# Patient Record
Sex: FEMALE | Race: WHITE | NOT HISPANIC OR LATINO | Employment: FULL TIME | URBAN - METROPOLITAN AREA
[De-identification: names, ages, dates, MRNs, and addresses within clinical notes are randomized per-mention and may not be internally consistent; named-entity substitution may affect disease eponyms.]

---

## 2019-09-03 RX ORDER — LANSOPRAZOLE 30 MG/1
30 CAPSULE, DELAYED RELEASE ORAL DAILY
Qty: 90 CAPSULE | Refills: 0 | Status: SHIPPED | OUTPATIENT
Start: 2019-09-03 | End: 2020-03-02

## 2019-11-25 RX ORDER — ONDANSETRON 4 MG/1
4 TABLET, FILM COATED ORAL EVERY 8 HOURS PRN
Qty: 30 TABLET | Refills: 1 | Status: SHIPPED | OUTPATIENT
Start: 2019-11-25 | End: 2019-12-05

## 2020-01-13 ENCOUNTER — OFFICE VISIT (OUTPATIENT)
Dept: FAMILY MEDICINE CLINIC | Facility: CLINIC | Age: 50
End: 2020-01-13

## 2020-01-13 VITALS
HEIGHT: 65 IN | DIASTOLIC BLOOD PRESSURE: 82 MMHG | OXYGEN SATURATION: 98 % | HEART RATE: 106 BPM | BODY MASS INDEX: 39.09 KG/M2 | TEMPERATURE: 98.9 F | WEIGHT: 234.6 LBS | SYSTOLIC BLOOD PRESSURE: 158 MMHG

## 2020-01-13 DIAGNOSIS — E06.3 HYPOTHYROIDISM DUE TO HASHIMOTO'S THYROIDITIS: ICD-10-CM

## 2020-01-13 DIAGNOSIS — G43.109 MIGRAINE WITH AURA AND WITHOUT STATUS MIGRAINOSUS, NOT INTRACTABLE: ICD-10-CM

## 2020-01-13 DIAGNOSIS — F43.22 ADJUSTMENT DISORDER WITH ANXIOUS MOOD: ICD-10-CM

## 2020-01-13 DIAGNOSIS — K21.9 GASTROESOPHAGEAL REFLUX DISEASE, ESOPHAGITIS PRESENCE NOT SPECIFIED: ICD-10-CM

## 2020-01-13 DIAGNOSIS — I10 ESSENTIAL HYPERTENSION: ICD-10-CM

## 2020-01-13 DIAGNOSIS — E03.8 HYPOTHYROIDISM DUE TO HASHIMOTO'S THYROIDITIS: ICD-10-CM

## 2020-01-13 DIAGNOSIS — Z79.899 ENCOUNTER FOR LONG-TERM (CURRENT) USE OF MEDICATIONS: ICD-10-CM

## 2020-01-13 DIAGNOSIS — I10 ESSENTIAL HYPERTENSION: Primary | ICD-10-CM

## 2020-01-13 PROBLEM — G43.909 HEADACHE, MIGRAINE: Status: ACTIVE | Noted: 2020-01-13

## 2020-01-13 PROBLEM — E03.9 HYPOTHYROIDISM: Status: ACTIVE | Noted: 2020-01-13

## 2020-01-13 LAB
BILIRUB BLD-MCNC: NEGATIVE MG/DL
GLUCOSE UR STRIP-MCNC: NEGATIVE MG/DL
KETONES UR QL: NEGATIVE
LEUKOCYTE EST, POC: NEGATIVE
NITRITE UR-MCNC: NEGATIVE MG/ML
PH UR: 6 [PH] (ref 5–8)
PROT UR STRIP-MCNC: ABNORMAL MG/DL
RBC # UR STRIP: NEGATIVE /UL
SP GR UR: 1.03 (ref 1–1.03)
UROBILINOGEN UR QL: NORMAL

## 2020-01-13 PROCEDURE — 99214 OFFICE O/P EST MOD 30 MIN: CPT | Performed by: FAMILY MEDICINE

## 2020-01-13 PROCEDURE — 93000 ELECTROCARDIOGRAM COMPLETE: CPT | Performed by: FAMILY MEDICINE

## 2020-01-13 PROCEDURE — 81003 URINALYSIS AUTO W/O SCOPE: CPT | Performed by: FAMILY MEDICINE

## 2020-01-13 RX ORDER — AMITRIPTYLINE HYDROCHLORIDE 50 MG/1
TABLET, FILM COATED ORAL
Refills: 1 | COMMUNITY
Start: 2019-11-15 | End: 2020-01-13 | Stop reason: SDUPTHER

## 2020-01-13 RX ORDER — HYDROXYZINE HYDROCHLORIDE 25 MG/1
25 TABLET, FILM COATED ORAL EVERY 8 HOURS PRN
Qty: 90 TABLET | Refills: 3 | Status: SHIPPED | OUTPATIENT
Start: 2020-01-13 | End: 2020-12-16 | Stop reason: SDUPTHER

## 2020-01-13 RX ORDER — AMITRIPTYLINE HYDROCHLORIDE 50 MG/1
50 TABLET, FILM COATED ORAL NIGHTLY
Qty: 90 TABLET | Refills: 3 | Status: SHIPPED | OUTPATIENT
Start: 2020-01-13 | End: 2021-03-18 | Stop reason: SDUPTHER

## 2020-01-13 RX ORDER — ESCITALOPRAM OXALATE 10 MG/1
10 TABLET ORAL DAILY
Qty: 90 TABLET | Refills: 3 | Status: SHIPPED | OUTPATIENT
Start: 2020-01-13 | End: 2020-11-30

## 2020-01-13 RX ORDER — THIAMINE HCL 100 MG
TABLET ORAL 2 TIMES DAILY
COMMUNITY
End: 2020-11-03

## 2020-01-13 RX ORDER — HYDROCHLOROTHIAZIDE 25 MG/1
25 TABLET ORAL DAILY
Qty: 30 TABLET | Refills: 1 | Status: SHIPPED | OUTPATIENT
Start: 2020-01-13 | End: 2020-03-13

## 2020-01-13 RX ORDER — HYDROCHLOROTHIAZIDE 25 MG/1
25 TABLET ORAL DAILY
Qty: 90 TABLET | OUTPATIENT
Start: 2020-01-13

## 2020-01-13 RX ORDER — ONDANSETRON 4 MG/1
TABLET, FILM COATED ORAL
COMMUNITY
Start: 2020-01-03 | End: 2020-03-06

## 2020-01-13 NOTE — PATIENT INSTRUCTIONS
"Surveillance labs were obtained today and any medication changes will be made based on lab results and will be called to the patient later this week.    I have started the patient on hydrochlorothiazide for the essential hypertension today.  Surveillance labs were obtained and follow-up will be in 1 month.    DASH Eating Plan  DASH stands for \"Dietary Approaches to Stop Hypertension.\" The DASH eating plan is a healthy eating plan that has been shown to reduce high blood pressure (hypertension). It may also reduce your risk for type 2 diabetes, heart disease, and stroke. The DASH eating plan may also help with weight loss.  What are tips for following this plan?    General guidelines  · Avoid eating more than 2,300 mg (milligrams) of salt (sodium) a day. If you have hypertension, you may need to reduce your sodium intake to 1,500 mg a day.  · Limit alcohol intake to no more than 1 drink a day for nonpregnant women and 2 drinks a day for men. One drink equals 12 oz of beer, 5 oz of wine, or 1½ oz of hard liquor.  · Work with your health care provider to maintain a healthy body weight or to lose weight. Ask what an ideal weight is for you.  · Get at least 30 minutes of exercise that causes your heart to beat faster (aerobic exercise) most days of the week. Activities may include walking, swimming, or biking.  · Work with your health care provider or diet and nutrition specialist (dietitian) to adjust your eating plan to your individual calorie needs.  Reading food labels    · Check food labels for the amount of sodium per serving. Choose foods with less than 5 percent of the Daily Value of sodium. Generally, foods with less than 300 mg of sodium per serving fit into this eating plan.  · To find whole grains, look for the word \"whole\" as the first word in the ingredient list.  Shopping  · Buy products labeled as \"low-sodium\" or \"no salt added.\"  · Buy fresh foods. Avoid canned foods and premade or frozen " meals.  Cooking  · Avoid adding salt when cooking. Use salt-free seasonings or herbs instead of table salt or sea salt. Check with your health care provider or pharmacist before using salt substitutes.  · Do not dolan foods. Cook foods using healthy methods such as baking, boiling, grilling, and broiling instead.  · Cook with heart-healthy oils, such as olive, canola, soybean, or sunflower oil.  Meal planning  · Eat a balanced diet that includes:  ? 5 or more servings of fruits and vegetables each day. At each meal, try to fill half of your plate with fruits and vegetables.  ? Up to 6-8 servings of whole grains each day.  ? Less than 6 oz of lean meat, poultry, or fish each day. A 3-oz serving of meat is about the same size as a deck of cards. One egg equals 1 oz.  ? 2 servings of low-fat dairy each day.  ? A serving of nuts, seeds, or beans 5 times each week.  ? Heart-healthy fats. Healthy fats called Omega-3 fatty acids are found in foods such as flaxseeds and coldwater fish, like sardines, salmon, and mackerel.  · Limit how much you eat of the following:  ? Canned or prepackaged foods.  ? Food that is high in trans fat, such as fried foods.  ? Food that is high in saturated fat, such as fatty meat.  ? Sweets, desserts, sugary drinks, and other foods with added sugar.  ? Full-fat dairy products.  · Do not salt foods before eating.  · Try to eat at least 2 vegetarian meals each week.  · Eat more home-cooked food and less restaurant, buffet, and fast food.  · When eating at a restaurant, ask that your food be prepared with less salt or no salt, if possible.  What foods are recommended?  The items listed may not be a complete list. Talk with your dietitian about what dietary choices are best for you.  Grains  Whole-grain or whole-wheat bread. Whole-grain or whole-wheat pasta. Brown rice. Oatmeal. Quinoa. Bulgur. Whole-grain and low-sodium cereals. Belkys bread. Low-fat, low-sodium crackers. Whole-wheat flour  tortillas.  Vegetables  Fresh or frozen vegetables (raw, steamed, roasted, or grilled). Low-sodium or reduced-sodium tomato and vegetable juice. Low-sodium or reduced-sodium tomato sauce and tomato paste. Low-sodium or reduced-sodium canned vegetables.  Fruits  All fresh, dried, or frozen fruit. Canned fruit in natural juice (without added sugar).  Meat and other protein foods  Skinless chicken or turkey. Ground chicken or turkey. Pork with fat trimmed off. Fish and seafood. Egg whites. Dried beans, peas, or lentils. Unsalted nuts, nut butters, and seeds. Unsalted canned beans. Lean cuts of beef with fat trimmed off. Low-sodium, lean deli meat.  Dairy  Low-fat (1%) or fat-free (skim) milk. Fat-free, low-fat, or reduced-fat cheeses. Nonfat, low-sodium ricotta or cottage cheese. Low-fat or nonfat yogurt. Low-fat, low-sodium cheese.  Fats and oils  Soft margarine without trans fats. Vegetable oil. Low-fat, reduced-fat, or light mayonnaise and salad dressings (reduced-sodium). Canola, safflower, olive, soybean, and sunflower oils. Avocado.  Seasoning and other foods  Herbs. Spices. Seasoning mixes without salt. Unsalted popcorn and pretzels. Fat-free sweets.  What foods are not recommended?  The items listed may not be a complete list. Talk with your dietitian about what dietary choices are best for you.  Grains  Baked goods made with fat, such as croissants, muffins, or some breads. Dry pasta or rice meal packs.  Vegetables  Creamed or fried vegetables. Vegetables in a cheese sauce. Regular canned vegetables (not low-sodium or reduced-sodium). Regular canned tomato sauce and paste (not low-sodium or reduced-sodium). Regular tomato and vegetable juice (not low-sodium or reduced-sodium). Pickles. Olives.  Fruits  Canned fruit in a light or heavy syrup. Fried fruit. Fruit in cream or butter sauce.  Meat and other protein foods  Fatty cuts of meat. Ribs. Fried meat. Ferrari. Sausage. Bologna and other processed lunch meats.  English Salami. Fatback. Hotdogs. Bratwurst. Salted nuts and seeds. Canned beans with added salt. Canned or smoked fish. Whole eggs or egg yolks. Chicken or turkey with skin.  Dairy  Whole or 2% milk, cream, and half-and-half. Whole or full-fat cream cheese. Whole-fat or sweetened yogurt. Full-fat cheese. Nondairy creamers. Whipped toppings. Processed cheese and cheese spreads.  Fats and oils  Butter. Stick margarine. Lard. Shortening. Ghee. Ferrari fat. Tropical oils, such as coconut, palm kernel, or palm oil.  Seasoning and other foods  Salted popcorn and pretzels. Onion salt, garlic salt, seasoned salt, table salt, and sea salt. Worcestershire sauce. Tartar sauce. Barbecue sauce. Teriyaki sauce. Soy sauce, including reduced-sodium. Steak sauce. Canned and packaged gravies. Fish sauce. Oyster sauce. Cocktail sauce. Horseradish that you find on the shelf. Ketchup. Mustard. Meat flavorings and tenderizers. Bouillon cubes. Hot sauce and Tabasco sauce. Premade or packaged marinades. Premade or packaged taco seasonings. Relishes. Regular salad dressings.  Where to find more information:  · National Heart, Lung, and Blood [place]: www.nhlbi.nih.gov  · American Heart Association: www.heart.org  Summary  · The DASH eating plan is a healthy eating plan that has been shown to reduce high blood pressure (hypertension). It may also reduce your risk for type 2 diabetes, heart disease, and stroke.  · With the DASH eating plan, you should limit salt (sodium) intake to 2,300 mg a day. If you have hypertension, you may need to reduce your sodium intake to 1,500 mg a day.  · When on the DASH eating plan, aim to eat more fresh fruits and vegetables, whole grains, lean proteins, low-fat dairy, and heart-healthy fats.  · Work with your health care provider or diet and nutrition specialist (dietitian) to adjust your eating plan to your individual calorie needs.  This information is not intended to replace advice given to you by your health  care provider. Make sure you discuss any questions you have with your health care provider.  Document Released: 12/06/2012 Document Revised: 12/11/2017 Document Reviewed: 12/11/2017  [employer] Interactive Patient Education © 2019 [employer]

## 2020-01-13 NOTE — PROGRESS NOTES
Subjective   Rosa Melgoza is a 49 y.o. female.     Chief Complaint   Patient presents with   • Hypothyroidism       Patient is here to follow-up on her chronic health conditions:    Migraine with aura.  Patient states that overall the Aimovig has reduced the number of migraines a month but she still is suffering with pretty severe migraine headaches.  Currently she has a headache today that is been there for 5 days.  She also takes amitriptyline 50 mg at bedtime.  And Fioricet as needed.  She has also started B2 and D3 vitamins to help.    GERD.  The patient takes lansoprazole 30 mg daily.  She denies any symptoms of reflux currently.  She denies any side effects of medication.    Hypothyroidism.  The patient currently takes levothyroxine 75 mcg daily.  She denies any hyper or hypothyroid symptoms at this time.    Elevated blood pressure reading.  The patient has had several elevated blood pressure readings over the past 6 to 9 months.  I believe that some of her headaches may be worsened by her blood pressure.  I think it is time to start treatment for high blood pressure.  Patient is in agreement.       Review of Systems   Constitutional: Negative for activity change, chills, fatigue and fever.   HENT: Negative for hearing loss, swollen glands, tinnitus and trouble swallowing.    Eyes: Negative for pain and visual disturbance.   Respiratory: Negative for cough and shortness of breath.    Cardiovascular: Negative for chest pain, palpitations and leg swelling.   Gastrointestinal: Negative for diarrhea and nausea.   Endocrine: Negative for polydipsia and polyuria.   Genitourinary: Negative for difficulty urinating and urinary incontinence.   Musculoskeletal: Negative for arthralgias, gait problem and joint swelling.   Skin: Negative for rash.   Allergic/Immunologic: Negative for immunocompromised state.   Neurological: Positive for headache. Negative for dizziness and light-headedness.   Hematological: Negative for  adenopathy. Does not bruise/bleed easily.   Psychiatric/Behavioral: Negative for dysphoric mood and sleep disturbance.       The following portions of the patient's history were reviewed and updated as appropriate: allergies, current medications, past family history, past medical history, past social history, past surgical history and problem list.    Past Medical History:   Diagnosis Date   • Disease of thyroid gland    • Elevated blood pressure reading without diagnosis of hypertension    • GERD (gastroesophageal reflux disease)    • Headache, migraine    • History of sore throat    • Hypothyroidism    • Migraine    • Need for DTaP and Hib vaccine     History of    • Vaginal yeast infection        Past Surgical History:   Procedure Laterality Date   • BREAST BIOPSY     •  SECTION     • CHOLECYSTECTOMY         Family History   Problem Relation Age of Onset   • Breast cancer Mother    • Hypertension Father    • Heart disease Father    • Breast cancer Other    • Alcohol abuse Other    • Depression Other    • Anxiety disorder Other    • Hypertension Other    • Other Other         Pure hypercholesterolemia and esophageal reflux   • Lung cancer Other        Social History     Socioeconomic History   • Marital status:      Spouse name: Not on file   • Number of children: Not on file   • Years of education: Not on file   • Highest education level: Not on file   Tobacco Use   • Smoking status: Former Smoker     Types: Cigarettes     Last attempt to quit: 3/1/2011     Years since quittin.8   • Smokeless tobacco: Never Used   Substance and Sexual Activity   • Alcohol use: Yes     Comment: Occasional   • Drug use: Never   • Sexual activity: Defer         Current Outpatient Medications:   •  amitriptyline (ELAVIL) 50 MG tablet, Take 1 tablet by mouth Every Night., Disp: 90 tablet, Rfl: 3  •  butalbital-acetaminophen-caffeine (FIORICET, ESGIC) -40 MG per tablet, TK 1 TO 2 TS PO Q 4 H PRN, Disp: , Rfl:  "3  •  Cholecalciferol (VITAMIN D3) 125 MCG (5000 UT) tablet tablet, Take 5,000 Units by mouth Daily., Disp: , Rfl:   •  Erenumab-aooe (AIMOVIG) 140 MG/ML solution auto-injector, Inject 1 mL under the skin into the appropriate area as directed Every 30 (Thirty) Days., Disp: 1.12 mL, Rfl: 7  •  escitalopram (LEXAPRO) 10 MG tablet, Take 1 tablet by mouth Daily., Disp: 90 tablet, Rfl: 3  •  hydrOXYzine (ATARAX) 25 MG tablet, Take 1 tablet by mouth Every 8 (Eight) Hours As Needed for Anxiety., Disp: 90 tablet, Rfl: 3  •  lansoprazole (PREVACID) 30 MG capsule, Take 1 capsule by mouth Daily., Disp: 90 capsule, Rfl: 0  •  levocetirizine (XYZAL) 5 MG tablet, TK 1 T PO QPM, Disp: , Rfl: 2  •  levothyroxine (SYNTHROID, LEVOTHROID) 75 MCG tablet, Take 75 mcg by mouth Daily., Disp: , Rfl: 1  •  ondansetron (ZOFRAN) 4 MG tablet, TK 1 T PO Q 8 H PRF NAUSEA OR VOM, Disp: , Rfl:   •  Riboflavin (B2) 100 MG tablet, Take  by mouth 2 (Two) Times a Day., Disp: , Rfl:   •  hydroCHLOROthiazide (HYDRODIURIL) 25 MG tablet, Take 1 tablet by mouth Daily., Disp: 30 tablet, Rfl: 1    Objective     Vitals:    01/13/20 0844   BP: 158/82   Pulse: 106   Temp: 98.9 °F (37.2 °C)   SpO2: 98%   Weight: 106 kg (234 lb 9.6 oz)   Height: 165.1 cm (65\")       Body mass index is 39.04 kg/m².    No components found for: 2D    Physical Exam   Constitutional: She is oriented to person, place, and time. She appears well-developed and well-nourished. She is obese.  HENT:   Head: Normocephalic and atraumatic.   Eyes: Conjunctivae are normal.   Neck: Normal range of motion. Neck supple.   Cardiovascular: Normal rate, regular rhythm, normal heart sounds and intact distal pulses.   Pulmonary/Chest: Effort normal and breath sounds normal.   Abdominal: Soft. Bowel sounds are normal.   Musculoskeletal: Normal range of motion. She exhibits no edema.   Neurological: She is alert and oriented to person, place, and time.   Skin: Skin is warm and dry. Capillary refill takes " less than 2 seconds. No rash noted.   Psychiatric: She has a normal mood and affect. Her behavior is normal. Judgment and thought content normal.   Nursing note and vitals reviewed.        ECG 12 Lead  Date/Time: 1/13/2020 9:04 AM  Performed by: Kathie Romeo DO  Authorized by: Kathie Romeo DO   Previous ECG: no previous ECG available  Rhythm: sinus rhythm  Rate: normal  Conduction: conduction normal  ST Segments: ST segments normal  T Waves: T waves normal  QRS axis: normal  Other: no other findings    Clinical impression: normal ECG            Assessment/Plan   Rosa was seen today for hypothyroidism.    Diagnoses and all orders for this visit:    Essential hypertension  -     hydroCHLOROthiazide (HYDRODIURIL) 25 MG tablet; Take 1 tablet by mouth Daily.  -     Comprehensive Metabolic Panel  -     Lipid Panel  -     ECG 12 Lead  -     POC Urinalysis Dipstick, Automated    Hypothyroidism due to Hashimoto's thyroiditis  -     TSH    Gastroesophageal reflux disease, esophagitis presence not specified    Migraine with aura and without status migrainosus, not intractable  -     amitriptyline (ELAVIL) 50 MG tablet; Take 1 tablet by mouth Every Night.  -     Erenumab-aooe (AIMOVIG) 140 MG/ML solution auto-injector; Inject 1 mL under the skin into the appropriate area as directed Every 30 (Thirty) Days.    Adjustment disorder with anxious mood  -     escitalopram (LEXAPRO) 10 MG tablet; Take 1 tablet by mouth Daily.  -     hydrOXYzine (ATARAX) 25 MG tablet; Take 1 tablet by mouth Every 8 (Eight) Hours As Needed for Anxiety.    Encounter for long-term (current) use of medications  -     Comprehensive Metabolic Panel  -     CBC & Differential  -     TSH  -     Lipid Panel        Patient Instructions   Surveillance labs were obtained today and any medication changes will be made based on lab results and will be called to the patient later this week.    I have started the patient on hydrochlorothiazide for the  "essential hypertension today.  Surveillance labs were obtained and follow-up will be in 1 month.    DASH Eating Plan  DASH stands for \"Dietary Approaches to Stop Hypertension.\" The DASH eating plan is a healthy eating plan that has been shown to reduce high blood pressure (hypertension). It may also reduce your risk for type 2 diabetes, heart disease, and stroke. The DASH eating plan may also help with weight loss.  What are tips for following this plan?    General guidelines  · Avoid eating more than 2,300 mg (milligrams) of salt (sodium) a day. If you have hypertension, you may need to reduce your sodium intake to 1,500 mg a day.  · Limit alcohol intake to no more than 1 drink a day for nonpregnant women and 2 drinks a day for men. One drink equals 12 oz of beer, 5 oz of wine, or 1½ oz of hard liquor.  · Work with your health care provider to maintain a healthy body weight or to lose weight. Ask what an ideal weight is for you.  · Get at least 30 minutes of exercise that causes your heart to beat faster (aerobic exercise) most days of the week. Activities may include walking, swimming, or biking.  · Work with your health care provider or diet and nutrition specialist (dietitian) to adjust your eating plan to your individual calorie needs.  Reading food labels    · Check food labels for the amount of sodium per serving. Choose foods with less than 5 percent of the Daily Value of sodium. Generally, foods with less than 300 mg of sodium per serving fit into this eating plan.  · To find whole grains, look for the word \"whole\" as the first word in the ingredient list.  Shopping  · Buy products labeled as \"low-sodium\" or \"no salt added.\"  · Buy fresh foods. Avoid canned foods and premade or frozen meals.  Cooking  · Avoid adding salt when cooking. Use salt-free seasonings or herbs instead of table salt or sea salt. Check with your health care provider or pharmacist before using salt substitutes.  · Do not dolan foods. " Cook foods using healthy methods such as baking, boiling, grilling, and broiling instead.  · Cook with heart-healthy oils, such as olive, canola, soybean, or sunflower oil.  Meal planning  · Eat a balanced diet that includes:  ? 5 or more servings of fruits and vegetables each day. At each meal, try to fill half of your plate with fruits and vegetables.  ? Up to 6-8 servings of whole grains each day.  ? Less than 6 oz of lean meat, poultry, or fish each day. A 3-oz serving of meat is about the same size as a deck of cards. One egg equals 1 oz.  ? 2 servings of low-fat dairy each day.  ? A serving of nuts, seeds, or beans 5 times each week.  ? Heart-healthy fats. Healthy fats called Omega-3 fatty acids are found in foods such as flaxseeds and coldwater fish, like sardines, salmon, and mackerel.  · Limit how much you eat of the following:  ? Canned or prepackaged foods.  ? Food that is high in trans fat, such as fried foods.  ? Food that is high in saturated fat, such as fatty meat.  ? Sweets, desserts, sugary drinks, and other foods with added sugar.  ? Full-fat dairy products.  · Do not salt foods before eating.  · Try to eat at least 2 vegetarian meals each week.  · Eat more home-cooked food and less restaurant, buffet, and fast food.  · When eating at a restaurant, ask that your food be prepared with less salt or no salt, if possible.  What foods are recommended?  The items listed may not be a complete list. Talk with your dietitian about what dietary choices are best for you.  Grains  Whole-grain or whole-wheat bread. Whole-grain or whole-wheat pasta. Brown rice. Oatmeal. Quinoa. Bulgur. Whole-grain and low-sodium cereals. Belkys bread. Low-fat, low-sodium crackers. Whole-wheat flour tortillas.  Vegetables  Fresh or frozen vegetables (raw, steamed, roasted, or grilled). Low-sodium or reduced-sodium tomato and vegetable juice. Low-sodium or reduced-sodium tomato sauce and tomato paste. Low-sodium or reduced-sodium  canned vegetables.  Fruits  All fresh, dried, or frozen fruit. Canned fruit in natural juice (without added sugar).  Meat and other protein foods  Skinless chicken or turkey. Ground chicken or turkey. Pork with fat trimmed off. Fish and seafood. Egg whites. Dried beans, peas, or lentils. Unsalted nuts, nut butters, and seeds. Unsalted canned beans. Lean cuts of beef with fat trimmed off. Low-sodium, lean deli meat.  Dairy  Low-fat (1%) or fat-free (skim) milk. Fat-free, low-fat, or reduced-fat cheeses. Nonfat, low-sodium ricotta or cottage cheese. Low-fat or nonfat yogurt. Low-fat, low-sodium cheese.  Fats and oils  Soft margarine without trans fats. Vegetable oil. Low-fat, reduced-fat, or light mayonnaise and salad dressings (reduced-sodium). Canola, safflower, olive, soybean, and sunflower oils. Avocado.  Seasoning and other foods  Herbs. Spices. Seasoning mixes without salt. Unsalted popcorn and pretzels. Fat-free sweets.  What foods are not recommended?  The items listed may not be a complete list. Talk with your dietitian about what dietary choices are best for you.  Grains  Baked goods made with fat, such as croissants, muffins, or some breads. Dry pasta or rice meal packs.  Vegetables  Creamed or fried vegetables. Vegetables in a cheese sauce. Regular canned vegetables (not low-sodium or reduced-sodium). Regular canned tomato sauce and paste (not low-sodium or reduced-sodium). Regular tomato and vegetable juice (not low-sodium or reduced-sodium). Pickles. Olives.  Fruits  Canned fruit in a light or heavy syrup. Fried fruit. Fruit in cream or butter sauce.  Meat and other protein foods  Fatty cuts of meat. Ribs. Fried meat. Ferrari. Sausage. Bologna and other processed lunch meats. Salami. Fatback. Hotdogs. Bratwurst. Salted nuts and seeds. Canned beans with added salt. Canned or smoked fish. Whole eggs or egg yolks. Chicken or turkey with skin.  Dairy  Whole or 2% milk, cream, and half-and-half. Whole or  full-fat cream cheese. Whole-fat or sweetened yogurt. Full-fat cheese. Nondairy creamers. Whipped toppings. Processed cheese and cheese spreads.  Fats and oils  Butter. Stick margarine. Lard. Shortening. Ghee. Ferrari fat. Tropical oils, such as coconut, palm kernel, or palm oil.  Seasoning and other foods  Salted popcorn and pretzels. Onion salt, garlic salt, seasoned salt, table salt, and sea salt. Worcestershire sauce. Tartar sauce. Barbecue sauce. Teriyaki sauce. Soy sauce, including reduced-sodium. Steak sauce. Canned and packaged gravies. Fish sauce. Oyster sauce. Cocktail sauce. Horseradish that you find on the shelf. Ketchup. Mustard. Meat flavorings and tenderizers. Bouillon cubes. Hot sauce and Tabasco sauce. Premade or packaged marinades. Premade or packaged taco seasonings. Relishes. Regular salad dressings.  Where to find more information:  · National Heart, Lung, and Blood [place]: www.nhlbi.nih.gov  · American Heart Association: www.heart.org  Summary  · The DASH eating plan is a healthy eating plan that has been shown to reduce high blood pressure (hypertension). It may also reduce your risk for type 2 diabetes, heart disease, and stroke.  · With the DASH eating plan, you should limit salt (sodium) intake to 2,300 mg a day. If you have hypertension, you may need to reduce your sodium intake to 1,500 mg a day.  · When on the DASH eating plan, aim to eat more fresh fruits and vegetables, whole grains, lean proteins, low-fat dairy, and heart-healthy fats.  · Work with your health care provider or diet and nutrition specialist (dietitian) to adjust your eating plan to your individual calorie needs.  This information is not intended to replace advice given to you by your health care provider. Make sure you discuss any questions you have with your health care provider.  Document Released: 12/06/2012 Document Revised: 12/11/2017 Document Reviewed: 12/11/2017  [employer] Interactive Patient Education © 2019  [employer]

## 2020-01-14 LAB
ALBUMIN SERPL-MCNC: 4 G/DL (ref 3.5–5.2)
ALBUMIN/GLOB SERPL: 1.6 G/DL
ALP SERPL-CCNC: 81 U/L (ref 39–117)
ALT SERPL-CCNC: 15 U/L (ref 1–33)
AST SERPL-CCNC: 9 U/L (ref 1–32)
BASOPHILS # BLD AUTO: 0.03 10*3/MM3 (ref 0–0.2)
BASOPHILS NFR BLD AUTO: 0.4 % (ref 0–1.5)
BILIRUB SERPL-MCNC: 0.2 MG/DL (ref 0.2–1.2)
BUN SERPL-MCNC: 11 MG/DL (ref 6–20)
BUN/CREAT SERPL: 15.1 (ref 7–25)
CALCIUM SERPL-MCNC: 8.5 MG/DL (ref 8.6–10.5)
CHLORIDE SERPL-SCNC: 100 MMOL/L (ref 98–107)
CHOLEST SERPL-MCNC: 214 MG/DL (ref 0–200)
CO2 SERPL-SCNC: 30.2 MMOL/L (ref 22–29)
CREAT SERPL-MCNC: 0.73 MG/DL (ref 0.57–1)
EOSINOPHIL # BLD AUTO: 0.13 10*3/MM3 (ref 0–0.4)
EOSINOPHIL NFR BLD AUTO: 1.9 % (ref 0.3–6.2)
ERYTHROCYTE [DISTWIDTH] IN BLOOD BY AUTOMATED COUNT: 12.8 % (ref 12.3–15.4)
GLOBULIN SER CALC-MCNC: 2.5 GM/DL
GLUCOSE SERPL-MCNC: 86 MG/DL (ref 65–99)
HCT VFR BLD AUTO: 35.2 % (ref 34–46.6)
HDLC SERPL-MCNC: 41 MG/DL (ref 40–60)
HGB BLD-MCNC: 12.2 G/DL (ref 12–15.9)
IMM GRANULOCYTES # BLD AUTO: 0.02 10*3/MM3 (ref 0–0.05)
IMM GRANULOCYTES NFR BLD AUTO: 0.3 % (ref 0–0.5)
LDLC SERPL CALC-MCNC: 128 MG/DL (ref 0–100)
LYMPHOCYTES # BLD AUTO: 1.75 10*3/MM3 (ref 0.7–3.1)
LYMPHOCYTES NFR BLD AUTO: 25.4 % (ref 19.6–45.3)
MCH RBC QN AUTO: 29.3 PG (ref 26.6–33)
MCHC RBC AUTO-ENTMCNC: 34.7 G/DL (ref 31.5–35.7)
MCV RBC AUTO: 84.4 FL (ref 79–97)
MONOCYTES # BLD AUTO: 0.43 10*3/MM3 (ref 0.1–0.9)
MONOCYTES NFR BLD AUTO: 6.2 % (ref 5–12)
NEUTROPHILS # BLD AUTO: 4.53 10*3/MM3 (ref 1.7–7)
NEUTROPHILS NFR BLD AUTO: 65.8 % (ref 42.7–76)
NRBC BLD AUTO-RTO: 0 /100 WBC (ref 0–0.2)
PLATELET # BLD AUTO: 199 10*3/MM3 (ref 140–450)
POTASSIUM SERPL-SCNC: 3.8 MMOL/L (ref 3.5–5.2)
PROT SERPL-MCNC: 6.5 G/DL (ref 6–8.5)
RBC # BLD AUTO: 4.17 10*6/MM3 (ref 3.77–5.28)
SODIUM SERPL-SCNC: 139 MMOL/L (ref 136–145)
TRIGL SERPL-MCNC: 223 MG/DL (ref 0–150)
TSH SERPL DL<=0.005 MIU/L-ACNC: 2.77 UIU/ML (ref 0.27–4.2)
VLDLC SERPL CALC-MCNC: 44.6 MG/DL (ref 5–40)
WBC # BLD AUTO: 6.89 10*3/MM3 (ref 3.4–10.8)

## 2020-01-17 RX ORDER — LEVOTHYROXINE SODIUM 0.07 MG/1
75 TABLET ORAL DAILY
Qty: 90 TABLET | Refills: 0 | Status: SHIPPED | OUTPATIENT
Start: 2020-01-17 | End: 2020-05-07

## 2020-01-28 ENCOUNTER — TELEPHONE (OUTPATIENT)
Dept: FAMILY MEDICINE CLINIC | Facility: CLINIC | Age: 50
End: 2020-01-28

## 2020-01-28 RX ORDER — PROMETHAZINE HYDROCHLORIDE 25 MG/1
25 TABLET ORAL EVERY 6 HOURS PRN
Qty: 60 TABLET | Refills: 1 | Status: SHIPPED | OUTPATIENT
Start: 2020-01-28 | End: 2020-08-13

## 2020-01-28 NOTE — TELEPHONE ENCOUNTER
Pt called this AM she has been having migranes that cause nausea, she went to take a zofran while she was brushing her teeth and knocked the whole bottle in the sink.  Her insurance wont cover a refill because its too soon. She was wondering if you would call her in a Script for phenagran to get her thru until its time for a refill of the zofran.  She also stated that you had her on magnesium in the past and is willing to try it again but was wondering about dosage.  Please advise

## 2020-01-28 NOTE — TELEPHONE ENCOUNTER
I have sent in the prescription for Phenergan as requested.  I am sorry I do not recall having the patient on magnesium in the past.  Maybe we can discuss this at a future appointment.

## 2020-02-13 ENCOUNTER — TELEPHONE (OUTPATIENT)
Dept: FAMILY MEDICINE CLINIC | Facility: CLINIC | Age: 50
End: 2020-02-13

## 2020-02-13 DIAGNOSIS — G43.109 MIGRAINE WITH AURA AND WITHOUT STATUS MIGRAINOSUS, NOT INTRACTABLE: Primary | ICD-10-CM

## 2020-02-14 RX ORDER — BUTALBITAL, ACETAMINOPHEN AND CAFFEINE 50; 325; 40 MG/1; MG/1; MG/1
2 TABLET ORAL EVERY 6 HOURS PRN
Qty: 180 TABLET | Refills: 1 | Status: SHIPPED | OUTPATIENT
Start: 2020-02-14 | End: 2020-08-13

## 2020-03-02 RX ORDER — LANSOPRAZOLE 30 MG/1
30 CAPSULE, DELAYED RELEASE ORAL DAILY
Qty: 90 CAPSULE | Refills: 0 | Status: SHIPPED | OUTPATIENT
Start: 2020-03-02 | End: 2020-07-06

## 2020-03-07 RX ORDER — ONDANSETRON 4 MG/1
TABLET, FILM COATED ORAL
Qty: 30 TABLET | Refills: 0 | Status: SHIPPED | OUTPATIENT
Start: 2020-03-07 | End: 2020-05-07

## 2020-03-13 DIAGNOSIS — I10 ESSENTIAL HYPERTENSION: ICD-10-CM

## 2020-03-13 RX ORDER — HYDROCHLOROTHIAZIDE 25 MG/1
25 TABLET ORAL DAILY
Qty: 90 TABLET | Refills: 1 | Status: SHIPPED | OUTPATIENT
Start: 2020-03-13 | End: 2020-06-16 | Stop reason: DRUGHIGH

## 2020-03-13 RX ORDER — HYDROCHLOROTHIAZIDE 25 MG/1
25 TABLET ORAL DAILY
Qty: 30 TABLET | Refills: 1 | Status: SHIPPED | OUTPATIENT
Start: 2020-03-13 | End: 2020-03-13

## 2020-05-06 ENCOUNTER — TELEMEDICINE (OUTPATIENT)
Dept: FAMILY MEDICINE CLINIC | Facility: CLINIC | Age: 50
End: 2020-05-06

## 2020-05-06 ENCOUNTER — E-VISIT (OUTPATIENT)
Dept: FAMILY MEDICINE CLINIC | Facility: CLINIC | Age: 50
End: 2020-05-06

## 2020-05-06 DIAGNOSIS — G43.109 MIGRAINE WITH AURA AND WITHOUT STATUS MIGRAINOSUS, NOT INTRACTABLE: Primary | ICD-10-CM

## 2020-05-06 DIAGNOSIS — J30.1 SEASONAL ALLERGIC RHINITIS DUE TO POLLEN: ICD-10-CM

## 2020-05-06 PROCEDURE — 99213 OFFICE O/P EST LOW 20 MIN: CPT | Performed by: FAMILY MEDICINE

## 2020-05-06 RX ORDER — FLUTICASONE PROPIONATE 50 MCG
2 SPRAY, SUSPENSION (ML) NASAL DAILY
COMMUNITY
End: 2020-05-06 | Stop reason: SDUPTHER

## 2020-05-06 RX ORDER — FLUTICASONE PROPIONATE 50 MCG
2 SPRAY, SUSPENSION (ML) NASAL DAILY
Qty: 1 BOTTLE | Refills: 6 | Status: SHIPPED | OUTPATIENT
Start: 2020-05-06 | End: 2020-12-21 | Stop reason: SDUPTHER

## 2020-05-06 NOTE — PROGRESS NOTES
Subjective   Rosa Melgoza is a 49 y.o. female.     Chief Complaint   Patient presents with   • Migraine       Patient has requested a video visit today during the coronavirus pandemic to discuss a new medication for migraines.  The patient states that currently the medication she takes for migraines, Fioricet and Aimovig, are not working as well because of the amount of stress she has been under.  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  I have reviewed the side effect profile as well as the contraindications and cautions and drug interactions of Nurtec.  I do feel that the patient would be a candidate and and willing to try it on her.  Patient denies any new symptoms just more frequent headaches.       Review of Systems   Constitutional: Negative for activity change, chills, fatigue and fever.   HENT: Negative for hearing loss, swollen glands, tinnitus and trouble swallowing.    Eyes: Negative for pain and visual disturbance.   Respiratory: Negative for cough and shortness of breath.    Cardiovascular: Negative for chest pain, palpitations and leg swelling.   Gastrointestinal: Negative for diarrhea and nausea.   Endocrine: Negative for polydipsia and polyuria.   Genitourinary: Negative for difficulty urinating and urinary incontinence.   Musculoskeletal: Negative for arthralgias, gait problem and joint swelling.   Skin: Negative for rash.   Allergic/Immunologic: Negative for immunocompromised state.   Neurological: Negative for dizziness, light-headedness and headache.   Hematological: Negative for adenopathy. Does not bruise/bleed easily.   Psychiatric/Behavioral: Negative for dysphoric mood and sleep disturbance.       The following portions of the patient's history were reviewed and updated as appropriate: allergies, current medications, past family history, past medical history, past social history, past surgical history and problem  list.    Past Medical History:   Diagnosis Date   • Disease of thyroid gland    • Elevated blood pressure reading without diagnosis of hypertension    • GERD (gastroesophageal reflux disease)    • Headache, migraine    • History of sore throat    • Hypothyroidism    • Migraine    • Need for DTaP and Hib vaccine     History of    • Vaginal yeast infection        Past Surgical History:   Procedure Laterality Date   • BREAST BIOPSY     •  SECTION     • CHOLECYSTECTOMY         Family History   Problem Relation Age of Onset   • Breast cancer Mother    • Hypertension Father    • Heart disease Father    • Breast cancer Other    • Alcohol abuse Other    • Depression Other    • Anxiety disorder Other    • Hypertension Other    • Other Other         Pure hypercholesterolemia and esophageal reflux   • Lung cancer Other        Social History     Socioeconomic History   • Marital status:      Spouse name: Not on file   • Number of children: Not on file   • Years of education: Not on file   • Highest education level: Not on file   Tobacco Use   • Smoking status: Former Smoker     Types: Cigarettes     Last attempt to quit: 3/1/2011     Years since quittin.1   • Smokeless tobacco: Never Used   Substance and Sexual Activity   • Alcohol use: Yes     Comment: Occasional   • Drug use: Never   • Sexual activity: Defer         Current Outpatient Medications:   •  amitriptyline (ELAVIL) 50 MG tablet, Take 1 tablet by mouth Every Night., Disp: 90 tablet, Rfl: 3  •  butalbital-acetaminophen-caffeine (FIORICET, ESGIC) -40 MG per tablet, Take 2 tablets by mouth Every 6 (Six) Hours As Needed for Migraine., Disp: 180 tablet, Rfl: 1  •  Cholecalciferol (VITAMIN D3) 125 MCG (5000 UT) tablet tablet, Take 5,000 Units by mouth Daily., Disp: , Rfl:   •  Erenumab-aooe (AIMOVIG) 140 MG/ML solution auto-injector, Inject 1 mL under the skin into the appropriate area as directed Every 30 (Thirty) Days., Disp: 1.12 mL, Rfl: 7  •   escitalopram (LEXAPRO) 10 MG tablet, Take 1 tablet by mouth Daily., Disp: 90 tablet, Rfl: 3  •  fluticasone (FLONASE) 50 MCG/ACT nasal spray, 2 sprays into the nostril(s) as directed by provider Daily., Disp: 1 bottle, Rfl: 6  •  hydroCHLOROthiazide (HYDRODIURIL) 25 MG tablet, TAKE 1 TABLET BY MOUTH DAILY, Disp: 90 tablet, Rfl: 1  •  hydrOXYzine (ATARAX) 25 MG tablet, Take 1 tablet by mouth Every 8 (Eight) Hours As Needed for Anxiety., Disp: 90 tablet, Rfl: 3  •  lansoprazole (PREVACID) 30 MG capsule, TAKE 1 CAPSULE BY MOUTH DAILY, Disp: 90 capsule, Rfl: 0  •  levocetirizine (XYZAL) 5 MG tablet, TK 1 T PO QPM, Disp: , Rfl: 2  •  levothyroxine (SYNTHROID, LEVOTHROID) 75 MCG tablet, Take 1 tablet by mouth Daily., Disp: 90 tablet, Rfl: 0  •  ondansetron (ZOFRAN) 4 MG tablet, TAKE 1 TABLET BY MOUTH EVERY 8 HOURS AS NEEDED FOR NAUSEA OR VOMITING, Disp: 30 tablet, Rfl: 0  •  promethazine (PHENERGAN) 25 MG tablet, Take 1 tablet by mouth Every 6 (Six) Hours As Needed for Nausea or Vomiting., Disp: 60 tablet, Rfl: 1  •  Riboflavin (B2) 100 MG tablet, Take  by mouth 2 (Two) Times a Day., Disp: , Rfl:   •  Rimegepant Sulfate (rimegepant) 75 MG tablet dispersible, Take 1 tablet by mouth Daily As Needed (headache)., Disp: 30 tablet, Rfl: 0    Objective     There were no vitals filed for this visit.    There is no height or weight on file to calculate BMI.    No components found for: 2D    Physical Exam   Constitutional: She is oriented to person, place, and time. She appears well-developed and well-nourished.   HENT:   Head: Normocephalic and atraumatic.   Eyes: Conjunctivae are normal. No scleral icterus.   Neck: Normal range of motion.   Pulmonary/Chest: Effort normal.   Neurological: She is alert and oriented to person, place, and time.   Psychiatric: She has a normal mood and affect. Her behavior is normal. Judgment and thought content normal.       Procedures    Assessment/Plan   Rosa was seen today for  migraine.    Diagnoses and all orders for this visit:    Migraine with aura and without status migrainosus, not intractable  -     Rimegepant Sulfate (rimegepant) 75 MG tablet dispersible; Take 1 tablet by mouth Daily As Needed (headache).    Seasonal allergic rhinitis due to pollen  -     fluticasone (FLONASE) 50 MCG/ACT nasal spray; 2 sprays into the nostril(s) as directed by provider Daily.    Per the patient's request I have reviewed interactions and side effect profile of the new medication Nurtec and will give the patient a prescription to try it.  She was advised not to take it with the Fioricet because Fioricet will likely make the Nurtec less effective.  She was also advised to contact the office with any problems and to follow-up in 1 month.    This was an audio and video enabled telemedicine encounter lasting 16 minutes all of which was spent in direct audio and video contact with the patient  There are no Patient Instructions on file for this visit.

## 2020-05-07 RX ORDER — LEVOTHYROXINE SODIUM 0.07 MG/1
75 TABLET ORAL DAILY
Qty: 90 TABLET | Refills: 0 | Status: SHIPPED | OUTPATIENT
Start: 2020-05-07 | End: 2020-06-16 | Stop reason: SDUPTHER

## 2020-05-07 RX ORDER — ONDANSETRON 4 MG/1
TABLET, FILM COATED ORAL
Qty: 30 TABLET | Refills: 0 | Status: SHIPPED | OUTPATIENT
Start: 2020-05-07 | End: 2020-06-23 | Stop reason: SDUPTHER

## 2020-06-04 ENCOUNTER — TELEPHONE (OUTPATIENT)
Dept: FAMILY MEDICINE CLINIC | Facility: CLINIC | Age: 50
End: 2020-06-04

## 2020-06-04 NOTE — TELEPHONE ENCOUNTER
We did received PA however in one message patient stated she wasn't going to do the Nurtec again, I have sent the patient a message asking if she was going to stay on it so we knew whether or not to do the PA.

## 2020-06-04 NOTE — TELEPHONE ENCOUNTER
ALAN FROM [employer] OFFICE (ON BEHALF OF [facility] ON [facility]) CALLED TO VERIFY THAT DR. ALFRED'S OFFICE RECEIVED THE PRIOR AUTHORIZATION FORM FOR THE PATIENT'S PRESCRIPTION FOR THE UBRELVY 100 MG THAT WAS FAXED YESTERDAY (6/3/20).    PLEASE CALL ALAN FROM [employer] OFFICE [phone], USING REFERENCE #[id number] AND STORE #[id number], WHEN THIS MESSAGE HAS BEEN RECEIVED AND ADVISE.

## 2020-06-04 NOTE — TELEPHONE ENCOUNTER
PT STATED THAT SHE IS NOT TAKING THE NURTEC. I HAVE CALLED THE PHARMACY AND LEFT A MESSAGE TO HAVE THEM CANCEL THE PA OUT.

## 2020-06-15 ENCOUNTER — OFFICE VISIT (OUTPATIENT)
Dept: FAMILY MEDICINE CLINIC | Facility: CLINIC | Age: 50
End: 2020-06-15

## 2020-06-15 VITALS
TEMPERATURE: 96.9 F | WEIGHT: 237.8 LBS | SYSTOLIC BLOOD PRESSURE: 148 MMHG | HEIGHT: 65 IN | DIASTOLIC BLOOD PRESSURE: 84 MMHG | HEART RATE: 106 BPM | BODY MASS INDEX: 39.62 KG/M2 | OXYGEN SATURATION: 98 %

## 2020-06-15 DIAGNOSIS — E06.3 HYPOTHYROIDISM DUE TO HASHIMOTO'S THYROIDITIS: Primary | ICD-10-CM

## 2020-06-15 DIAGNOSIS — E03.8 HYPOTHYROIDISM DUE TO HASHIMOTO'S THYROIDITIS: Primary | ICD-10-CM

## 2020-06-15 DIAGNOSIS — I10 ESSENTIAL HYPERTENSION: ICD-10-CM

## 2020-06-15 DIAGNOSIS — Z79.899 ENCOUNTER FOR LONG-TERM (CURRENT) USE OF MEDICATIONS: ICD-10-CM

## 2020-06-15 PROCEDURE — 99214 OFFICE O/P EST MOD 30 MIN: CPT | Performed by: FAMILY MEDICINE

## 2020-06-15 NOTE — PROGRESS NOTES
Subjective   Rosa Melgoza is a 49 y.o. female.     Chief Complaint   Patient presents with   • Hypothyroidism   • Hypertension   • Migraine       Patient is here to follow-up on her chronic health conditions:    Migraine with aura.  Patient states that overall the Aimovig has reduced the number of migraines a month but she still is suffering with pretty severe migraine headaches.   She also takes amitriptyline 50 mg at bedtime.  And Fioricet as needed.  She has also started B2 and D3 vitamins to help.  We have tried a few different newer medications recently and she has had varying degrees of benefit.  She is still considering a neurology appointment in the future.    GERD.  The patient takes lansoprazole 30 mg daily.  She denies any symptoms of reflux currently.  She denies any side effects of medication.    Hypothyroidism.  The patient currently takes levothyroxine 75 mcg daily. Patient has been having some fatigue, hair loss, lack of concentration, constipation, irregular menstrual cycles, weight gain and achiness.  She has a friend who is seen an endocrinologist and told her that she should have her T3 checked because her symptoms sounded a lot like hers and after taking T3 she has felt much better.    Hypertension.  We started the patient on hydrochlorothiazide about 4 months ago.  The patient has been having some fatigue, hair loss, constipation since then.  We have not checked labs since starting it due to the coronavirus pandemic.  The patient has not checked her blood pressures at home.  She has not taken the hydrochlorothiazide yet today.        Review of Systems   Constitutional: Negative for activity change, chills, fatigue and fever.   HENT: Negative for hearing loss, swollen glands, tinnitus and trouble swallowing.    Eyes: Negative for pain and visual disturbance.   Respiratory: Negative for cough and shortness of breath.    Cardiovascular: Negative for chest pain, palpitations and leg swelling.    Gastrointestinal: Positive for constipation. Negative for diarrhea and nausea.   Endocrine: Negative for polydipsia and polyuria.   Genitourinary: Negative for difficulty urinating and urinary incontinence.   Musculoskeletal: Positive for arthralgias. Negative for gait problem and joint swelling.   Skin: Negative for rash.   Allergic/Immunologic: Negative for immunocompromised state.   Neurological: Positive for headache. Negative for dizziness and light-headedness.   Hematological: Negative for adenopathy. Does not bruise/bleed easily.   Psychiatric/Behavioral: Positive for decreased concentration and depressed mood. Negative for dysphoric mood and sleep disturbance.       The following portions of the patient's history were reviewed and updated as appropriate: allergies, current medications, past family history, past medical history, past social history, past surgical history and problem list.    Past Medical History:   Diagnosis Date   • Disease of thyroid gland    • Elevated blood pressure reading without diagnosis of hypertension    • GERD (gastroesophageal reflux disease)    • Headache, migraine    • History of sore throat    • Hypothyroidism    • Migraine    • Need for DTaP and Hib vaccine     History of    • Vaginal yeast infection        Past Surgical History:   Procedure Laterality Date   • BREAST BIOPSY     •  SECTION     • CHOLECYSTECTOMY         Family History   Problem Relation Age of Onset   • Breast cancer Mother    • Hypertension Father    • Heart disease Father    • Breast cancer Other    • Alcohol abuse Other    • Depression Other    • Anxiety disorder Other    • Hypertension Other    • Other Other         Pure hypercholesterolemia and esophageal reflux   • Lung cancer Other        Social History     Socioeconomic History   • Marital status:      Spouse name: Not on file   • Number of children: Not on file   • Years of education: Not on file   • Highest education level: Not on  file   Tobacco Use   • Smoking status: Former Smoker     Types: Cigarettes     Last attempt to quit: 3/1/2011     Years since quittin.2   • Smokeless tobacco: Never Used   Substance and Sexual Activity   • Alcohol use: Yes     Comment: Occasional   • Drug use: Never   • Sexual activity: Defer         Current Outpatient Medications:   •  amitriptyline (ELAVIL) 50 MG tablet, Take 1 tablet by mouth Every Night., Disp: 90 tablet, Rfl: 3  •  butalbital-acetaminophen-caffeine (FIORICET, ESGIC) -40 MG per tablet, Take 2 tablets by mouth Every 6 (Six) Hours As Needed for Migraine., Disp: 180 tablet, Rfl: 1  •  Cholecalciferol (VITAMIN D3) 125 MCG (5000 UT) tablet tablet, Take 5,000 Units by mouth Daily., Disp: , Rfl:   •  Erenumab-aooe (AIMOVIG) 140 MG/ML solution auto-injector, Inject 1 mL under the skin into the appropriate area as directed Every 30 (Thirty) Days., Disp: 1.12 mL, Rfl: 7  •  escitalopram (LEXAPRO) 10 MG tablet, Take 1 tablet by mouth Daily., Disp: 90 tablet, Rfl: 3  •  fluticasone (FLONASE) 50 MCG/ACT nasal spray, 2 sprays into the nostril(s) as directed by provider Daily., Disp: 1 bottle, Rfl: 6  •  hydroCHLOROthiazide (HYDRODIURIL) 25 MG tablet, TAKE 1 TABLET BY MOUTH DAILY, Disp: 90 tablet, Rfl: 1  •  hydrOXYzine (ATARAX) 25 MG tablet, Take 1 tablet by mouth Every 8 (Eight) Hours As Needed for Anxiety., Disp: 90 tablet, Rfl: 3  •  lansoprazole (PREVACID) 30 MG capsule, TAKE 1 CAPSULE BY MOUTH DAILY, Disp: 90 capsule, Rfl: 0  •  levocetirizine (XYZAL) 5 MG tablet, TK 1 T PO QPM, Disp: , Rfl: 2  •  levothyroxine (SYNTHROID, LEVOTHROID) 75 MCG tablet, TAKE 1 TABLET BY MOUTH DAILY, Disp: 90 tablet, Rfl: 0  •  ondansetron (ZOFRAN) 4 MG tablet, TAKE 1 TABLET BY MOUTH EVERY 8 HOURS AS NEEDED FOR NAUSEA OR VOMITING, Disp: 30 tablet, Rfl: 0  •  promethazine (PHENERGAN) 25 MG tablet, Take 1 tablet by mouth Every 6 (Six) Hours As Needed for Nausea or Vomiting., Disp: 60 tablet, Rfl: 1  •  Riboflavin (B2)  "100 MG tablet, Take  by mouth 2 (Two) Times a Day., Disp: , Rfl:   •  Ubrogepant (ubrogepant) 100 MG tablet, Take 1 tablet by mouth Daily As Needed (headache) for up to 1 dose., Disp: 9 tablet, Rfl: 1    Objective     Vitals:    06/15/20 0825   BP: 148/84   Pulse: 106   Temp: 96.9 °F (36.1 °C)   SpO2: 98%   Weight: 108 kg (237 lb 12.8 oz)   Height: 165.1 cm (65\")       Body mass index is 39.57 kg/m².    No components found for: 2D    Physical Exam   Constitutional: She is oriented to person, place, and time. She appears well-developed and well-nourished.   HENT:   Head: Normocephalic and atraumatic.   Eyes: Conjunctivae are normal.   Neck: Normal range of motion. Neck supple. No thyromegaly present.   Cardiovascular: Normal rate, regular rhythm, normal heart sounds and intact distal pulses.   Pulmonary/Chest: Effort normal and breath sounds normal.   Abdominal: Soft. Bowel sounds are normal.   Musculoskeletal: Normal range of motion. She exhibits no edema.   Neurological: She is alert and oriented to person, place, and time.   Skin: Skin is warm and dry. Capillary refill takes less than 2 seconds. No rash noted.   Psychiatric: She has a normal mood and affect. Her behavior is normal. Judgment and thought content normal.   Nursing note and vitals reviewed.      Procedures    Assessment/Plan   Rosa was seen today for hypothyroidism, hypertension and migraine.    Diagnoses and all orders for this visit:    Hypothyroidism due to Hashimoto's thyroiditis  -     TSH  -     T3, free  -     T4, free    Essential hypertension  -     Comprehensive Metabolic Panel    Encounter for long-term (current) use of medications  -     Comprehensive Metabolic Panel    If the patient's T3 is low I will refer her to endocrinology to monitor it in the future.  I will check labs today to follow-up on the start of hydrochlorothiazide for blood pressures.  I have advised the patient to check her blood pressures as an outpatient and let me " know if they remain elevated.  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  Surveillance labs were obtained today and any medication changes will be made based on lab results and will be called to the patient later this week.    There are no Patient Instructions on file for this visit.         Answers for HPI/ROS submitted by the patient on 6/11/2020   What is the primary reason for your visit?: Other  Please describe your symptoms.: Fatigue, weight gain, depression, concentration and memory problems, lots and lots of hair loss, joint aches, dry skin, constipation (I generally have loose stools and diarrhea since no gallbladder), very erratic periods for last 6 or so months.  Have you had these symptoms before?: Yes  How long have you been having these symptoms?: Greater than 2 weeks  Please list any medications you are currently taking for this condition.: levothyroxine.   That's why I want to have my t3 checked, to see if that is the problem.  Please describe any probable cause for these symptoms. : T3

## 2020-06-16 DIAGNOSIS — I10 ESSENTIAL HYPERTENSION: ICD-10-CM

## 2020-06-16 DIAGNOSIS — E03.8 HYPOTHYROIDISM DUE TO HASHIMOTO'S THYROIDITIS: Primary | ICD-10-CM

## 2020-06-16 DIAGNOSIS — E06.3 HYPOTHYROIDISM DUE TO HASHIMOTO'S THYROIDITIS: Primary | ICD-10-CM

## 2020-06-16 LAB
ALBUMIN SERPL-MCNC: 4.1 G/DL (ref 3.8–4.8)
ALBUMIN/GLOB SERPL: 1.3 {RATIO} (ref 1.2–2.2)
ALP SERPL-CCNC: 84 IU/L (ref 39–117)
ALT SERPL-CCNC: 20 IU/L (ref 0–32)
AST SERPL-CCNC: 11 IU/L (ref 0–40)
BILIRUB SERPL-MCNC: 0.3 MG/DL (ref 0–1.2)
BUN SERPL-MCNC: 7 MG/DL (ref 6–24)
BUN/CREAT SERPL: 10 (ref 9–23)
CALCIUM SERPL-MCNC: 9.2 MG/DL (ref 8.7–10.2)
CHLORIDE SERPL-SCNC: 100 MMOL/L (ref 96–106)
CO2 SERPL-SCNC: 28 MMOL/L (ref 20–29)
CREAT SERPL-MCNC: 0.67 MG/DL (ref 0.57–1)
GLOBULIN SER CALC-MCNC: 3.1 G/DL (ref 1.5–4.5)
GLUCOSE SERPL-MCNC: 98 MG/DL (ref 65–99)
POTASSIUM SERPL-SCNC: 3.1 MMOL/L (ref 3.5–5.2)
PROT SERPL-MCNC: 7.2 G/DL (ref 6–8.5)
SODIUM SERPL-SCNC: 143 MMOL/L (ref 134–144)
T3FREE SERPL-MCNC: 2.7 PG/ML (ref 2–4.4)
T4 FREE SERPL-MCNC: 1.11 NG/DL (ref 0.82–1.77)
TSH SERPL DL<=0.005 MIU/L-ACNC: 4.55 UIU/ML (ref 0.45–4.5)

## 2020-06-16 RX ORDER — LISINOPRIL AND HYDROCHLOROTHIAZIDE 12.5; 1 MG/1; MG/1
1 TABLET ORAL DAILY
Qty: 30 TABLET | Refills: 2 | Status: SHIPPED | OUTPATIENT
Start: 2020-06-16 | End: 2020-09-23

## 2020-06-16 RX ORDER — LEVOTHYROXINE SODIUM 88 UG/1
88 TABLET ORAL DAILY
Qty: 30 TABLET | Refills: 2 | Status: SHIPPED | OUTPATIENT
Start: 2020-06-16 | End: 2020-08-03 | Stop reason: SDUPTHER

## 2020-06-19 ENCOUNTER — HOSPITAL ENCOUNTER (EMERGENCY)
Facility: HOSPITAL | Age: 50
Discharge: HOME | End: 2020-06-19
Attending: EMERGENCY MEDICINE | Admitting: EMERGENCY MEDICINE

## 2020-06-19 ENCOUNTER — APPOINTMENT (OUTPATIENT)
Dept: GENERAL RADIOLOGY | Facility: HOSPITAL | Age: 50
End: 2020-06-19

## 2020-06-19 ENCOUNTER — APPOINTMENT (OUTPATIENT)
Dept: CT IMAGING | Facility: HOSPITAL | Age: 50
End: 2020-06-19

## 2020-06-19 VITALS
HEART RATE: 86 BPM | DIASTOLIC BLOOD PRESSURE: 66 MMHG | HEIGHT: 65 IN | BODY MASS INDEX: 39.49 KG/M2 | RESPIRATION RATE: 18 BRPM | OXYGEN SATURATION: 93 % | SYSTOLIC BLOOD PRESSURE: 113 MMHG | TEMPERATURE: 96.6 F | WEIGHT: 237 LBS

## 2020-06-19 DIAGNOSIS — E87.6 HYPOKALEMIA: ICD-10-CM

## 2020-06-19 DIAGNOSIS — R91.1 LUNG NODULE: ICD-10-CM

## 2020-06-19 DIAGNOSIS — K52.9 GASTROENTERITIS: Primary | ICD-10-CM

## 2020-06-19 LAB
ALBUMIN SERPL-MCNC: 4.2 G/DL (ref 3.5–5.2)
ALBUMIN/GLOB SERPL: 1.3 G/DL
ALP SERPL-CCNC: 70 U/L (ref 39–117)
ALT SERPL W P-5'-P-CCNC: 16 U/L (ref 1–33)
ANION GAP SERPL CALCULATED.3IONS-SCNC: 13.2 MMOL/L (ref 5–15)
AST SERPL-CCNC: 8 U/L (ref 1–32)
BACTERIA UR QL AUTO: ABNORMAL /HPF
BASOPHILS # BLD AUTO: 0.04 10*3/MM3 (ref 0–0.2)
BASOPHILS NFR BLD AUTO: 0.2 % (ref 0–1.5)
BILIRUB SERPL-MCNC: 0.2 MG/DL (ref 0.2–1.2)
BILIRUB UR QL STRIP: NEGATIVE
BUN BLD-MCNC: 10 MG/DL (ref 6–20)
BUN/CREAT SERPL: 12 (ref 7–25)
CALCIUM SPEC-SCNC: 9.2 MG/DL (ref 8.6–10.5)
CHLORIDE SERPL-SCNC: 97 MMOL/L (ref 98–107)
CLARITY UR: CLEAR
CO2 SERPL-SCNC: 27.8 MMOL/L (ref 22–29)
COLOR UR: YELLOW
CREAT BLD-MCNC: 0.83 MG/DL (ref 0.57–1)
DEPRECATED RDW RBC AUTO: 43.6 FL (ref 37–54)
EOSINOPHIL # BLD AUTO: 0.07 10*3/MM3 (ref 0–0.4)
EOSINOPHIL NFR BLD AUTO: 0.3 % (ref 0.3–6.2)
ERYTHROCYTE [DISTWIDTH] IN BLOOD BY AUTOMATED COUNT: 13.4 % (ref 12.3–15.4)
GFR SERPL CREATININE-BSD FRML MDRD: 73 ML/MIN/1.73
GLOBULIN UR ELPH-MCNC: 3.2 GM/DL
GLUCOSE BLD-MCNC: 127 MG/DL (ref 65–99)
GLUCOSE UR STRIP-MCNC: NEGATIVE MG/DL
HCG SERPL QL: NEGATIVE
HCT VFR BLD AUTO: 43.9 % (ref 34–46.6)
HGB BLD-MCNC: 14.8 G/DL (ref 12–15.9)
HGB UR QL STRIP.AUTO: NEGATIVE
HOLD SPECIMEN: NORMAL
HYALINE CASTS UR QL AUTO: ABNORMAL /LPF
IMM GRANULOCYTES # BLD AUTO: 0.1 10*3/MM3 (ref 0–0.05)
IMM GRANULOCYTES NFR BLD AUTO: 0.4 % (ref 0–0.5)
KETONES UR QL STRIP: NEGATIVE
LEUKOCYTE ESTERASE UR QL STRIP.AUTO: ABNORMAL
LIPASE SERPL-CCNC: 20 U/L (ref 13–60)
LYMPHOCYTES # BLD AUTO: 2.36 10*3/MM3 (ref 0.7–3.1)
LYMPHOCYTES NFR BLD AUTO: 10.5 % (ref 19.6–45.3)
MCH RBC QN AUTO: 29.7 PG (ref 26.6–33)
MCHC RBC AUTO-ENTMCNC: 33.7 G/DL (ref 31.5–35.7)
MCV RBC AUTO: 88.2 FL (ref 79–97)
MONOCYTES # BLD AUTO: 0.71 10*3/MM3 (ref 0.1–0.9)
MONOCYTES NFR BLD AUTO: 3.2 % (ref 5–12)
NEUTROPHILS # BLD AUTO: 19.12 10*3/MM3 (ref 1.7–7)
NEUTROPHILS NFR BLD AUTO: 85.4 % (ref 42.7–76)
NITRITE UR QL STRIP: NEGATIVE
NRBC BLD AUTO-RTO: 0 /100 WBC (ref 0–0.2)
PH UR STRIP.AUTO: 6 [PH] (ref 5–8)
PLATELET # BLD AUTO: 266 10*3/MM3 (ref 140–450)
PMV BLD AUTO: 9.5 FL (ref 6–12)
POTASSIUM BLD-SCNC: 3.1 MMOL/L (ref 3.5–5.2)
PROT SERPL-MCNC: 7.4 G/DL (ref 6–8.5)
PROT UR QL STRIP: NEGATIVE
RBC # BLD AUTO: 4.98 10*6/MM3 (ref 3.77–5.28)
RBC # UR: ABNORMAL /HPF
REF LAB TEST METHOD: ABNORMAL
SODIUM BLD-SCNC: 138 MMOL/L (ref 136–145)
SP GR UR STRIP: >=1.03 (ref 1–1.03)
SQUAMOUS #/AREA URNS HPF: ABNORMAL /HPF
TROPONIN T SERPL-MCNC: <0.01 NG/ML (ref 0–0.03)
UROBILINOGEN UR QL STRIP: ABNORMAL
WBC NRBC COR # BLD: 22.4 10*3/MM3 (ref 3.4–10.8)
WBC UR QL AUTO: ABNORMAL /HPF
WHOLE BLOOD HOLD SPECIMEN: NORMAL
WHOLE BLOOD HOLD SPECIMEN: NORMAL

## 2020-06-19 PROCEDURE — 74177 CT ABD & PELVIS W/CONTRAST: CPT

## 2020-06-19 PROCEDURE — 93005 ELECTROCARDIOGRAM TRACING: CPT | Performed by: EMERGENCY MEDICINE

## 2020-06-19 PROCEDURE — 96374 THER/PROPH/DIAG INJ IV PUSH: CPT

## 2020-06-19 PROCEDURE — 99284 EMERGENCY DEPT VISIT MOD MDM: CPT

## 2020-06-19 PROCEDURE — 84703 CHORIONIC GONADOTROPIN ASSAY: CPT | Performed by: EMERGENCY MEDICINE

## 2020-06-19 PROCEDURE — 80053 COMPREHEN METABOLIC PANEL: CPT | Performed by: EMERGENCY MEDICINE

## 2020-06-19 PROCEDURE — 93010 ELECTROCARDIOGRAM REPORT: CPT | Performed by: INTERNAL MEDICINE

## 2020-06-19 PROCEDURE — 84484 ASSAY OF TROPONIN QUANT: CPT | Performed by: EMERGENCY MEDICINE

## 2020-06-19 PROCEDURE — 25010000002 MORPHINE PER 10 MG: Performed by: EMERGENCY MEDICINE

## 2020-06-19 PROCEDURE — 96375 TX/PRO/DX INJ NEW DRUG ADDON: CPT

## 2020-06-19 PROCEDURE — 81001 URINALYSIS AUTO W/SCOPE: CPT | Performed by: EMERGENCY MEDICINE

## 2020-06-19 PROCEDURE — 85025 COMPLETE CBC W/AUTO DIFF WBC: CPT | Performed by: EMERGENCY MEDICINE

## 2020-06-19 PROCEDURE — 25010000002 ONDANSETRON PER 1 MG: Performed by: EMERGENCY MEDICINE

## 2020-06-19 PROCEDURE — 83690 ASSAY OF LIPASE: CPT | Performed by: EMERGENCY MEDICINE

## 2020-06-19 PROCEDURE — 25010000002 IOPAMIDOL 61 % SOLUTION: Performed by: EMERGENCY MEDICINE

## 2020-06-19 RX ORDER — ONDANSETRON 2 MG/ML
4 INJECTION INTRAMUSCULAR; INTRAVENOUS ONCE
Status: COMPLETED | OUTPATIENT
Start: 2020-06-19 | End: 2020-06-19

## 2020-06-19 RX ORDER — MORPHINE SULFATE 2 MG/ML
4 INJECTION, SOLUTION INTRAMUSCULAR; INTRAVENOUS ONCE
Status: COMPLETED | OUTPATIENT
Start: 2020-06-19 | End: 2020-06-19

## 2020-06-19 RX ORDER — ONDANSETRON 4 MG/1
4 TABLET, FILM COATED ORAL EVERY 8 HOURS PRN
Qty: 15 TABLET | Refills: 0 | Status: SHIPPED | OUTPATIENT
Start: 2020-06-19 | End: 2020-06-23 | Stop reason: SDUPTHER

## 2020-06-19 RX ORDER — ALUMINA, MAGNESIA, AND SIMETHICONE 2400; 2400; 240 MG/30ML; MG/30ML; MG/30ML
15 SUSPENSION ORAL ONCE
Status: COMPLETED | OUTPATIENT
Start: 2020-06-19 | End: 2020-06-19

## 2020-06-19 RX ORDER — LIDOCAINE HYDROCHLORIDE 20 MG/ML
15 SOLUTION OROPHARYNGEAL ONCE
Status: COMPLETED | OUTPATIENT
Start: 2020-06-19 | End: 2020-06-19

## 2020-06-19 RX ORDER — SUCRALFATE 1 G/1
1 TABLET ORAL 4 TIMES DAILY
Qty: 40 TABLET | Refills: 0 | Status: SHIPPED | OUTPATIENT
Start: 2020-06-19 | End: 2020-11-03

## 2020-06-19 RX ORDER — SODIUM CHLORIDE 0.9 % (FLUSH) 0.9 %
10 SYRINGE (ML) INJECTION AS NEEDED
Status: DISCONTINUED | OUTPATIENT
Start: 2020-06-19 | End: 2020-06-19 | Stop reason: HOSPADM

## 2020-06-19 RX ORDER — POTASSIUM CHLORIDE 750 MG/1
40 CAPSULE, EXTENDED RELEASE ORAL ONCE
Status: COMPLETED | OUTPATIENT
Start: 2020-06-19 | End: 2020-06-19

## 2020-06-19 RX ADMIN — ONDANSETRON 4 MG: 2 INJECTION INTRAMUSCULAR; INTRAVENOUS at 17:37

## 2020-06-19 RX ADMIN — IOPAMIDOL 85 ML: 612 INJECTION, SOLUTION INTRAVENOUS at 18:21

## 2020-06-19 RX ADMIN — SODIUM CHLORIDE 1000 ML: 9 INJECTION, SOLUTION INTRAVENOUS at 17:35

## 2020-06-19 RX ADMIN — LIDOCAINE HYDROCHLORIDE 15 ML: 20 SOLUTION ORAL; TOPICAL at 17:40

## 2020-06-19 RX ADMIN — ALUMINUM HYDROXIDE, MAGNESIUM HYDROXIDE, AND DIMETHICONE 15 ML: 400; 400; 40 SUSPENSION ORAL at 17:40

## 2020-06-19 RX ADMIN — POTASSIUM CHLORIDE 40 MEQ: 10 CAPSULE, COATED, EXTENDED RELEASE ORAL at 19:06

## 2020-06-19 RX ADMIN — MORPHINE SULFATE 4 MG: 2 INJECTION, SOLUTION INTRAMUSCULAR; INTRAVENOUS at 17:38

## 2020-06-19 NOTE — ED NOTES
Spoke with family regarding patient status and was provided patient access code.  Informed family of enhanced visitor policy and they verbalized understanding.    Spoke with  and he was asked to wait at least another hour to allow for disposition and CT results.     Nelli Arenas, RN  06/19/20 1274

## 2020-06-19 NOTE — ED TRIAGE NOTES
Burning and stabbing abd. Pain started 1 hour ago with nausea. Mask placed on patient in first look triage. Triage staff wearing masks.

## 2020-06-19 NOTE — ED PROVIDER NOTES
The KAUR and I have discussed this patient's history, physical exam, and treatment plan. I have reviewed the documentation and personally had a face to face interaction with the patient  I affirm the documentation and agree with the treatment and plan.  The following describes my personal findings.    The patient presents complaining of upper mid abdominal pain onset earlier today with mild nausea without vomiting.  Patient reports that the pain not affected by movement eating or drinking.  Patient reports she had normal urination and bowel movements, denies chest pain, shortness of air, fever, recent cough.  Patient does report she has a history of ulcers diagnosed by Dr. Montes De Oca on EGD approximately 10 years ago and reports she takes Prevacid daily.  Patient reports she is had a  and laparoscopic cholecystectomy years ago    Limited physical exam:  Patient is nontoxic appearing in NAD  Lungs/cardiovascular: Good air movement bilaterally, no respiratory distress, non-tachycardic  Abdomen: Mild epigastric discomfort without guarding or rebound  Back/extremities: Posterior tibial pulses intact no edema    EKG          EKG time: 1750  Rhythm/Rate: Sinus rhythm, rate in the 90s  P waves and SC: Unremarkable P waves, unremarkable FRANCESCO  QRS, axis: Q's normal  ST and T waves: Nonspecific ST/T wave findings anterior lateral leads    Interpreted Contemporaneously by me, independently viewed  Minimally changed compared to prior 2020    Patient voices understanding of leukocytosis and need to follow closely with primary care provider and Dr. Montes De Oca for recheck and further testing/treatment as needed    Patient was wearing facemask when I entered the room and throughout our encounter. Full protective equipment was worn throughout this patient encounter including a face mask, eye protection and gloves. Hand hygiene was performed before donning protective equipment and after removal when leaving the room.      \        Shona Pavon MD  06/19/20 1955

## 2020-06-19 NOTE — ED PROVIDER NOTES
EMERGENCY DEPARTMENT ENCOUNTER    Room Number:  44/44  Date of encounter:  2020  PCP: Kathie Romeo DO  Historian: Patient      HPI:  Chief Complaint: Abdominal pain   A complete HPI/ROS/PMH/PSH/SH/FH are unobtainable due to: Nothing    Context: Rosa Melgoza is a 49 y.o. female who presents to the ED c/o several hour, sudden onset, constant abdominal pain.  Patient states the pain started as a burning sensation in epigastric area, and then migrated down to her periumbilical area.  She does report nausea, however denies any vomiting, fever, dysuria.  Patient has had chronic diarrhea for years since her cholecystectomy.  She denies any previous similar symptoms.  She denies any precipitating or alleviating factors.    Review of Medical Records  I reviewed patient's last internal medicine visit from 6/15/2020.  Patient seen for hypothyroidism, hypertension.  I can find no old abdominal CT scans in our records.    PAST MEDICAL HISTORY  Active Ambulatory Problems     Diagnosis Date Noted   • Hypothyroidism 2020   • GERD (gastroesophageal reflux disease) 2020   • Headache, migraine 2020   • Adjustment disorder with anxious mood 2020   • Encounter for long-term (current) use of medications 2020   • Essential hypertension 2020     Resolved Ambulatory Problems     Diagnosis Date Noted   • No Resolved Ambulatory Problems     Past Medical History:   Diagnosis Date   • Disease of thyroid gland    • Elevated blood pressure reading without diagnosis of hypertension    • History of sore throat    • Migraine    • Need for DTaP and Hib vaccine    • Vaginal yeast infection          PAST SURGICAL HISTORY  Past Surgical History:   Procedure Laterality Date   • BREAST BIOPSY     •  SECTION     • CHOLECYSTECTOMY           FAMILY HISTORY  Family History   Problem Relation Age of Onset   • Breast cancer Mother    • Hypertension Father    • Heart disease Father    • Breast cancer Other     • Alcohol abuse Other    • Depression Other    • Anxiety disorder Other    • Hypertension Other    • Other Other         Pure hypercholesterolemia and esophageal reflux   • Lung cancer Other          SOCIAL HISTORY  Social History     Socioeconomic History   • Marital status:      Spouse name: Not on file   • Number of children: Not on file   • Years of education: Not on file   • Highest education level: Not on file   Tobacco Use   • Smoking status: Former Smoker     Types: Cigarettes     Last attempt to quit: 3/1/2011     Years since quittin.3   • Smokeless tobacco: Never Used   Substance and Sexual Activity   • Alcohol use: Yes     Comment: Occasional   • Drug use: Never   • Sexual activity: Defer         ALLERGIES  Patient has no known allergies.        REVIEW OF SYSTEMS  All systems reviewed and negative except for those discussed in HPI.       PHYSICAL EXAM    I have reviewed the triage vital signs and nursing notes.    ED Triage Vitals [20 1637]   Temp Heart Rate Resp BP SpO2   96.6 °F (35.9 °C) (!) 125 24 -- 97 %      Temp src Heart Rate Source Patient Position BP Location FiO2 (%)   Tympanic Monitor -- -- --       Physical Exam  GENERAL: Alert, oriented, mild distress, pale  HENT: nares patent, head atraumatic  EYES: no scleral icterus, EOMI  CV: regular rhythm, tachycardic rate, no murmur  RESPIRATORY: normal effort, CTA  ABDOMEN: soft, mild generalized tenderness without guarding or rebound.  Normal bowel sounds  MUSCULOSKELETAL: no deformity, FROM  NEURO: alert, moves all extremities, follows commands  SKIN: warm, diaphoretic        LAB RESULTS  Recent Results (from the past 24 hour(s))   Comprehensive Metabolic Panel    Collection Time: 20  5:28 PM   Result Value Ref Range    Glucose 127 (H) 65 - 99 mg/dL    BUN 10 6 - 20 mg/dL    Creatinine 0.83 0.57 - 1.00 mg/dL    Sodium 138 136 - 145 mmol/L    Potassium 3.1 (L) 3.5 - 5.2 mmol/L    Chloride 97 (L) 98 - 107 mmol/L    CO2 27.8  22.0 - 29.0 mmol/L    Calcium 9.2 8.6 - 10.5 mg/dL    Total Protein 7.4 6.0 - 8.5 g/dL    Albumin 4.20 3.50 - 5.20 g/dL    ALT (SGPT) 16 1 - 33 U/L    AST (SGOT) 8 1 - 32 U/L    Alkaline Phosphatase 70 39 - 117 U/L    Total Bilirubin 0.2 0.2 - 1.2 mg/dL    eGFR Non African Amer 73 >60 mL/min/1.73    Globulin 3.2 gm/dL    A/G Ratio 1.3 g/dL    BUN/Creatinine Ratio 12.0 7.0 - 25.0    Anion Gap 13.2 5.0 - 15.0 mmol/L   Lipase    Collection Time: 06/19/20  5:28 PM   Result Value Ref Range    Lipase 20 13 - 60 U/L   Troponin    Collection Time: 06/19/20  5:28 PM   Result Value Ref Range    Troponin T <0.010 0.000 - 0.030 ng/mL   hCG, Serum, Qualitative    Collection Time: 06/19/20  5:28 PM   Result Value Ref Range    HCG Qualitative Negative Negative   Light Blue Top    Collection Time: 06/19/20  5:28 PM   Result Value Ref Range    Extra Tube hold for add-on    Green Top (Gel)    Collection Time: 06/19/20  5:28 PM   Result Value Ref Range    Extra Tube Hold for add-ons.    Lavender Top    Collection Time: 06/19/20  5:28 PM   Result Value Ref Range    Extra Tube hold for add-on    CBC Auto Differential    Collection Time: 06/19/20  5:28 PM   Result Value Ref Range    WBC 22.40 (H) 3.40 - 10.80 10*3/mm3    RBC 4.98 3.77 - 5.28 10*6/mm3    Hemoglobin 14.8 12.0 - 15.9 g/dL    Hematocrit 43.9 34.0 - 46.6 %    MCV 88.2 79.0 - 97.0 fL    MCH 29.7 26.6 - 33.0 pg    MCHC 33.7 31.5 - 35.7 g/dL    RDW 13.4 12.3 - 15.4 %    RDW-SD 43.6 37.0 - 54.0 fl    MPV 9.5 6.0 - 12.0 fL    Platelets 266 140 - 450 10*3/mm3    Neutrophil % 85.4 (H) 42.7 - 76.0 %    Lymphocyte % 10.5 (L) 19.6 - 45.3 %    Monocyte % 3.2 (L) 5.0 - 12.0 %    Eosinophil % 0.3 0.3 - 6.2 %    Basophil % 0.2 0.0 - 1.5 %    Immature Grans % 0.4 0.0 - 0.5 %    Neutrophils, Absolute 19.12 (H) 1.70 - 7.00 10*3/mm3    Lymphocytes, Absolute 2.36 0.70 - 3.10 10*3/mm3    Monocytes, Absolute 0.71 0.10 - 0.90 10*3/mm3    Eosinophils, Absolute 0.07 0.00 - 0.40 10*3/mm3     Basophils, Absolute 0.04 0.00 - 0.20 10*3/mm3    Immature Grans, Absolute 0.10 (H) 0.00 - 0.05 10*3/mm3    nRBC 0.0 0.0 - 0.2 /100 WBC       Ordered the above labs and independently reviewed the results.        RADIOLOGY  Ct Abdomen Pelvis With Contrast    Result Date: 6/19/2020  CT ABDOMEN PELVIS W CONTRAST-  INDICATIONS: Abdominal pain  TECHNIQUE: Radiation dose reduction techniques were utilized, including automated exposure control and exposure modulation based on body size. Enhanced ABDOMEN AND PELVIS CT  COMPARISON:  FINDINGS:  Small perihepatic ascites.  Gallbladder is surgically absent.  The spleen is mildly enlarged, 13.2 cm.  Otherwise unremarkable appearance of the liver, spleen, adrenal glands, pancreas, kidneys, bladder, uterus.  No bowel obstruction. Abnormal wall thickening in the mid to distal small bowel, surrounding inflammatory fat stranding, compatible with nonspecific small bowel enteritis. Mild hiatal hernia. Colonic diverticula are seen that do not appear focally inflamed.  No free intraperitoneal gas. Small pelvic free fluid. Small umbilical hernia of fat.  Scattered small mesenteric and para-aortic lymph nodes are seen that are not significant by size criteria.  Abdominal aorta is not aneurysmal.  The lung bases show a 1.5 cm nodule in the left lower lobe on axial image 11, not present on the prior exam, may represent neoplasm, initial further evaluation with positron emission tomography advised. More superiorly in the left lower lobe on image 7, a 5 mm nodule is apparent. Degenerative changes are seen in the spine. No acute fracture is identified.          1. Nonspecific small bowel enteritis involving the mid to distal small bowel. 2. Left lower lobe pulmonary nodules, further evaluation recommended, possibility of neoplasm not excluded.  Discussed by telephone with Dr. Pavon at 1835, 06/19/2020.  This report was finalized on 6/19/2020 6:37 PM by Dr. Nemesio Estevez M.D.        I ordered  the above noted radiological studies. Reviewed by me and discussed with radiologist.  See dictation for official radiology interpretation.      MEDICATIONS GIVEN IN ER    Medications   sodium chloride 0.9 % flush 10 mL (has no administration in time range)   morphine injection 4 mg (4 mg Intravenous Given 6/19/20 1738)   ondansetron (ZOFRAN) injection 4 mg (4 mg Intravenous Given 6/19/20 1737)   aluminum-magnesium hydroxide-simethicone (MAALOX MAX) 400-400-40 MG/5ML suspension 15 mL (15 mL Oral Given 6/19/20 1740)   Lidocaine Viscous HCl (XYLOCAINE) 2 % mouth solution 15 mL (15 mL Mouth/Throat Given 6/19/20 1740)   sodium chloride 0.9 % bolus 1,000 mL (1,000 mL Intravenous New Bag 6/19/20 1735)   iopamidol (ISOVUE-300) 61 % injection 100 mL (85 mL Intravenous Given by Other 6/19/20 1821)   potassium chloride (MICRO-K) CR capsule 40 mEq (40 mEq Oral Given 6/19/20 1906)         PROGRESS, DATA ANALYSIS, CONSULTS, AND MEDICAL DECISION MAKING    All labs have been independently reviewed by me.  All radiology studies have been reviewed by me and discussed with radiologist dictating the report.   EKG's independently viewed and interpreted by me.  Discussion below represents my analysis of pertinent findings related to patient's condition, differential diagnosis, treatment plan and final disposition.    I have discussed case with Dr. Pavon, emergency room physician.  He has performed his own bedside examination and agrees with treatment plan.    ED Course as of Jun 19 1920 Fri Jun 19, 2020   1727 Patient presents with 2-hour history of epigastric and mid abdominal pain with nausea.  Patient has had her gallbladder removed.  Differential diagnoses include but not limited to gastritis, colitis, pancreatitis.    [EE]   1844 CT findings with Dr. Estevez.  Patient does have a 1.5 cm left pulmonary nodule and needs follow-up.  In addition patient does have extensive small bowel enteritis.  No evidence of obstruction.    [EE]    1845 WBC(!): 22.40 [EE]   1845 Troponin T: <0.010 [EE]   1845 Lipase: 20 [EE]   1845 Glucose(!): 127 [EE]   1846 EKG interpreted by myself.  Time 1715.  Sinus rhythm, 95 bpm.  Normal P/FRANCESCO.  QRS normal.  Nonspecific T wave changes laterally.  No significant change in comparison to 1/13/2020.    [EE]   1901 I updated patient on CT findings.  At this time patient states she feels much better.  Her heart rate is in the 90s.  She understands to follow-up with her family doctor for CT scan of her chest for pulmonary nodule.  We will discharge her with Zofran and close follow-up.    [EE]      ED Course User Index  [EE] Piter Malik PA       AS OF 19:20 VITALS:    BP - 121/70  HR - 90  TEMP - 96.6 °F (35.9 °C) (Tympanic)  O2 SATS - 100%        DIAGNOSIS  Final diagnoses:   Gastroenteritis   Hypokalemia   Lung nodule         DISPOSITION  Discharged             Piter Malik PA  06/19/20 1923

## 2020-06-19 NOTE — ED TRIAGE NOTES
Pt to ED with c/o epigastric pains moving into lower abd, nausea began last night, took phenergan PTA, states helping a little. Feels lightheaded     Pt placed in mask at triage, all staff wearing appropriate ppe

## 2020-06-22 ENCOUNTER — EPISODE CHANGES (OUTPATIENT)
Dept: CASE MANAGEMENT | Facility: OTHER | Age: 50
End: 2020-06-22

## 2020-06-22 ENCOUNTER — OFFICE VISIT (OUTPATIENT)
Dept: FAMILY MEDICINE CLINIC | Facility: CLINIC | Age: 50
End: 2020-06-22

## 2020-06-22 VITALS
WEIGHT: 226.4 LBS | DIASTOLIC BLOOD PRESSURE: 86 MMHG | HEART RATE: 110 BPM | BODY MASS INDEX: 37.72 KG/M2 | SYSTOLIC BLOOD PRESSURE: 134 MMHG | HEIGHT: 65 IN | TEMPERATURE: 96.8 F | OXYGEN SATURATION: 98 %

## 2020-06-22 DIAGNOSIS — R11.2 NON-INTRACTABLE VOMITING WITH NAUSEA, UNSPECIFIED VOMITING TYPE: ICD-10-CM

## 2020-06-22 DIAGNOSIS — R10.84 GENERALIZED ABDOMINAL PAIN: ICD-10-CM

## 2020-06-22 DIAGNOSIS — R19.7 DIARRHEA, UNSPECIFIED TYPE: Primary | ICD-10-CM

## 2020-06-22 PROCEDURE — 99214 OFFICE O/P EST MOD 30 MIN: CPT | Performed by: FAMILY MEDICINE

## 2020-06-22 NOTE — PROGRESS NOTES
Subjective   Rosa Melgoza is a 49 y.o. female.     Chief Complaint   Patient presents with   • Follow-up     [facility] 06/19 with gastro issues   • Diarrhea   • Nausea   • Vomiting       Patient started 5 days ago with decreased appetite.  Then the next day the patient started having some nausea and a burning type abdominal pain in the upper abdomen.  The abdominal pain worsened throughout the day and seemed to be more generalized around the umbilicus.  It seemed bad enough that she went to the emergency room.  They did lab work and a CT and found an elevation in her white blood cells, slightly low potassium, normal liver function tests, and a CT of the abdomen and pelvis that supported an acute gastroenteritis (fluid-filled loops of small bowel).  The patient improved with morphine and IV fluids.  However the following day she started having nausea, vomiting, and diarrhea.  The patient does not have a thermometer at home but has felt feverish over the past 3 days.  She denies any cough or congestion.  However she is having a lot of fatigue and body aches.  She was not tested in the ER for COVID-19.  The patient is a home health nurse.  The patient has not been to work since her symptoms started.       Review of Systems   Constitutional: Positive for fatigue and fever. Negative for activity change and chills.   HENT: Negative for hearing loss, swollen glands, tinnitus and trouble swallowing.    Eyes: Negative for pain and visual disturbance.   Respiratory: Negative for cough and shortness of breath.    Cardiovascular: Negative for chest pain, palpitations and leg swelling.   Gastrointestinal: Positive for abdominal pain, diarrhea, nausea and vomiting.   Endocrine: Negative for polydipsia and polyuria.   Genitourinary: Negative for difficulty urinating and urinary incontinence.   Musculoskeletal: Negative for arthralgias, gait problem and joint swelling.   Skin: Negative for rash.   Allergic/Immunologic:  Negative for immunocompromised state.   Neurological: Negative for dizziness, light-headedness and headache.   Hematological: Negative for adenopathy. Does not bruise/bleed easily.   Psychiatric/Behavioral: Negative for dysphoric mood and sleep disturbance.       The following portions of the patient's history were reviewed and updated as appropriate: allergies, current medications, past family history, past medical history, past social history, past surgical history and problem list.    Past Medical History:   Diagnosis Date   • Disease of thyroid gland    • Elevated blood pressure reading without diagnosis of hypertension    • GERD (gastroesophageal reflux disease)    • Headache, migraine    • History of sore throat    • Hypothyroidism    • Migraine    • Need for DTaP and Hib vaccine     History of    • Vaginal yeast infection        Past Surgical History:   Procedure Laterality Date   • BREAST BIOPSY     •  SECTION     • CHOLECYSTECTOMY         Family History   Problem Relation Age of Onset   • Breast cancer Mother    • Hypertension Father    • Heart disease Father    • Breast cancer Other    • Alcohol abuse Other    • Depression Other    • Anxiety disorder Other    • Hypertension Other    • Other Other         Pure hypercholesterolemia and esophageal reflux   • Lung cancer Other        Social History     Socioeconomic History   • Marital status:      Spouse name: Not on file   • Number of children: Not on file   • Years of education: Not on file   • Highest education level: Not on file   Tobacco Use   • Smoking status: Former Smoker     Types: Cigarettes     Last attempt to quit: 3/1/2011     Years since quittin.3   • Smokeless tobacco: Never Used   Substance and Sexual Activity   • Alcohol use: Yes     Comment: Occasional   • Drug use: Never   • Sexual activity: Defer         Current Outpatient Medications:   •  amitriptyline (ELAVIL) 50 MG tablet, Take 1 tablet by mouth Every Night., Disp:  90 tablet, Rfl: 3  •  butalbital-acetaminophen-caffeine (FIORICET, ESGIC) -40 MG per tablet, Take 2 tablets by mouth Every 6 (Six) Hours As Needed for Migraine., Disp: 180 tablet, Rfl: 1  •  Cholecalciferol (VITAMIN D3) 125 MCG (5000 UT) tablet tablet, Take 5,000 Units by mouth Daily., Disp: , Rfl:   •  Erenumab-aooe (AIMOVIG) 140 MG/ML solution auto-injector, Inject 1 mL under the skin into the appropriate area as directed Every 30 (Thirty) Days., Disp: 1.12 mL, Rfl: 7  •  escitalopram (LEXAPRO) 10 MG tablet, Take 1 tablet by mouth Daily., Disp: 90 tablet, Rfl: 3  •  fluticasone (FLONASE) 50 MCG/ACT nasal spray, 2 sprays into the nostril(s) as directed by provider Daily., Disp: 1 bottle, Rfl: 6  •  hydrOXYzine (ATARAX) 25 MG tablet, Take 1 tablet by mouth Every 8 (Eight) Hours As Needed for Anxiety., Disp: 90 tablet, Rfl: 3  •  lansoprazole (PREVACID) 30 MG capsule, TAKE 1 CAPSULE BY MOUTH DAILY, Disp: 90 capsule, Rfl: 0  •  levocetirizine (XYZAL) 5 MG tablet, TK 1 T PO QPM, Disp: , Rfl: 2  •  levothyroxine (SYNTHROID, LEVOTHROID) 88 MCG tablet, Take 1 tablet by mouth Daily., Disp: 30 tablet, Rfl: 2  •  lisinopril-hydrochlorothiazide (Zestoretic) 10-12.5 MG per tablet, Take 1 tablet by mouth Daily., Disp: 30 tablet, Rfl: 2  •  ondansetron (ZOFRAN) 4 MG tablet, TAKE 1 TABLET BY MOUTH EVERY 8 HOURS AS NEEDED FOR NAUSEA OR VOMITING, Disp: 30 tablet, Rfl: 0  •  ondansetron (Zofran) 4 MG tablet, Take 1 tablet by mouth Every 8 (Eight) Hours As Needed for Nausea or Vomiting., Disp: 15 tablet, Rfl: 0  •  promethazine (PHENERGAN) 25 MG tablet, Take 1 tablet by mouth Every 6 (Six) Hours As Needed for Nausea or Vomiting., Disp: 60 tablet, Rfl: 1  •  Riboflavin (B2) 100 MG tablet, Take  by mouth 2 (Two) Times a Day., Disp: , Rfl:   •  sucralfate (CARAFATE) 1 g tablet, Take 1 tablet by mouth 4 (Four) Times a Day., Disp: 40 tablet, Rfl: 0  •  Ubrogepant (ubrogepant) 100 MG tablet, Take 1 tablet by mouth Daily As Needed  "(headache) for up to 1 dose., Disp: 9 tablet, Rfl: 1    Objective     Vitals:    06/22/20 0958   BP: 134/86   Pulse: 110   Temp: 96.8 °F (36 °C)   SpO2: 98%   Weight: 103 kg (226 lb 6.4 oz)   Height: 165.1 cm (65\")       Body mass index is 37.67 kg/m².    No components found for: 2D    Physical Exam   Constitutional: She is oriented to person, place, and time. She appears well-developed and well-nourished.   HENT:   Head: Normocephalic and atraumatic.   Eyes: Conjunctivae are normal.   Neck: Normal range of motion. Neck supple.   Cardiovascular: Normal rate, regular rhythm, normal heart sounds and intact distal pulses.   Pulmonary/Chest: Effort normal and breath sounds normal.   Abdominal: Soft. She exhibits no distension and no mass. Bowel sounds are increased. There is tenderness (Mild generalized tenderness). There is no rebound and no guarding. No hernia.   Musculoskeletal: Normal range of motion. She exhibits no edema.   Neurological: She is alert and oriented to person, place, and time.   Skin: Skin is warm and dry. Capillary refill takes less than 2 seconds. No rash noted.   Psychiatric: She has a normal mood and affect. Her behavior is normal. Judgment and thought content normal.   Nursing note and vitals reviewed.    I reviewed all the labs from [facility] emergency room visit as well as the CT scan.    Procedures    Assessment/Plan   Rosa was seen today for follow-up, diarrhea, nausea and vomiting.    Diagnoses and all orders for this visit:    Diarrhea, unspecified type  -     Stool Culture (Reference Lab) - Stool, Per Rectum; Future  -     Ova & Parasite Examination - Stool, Per Rectum; Future  -     Clostridium Difficile Toxin, PCR - Stool, Per Rectum; Future  -     CBC & Differential  -     Comprehensive Metabolic Panel  -     Hepatitis panel, acute  -     COVID-19,[facility] ROUTINE, NP/OP SWAB IN TRANSPORT MEDIA OR ESWAB 72 HR TAT - Swab, Oropharynx; Future  -     Clostridium Difficile Toxin, PCR - " Stool, Per Rectum  -     Ova & Parasite Examination - Stool, Per Rectum  -     Stool Culture (Reference Lab) - Stool, Per Rectum    Generalized abdominal pain  -     Stool Culture (Reference Lab) - Stool, Per Rectum; Future  -     Ova & Parasite Examination - Stool, Per Rectum; Future  -     Clostridium Difficile Toxin, PCR - Stool, Per Rectum; Future  -     CBC & Differential  -     Comprehensive Metabolic Panel  -     Hepatitis panel, acute  -     COVID-19,[facility] ROUTINE, NP/OP SWAB IN TRANSPORT MEDIA OR ESWAB 72 HR TAT - Swab, Oropharynx; Future  -     Clostridium Difficile Toxin, PCR - Stool, Per Rectum  -     Ova & Parasite Examination - Stool, Per Rectum  -     Stool Culture (Reference Lab) - Stool, Per Rectum    Non-intractable vomiting with nausea, unspecified vomiting type  -     Stool Culture (Reference Lab) - Stool, Per Rectum; Future  -     Ova & Parasite Examination - Stool, Per Rectum; Future  -     Clostridium Difficile Toxin, PCR - Stool, Per Rectum; Future  -     CBC & Differential  -     Comprehensive Metabolic Panel  -     Hepatitis panel, acute  -     COVID-19,[facility] ROUTINE, NP/OP SWAB IN TRANSPORT MEDIA OR ESWAB 72 HR TAT - Swab, Oropharynx; Future  -     Clostridium Difficile Toxin, PCR - Stool, Per Rectum  -     Ova & Parasite Examination - Stool, Per Rectum  -     Stool Culture (Reference Lab) - Stool, Per Rectum    Patient symptoms are likely due to a gastroenteritis and I will proceed with stool studies.  It may be viral and it also may be related to COVID-19 so I will also have her tested for that.  I have advised the patient that until her COVID-19 test results she should self quarantine.  I will provide her with a note.  Depending on the results of the testing I will make further recommendations.  I advised her to go to the ER if her pain worsens or if she is becoming dehydrated.  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    Patient Instructions   How to Quarantine at  Home  Information for Patients and Families    These instructions are for people with confirmed or suspected COVID-19 who do not need to be hospitalized and those with confirmed COVID-19 who were hospitalized and discharged to care for themselves at home.    If you were tested through the Health Department  The Health Department will monitor your wellbeing.  If it is determined that you do not need to be hospitalized and can be isolated at home, you will be monitored by staff from your local or state health department.     If you were tested through a Commercial Lab  You will need to monitor yourself and report changes in your symptoms to your doctor.  See the section below called Monitor Your Symptoms.    Follow these steps until a healthcare provider or local or state health department says you can return to your normal activities.    Stay home except to get medical care  • Restrict activities outside your home, except for getting medical care.   • Do not go to work, school, or public areas.   • Avoid using public transportation, ride-sharing, or taxis.    Separate yourself from other people and animals in your home  People  As much as possible, you should stay in a specific room and away from other people in your home. Also, you should use a separate bathroom, if available.    Animals  You should restrict contact with pets and other animals while you are sick with COVID-19, just like you would around other people. When possible, have another member of your household care for your animals while you are sick. If you are sick with COVID-19, avoid contact with your pet, including petting, snuggling, being kissed or licked, and sharing food. If you must care for your pet or be around animals while you are sick, wash your hands before and after you interact with pets and wear a facemask. See COVID-19 and Animals for more information.    Call ahead before visiting your doctor  If you have a medical appointment, call the  healthcare provider and tell them that you have or may have COVID-19. This information will help the healthcare provider’s office take steps to keep other people from getting infected or exposed.    Wear a facemask  You should wear a facemask when you are around other people (e.g., sharing a room or vehicle) or pets and before you enter a healthcare provider’s office.     If you are not able to wear a facemask (for example, because it causes trouble breathing), then people who live with you should not stay in the same room with you, or they should wear a facemask if they enter your room.    Cover your coughs and sneezes  • Cover your mouth and nose with a tissue when you cough or sneeze.   • Throw used tissues in a lined trash can.   • Immediately wash your hands with soap and water for at least 20 seconds or, if soap and water are not available, clean your hands with an alcohol-based hand  that contains at least 60% alcohol.    Clean your hands often  • Wash your hands often with soap and water for at least 20 seconds, especially after blowing your nose, coughing, or sneezing; going to the bathroom; and before eating or preparing food.     • If soap and water are not readily available, use an alcohol-based hand  with at least 60% alcohol, covering all surfaces of your hands and rubbing them together until they feel dry.    • Soap and water are the best option if hands are visibly dirty. Avoid touching your eyes, nose, and mouth with unwashed hands.    Avoid sharing personal household items  • You should not share dishes, drinking glasses, cups, eating utensils, towels, or bedding with other people or pets in your home.   • After using these items, they should be washed thoroughly with soap and water.    Clean all “high-touch” surfaces everyday  • High touch surfaces include counters, tabletops, doorknobs, bathroom fixtures, toilets, phones, keyboards, tablets, and bedside tables.   • Also, clean  any surfaces that may have blood, stool, or body fluids on them.   • Use a household cleaning spray or wipe, according to the label instructions. Labels contain instructions for safe and effective use of the cleaning product, including precautions you should take when applying the product, such as wearing gloves and making sure you have good ventilation during use of the product.    Monitor your symptoms  • Seek prompt medical attention if your illness is worsening (e.g., difficulty breathing).   • Before seeking care, call your healthcare provider and tell them that you have, or are being evaluated for, COVID-19.   • Put on a facemask before you enter the facility.     • These steps will help the healthcare provider’s office to keep other people in the office or waiting room from getting infected or exposed.   • Persons who are placed under active monitoring or facilitated self-monitoring should follow instructions provided by their local health department or occupational health professionals, as appropriate.  • If you have a medical emergency and need to call 911, notify the dispatch personnel that you have, or are being evaluated for COVID-19. If possible, put on a facemask before emergency medical services arrive.    Discontinuing home isolation  Patients with confirmed COVID-19 should remain under home isolation precautions until the risk of secondary transmission to others is thought to be low. The decision to discontinue home isolation precautions should be made on a case-by-case basis, in consultation with healthcare providers and state and local health departments.    The below content are for household members, intimate partners, and caregivers of a patient with symptomatic laboratory-confirmed COVID-19 or a patient under investigation:    Household members, intimate partners, and caregivers may have close contact with a person with symptomatic, laboratory-confirmed COVID-19 or a person under  investigation.     Close contacts should monitor their health; they should call their healthcare provider right away if they develop symptoms suggestive of COVID-19 (e.g., fever, cough, shortness of breath)     Close contacts should also follow these recommendations:  • Make sure that you understand and can help the patient follow their healthcare provider’s instructions for medication(s) and care. You should help the patient with basic needs in the home and provide support for getting groceries, prescriptions, and other personal needs.  • Monitor the patient’s symptoms. If the patient is getting sicker, call his or her healthcare provider and tell them that the patient has laboratory-confirmed COVID-19. This will help the healthcare provider’s office take steps to keep other people in the office or waiting room from getting infected. Ask the healthcare provider to call the local or [facility] health department for additional guidance. If the patient has a medical emergency and you need to call 911, notify the dispatch personnel that the patient has, or is being evaluated for COVID-19.  • Household members should stay in another room or be  from the patient as much as possible. Household members should use a separate bedroom and bathroom, if available.  • Prohibit visitors who do not have an essential need to be in the home.  • Household members should care for any pets in the home. Do not handle pets or other animals while sick.  For more information, see COVID-19 and Animals.  • Make sure that shared spaces in the home have good air flow, such as by an air conditioner or an opened window, weather permitting.  • Perform hand hygiene frequently. Wash your hands often with soap and water for at least 20 seconds or use an alcohol-based hand  that contains 60 to 95% alcohol, covering all surfaces of your hands and rubbing them together until they feel dry. Soap and water should be used preferentially if  hands are visibly dirty.  • Avoid touching your eyes, nose, and mouth with unwashed hands.  • The patient should wear a facemask when you are around other people. If the patient is not able to wear a facemask (for example, because it causes trouble breathing), you, as the caregiver, should wear a mask when you are in the same room as the patient.  • Wear a disposable facemask and gloves when you touch or have contact with the patient’s blood, stool, or body fluids, such as saliva, sputum, nasal mucus, vomit, or urine.   o Throw out disposable facemasks and gloves after using them. Do not reuse.  o When removing personal protective equipment, first remove and dispose of gloves. Then, immediately clean your hands with soap and water or alcohol-based hand . Next, remove and dispose of facemask, and immediately clean your hands again with soap and water or alcohol-based hand .  • Avoid sharing household items with the patient. You should not share dishes, drinking glasses, cups, eating utensils, towels, bedding, or other items. After the patient uses these items, you should wash them thoroughly (see below “Wash laundry thoroughly”).  • Clean all “high-touch” surfaces, such as counters, tabletops, doorknobs, bathroom fixtures, toilets, phones, keyboards, tablets, and bedside tables, every day. Also, clean any surfaces that may have blood, stool, or body fluids on them.   o Use a household cleaning spray or wipe, according to the label instructions. Labels contain instructions for safe and effective use of the cleaning product including precautions you should take when applying the product, such as wearing gloves and making sure you have good ventilation during use of the product.  • Wash laundry thoroughly.   o Immediately remove and wash clothes or bedding that have blood, stool, or body fluids on them.  o Wear disposable gloves while handling soiled items and keep soiled items away from your body.  Clean your hands (with soap and water or an alcohol-based hand ) immediately after removing your gloves.  o Read and follow directions on labels of laundry or clothing items and detergent. In general, using a normal laundry detergent according to washing machine instructions and dry thoroughly using the warmest temperatures recommended on the clothing label.  • Place all used disposable gloves, facemasks, and other contaminated items in a lined container before disposing of them with other household waste. Clean your hands (with soap and water or an alcohol-based hand ) immediately after handling these items. Soap and water should be used preferentially if hands are visibly dirty.  • Discuss any additional questions with your state or local health department or healthcare provider.    Adapted from information provided by the Centers for Disease Control and Prevention.  For more information, visit https://www.cdc.gov/coronavirus/2019-ncov/hcp/guidance-prevent-spread.html

## 2020-06-22 NOTE — PATIENT INSTRUCTIONS

## 2020-06-23 DIAGNOSIS — R11.2 NON-INTRACTABLE VOMITING WITH NAUSEA, UNSPECIFIED VOMITING TYPE: Primary | ICD-10-CM

## 2020-06-23 DIAGNOSIS — R19.7 DIARRHEA, UNSPECIFIED TYPE: ICD-10-CM

## 2020-06-23 DIAGNOSIS — R10.84 GENERALIZED ABDOMINAL PAIN: ICD-10-CM

## 2020-06-23 LAB
ALBUMIN SERPL-MCNC: 4.3 G/DL (ref 3.5–5.2)
ALBUMIN/GLOB SERPL: 1.3 G/DL
ALP SERPL-CCNC: 80 U/L (ref 39–117)
ALT SERPL-CCNC: 27 U/L (ref 1–33)
AST SERPL-CCNC: 20 U/L (ref 1–32)
BASOPHILS # BLD AUTO: 0.04 10*3/MM3 (ref 0–0.2)
BASOPHILS NFR BLD AUTO: 0.2 % (ref 0–1.5)
BILIRUB SERPL-MCNC: 0.5 MG/DL (ref 0.2–1.2)
BUN SERPL-MCNC: 11 MG/DL (ref 6–20)
BUN/CREAT SERPL: 10.7 (ref 7–25)
CALCIUM SERPL-MCNC: 8.7 MG/DL (ref 8.6–10.5)
CHLORIDE SERPL-SCNC: 96 MMOL/L (ref 98–107)
CO2 SERPL-SCNC: 25.1 MMOL/L (ref 22–29)
CREAT SERPL-MCNC: 1.03 MG/DL (ref 0.57–1)
EOSINOPHIL # BLD AUTO: 0.14 10*3/MM3 (ref 0–0.4)
EOSINOPHIL NFR BLD AUTO: 0.7 % (ref 0.3–6.2)
ERYTHROCYTE [DISTWIDTH] IN BLOOD BY AUTOMATED COUNT: 12.7 % (ref 12.3–15.4)
GLOBULIN SER CALC-MCNC: 3.2 GM/DL
GLUCOSE SERPL-MCNC: 124 MG/DL (ref 65–99)
HAV IGM SERPL QL IA: NEGATIVE
HBV CORE IGM SERPL QL IA: POSITIVE
HBV SURFACE AG SERPL QL IA: NEGATIVE
HCT VFR BLD AUTO: 41.9 % (ref 34–46.6)
HCV AB S/CO SERPL IA: 0.2 S/CO RATIO (ref 0–0.9)
HGB BLD-MCNC: 14.4 G/DL (ref 12–15.9)
IMM GRANULOCYTES # BLD AUTO: 0.11 10*3/MM3 (ref 0–0.05)
IMM GRANULOCYTES NFR BLD AUTO: 0.6 % (ref 0–0.5)
LYMPHOCYTES # BLD AUTO: 2.33 10*3/MM3 (ref 0.7–3.1)
LYMPHOCYTES NFR BLD AUTO: 12.5 % (ref 19.6–45.3)
MCH RBC QN AUTO: 29.8 PG (ref 26.6–33)
MCHC RBC AUTO-ENTMCNC: 34.4 G/DL (ref 31.5–35.7)
MCV RBC AUTO: 86.6 FL (ref 79–97)
MONOCYTES # BLD AUTO: 0.81 10*3/MM3 (ref 0.1–0.9)
MONOCYTES NFR BLD AUTO: 4.3 % (ref 5–12)
NEUTROPHILS # BLD AUTO: 15.24 10*3/MM3 (ref 1.7–7)
NEUTROPHILS NFR BLD AUTO: 81.7 % (ref 42.7–76)
NRBC BLD AUTO-RTO: 0 /100 WBC (ref 0–0.2)
PLATELET # BLD AUTO: 383 10*3/MM3 (ref 140–450)
POTASSIUM SERPL-SCNC: 3.2 MMOL/L (ref 3.5–5.2)
PROT SERPL-MCNC: 7.5 G/DL (ref 6–8.5)
RBC # BLD AUTO: 4.84 10*6/MM3 (ref 3.77–5.28)
SODIUM SERPL-SCNC: 135 MMOL/L (ref 136–145)
WBC # BLD AUTO: 18.67 10*3/MM3 (ref 3.4–10.8)

## 2020-06-23 RX ORDER — ONDANSETRON 4 MG/1
4 TABLET, FILM COATED ORAL EVERY 8 HOURS PRN
Qty: 60 TABLET | Refills: 0 | Status: SHIPPED | OUTPATIENT
Start: 2020-06-23 | End: 2020-08-13

## 2020-06-23 RX ORDER — ONDANSETRON 4 MG/1
TABLET, FILM COATED ORAL
Qty: 30 TABLET | Refills: 0 | OUTPATIENT
Start: 2020-06-23

## 2020-06-24 ENCOUNTER — EPISODE CHANGES (OUTPATIENT)
Dept: CASE MANAGEMENT | Facility: OTHER | Age: 50
End: 2020-06-24

## 2020-06-26 LAB
BACTERIA SPEC CULT: NORMAL
BACTERIA SPEC CULT: NORMAL
C DIFF TOX GENS STL QL NAA+PROBE: NEGATIVE
CAMPYLOBACTER STL CULT: NORMAL
E COLI SXT STL QL IA: NEGATIVE
O+P SPEC MICRO: NORMAL
O+P STL CONC: NORMAL
SALM + SHIG STL CULT: NORMAL

## 2020-06-27 LAB
HBV CORE AB SERPL QL IA: POSITIVE
HBV SURFACE AB SER QL: REACTIVE
HBV SURFACE AG SERPL QL IA: NEGATIVE

## 2020-06-28 ENCOUNTER — RESULTS ENCOUNTER (OUTPATIENT)
Dept: FAMILY MEDICINE CLINIC | Facility: CLINIC | Age: 50
End: 2020-06-28

## 2020-06-28 DIAGNOSIS — R11.2 NON-INTRACTABLE VOMITING WITH NAUSEA, UNSPECIFIED VOMITING TYPE: ICD-10-CM

## 2020-06-28 DIAGNOSIS — R19.7 DIARRHEA, UNSPECIFIED TYPE: ICD-10-CM

## 2020-06-28 DIAGNOSIS — R10.84 GENERALIZED ABDOMINAL PAIN: ICD-10-CM

## 2020-06-29 ENCOUNTER — EPISODE CHANGES (OUTPATIENT)
Dept: CASE MANAGEMENT | Facility: OTHER | Age: 50
End: 2020-06-29

## 2020-07-06 RX ORDER — LANSOPRAZOLE 30 MG/1
30 CAPSULE, DELAYED RELEASE ORAL DAILY
Qty: 90 CAPSULE | Refills: 0 | Status: SHIPPED | OUTPATIENT
Start: 2020-07-06 | End: 2020-07-27

## 2020-07-06 RX ORDER — LEVOCETIRIZINE DIHYDROCHLORIDE 5 MG/1
5 TABLET, FILM COATED ORAL EVERY EVENING
Qty: 90 TABLET | Refills: 0 | Status: SHIPPED | OUTPATIENT
Start: 2020-07-06 | End: 2020-10-07

## 2020-07-07 ENCOUNTER — EPISODE CHANGES (OUTPATIENT)
Dept: CASE MANAGEMENT | Facility: OTHER | Age: 50
End: 2020-07-07

## 2020-07-08 ENCOUNTER — EPISODE CHANGES (OUTPATIENT)
Dept: CASE MANAGEMENT | Facility: OTHER | Age: 50
End: 2020-07-08

## 2020-07-10 ENCOUNTER — EPISODE CHANGES (OUTPATIENT)
Dept: CASE MANAGEMENT | Facility: OTHER | Age: 50
End: 2020-07-10

## 2020-07-27 ENCOUNTER — OFFICE VISIT (OUTPATIENT)
Dept: FAMILY MEDICINE CLINIC | Facility: CLINIC | Age: 50
End: 2020-07-27

## 2020-07-27 VITALS
WEIGHT: 223 LBS | HEIGHT: 65 IN | HEART RATE: 94 BPM | BODY MASS INDEX: 37.15 KG/M2 | TEMPERATURE: 97.3 F | DIASTOLIC BLOOD PRESSURE: 90 MMHG | OXYGEN SATURATION: 97 % | SYSTOLIC BLOOD PRESSURE: 132 MMHG

## 2020-07-27 DIAGNOSIS — K21.9 GASTROESOPHAGEAL REFLUX DISEASE, ESOPHAGITIS PRESENCE NOT SPECIFIED: ICD-10-CM

## 2020-07-27 DIAGNOSIS — R11.2 NON-INTRACTABLE VOMITING WITH NAUSEA, UNSPECIFIED VOMITING TYPE: ICD-10-CM

## 2020-07-27 DIAGNOSIS — K21.9 GASTROESOPHAGEAL REFLUX DISEASE, ESOPHAGITIS PRESENCE NOT SPECIFIED: Primary | ICD-10-CM

## 2020-07-27 DIAGNOSIS — R19.7 DIARRHEA, UNSPECIFIED TYPE: ICD-10-CM

## 2020-07-27 DIAGNOSIS — R10.84 GENERALIZED ABDOMINAL PAIN: ICD-10-CM

## 2020-07-27 PROCEDURE — 99213 OFFICE O/P EST LOW 20 MIN: CPT | Performed by: FAMILY MEDICINE

## 2020-07-27 RX ORDER — PANTOPRAZOLE SODIUM 40 MG/1
40 TABLET, DELAYED RELEASE ORAL DAILY
Qty: 30 TABLET | Refills: 0 | Status: SHIPPED | OUTPATIENT
Start: 2020-07-27 | End: 2020-07-28

## 2020-07-27 RX ORDER — METRONIDAZOLE 500 MG/1
500 TABLET ORAL 3 TIMES DAILY
Qty: 30 TABLET | Refills: 0 | Status: SHIPPED | OUTPATIENT
Start: 2020-07-27 | End: 2020-09-01 | Stop reason: SDUPTHER

## 2020-07-27 NOTE — PROGRESS NOTES
Subjective   Rosa Melgoza is a 49 y.o. female.     Chief Complaint   Patient presents with   • Abdominal Pain     STATES THE PAIN IMPROVED, AND THEN RETURNED. SHE HAS TRIED OTC ACID REFLUX MEDICATION   • Vomiting       Patient is here today for worsening problem.  She initially started with abdominal pain about a month ago.  It was very sharp epigastric pain associated with nausea and vomiting and diarrhea.  She was seen at [facility] ER and a CT and labs were drawn which showed an area of thickening and stranding in the distal small intestine, normal labs.  She was given Carafate and Zofran.  She followed up with me and I gave her Flagyl for 10 days and the patient symptoms improved.  Until July 24 when she started once again with the same sharp epigastric pain nausea, vomiting and diarrhea.  It is a little bit better today.  She has tried Carafate, lansoprazole, Pepto-Bismol, Tums, Mylanta, Maalox without much benefit.  The patient has had her gallbladder removed in the past.  She denies any fevers, chills, melena or bright red blood per rectum or in emesis       Review of Systems   Constitutional: Negative for activity change, chills, fatigue and fever.   HENT: Negative for hearing loss, swollen glands, tinnitus and trouble swallowing.    Eyes: Negative for pain and visual disturbance.   Respiratory: Negative for cough and shortness of breath.    Cardiovascular: Negative for chest pain, palpitations and leg swelling.   Gastrointestinal: Positive for abdominal pain, diarrhea, nausea and vomiting.   Endocrine: Negative for polydipsia and polyuria.   Genitourinary: Negative for difficulty urinating and urinary incontinence.   Musculoskeletal: Negative for arthralgias, gait problem and joint swelling.   Skin: Negative for rash.   Allergic/Immunologic: Negative for immunocompromised state.   Neurological: Negative for dizziness, light-headedness and headache.   Hematological: Negative for adenopathy. Does not  bruise/bleed easily.   Psychiatric/Behavioral: Negative for dysphoric mood and sleep disturbance.       The following portions of the patient's history were reviewed and updated as appropriate: allergies, current medications, past family history, past medical history, past social history, past surgical history and problem list.    Past Medical History:   Diagnosis Date   • Disease of thyroid gland    • Elevated blood pressure reading without diagnosis of hypertension    • GERD (gastroesophageal reflux disease)    • Headache, migraine    • History of sore throat    • Hypothyroidism    • Migraine    • Need for DTaP and Hib vaccine     History of    • Vaginal yeast infection        Past Surgical History:   Procedure Laterality Date   • BREAST BIOPSY     •  SECTION     • CHOLECYSTECTOMY         Family History   Problem Relation Age of Onset   • Breast cancer Mother    • Hypertension Father    • Heart disease Father    • Breast cancer Other    • Alcohol abuse Other    • Depression Other    • Anxiety disorder Other    • Hypertension Other    • Other Other         Pure hypercholesterolemia and esophageal reflux   • Lung cancer Other        Social History     Socioeconomic History   • Marital status:      Spouse name: Not on file   • Number of children: Not on file   • Years of education: Not on file   • Highest education level: Not on file   Tobacco Use   • Smoking status: Former Smoker     Types: Cigarettes     Last attempt to quit: 3/1/2011     Years since quittin.4   • Smokeless tobacco: Never Used   Substance and Sexual Activity   • Alcohol use: Yes     Comment: Occasional   • Drug use: Never   • Sexual activity: Defer         Current Outpatient Medications:   •  amitriptyline (ELAVIL) 50 MG tablet, Take 1 tablet by mouth Every Night., Disp: 90 tablet, Rfl: 3  •  butalbital-acetaminophen-caffeine (FIORICET, ESGIC) -40 MG per tablet, Take 2 tablets by mouth Every 6 (Six) Hours As Needed for  Migraine., Disp: 180 tablet, Rfl: 1  •  Cholecalciferol (VITAMIN D3) 125 MCG (5000 UT) tablet tablet, Take 5,000 Units by mouth Daily., Disp: , Rfl:   •  Erenumab-aooe (AIMOVIG) 140 MG/ML solution auto-injector, Inject 1 mL under the skin into the appropriate area as directed Every 30 (Thirty) Days., Disp: 1.12 mL, Rfl: 7  •  escitalopram (LEXAPRO) 10 MG tablet, Take 1 tablet by mouth Daily., Disp: 90 tablet, Rfl: 3  •  fluticasone (FLONASE) 50 MCG/ACT nasal spray, 2 sprays into the nostril(s) as directed by provider Daily., Disp: 1 bottle, Rfl: 6  •  hydrOXYzine (ATARAX) 25 MG tablet, Take 1 tablet by mouth Every 8 (Eight) Hours As Needed for Anxiety., Disp: 90 tablet, Rfl: 3  •  levocetirizine (XYZAL) 5 MG tablet, Take 1 tablet by mouth Every Evening., Disp: 90 tablet, Rfl: 0  •  levothyroxine (SYNTHROID, LEVOTHROID) 88 MCG tablet, Take 1 tablet by mouth Daily., Disp: 30 tablet, Rfl: 2  •  lisinopril-hydrochlorothiazide (Zestoretic) 10-12.5 MG per tablet, Take 1 tablet by mouth Daily., Disp: 30 tablet, Rfl: 2  •  ondansetron (Zofran) 4 MG tablet, Take 1 tablet by mouth Every 8 (Eight) Hours As Needed for Nausea or Vomiting., Disp: 60 tablet, Rfl: 0  •  promethazine (PHENERGAN) 25 MG tablet, Take 1 tablet by mouth Every 6 (Six) Hours As Needed for Nausea or Vomiting., Disp: 60 tablet, Rfl: 1  •  Riboflavin (B2) 100 MG tablet, Take  by mouth 2 (Two) Times a Day., Disp: , Rfl:   •  Ubrogepant (ubrogepant) 100 MG tablet, Take 1 tablet by mouth Daily As Needed (headache) for up to 1 dose., Disp: 9 tablet, Rfl: 1  •  metroNIDAZOLE (Flagyl) 500 MG tablet, Take 1 tablet by mouth 3 (Three) Times a Day., Disp: 30 tablet, Rfl: 0  •  pantoprazole (PROTONIX) 40 MG EC tablet, Take 1 tablet by mouth Daily., Disp: 30 tablet, Rfl: 0  •  sucralfate (CARAFATE) 1 g tablet, Take 1 tablet by mouth 4 (Four) Times a Day., Disp: 40 tablet, Rfl: 0    Objective     Vitals:    07/27/20 1417   BP: 132/90   Pulse: 94   Temp: 97.3 °F (36.3 °C)  "  SpO2: 97%   Weight: 101 kg (223 lb)   Height: 165.1 cm (65\")       Body mass index is 37.11 kg/m².    No components found for: 2D    Physical Exam   Constitutional: She is oriented to person, place, and time. She appears well-developed and well-nourished.   HENT:   Head: Normocephalic and atraumatic.   Eyes: Conjunctivae are normal.   Neck: Normal range of motion. Neck supple.   Cardiovascular: Normal rate, regular rhythm, normal heart sounds and intact distal pulses.   Pulmonary/Chest: Effort normal and breath sounds normal.   Abdominal: Soft. Bowel sounds are normal. She exhibits no distension and no mass. There is tenderness (Generalized tenderness throughout). There is no rebound and no guarding. No hernia.   Musculoskeletal: Normal range of motion. She exhibits no edema.   Neurological: She is alert and oriented to person, place, and time.   Skin: Skin is warm and dry. Capillary refill takes less than 2 seconds. No rash noted.   Psychiatric: She has a normal mood and affect. Her behavior is normal. Judgment and thought content normal.   Nursing note and vitals reviewed.      Procedures    Assessment/Plan   Rosa was seen today for abdominal pain and vomiting.    Diagnoses and all orders for this visit:    Gastroesophageal reflux disease, esophagitis presence not specified  -     pantoprazole (PROTONIX) 40 MG EC tablet; Take 1 tablet by mouth Daily.  -     CBC & Differential  -     Comprehensive Metabolic Panel  -     Lipase  -     Amylase    Diarrhea, unspecified type  -     metroNIDAZOLE (Flagyl) 500 MG tablet; Take 1 tablet by mouth 3 (Three) Times a Day.  -     Ambulatory Referral to Gastroenterology  -     CBC & Differential  -     Comprehensive Metabolic Panel  -     Lipase  -     Amylase    Generalized abdominal pain  -     metroNIDAZOLE (Flagyl) 500 MG tablet; Take 1 tablet by mouth 3 (Three) Times a Day.  -     Ambulatory Referral to Gastroenterology  -     CBC & Differential  -     Comprehensive " Metabolic Panel  -     Lipase  -     Amylase    Non-intractable vomiting with nausea, unspecified vomiting type  -     Ambulatory Referral to Gastroenterology  -     CBC & Differential  -     Comprehensive Metabolic Panel  -     Lipase  -     Amylase      We will try another round of Flagyl and changing the patient's PPI from lansoprazole to pantoprazole.  I will refer the patient to GI for at least upper endoscopy and further work-up of CT findings in distal small bowel.    There are no Patient Instructions on file for this visit.

## 2020-07-28 LAB
ALBUMIN SERPL-MCNC: 4.4 G/DL (ref 3.8–4.8)
ALBUMIN/GLOB SERPL: 1.7 {RATIO} (ref 1.2–2.2)
ALP SERPL-CCNC: 60 IU/L (ref 39–117)
ALT SERPL-CCNC: 25 IU/L (ref 0–32)
AMYLASE SERPL-CCNC: 32 U/L (ref 31–110)
AST SERPL-CCNC: 22 IU/L (ref 0–40)
BASOPHILS # BLD AUTO: 0 X10E3/UL (ref 0–0.2)
BASOPHILS NFR BLD AUTO: 0 %
BILIRUB SERPL-MCNC: 0.2 MG/DL (ref 0–1.2)
BUN SERPL-MCNC: 8 MG/DL (ref 6–24)
BUN/CREAT SERPL: 10 (ref 9–23)
CALCIUM SERPL-MCNC: 9.3 MG/DL (ref 8.7–10.2)
CHLORIDE SERPL-SCNC: 99 MMOL/L (ref 96–106)
CO2 SERPL-SCNC: 28 MMOL/L (ref 20–29)
CREAT SERPL-MCNC: 0.81 MG/DL (ref 0.57–1)
EOSINOPHIL # BLD AUTO: 0.1 X10E3/UL (ref 0–0.4)
EOSINOPHIL NFR BLD AUTO: 1 %
ERYTHROCYTE [DISTWIDTH] IN BLOOD BY AUTOMATED COUNT: 13.1 % (ref 11.7–15.4)
GLOBULIN SER CALC-MCNC: 2.6 G/DL (ref 1.5–4.5)
GLUCOSE SERPL-MCNC: 93 MG/DL (ref 65–99)
HCT VFR BLD AUTO: 35.9 % (ref 34–46.6)
HGB BLD-MCNC: 12.1 G/DL (ref 11.1–15.9)
IMM GRANULOCYTES # BLD AUTO: 0 X10E3/UL (ref 0–0.1)
IMM GRANULOCYTES NFR BLD AUTO: 0 %
LIPASE SERPL-CCNC: 34 U/L (ref 14–72)
LYMPHOCYTES # BLD AUTO: 1.9 X10E3/UL (ref 0.7–3.1)
LYMPHOCYTES NFR BLD AUTO: 20 %
MCH RBC QN AUTO: 29.7 PG (ref 26.6–33)
MCHC RBC AUTO-ENTMCNC: 33.7 G/DL (ref 31.5–35.7)
MCV RBC AUTO: 88 FL (ref 79–97)
MONOCYTES # BLD AUTO: 0.5 X10E3/UL (ref 0.1–0.9)
MONOCYTES NFR BLD AUTO: 5 %
NEUTROPHILS # BLD AUTO: 7.3 X10E3/UL (ref 1.4–7)
NEUTROPHILS NFR BLD AUTO: 74 %
PLATELET # BLD AUTO: 332 X10E3/UL (ref 150–450)
POTASSIUM SERPL-SCNC: 3.4 MMOL/L (ref 3.5–5.2)
PROT SERPL-MCNC: 7 G/DL (ref 6–8.5)
RBC # BLD AUTO: 4.08 X10E6/UL (ref 3.77–5.28)
SODIUM SERPL-SCNC: 141 MMOL/L (ref 134–144)
WBC # BLD AUTO: 9.8 X10E3/UL (ref 3.4–10.8)

## 2020-07-28 RX ORDER — PANTOPRAZOLE SODIUM 40 MG/1
40 TABLET, DELAYED RELEASE ORAL DAILY
Qty: 90 TABLET | Refills: 0 | Status: SHIPPED | OUTPATIENT
Start: 2020-07-28 | End: 2020-12-02

## 2020-08-03 ENCOUNTER — PATIENT MESSAGE (OUTPATIENT)
Dept: FAMILY MEDICINE CLINIC | Facility: CLINIC | Age: 50
End: 2020-08-03

## 2020-08-03 DIAGNOSIS — E06.3 HYPOTHYROIDISM DUE TO HASHIMOTO'S THYROIDITIS: ICD-10-CM

## 2020-08-03 DIAGNOSIS — E03.8 HYPOTHYROIDISM DUE TO HASHIMOTO'S THYROIDITIS: ICD-10-CM

## 2020-08-03 RX ORDER — LEVOTHYROXINE SODIUM 88 UG/1
88 TABLET ORAL DAILY
Qty: 30 TABLET | Refills: 2 | OUTPATIENT
Start: 2020-08-03

## 2020-08-03 RX ORDER — LEVOTHYROXINE SODIUM 0.07 MG/1
75 TABLET ORAL DAILY
Qty: 90 TABLET | Refills: 0 | OUTPATIENT
Start: 2020-08-03

## 2020-08-03 RX ORDER — LEVOTHYROXINE SODIUM 88 UG/1
88 TABLET ORAL DAILY
Qty: 30 TABLET | Refills: 2 | Status: SHIPPED | OUTPATIENT
Start: 2020-08-03 | End: 2020-12-21 | Stop reason: SDUPTHER

## 2020-08-03 NOTE — TELEPHONE ENCOUNTER
From: Erin Jon MA  To: Rosa Melgoza  Sent: 8/3/2020 10:34 AM EDT  Subject: levothyroxine    What dose of levothyroxine are you currently taking?

## 2020-08-12 DIAGNOSIS — G43.109 MIGRAINE WITH AURA AND WITHOUT STATUS MIGRAINOSUS, NOT INTRACTABLE: ICD-10-CM

## 2020-08-12 DIAGNOSIS — R11.2 NON-INTRACTABLE VOMITING WITH NAUSEA, UNSPECIFIED VOMITING TYPE: ICD-10-CM

## 2020-08-13 RX ORDER — ONDANSETRON 4 MG/1
TABLET, FILM COATED ORAL
Qty: 60 TABLET | Refills: 0 | Status: SHIPPED | OUTPATIENT
Start: 2020-08-13 | End: 2020-11-20 | Stop reason: SDUPTHER

## 2020-08-13 RX ORDER — BUTALBITAL, ACETAMINOPHEN AND CAFFEINE 50; 325; 40 MG/1; MG/1; MG/1
TABLET ORAL
Qty: 180 TABLET | Refills: 0 | Status: SHIPPED | OUTPATIENT
Start: 2020-08-13 | End: 2020-11-20

## 2020-08-13 RX ORDER — PROMETHAZINE HYDROCHLORIDE 25 MG/1
TABLET ORAL
Qty: 60 TABLET | Refills: 0 | Status: SHIPPED | OUTPATIENT
Start: 2020-08-13 | End: 2020-11-20 | Stop reason: SDUPTHER

## 2020-09-01 DIAGNOSIS — R19.7 DIARRHEA, UNSPECIFIED TYPE: ICD-10-CM

## 2020-09-01 DIAGNOSIS — R10.84 GENERALIZED ABDOMINAL PAIN: ICD-10-CM

## 2020-09-01 RX ORDER — METRONIDAZOLE 500 MG/1
500 TABLET ORAL 3 TIMES DAILY
Qty: 30 TABLET | Refills: 0 | Status: SHIPPED | OUTPATIENT
Start: 2020-09-01 | End: 2020-09-09

## 2020-09-01 NOTE — TELEPHONE ENCOUNTER
----- Message from Rosa Melgoza sent at 9/1/2020 11:37 AM EDT -----  Regarding: RE: Non-Urgent Medical Question  Contact: [phone]  My GI appt is on the 9th, so if she doesnt mind to call me in a refill to get me thru til my appt, that would be great.      ----- Message -----  From: MICHAEL CISNEROS  Sent: 9/1/20 11:20 AM  To: Rosa Melgoza  Subject: RE: Non-Urgent Medical Question    From Dr. Romeo:    I am okay to try another trial of Flagyl but if that does not fix this long-term then she needs to follow-up with me next time.  If she wants to be seen today I would be glad to see her as well, please find out what she would rather do.     ----- Message -----     From: Rosa Melgoza     Sent: 9/1/2020  7:54 AM EDT       To: Kathie Romeo, DO  Subject: Non-Urgent Medical Question    The stomach pain, vomiting, and diarrhea are back.  Do I need to do flagyl again? If so, can you call it in for me.     Thank you,   Rosa

## 2020-09-08 DIAGNOSIS — G43.109 MIGRAINE WITH AURA AND WITHOUT STATUS MIGRAINOSUS, NOT INTRACTABLE: ICD-10-CM

## 2020-09-08 RX ORDER — ERENUMAB-AOOE 140 MG/ML
INJECTION, SOLUTION SUBCUTANEOUS
Qty: 1 ML | Refills: 2 | Status: SHIPPED | OUTPATIENT
Start: 2020-09-08 | End: 2020-12-21 | Stop reason: SDUPTHER

## 2020-09-09 ENCOUNTER — OFFICE VISIT (OUTPATIENT)
Dept: GASTROENTEROLOGY | Facility: CLINIC | Age: 50
End: 2020-09-09

## 2020-09-09 VITALS — TEMPERATURE: 97.8 F | HEIGHT: 65 IN | WEIGHT: 232.9 LBS | BODY MASS INDEX: 38.8 KG/M2

## 2020-09-09 DIAGNOSIS — R10.13 EPIGASTRIC PAIN: Primary | ICD-10-CM

## 2020-09-09 PROCEDURE — 99204 OFFICE O/P NEW MOD 45 MIN: CPT | Performed by: INTERNAL MEDICINE

## 2020-09-09 RX ORDER — SODIUM CHLORIDE, SODIUM LACTATE, POTASSIUM CHLORIDE, CALCIUM CHLORIDE 600; 310; 30; 20 MG/100ML; MG/100ML; MG/100ML; MG/100ML
30 INJECTION, SOLUTION INTRAVENOUS CONTINUOUS
Status: CANCELLED | OUTPATIENT
Start: 2020-09-24

## 2020-09-09 NOTE — H&P (VIEW-ONLY)
Chief Complaint   Patient presents with   • Abdominal Pain   • Nausea   • Vomiting     Subjective      HPI     Rosa Melgoza is a 50 y.o. female who presents today for new patient evaluation.     She c/o abdominal pain. Located in epigastrium.  Associated with blaoting and diarrhea.  Initial episode in June, seen in ER with CT scan noted below.      IMPRESSION:  1. Nonspecific small bowel enteritis involving the mid to distal small  bowel.  2. Left lower lobe pulmonary nodules, further evaluation recommended,  possibility of neoplasm not excluded.    Was felt to have nonspecific GE, symptoms gradually improved.  Had some recurrence after 2 weeks, treated with course of Flagyl by PCP.   Had another similar episode about 2 weeks ago.    She reports prior colonoscopy about 10 years ago.      No unintentional weight loss      Past Medical History:   Diagnosis Date   • Disease of thyroid gland    • Elevated blood pressure reading without diagnosis of hypertension    • GERD (gastroesophageal reflux disease)    • Headache, migraine    • History of sore throat    • Hypothyroidism    • Migraine    • Need for DTaP and Hib vaccine     History of    • Vaginal yeast infection        Current Outpatient Medications:   •  AIMOVIG 140 MG/ML prefilled syringe, INJECT 1 ML UNDER THE SKIN IN APPROPRIATE AREA AS DIRECTED EVERY 30 DAYS, Disp: 1 mL, Rfl: 2  •  amitriptyline (ELAVIL) 50 MG tablet, Take 1 tablet by mouth Every Night., Disp: 90 tablet, Rfl: 3  •  butalbital-acetaminophen-caffeine (FIORICET, ESGIC) -40 MG per tablet, TAKE 2 TABLETS BY MOUTH EVERY 6 HOURS AS NEEDED FOR MIGRAINE, Disp: 180 tablet, Rfl: 0  •  Cholecalciferol (VITAMIN D3) 125 MCG (5000 UT) tablet tablet, Take 5,000 Units by mouth Daily., Disp: , Rfl:   •  escitalopram (LEXAPRO) 10 MG tablet, Take 1 tablet by mouth Daily., Disp: 90 tablet, Rfl: 3  •  fluticasone (FLONASE) 50 MCG/ACT nasal spray, 2 sprays into the nostril(s) as directed by provider Daily.,  Disp: 1 bottle, Rfl: 6  •  hydrOXYzine (ATARAX) 25 MG tablet, Take 1 tablet by mouth Every 8 (Eight) Hours As Needed for Anxiety., Disp: 90 tablet, Rfl: 3  •  levocetirizine (XYZAL) 5 MG tablet, Take 1 tablet by mouth Every Evening., Disp: 90 tablet, Rfl: 0  •  levothyroxine (SYNTHROID, LEVOTHROID) 88 MCG tablet, Take 1 tablet by mouth Daily., Disp: 30 tablet, Rfl: 2  •  lisinopril-hydrochlorothiazide (Zestoretic) 10-12.5 MG per tablet, Take 1 tablet by mouth Daily., Disp: 30 tablet, Rfl: 2  •  ondansetron (ZOFRAN) 4 MG tablet, TAKE 1 TABLET BY MOUTH EVERY 8 HOURS AS NEEDED FOR NAUSEA OR VOMITING, Disp: 60 tablet, Rfl: 0  •  pantoprazole (PROTONIX) 40 MG EC tablet, TAKE 1 TABLET BY MOUTH DAILY, Disp: 90 tablet, Rfl: 0  •  promethazine (PHENERGAN) 25 MG tablet, TAKE 1 TABLET BY MOUTH EVERY 6 HOURS AS NEEDED FOR NAUSEA OR VOMITING, Disp: 60 tablet, Rfl: 0  •  Riboflavin (B2) 100 MG tablet, Take  by mouth 2 (Two) Times a Day., Disp: , Rfl:   •  sucralfate (CARAFATE) 1 g tablet, Take 1 tablet by mouth 4 (Four) Times a Day., Disp: 40 tablet, Rfl: 0  No Known Allergies  Social History     Socioeconomic History   • Marital status:      Spouse name: Not on file   • Number of children: Not on file   • Years of education: Not on file   • Highest education level: Not on file   Tobacco Use   • Smoking status: Former Smoker     Types: Cigarettes     Last attempt to quit: 3/1/2011     Years since quittin.5   • Smokeless tobacco: Never Used   Substance and Sexual Activity   • Alcohol use: Yes     Comment: Occasional   • Drug use: Never   • Sexual activity: Defer     Family History   Problem Relation Age of Onset   • Breast cancer Mother    • Hypertension Father    • Heart disease Father    • Breast cancer Other    • Alcohol abuse Other    • Depression Other    • Anxiety disorder Other    • Hypertension Other    • Other Other         Pure hypercholesterolemia and esophageal reflux   • Lung cancer Other      Review of  Systems   Constitutional: Positive for fatigue.   Gastrointestinal: Positive for abdominal pain, diarrhea and nausea.   All other systems reviewed and are negative.    Objective   Vitals:    09/09/20 0920   Temp: 97.8 °F (36.6 °C)     Physical Exam   Constitutional: She is oriented to person, place, and time. She appears well-developed and well-nourished.   HENT:   Head: Normocephalic and atraumatic.   Mouth/Throat: Oropharynx is clear and moist.   Eyes: EOM are normal. No scleral icterus.   Neck: Normal range of motion. Neck supple. No thyromegaly present.   Cardiovascular: Normal rate, regular rhythm and normal heart sounds. Exam reveals no gallop and no friction rub.   No murmur heard.  Pulmonary/Chest: Effort normal and breath sounds normal. She has no wheezes. She has no rales. She exhibits no tenderness.   Abdominal: Soft. Bowel sounds are normal. She exhibits no distension. There is no tenderness. There is no rebound and no guarding. No hernia.   Musculoskeletal: Normal range of motion. She exhibits no edema.   Lymphadenopathy:     She has no cervical adenopathy.   Neurological: She is alert and oriented to person, place, and time.   Skin: Skin is warm and dry.   Psychiatric: She has a normal mood and affect. Judgment and thought content normal.   Vitals reviewed.    Assessment/Plan   Assessment:     1. Epigastric pain    2.      Nausea  3.      Diarrhea  4.      Abnormal CT scan small bowel    Plan:   The patient will be scheduled for bidirectional endoscopy to evaluate the above-noted symptoms.  Her initial episode sounds like a nonspecific viral GE but she has had recurrent episodes since that time and this needs further work-up.  I will also arrange for her to have a small bowel enterography to evaluate the area of inflammation in the mid small bowel which will not be able to reach with standard gastroscope or colonoscope.            Maicol Garza M.D.  [facility]  [address]  [place] [address]  Office: [phone]

## 2020-09-09 NOTE — PROGRESS NOTES
Chief Complaint   Patient presents with   • Abdominal Pain   • Nausea   • Vomiting     Subjective      HPI     Rosa Melgoza is a 50 y.o. female who presents today for new patient evaluation.     She c/o abdominal pain. Located in epigastrium.  Associated with blaoting and diarrhea.  Initial episode in June, seen in ER with CT scan noted below.      IMPRESSION:  1. Nonspecific small bowel enteritis involving the mid to distal small  bowel.  2. Left lower lobe pulmonary nodules, further evaluation recommended,  possibility of neoplasm not excluded.    Was felt to have nonspecific GE, symptoms gradually improved.  Had some recurrence after 2 weeks, treated with course of Flagyl by PCP.   Had another similar episode about 2 weeks ago.    She reports prior colonoscopy about 10 years ago.      No unintentional weight loss      Past Medical History:   Diagnosis Date   • Disease of thyroid gland    • Elevated blood pressure reading without diagnosis of hypertension    • GERD (gastroesophageal reflux disease)    • Headache, migraine    • History of sore throat    • Hypothyroidism    • Migraine    • Need for DTaP and Hib vaccine     History of    • Vaginal yeast infection        Current Outpatient Medications:   •  AIMOVIG 140 MG/ML prefilled syringe, INJECT 1 ML UNDER THE SKIN IN APPROPRIATE AREA AS DIRECTED EVERY 30 DAYS, Disp: 1 mL, Rfl: 2  •  amitriptyline (ELAVIL) 50 MG tablet, Take 1 tablet by mouth Every Night., Disp: 90 tablet, Rfl: 3  •  butalbital-acetaminophen-caffeine (FIORICET, ESGIC) -40 MG per tablet, TAKE 2 TABLETS BY MOUTH EVERY 6 HOURS AS NEEDED FOR MIGRAINE, Disp: 180 tablet, Rfl: 0  •  Cholecalciferol (VITAMIN D3) 125 MCG (5000 UT) tablet tablet, Take 5,000 Units by mouth Daily., Disp: , Rfl:   •  escitalopram (LEXAPRO) 10 MG tablet, Take 1 tablet by mouth Daily., Disp: 90 tablet, Rfl: 3  •  fluticasone (FLONASE) 50 MCG/ACT nasal spray, 2 sprays into the nostril(s) as directed by provider Daily.,  Disp: 1 bottle, Rfl: 6  •  hydrOXYzine (ATARAX) 25 MG tablet, Take 1 tablet by mouth Every 8 (Eight) Hours As Needed for Anxiety., Disp: 90 tablet, Rfl: 3  •  levocetirizine (XYZAL) 5 MG tablet, Take 1 tablet by mouth Every Evening., Disp: 90 tablet, Rfl: 0  •  levothyroxine (SYNTHROID, LEVOTHROID) 88 MCG tablet, Take 1 tablet by mouth Daily., Disp: 30 tablet, Rfl: 2  •  lisinopril-hydrochlorothiazide (Zestoretic) 10-12.5 MG per tablet, Take 1 tablet by mouth Daily., Disp: 30 tablet, Rfl: 2  •  ondansetron (ZOFRAN) 4 MG tablet, TAKE 1 TABLET BY MOUTH EVERY 8 HOURS AS NEEDED FOR NAUSEA OR VOMITING, Disp: 60 tablet, Rfl: 0  •  pantoprazole (PROTONIX) 40 MG EC tablet, TAKE 1 TABLET BY MOUTH DAILY, Disp: 90 tablet, Rfl: 0  •  promethazine (PHENERGAN) 25 MG tablet, TAKE 1 TABLET BY MOUTH EVERY 6 HOURS AS NEEDED FOR NAUSEA OR VOMITING, Disp: 60 tablet, Rfl: 0  •  Riboflavin (B2) 100 MG tablet, Take  by mouth 2 (Two) Times a Day., Disp: , Rfl:   •  sucralfate (CARAFATE) 1 g tablet, Take 1 tablet by mouth 4 (Four) Times a Day., Disp: 40 tablet, Rfl: 0  No Known Allergies  Social History     Socioeconomic History   • Marital status:      Spouse name: Not on file   • Number of children: Not on file   • Years of education: Not on file   • Highest education level: Not on file   Tobacco Use   • Smoking status: Former Smoker     Types: Cigarettes     Last attempt to quit: 3/1/2011     Years since quittin.5   • Smokeless tobacco: Never Used   Substance and Sexual Activity   • Alcohol use: Yes     Comment: Occasional   • Drug use: Never   • Sexual activity: Defer     Family History   Problem Relation Age of Onset   • Breast cancer Mother    • Hypertension Father    • Heart disease Father    • Breast cancer Other    • Alcohol abuse Other    • Depression Other    • Anxiety disorder Other    • Hypertension Other    • Other Other         Pure hypercholesterolemia and esophageal reflux   • Lung cancer Other      Review of  Systems   Constitutional: Positive for fatigue.   Gastrointestinal: Positive for abdominal pain, diarrhea and nausea.   All other systems reviewed and are negative.    Objective   Vitals:    09/09/20 0920   Temp: 97.8 °F (36.6 °C)     Physical Exam   Constitutional: She is oriented to person, place, and time. She appears well-developed and well-nourished.   HENT:   Head: Normocephalic and atraumatic.   Mouth/Throat: Oropharynx is clear and moist.   Eyes: EOM are normal. No scleral icterus.   Neck: Normal range of motion. Neck supple. No thyromegaly present.   Cardiovascular: Normal rate, regular rhythm and normal heart sounds. Exam reveals no gallop and no friction rub.   No murmur heard.  Pulmonary/Chest: Effort normal and breath sounds normal. She has no wheezes. She has no rales. She exhibits no tenderness.   Abdominal: Soft. Bowel sounds are normal. She exhibits no distension. There is no tenderness. There is no rebound and no guarding. No hernia.   Musculoskeletal: Normal range of motion. She exhibits no edema.   Lymphadenopathy:     She has no cervical adenopathy.   Neurological: She is alert and oriented to person, place, and time.   Skin: Skin is warm and dry.   Psychiatric: She has a normal mood and affect. Judgment and thought content normal.   Vitals reviewed.    Assessment/Plan   Assessment:     1. Epigastric pain    2.      Nausea  3.      Diarrhea  4.      Abnormal CT scan small bowel    Plan:   The patient will be scheduled for bidirectional endoscopy to evaluate the above-noted symptoms.  Her initial episode sounds like a nonspecific viral GE but she has had recurrent episodes since that time and this needs further work-up.  I will also arrange for her to have a small bowel enterography to evaluate the area of inflammation in the mid small bowel which will not be able to reach with standard gastroscope or colonoscope.            Maicol Garza M.D.  [facility]  [address]  [place] [address]  Office: [phone]

## 2020-09-10 DIAGNOSIS — I10 ESSENTIAL HYPERTENSION: ICD-10-CM

## 2020-09-10 RX ORDER — HYDROCHLOROTHIAZIDE 25 MG/1
25 TABLET ORAL DAILY
Qty: 90 TABLET | Refills: 1 | OUTPATIENT
Start: 2020-09-10

## 2020-09-17 ENCOUNTER — TRANSCRIBE ORDERS (OUTPATIENT)
Dept: SLEEP MEDICINE | Facility: HOSPITAL | Age: 50
End: 2020-09-17

## 2020-09-17 DIAGNOSIS — Z01.818 OTHER SPECIFIED PRE-OPERATIVE EXAMINATION: Primary | ICD-10-CM

## 2020-09-21 ENCOUNTER — HOSPITAL ENCOUNTER (OUTPATIENT)
Dept: CT IMAGING | Facility: HOSPITAL | Age: 50
Discharge: HOME | End: 2020-09-21
Admitting: INTERNAL MEDICINE

## 2020-09-21 LAB — CREAT BLDA-MCNC: 0.8 MG/DL (ref 0.6–1.3)

## 2020-09-21 PROCEDURE — 25010000002 IOPAMIDOL 61 % SOLUTION: Performed by: INTERNAL MEDICINE

## 2020-09-21 PROCEDURE — 82565 ASSAY OF CREATININE: CPT

## 2020-09-21 PROCEDURE — 74177 CT ABD & PELVIS W/CONTRAST: CPT

## 2020-09-21 RX ADMIN — IOPAMIDOL 100 ML: 612 INJECTION, SOLUTION INTRAVENOUS at 12:32

## 2020-09-22 ENCOUNTER — LAB (OUTPATIENT)
Dept: LAB | Facility: HOSPITAL | Age: 50
End: 2020-09-22

## 2020-09-22 DIAGNOSIS — Z01.818 OTHER SPECIFIED PRE-OPERATIVE EXAMINATION: ICD-10-CM

## 2020-09-22 DIAGNOSIS — I10 ESSENTIAL HYPERTENSION: ICD-10-CM

## 2020-09-22 PROCEDURE — C9803 HOPD COVID-19 SPEC COLLECT: HCPCS

## 2020-09-22 PROCEDURE — U0004 COV-19 TEST NON-CDC HGH THRU: HCPCS

## 2020-09-23 LAB — SARS-COV-2 RNA RESP QL NAA+PROBE: NOT DETECTED

## 2020-09-23 RX ORDER — LISINOPRIL AND HYDROCHLOROTHIAZIDE 12.5; 1 MG/1; MG/1
1 TABLET ORAL DAILY
Qty: 90 TABLET | Refills: 1 | Status: SHIPPED | OUTPATIENT
Start: 2020-09-23 | End: 2021-03-18 | Stop reason: SDUPTHER

## 2020-09-24 ENCOUNTER — TELEPHONE (OUTPATIENT)
Dept: GASTROENTEROLOGY | Facility: CLINIC | Age: 50
End: 2020-09-24

## 2020-09-24 ENCOUNTER — ANESTHESIA EVENT (OUTPATIENT)
Dept: GASTROENTEROLOGY | Facility: HOSPITAL | Age: 50
End: 2020-09-24

## 2020-09-24 ENCOUNTER — ANESTHESIA (OUTPATIENT)
Dept: GASTROENTEROLOGY | Facility: HOSPITAL | Age: 50
End: 2020-09-24

## 2020-09-24 ENCOUNTER — HOSPITAL ENCOUNTER (OUTPATIENT)
Facility: HOSPITAL | Age: 50
Setting detail: HOSPITAL OUTPATIENT SURGERY
Discharge: HOME | End: 2020-09-24
Attending: INTERNAL MEDICINE | Admitting: INTERNAL MEDICINE

## 2020-09-24 VITALS
BODY MASS INDEX: 37.03 KG/M2 | DIASTOLIC BLOOD PRESSURE: 70 MMHG | HEIGHT: 65 IN | SYSTOLIC BLOOD PRESSURE: 121 MMHG | HEART RATE: 71 BPM | OXYGEN SATURATION: 100 % | TEMPERATURE: 98.8 F | RESPIRATION RATE: 16 BRPM | WEIGHT: 222.25 LBS

## 2020-09-24 DIAGNOSIS — R91.1 NODULE OF LOWER LOBE OF LEFT LUNG: Primary | ICD-10-CM

## 2020-09-24 DIAGNOSIS — R10.13 EPIGASTRIC PAIN: ICD-10-CM

## 2020-09-24 LAB
B-HCG UR QL: NEGATIVE
INTERNAL NEGATIVE CONTROL: NEGATIVE
INTERNAL POSITIVE CONTROL: POSITIVE
Lab: NORMAL

## 2020-09-24 PROCEDURE — 25010000002 PROPOFOL 10 MG/ML EMULSION: Performed by: ANESTHESIOLOGY

## 2020-09-24 PROCEDURE — 45380 COLONOSCOPY AND BIOPSY: CPT | Performed by: INTERNAL MEDICINE

## 2020-09-24 PROCEDURE — 81025 URINE PREGNANCY TEST: CPT | Performed by: INTERNAL MEDICINE

## 2020-09-24 PROCEDURE — 43239 EGD BIOPSY SINGLE/MULTIPLE: CPT | Performed by: INTERNAL MEDICINE

## 2020-09-24 PROCEDURE — 88305 TISSUE EXAM BY PATHOLOGIST: CPT | Performed by: INTERNAL MEDICINE

## 2020-09-24 RX ORDER — PROPOFOL 10 MG/ML
VIAL (ML) INTRAVENOUS CONTINUOUS PRN
Status: DISCONTINUED | OUTPATIENT
Start: 2020-09-24 | End: 2020-09-24 | Stop reason: SURG

## 2020-09-24 RX ORDER — PROPOFOL 10 MG/ML
VIAL (ML) INTRAVENOUS AS NEEDED
Status: DISCONTINUED | OUTPATIENT
Start: 2020-09-24 | End: 2020-09-24 | Stop reason: SURG

## 2020-09-24 RX ORDER — PROMETHAZINE HYDROCHLORIDE 25 MG/1
25 TABLET ORAL ONCE AS NEEDED
Status: DISCONTINUED | OUTPATIENT
Start: 2020-09-24 | End: 2020-09-24 | Stop reason: HOSPADM

## 2020-09-24 RX ORDER — SODIUM CHLORIDE, SODIUM LACTATE, POTASSIUM CHLORIDE, CALCIUM CHLORIDE 600; 310; 30; 20 MG/100ML; MG/100ML; MG/100ML; MG/100ML
30 INJECTION, SOLUTION INTRAVENOUS CONTINUOUS
Status: DISCONTINUED | OUTPATIENT
Start: 2020-09-24 | End: 2020-09-24 | Stop reason: HOSPADM

## 2020-09-24 RX ORDER — PROMETHAZINE HYDROCHLORIDE 25 MG/1
25 SUPPOSITORY RECTAL ONCE AS NEEDED
Status: DISCONTINUED | OUTPATIENT
Start: 2020-09-24 | End: 2020-09-24 | Stop reason: HOSPADM

## 2020-09-24 RX ORDER — LIDOCAINE HYDROCHLORIDE 20 MG/ML
INJECTION, SOLUTION INFILTRATION; PERINEURAL AS NEEDED
Status: DISCONTINUED | OUTPATIENT
Start: 2020-09-24 | End: 2020-09-24 | Stop reason: SURG

## 2020-09-24 RX ADMIN — SODIUM CHLORIDE, POTASSIUM CHLORIDE, SODIUM LACTATE AND CALCIUM CHLORIDE 30 ML/HR: 600; 310; 30; 20 INJECTION, SOLUTION INTRAVENOUS at 09:21

## 2020-09-24 RX ADMIN — PROPOFOL 200 MCG/KG/MIN: 10 INJECTION, EMULSION INTRAVENOUS at 09:52

## 2020-09-24 RX ADMIN — LIDOCAINE HYDROCHLORIDE 40 MG: 20 INJECTION, SOLUTION INFILTRATION; PERINEURAL at 09:52

## 2020-09-24 RX ADMIN — PROPOFOL 100 MG: 10 INJECTION, EMULSION INTRAVENOUS at 09:56

## 2020-09-24 RX ADMIN — PROPOFOL 100 MG: 10 INJECTION, EMULSION INTRAVENOUS at 09:52

## 2020-09-24 NOTE — ANESTHESIA POSTPROCEDURE EVALUATION
Patient: Rosa Melgoza    Procedure Summary     Date: 09/24/20 Room / Location: [facility] ENDOSCOPY 10 / [facility] ENDOSCOPY    Anesthesia Start: 0949 Anesthesia Stop: 1026    Procedures:       ESOPHAGOGASTRODUODENOSCOPY WITH BIOPSY (N/A Esophagus)      COLONOSCOPY  into cecum and TI with biopsies (N/A ) Diagnosis:       Epigastric pain      (Epigastric pain [R10.13])    Surgeon: Maicol Garza MD Provider: John Ware MD    Anesthesia Type: MAC ASA Status: 2          Anesthesia Type: MAC    Vitals  Vitals Value Taken Time   /74 09/24/20 1027   Temp     Pulse 72 09/24/20 1027   Resp 16 09/24/20 1027   SpO2 100 % 09/24/20 1027           Post Anesthesia Care and Evaluation    Patient location during evaluation: bedside  Patient participation: complete - patient participated  Level of consciousness: awake  Pain management: adequate  Airway patency: patent  Anesthetic complications: No anesthetic complications  PONV Status: none  Cardiovascular status: acceptable  Respiratory status: acceptable  Hydration status: acceptable  Post Neuraxial Block status: Motor and sensory function returned to baseline

## 2020-09-24 NOTE — PROGRESS NOTES
The inflammation of small bowel has resolved.  There is ? Of some mild thickening of the cecum, this is non-specific but I think we should have her come in for colonoscopy to evaluate.    There was an incidental finding of some nodules in her L lower lung.  Radiologist recommends a dedicated CT scan of Chest with contrast.  Can we schedule and then refer her to [facility] for evaluation, Dr Loaiza et al.

## 2020-09-24 NOTE — TELEPHONE ENCOUNTER
----- Message from Maicol Garza MD sent at 9/24/2020  8:02 AM EDT -----  The inflammation of small bowel has resolved.  There is ? Of some mild thickening of the cecum, this is non-specific but I think we should have her come in for colonoscopy to evaluate.    There was an incidental finding of some nodules in her L lower lung.  Radiologist recommends a dedicated CT scan of Chest with contrast.  Can we schedule and then refer her to [facility] for evaluation, Dr Loaiza et al.

## 2020-09-24 NOTE — ANESTHESIA PREPROCEDURE EVALUATION
Anesthesia Evaluation     Patient summary reviewed and Nursing notes reviewed   NPO Solid Status: > 8 hours  NPO Liquid Status: > 2 hours           Airway   Mallampati: II  TM distance: >3 FB  Neck ROM: full  No difficulty expected  Dental      Pulmonary - negative pulmonary ROS    breath sounds clear to auscultation  Cardiovascular   Exercise tolerance: good (4-7 METS)    Rhythm: regular  Rate: normal    (+) hypertension, hyperlipidemia,       Neuro/Psych  (+) headaches, psychiatric history,     GI/Hepatic/Renal/Endo    (+) obesity,  GERD,  thyroid problem hypothyroidism    Musculoskeletal (-) negative ROS    Abdominal   (+) obese,    Substance History - negative use     OB/GYN negative ob/gyn ROS         Other                        Anesthesia Plan    ASA 2     MAC       Anesthetic plan, all risks, benefits, and alternatives have been provided, discussed and informed consent has been obtained with: patient.

## 2020-09-24 NOTE — DISCHARGE INSTRUCTIONS
For the next 24 hours patient needs to be with a responsible adult.    For 24 hours DO NOT drive, operate machinery, appliances, drink alcohol, make important decisions or sign legal documents.    Start with a light or bland diet if you are feeling sick to your stomach otherwise advance to regular diet as tolerated.    Follow recommendations on procedure report if provided by your doctor.    Call Dr Garza for problems [phone]    Problems may include but not limited to: large amounts of bleeding, trouble breathing, repeated vomiting, severe unrelieved pain, fever or chills.

## 2020-09-25 LAB
CYTO UR: NORMAL
LAB AP CASE REPORT: NORMAL
PATH REPORT.FINAL DX SPEC: NORMAL
PATH REPORT.GROSS SPEC: NORMAL

## 2020-10-07 RX ORDER — LEVOCETIRIZINE DIHYDROCHLORIDE 5 MG/1
5 TABLET, FILM COATED ORAL EVERY EVENING
Qty: 90 TABLET | Refills: 0 | Status: SHIPPED | OUTPATIENT
Start: 2020-10-07 | End: 2020-11-30

## 2020-11-03 ENCOUNTER — TELEMEDICINE (OUTPATIENT)
Dept: FAMILY MEDICINE CLINIC | Facility: TELEHEALTH | Age: 50
End: 2020-11-03

## 2020-11-03 DIAGNOSIS — J01.40 ACUTE PANSINUSITIS, RECURRENCE NOT SPECIFIED: Primary | ICD-10-CM

## 2020-11-03 PROCEDURE — 99212-NC PR NO CHARGE CBC OFFICE OUTPATIENT VISIT 10 MINUTES: Performed by: NURSE PRACTITIONER

## 2020-11-03 PROCEDURE — BHEMPVIDEOVISIT: Performed by: NURSE PRACTITIONER

## 2020-11-03 RX ORDER — METHYLPREDNISOLONE 4 MG/1
TABLET ORAL
Qty: 21 TABLET | Refills: 0 | Status: SHIPPED | OUTPATIENT
Start: 2020-11-03 | End: 2021-02-04 | Stop reason: SDDI

## 2020-11-03 RX ORDER — POLYETHYLENE GLYCOL 3350, SODIUM SULFATE, SODIUM CHLORIDE, POTASSIUM CHLORIDE, ASCORBIC ACID, SODIUM ASCORBATE 140-9-5.2G
KIT ORAL
COMMUNITY
Start: 2020-09-23 | End: 2021-02-04 | Stop reason: SDDI

## 2020-11-03 RX ORDER — AMOXICILLIN AND CLAVULANATE POTASSIUM 875; 125 MG/1; MG/1
1 TABLET, FILM COATED ORAL 2 TIMES DAILY
Qty: 20 TABLET | Refills: 0 | Status: SHIPPED | OUTPATIENT
Start: 2020-11-03 | End: 2020-11-13

## 2020-11-03 RX ORDER — FLUCONAZOLE 150 MG/1
150 TABLET ORAL ONCE
Qty: 1 TABLET | Refills: 0 | Status: SHIPPED | OUTPATIENT
Start: 2020-11-03 | End: 2020-11-03

## 2020-11-03 NOTE — PATIENT INSTRUCTIONS
Restart nasal rinses   Ibuprofen or acetaminophen for pain   Start antibiotic if symptoms last longer then 5 more days or worsen   If symptoms persist after starting antibiotic follow up at urgent care or with primary care provider for in person evaluation and treatmen    Follow up if you develop new symptoms.       Sinusitis, Adult  Sinusitis is soreness and swelling (inflammation) of your sinuses. Sinuses are hollow spaces in the bones around your face. They are located:  · Around your eyes.  · In the middle of your forehead.  · Behind your nose.  · In your cheekbones.  Your sinuses and nasal passages are lined with a fluid called mucus. Mucus drains out of your sinuses. Swelling can trap mucus in your sinuses. This lets germs (bacteria, virus, or fungus) grow, which leads to infection. Most of the time, this condition is caused by a virus.  What are the causes?  This condition is caused by:  · Allergies.  · Asthma.  · Germs.  · Things that block your nose or sinuses.  · Growths in the nose (nasal polyps).  · Chemicals or irritants in the air.  · Fungus (rare).  What increases the risk?  You are more likely to develop this condition if:  · You have a weak body defense system (immune system).  · You do a lot of swimming or diving.  · You use nasal sprays too much.  · You smoke.  What are the signs or symptoms?  The main symptoms of this condition are pain and a feeling of pressure around the sinuses. Other symptoms include:  · Stuffy nose (congestion).  · Runny nose (drainage).  · Swelling and warmth in the sinuses.  · Headache.  · Toothache.  · A cough that may get worse at night.  · Mucus that collects in the throat or the back of the nose (postnasal drip).  · Being unable to smell and taste.  · Being very tired (fatigue).  · A fever.  · Sore throat.  · Bad breath.  How is this diagnosed?  This condition is diagnosed based on:  · Your symptoms.  · Your medical history.  · A physical exam.  · Tests to find out  if your condition is short-term (acute) or long-term (chronic). Your doctor may:  ? Check your nose for growths (polyps).  ? Check your sinuses using a tool that has a light (endoscope).  ? Check for allergies or germs.  ? Do imaging tests, such as an MRI or CT scan.  How is this treated?  Treatment for this condition depends on the cause and whether it is short-term or long-term.  · If caused by a virus, your symptoms should go away on their own within 10 days. You may be given medicines to relieve symptoms. They include:  ? Medicines that shrink swollen tissue in the nose.  ? Medicines that treat allergies (antihistamines).  ? A spray that treats swelling of the nostrils.   ? Rinses that help get rid of thick mucus in your nose (nasal saline washes).  · If caused by bacteria, your doctor may wait to see if you will get better without treatment. You may be given antibiotic medicine if you have:  ? A very bad infection.  ? A weak body defense system.  · If caused by growths in the nose, you may need to have surgery.  Follow these instructions at home:  Medicines  · Take, use, or apply over-the-counter and prescription medicines only as told by your doctor. These may include nasal sprays.  · If you were prescribed an antibiotic medicine, take it as told by your doctor. Do not stop taking the antibiotic even if you start to feel better.  Hydrate and humidify    · Drink enough water to keep your pee (urine) pale yellow.  · Use a cool mist humidifier to keep the humidity level in your home above 50%.  · Breathe in steam for 10-15 minutes, 3-4 times a day, or as told by your doctor. You can do this in the bathroom while a hot shower is running.  · Try not to spend time in cool or dry air.  Rest  · Rest as much as you can.  · Sleep with your head raised (elevated).  · Make sure you get enough sleep each night.  General instructions    · Put a warm, moist washcloth on your face 3-4 times a day, or as often as told by your  doctor. This will help with discomfort.  · Wash your hands often with soap and water. If there is no soap and water, use hand .  · Do not smoke. Avoid being around people who are smoking (secondhand smoke).  · Keep all follow-up visits as told by your doctor. This is important.  Contact a doctor if:  · You have a fever.  · Your symptoms get worse.  · Your symptoms do not get better within 10 days.  Get help right away if:  · You have a very bad headache.  · You cannot stop throwing up (vomiting).  · You have very bad pain or swelling around your face or eyes.  · You have trouble seeing.  · You feel confused.  · Your neck is stiff.  · You have trouble breathing.  Summary  · Sinusitis is swelling of your sinuses. Sinuses are hollow spaces in the bones around your face.  · This condition is caused by tissues in your nose that become inflamed or swollen. This traps germs. These can lead to infection.  · If you were prescribed an antibiotic medicine, take it as told by your doctor. Do not stop taking it even if you start to feel better.  · Keep all follow-up visits as told by your doctor. This is important.  This information is not intended to replace advice given to you by your health care provider. Make sure you discuss any questions you have with your health care provider.  Document Released: 06/05/2009 Document Revised: 05/20/2019 Document Reviewed: 05/20/2019  Elsevier Patient Education © 2020 [employer]

## 2020-11-03 NOTE — PROGRESS NOTES
CHIEF COMPLAINT  Chief Complaint   Patient presents with   • Sinusitis         HPI  Rosa Melgoza is a 50 y.o. female  presents with complaint of sinusitis for about 3 days. She has sinus pressure across her forehead and across her nose. She has seasonal allergies. She has had issues with allergies this fall for several weeks. She has had no fever, no coughing, no loss of taste or smell. 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     Review of Systems   Constitutional: Negative for chills, diaphoresis, fatigue and fever.   HENT: Positive for congestion, postnasal drip, rhinorrhea, sinus pressure and sinus pain. Negative for sneezing and sore throat (irritation ).         No reports of loss of taste or smell.    Respiratory: Negative for cough, shortness of breath and wheezing.    Cardiovascular: Negative for chest pain.   Gastrointestinal: Negative for diarrhea, nausea and vomiting.   Skin: Negative for rash.   Neurological: Positive for headaches.       Past Medical History:   Diagnosis Date   • Disease of thyroid gland    • Elevated blood pressure reading without diagnosis of hypertension    • GERD (gastroesophageal reflux disease)    • Headache, migraine    • History of sore throat    • Hypothyroidism    • Migraine    • Need for DTaP and Hib vaccine     History of    • Vaginal yeast infection        Family History   Problem Relation Age of Onset   • Breast cancer Mother    • Hypertension Father    • Heart disease Father    • Breast cancer Other    • Alcohol abuse Other    • Depression Other    • Anxiety disorder Other    • Hypertension Other    • Other Other         Pure hypercholesterolemia and esophageal reflux   • Lung cancer Other        Social History     Socioeconomic History   • Marital status:      Spouse name: Not on file   • Number of children: Not on file   • Years of education: Not on file   • Highest education level: Not on file   Tobacco Use   • Smoking status:  Former Smoker     Types: Cigarettes     Quit date: 3/1/2011     Years since quittin.6   • Smokeless tobacco: Never Used   Substance and Sexual Activity   • Alcohol use: Yes     Comment: Occasional   • Drug use: Never   • Sexual activity: Defer         LMP 10/24/2020 (Exact Date)   Breastfeeding No     PHYSICAL EXAM  Physical Exam   Constitutional: She is oriented to person, place, and time. She appears well-developed and well-nourished. She does not have a sickly appearance. She does not appear ill. No distress.   HENT:   Head: Normocephalic and atraumatic.   Nose: Right sinus exhibits maxillary sinus tenderness (patient directed exam. reports pain ) and frontal sinus tenderness (patient directed exam. reports pain ). Left sinus exhibits maxillary sinus tenderness (patient directed exam. reports pain ) and frontal sinus tenderness (patient directed exam. reports pain ).   Eyes: EOM are normal.   Neck:   Patient directed exam. Reports tenderness   Pulmonary/Chest: Effort normal.  No respiratory distress.  Lymphadenopathy:     She has no cervical adenopathy.   Neurological: She is alert and oriented to person, place, and time.   Skin: Skin is warm and dry.   Psychiatric: She has a normal mood and affect.         Diagnoses and all orders for this visit:    1. Acute pansinusitis, recurrence not specified (Primary)    Other orders  -     methylPREDNISolone (MEDROL) 4 MG dose pack; Take as directed on package instructions.  Dispense: 21 tablet; Refill: 0  -     amoxicillin-clavulanate (Augmentin) 875-125 MG per tablet; Take 1 tablet by mouth 2 (Two) Times a Day for 10 days.  Dispense: 20 tablet; Refill: 0        Start antibiotic if symptoms last longer then 5 more days or worsen   If symptoms persist after starting antibiotic follow up at urgent care or with primary care provider for in person evaluation and treatment  Follow up if you develop new symptoms.       Patient verbalizes understanding of medication dosage,  comfort measures, instructions for treatment and follow-up.    Shell Campbell, NADEGE  11/03/2020  13:54 EST    This visit was performed via Telehealth.  This patient has been instructed to follow-up with their primary care provider if their symptoms worsen or the treatment provided does not resolve their illness.

## 2020-11-20 DIAGNOSIS — G43.109 MIGRAINE WITH AURA AND WITHOUT STATUS MIGRAINOSUS, NOT INTRACTABLE: ICD-10-CM

## 2020-11-20 DIAGNOSIS — R11.2 NON-INTRACTABLE VOMITING WITH NAUSEA, UNSPECIFIED VOMITING TYPE: ICD-10-CM

## 2020-11-20 RX ORDER — PROMETHAZINE HYDROCHLORIDE 25 MG/1
TABLET ORAL
Qty: 60 TABLET | Refills: 0 | Status: SHIPPED | OUTPATIENT
Start: 2020-11-20 | End: 2021-03-18 | Stop reason: SDUPTHER

## 2020-11-20 RX ORDER — ONDANSETRON 4 MG/1
TABLET, FILM COATED ORAL
Qty: 60 TABLET | Refills: 0 | Status: SHIPPED | OUTPATIENT
Start: 2020-11-20 | End: 2021-03-18 | Stop reason: SDUPTHER

## 2020-11-20 RX ORDER — BUTALBITAL, ACETAMINOPHEN AND CAFFEINE 50; 325; 40 MG/1; MG/1; MG/1
TABLET ORAL
Qty: 180 TABLET | Refills: 2 | Status: SHIPPED | OUTPATIENT
Start: 2020-11-20 | End: 2021-03-18 | Stop reason: SDUPTHER

## 2020-11-29 DIAGNOSIS — F43.22 ADJUSTMENT DISORDER WITH ANXIOUS MOOD: ICD-10-CM

## 2020-11-30 RX ORDER — LEVOCETIRIZINE DIHYDROCHLORIDE 5 MG/1
5 TABLET, FILM COATED ORAL EVERY EVENING
Qty: 90 TABLET | Refills: 0 | Status: SHIPPED | OUTPATIENT
Start: 2020-11-30 | End: 2021-03-18 | Stop reason: SDUPTHER

## 2020-11-30 RX ORDER — ESCITALOPRAM OXALATE 10 MG/1
10 TABLET ORAL DAILY
Qty: 90 TABLET | Refills: 0 | Status: SHIPPED | OUTPATIENT
Start: 2020-11-30 | End: 2021-03-18 | Stop reason: SDUPTHER

## 2020-12-02 DIAGNOSIS — K21.9 GASTROESOPHAGEAL REFLUX DISEASE: ICD-10-CM

## 2020-12-02 RX ORDER — PANTOPRAZOLE SODIUM 40 MG/1
40 TABLET, DELAYED RELEASE ORAL DAILY
Qty: 90 TABLET | Refills: 3 | Status: SHIPPED | OUTPATIENT
Start: 2020-12-02 | End: 2021-03-18 | Stop reason: SDUPTHER

## 2020-12-16 DIAGNOSIS — F43.22 ADJUSTMENT DISORDER WITH ANXIOUS MOOD: ICD-10-CM

## 2020-12-16 RX ORDER — HYDROXYZINE HYDROCHLORIDE 25 MG/1
25 TABLET, FILM COATED ORAL EVERY 8 HOURS PRN
Qty: 90 TABLET | Refills: 3 | Status: SHIPPED | OUTPATIENT
Start: 2020-12-16 | End: 2022-03-24 | Stop reason: SDUPTHER

## 2020-12-21 DIAGNOSIS — E03.8 HYPOTHYROIDISM DUE TO HASHIMOTO'S THYROIDITIS: ICD-10-CM

## 2020-12-21 DIAGNOSIS — J30.1 SEASONAL ALLERGIC RHINITIS DUE TO POLLEN: ICD-10-CM

## 2020-12-21 DIAGNOSIS — G43.109 MIGRAINE WITH AURA AND WITHOUT STATUS MIGRAINOSUS, NOT INTRACTABLE: ICD-10-CM

## 2020-12-21 DIAGNOSIS — E06.3 HYPOTHYROIDISM DUE TO HASHIMOTO'S THYROIDITIS: ICD-10-CM

## 2020-12-21 RX ORDER — ERENUMAB-AOOE 140 MG/ML
INJECTION, SOLUTION SUBCUTANEOUS
Qty: 3 ML | Refills: 0 | Status: SHIPPED | OUTPATIENT
Start: 2020-12-21 | End: 2021-03-18 | Stop reason: SDUPTHER

## 2020-12-21 RX ORDER — FLUTICASONE PROPIONATE 50 MCG
2 SPRAY, SUSPENSION (ML) NASAL DAILY
Qty: 3 BOTTLE | Refills: 0 | Status: SHIPPED | OUTPATIENT
Start: 2020-12-21 | End: 2021-03-18 | Stop reason: SDUPTHER

## 2020-12-21 RX ORDER — LEVOTHYROXINE SODIUM 88 UG/1
88 TABLET ORAL DAILY
Qty: 90 TABLET | Refills: 0 | Status: SHIPPED | OUTPATIENT
Start: 2020-12-21 | End: 2021-02-05 | Stop reason: SDUPTHER

## 2020-12-29 ENCOUNTER — TELEPHONE (OUTPATIENT)
Dept: GASTROENTEROLOGY | Facility: CLINIC | Age: 50
End: 2020-12-29

## 2020-12-29 NOTE — TELEPHONE ENCOUNTER
----- Message from Maicol Garza MD sent at 10/5/2020  6:51 AM EDT -----  Gastritis no H pylori, continue PPI and carafate  Colon biopsies were normal recall 10 years  RTC 6-8 weeks with BG

## 2020-12-31 ENCOUNTER — IMMUNIZATION (OUTPATIENT)
Dept: VACCINE CLINIC | Facility: HOSPITAL | Age: 50
End: 2020-12-31

## 2020-12-31 PROCEDURE — 0001A: CPT | Performed by: INTERNAL MEDICINE

## 2020-12-31 PROCEDURE — 91300 HC SARSCOV02 VAC 30MCG/0.3ML IM: CPT | Performed by: INTERNAL MEDICINE

## 2021-01-21 ENCOUNTER — IMMUNIZATION (OUTPATIENT)
Dept: VACCINE CLINIC | Facility: HOSPITAL | Age: 51
End: 2021-01-21

## 2021-01-21 PROCEDURE — 0002A: CPT | Performed by: INTERNAL MEDICINE

## 2021-01-21 PROCEDURE — 91300 HC SARSCOV02 VAC 30MCG/0.3ML IM: CPT | Performed by: INTERNAL MEDICINE

## 2021-02-04 ENCOUNTER — PATIENT MESSAGE (OUTPATIENT)
Dept: FAMILY MEDICINE CLINIC | Facility: CLINIC | Age: 51
End: 2021-02-04

## 2021-02-04 ENCOUNTER — OFFICE VISIT (OUTPATIENT)
Dept: FAMILY MEDICINE CLINIC | Facility: CLINIC | Age: 51
End: 2021-02-04

## 2021-02-04 VITALS
OXYGEN SATURATION: 99 % | SYSTOLIC BLOOD PRESSURE: 124 MMHG | WEIGHT: 233.6 LBS | RESPIRATION RATE: 18 BRPM | HEART RATE: 99 BPM | BODY MASS INDEX: 38.92 KG/M2 | TEMPERATURE: 97.3 F | DIASTOLIC BLOOD PRESSURE: 72 MMHG | HEIGHT: 65 IN

## 2021-02-04 DIAGNOSIS — G43.109 MIGRAINE WITH AURA AND WITHOUT STATUS MIGRAINOSUS, NOT INTRACTABLE: Primary | ICD-10-CM

## 2021-02-04 DIAGNOSIS — K21.9 GASTROESOPHAGEAL REFLUX DISEASE, UNSPECIFIED WHETHER ESOPHAGITIS PRESENT: ICD-10-CM

## 2021-02-04 DIAGNOSIS — F43.22 ADJUSTMENT DISORDER WITH ANXIOUS MOOD: ICD-10-CM

## 2021-02-04 DIAGNOSIS — Z79.899 ENCOUNTER FOR LONG-TERM (CURRENT) USE OF MEDICATIONS: ICD-10-CM

## 2021-02-04 DIAGNOSIS — E06.3 HYPOTHYROIDISM DUE TO HASHIMOTO'S THYROIDITIS: ICD-10-CM

## 2021-02-04 DIAGNOSIS — I10 ESSENTIAL HYPERTENSION: ICD-10-CM

## 2021-02-04 DIAGNOSIS — E03.8 HYPOTHYROIDISM DUE TO HASHIMOTO'S THYROIDITIS: ICD-10-CM

## 2021-02-04 PROCEDURE — 99214 OFFICE O/P EST MOD 30 MIN: CPT | Performed by: FAMILY MEDICINE

## 2021-02-04 RX ORDER — LASMIDITAN 50 MG/1
1 TABLET ORAL DAILY PRN
Qty: 32 TABLET | Refills: 0 | Status: SHIPPED | OUTPATIENT
Start: 2021-02-04 | End: 2021-10-19 | Stop reason: SDUPTHER

## 2021-02-04 NOTE — PROGRESS NOTES
"Chief Complaint  Migraine    Subjective          Rosa Melgoza presents to [facility] for   Patient is here to follow-up on her chronic health conditions:    Migraine with aura.  Patient states that overall the Aimovig has reduced the number of migraines a month but she still is suffering with pretty severe migraine headaches.   She also takes amitriptyline 50 mg at bedtime.  And Fioricet as needed.  She has also started B2 and D3 vitamins to help.  She would like to try a new medication called Reyvow.  It is a schedule V medication.  She understands that she cannot drive and take this medication.  She also understands only 1 dose per day.  She will discontinue Fioricet if the Reyvow works and agrees not to take them together..  UDS will be obtained today, Hero is consistent.    GERD.  The patient takes pantoprazole 40 mg daily.  She denies any symptoms of reflux currently.  She denies any side effects of medication.    Hypothyroidism.  The patient currently takes levothyroxine 88 mcg daily.  Patient feels her thyroid is in range.  No side effects of medication.    Hypertension.  The patient takes lisinopril hydrochlorothiazide 10/12.5 for blood pressure.  She denies any side effects and states that her blood pressures have been good.      Objective   Vital Signs:   /72 (BP Location: Left arm, Patient Position: Sitting, Cuff Size: Large Adult)   Pulse 99   Temp 97.3 °F (36.3 °C) (Temporal)   Resp 18   Ht 165.1 cm (65\")   Wt 106 kg (233 lb 9.6 oz)   SpO2 99%   BMI 38.87 kg/m²     Physical Exam  Vitals signs and nursing note reviewed.   Constitutional:       Appearance: Normal appearance. She is well-developed. She is obese.   HENT:      Head: Normocephalic and atraumatic.   Eyes:      General: No scleral icterus.     Conjunctiva/sclera: Conjunctivae normal.   Neck:      Musculoskeletal: Normal range of motion and neck supple.   Cardiovascular:      Rate and Rhythm: " Normal rate and regular rhythm.      Heart sounds: Normal heart sounds.   Pulmonary:      Effort: Pulmonary effort is normal.      Breath sounds: Normal breath sounds.   Abdominal:      General: Bowel sounds are normal.      Palpations: Abdomen is soft.   Musculoskeletal: Normal range of motion.      Right lower leg: No edema.      Left lower leg: No edema.   Skin:     General: Skin is warm and dry.      Capillary Refill: Capillary refill takes less than 2 seconds.      Findings: No rash.   Neurological:      Mental Status: She is alert and oriented to person, place, and time.   Psychiatric:         Mood and Affect: Mood normal.         Behavior: Behavior normal.         Thought Content: Thought content normal.         Judgment: Judgment normal.        Result Review :   The following data was reviewed by: Kathie Romeo DO on 02/04/2021:  Common labs    Common Labsle 6/22/20 6/22/20 7/27/20 7/27/20 9/21/20    1045 1045 1523 1523    Glucose  124 (A)  93    BUN  11  8    Creatinine  1.03 (A)  0.81 0.80   eGFR Non African Am  57 (A)  86    eGFR  Am  69  99    Sodium  135 (A)  141    Potassium  3.2 (A)  3.4 (A)    Chloride  96 (A)  99    Calcium  8.7  9.3    Total Protein  7.5  7.0    Albumin  4.30  4.4    Total Bilirubin  0.5  0.2    Alkaline Phosphatase  80  60    AST (SGOT)  20  22    ALT (SGPT)  27  25    WBC 18.67 (A)  9.8     Hemoglobin 14.4  12.1     Hematocrit 41.9  35.9     Platelets 383  332     (A) Abnormal value       Comments are available for some flowsheets but are not being displayed.           TSH    TSH 6/15/20   TSH 4.550 (A)   (A) Abnormal value                      Assessment and Plan    Problem List Items Addressed This Visit        Cardiac and Vasculature    Essential hypertension    Relevant Orders    Comprehensive Metabolic Panel       Endocrine and Metabolic    Hypothyroidism    Relevant Orders    TSH       Gastrointestinal Abdominal     GERD (gastroesophageal reflux disease)        Health Encounters    Encounter for long-term (current) use of medications    Relevant Medications    Lasmiditan Succinate (Reyvow) 50 MG tablet    Other Relevant Orders    CBC & Differential    Comprehensive Metabolic Panel    TSH    Drug Profile [id number] - Urine, Clean Catch       Mental Health    Adjustment disorder with anxious mood       Neuro    Headache, migraine - Primary    Relevant Medications    Lasmiditan Succinate (Reyvow) 50 MG tablet        Patient seen today for chronic stable hypertension, hypothyroidism, and GERD. Surveillance labs were obtained today and any medication changes will be made based on lab results and will be called to the patient later this week. She is also here for worsening migraine headaches.  We will start her on a new medication for acute migraines but she will continue Aimovig and amitriptyline.  A long-term med use agreement was discussed the patient understands that the medication is a controlled substance schedule V.  She agrees to close follow-up.      Follow Up   Return in about 3 months (around 5/4/2021) for Annual physical.  Patient was given instructions and counseling regarding her condition or for health maintenance advice. Please see specific information pulled into the AVS if appropriate.       Answers for HPI/ROS submitted by the patient on 2/1/2021   What is the primary reason for your visit?: Other  Please describe your symptoms.: Migraine headaches.   Chronic and frequent.  Have you had these symptoms before?: Yes  How long have you been having these symptoms?: Greater than 2 weeks  Please list any medications you are currently taking for this condition.: Aimovig, Fioricet, Amitriptyline  Please describe any probable cause for these symptoms. : Migraines

## 2021-02-05 DIAGNOSIS — E06.3 HYPOTHYROIDISM DUE TO HASHIMOTO'S THYROIDITIS: ICD-10-CM

## 2021-02-05 DIAGNOSIS — E03.8 HYPOTHYROIDISM DUE TO HASHIMOTO'S THYROIDITIS: ICD-10-CM

## 2021-02-05 LAB
ALBUMIN SERPL-MCNC: 3.8 G/DL (ref 3.5–5.2)
ALBUMIN/GLOB SERPL: 1.5 G/DL
ALP SERPL-CCNC: 67 U/L (ref 39–117)
ALT SERPL-CCNC: 14 U/L (ref 1–33)
AST SERPL-CCNC: 14 U/L (ref 1–32)
BASOPHILS # BLD AUTO: 0.02 10*3/MM3 (ref 0–0.2)
BASOPHILS NFR BLD AUTO: 0.2 % (ref 0–1.5)
BILIRUB SERPL-MCNC: 0.3 MG/DL (ref 0–1.2)
BUN SERPL-MCNC: 16 MG/DL (ref 6–20)
BUN/CREAT SERPL: 17.6 (ref 7–25)
CALCIUM SERPL-MCNC: 8.6 MG/DL (ref 8.6–10.5)
CHLORIDE SERPL-SCNC: 99 MMOL/L (ref 98–107)
CO2 SERPL-SCNC: 28.8 MMOL/L (ref 22–29)
CREAT SERPL-MCNC: 0.91 MG/DL (ref 0.57–1)
EOSINOPHIL # BLD AUTO: 0.12 10*3/MM3 (ref 0–0.4)
EOSINOPHIL NFR BLD AUTO: 1.2 % (ref 0.3–6.2)
ERYTHROCYTE [DISTWIDTH] IN BLOOD BY AUTOMATED COUNT: 12.7 % (ref 12.3–15.4)
GLOBULIN SER CALC-MCNC: 2.6 GM/DL
GLUCOSE SERPL-MCNC: 96 MG/DL (ref 65–99)
HCT VFR BLD AUTO: 35.6 % (ref 34–46.6)
HGB BLD-MCNC: 12.1 G/DL (ref 12–15.9)
IMM GRANULOCYTES # BLD AUTO: 0.03 10*3/MM3 (ref 0–0.05)
IMM GRANULOCYTES NFR BLD AUTO: 0.3 % (ref 0–0.5)
LYMPHOCYTES # BLD AUTO: 1.88 10*3/MM3 (ref 0.7–3.1)
LYMPHOCYTES NFR BLD AUTO: 19.5 % (ref 19.6–45.3)
MCH RBC QN AUTO: 29.4 PG (ref 26.6–33)
MCHC RBC AUTO-ENTMCNC: 34 G/DL (ref 31.5–35.7)
MCV RBC AUTO: 86.6 FL (ref 79–97)
MONOCYTES # BLD AUTO: 0.48 10*3/MM3 (ref 0.1–0.9)
MONOCYTES NFR BLD AUTO: 5 % (ref 5–12)
NEUTROPHILS # BLD AUTO: 7.12 10*3/MM3 (ref 1.7–7)
NEUTROPHILS NFR BLD AUTO: 73.8 % (ref 42.7–76)
NRBC BLD AUTO-RTO: 0 /100 WBC (ref 0–0.2)
PLATELET # BLD AUTO: 223 10*3/MM3 (ref 140–450)
POTASSIUM SERPL-SCNC: 3.3 MMOL/L (ref 3.5–5.2)
PROT SERPL-MCNC: 6.4 G/DL (ref 6–8.5)
RBC # BLD AUTO: 4.11 10*6/MM3 (ref 3.77–5.28)
SODIUM SERPL-SCNC: 137 MMOL/L (ref 136–145)
TSH SERPL DL<=0.005 MIU/L-ACNC: 1.15 UIU/ML (ref 0.27–4.2)
WBC # BLD AUTO: 9.65 10*3/MM3 (ref 3.4–10.8)

## 2021-02-05 RX ORDER — LEVOTHYROXINE SODIUM 88 UG/1
88 TABLET ORAL DAILY
Qty: 90 TABLET | Refills: 1 | Status: SHIPPED | OUTPATIENT
Start: 2021-02-05 | End: 2021-03-18 | Stop reason: SDUPTHER

## 2021-02-05 NOTE — TELEPHONE ENCOUNTER
From: Rosa Melgoza  To: Kathie Romeo DO  Sent: 2/4/2021 8:46 PM EST  Subject: Visit Follow-Up Question    Just wanted to update you on the reyvow. Ins will let you get 8 tablets per month. With the reyvow savings card, download from their site and you don't need a prior auth, and it's $0 copay for the first month. To refill, they will need the prior auth. I will update again when I take it. [facility] pharmacy ordered it today, and said it may bein tomorrow.

## 2021-02-12 ENCOUNTER — TELEPHONE (OUTPATIENT)
Dept: FAMILY MEDICINE CLINIC | Facility: CLINIC | Age: 51
End: 2021-02-12

## 2021-02-12 NOTE — TELEPHONE ENCOUNTER
Left message per verbal release for pt that she has a UDS order here that needs to be completed for her new migraine medication.  Will postpone 1 week

## 2021-02-16 ENCOUNTER — TELEPHONE (OUTPATIENT)
Dept: FAMILY MEDICINE CLINIC | Facility: CLINIC | Age: 51
End: 2021-02-16

## 2021-02-16 NOTE — TELEPHONE ENCOUNTER
Attempted PA, cover my meds is stating patients info is wrong. Called pt to confirm address and insurance are correct na lm to call back

## 2021-02-16 NOTE — TELEPHONE ENCOUNTER
[employer] MY [employer] CALLED REQUESTING A PRIOR AUTH FOR:  Lasmiditan Succinate (Reyvow) 50 MG tablet    CALL BACK NUMBER: [phone]    REFERENCE CURRY: [id number]

## 2021-02-17 ENCOUNTER — LAB (OUTPATIENT)
Dept: FAMILY MEDICINE CLINIC | Facility: CLINIC | Age: 51
End: 2021-02-17

## 2021-02-17 VITALS — TEMPERATURE: 96.9 F

## 2021-02-17 DIAGNOSIS — Z79.899 LONG-TERM USE OF HIGH-RISK MEDICATION: Primary | ICD-10-CM

## 2021-02-21 LAB
AMPHETAMINES UR QL SCN: NEGATIVE NG/ML
BARBITURATES UR QL: POSITIVE
BENZODIAZ UR QL SCN: NEGATIVE NG/ML
BZE UR QL: NEGATIVE NG/ML
CANNABINOIDS UR QL SCN: NEGATIVE NG/ML
CREAT UR-MCNC: 271.7 MG/DL (ref 20–300)
ETHANOL UR-MCNC: NEGATIVE %
MEPERIDINE UR QL: NEGATIVE NG/ML
METHADONE UR QL: NEGATIVE NG/ML
OPIATES UR QL SCN: NEGATIVE NG/ML
OXYCODONE+OXYMORPHONE UR QL SCN: NEGATIVE NG/ML
PCP UR QL: NEGATIVE NG/ML
PROPOXYPH UR QL: NEGATIVE NG/ML
TRAMADOL UR QL SCN: NEGATIVE NG/ML

## 2021-02-27 ENCOUNTER — PATIENT MESSAGE (OUTPATIENT)
Dept: FAMILY MEDICINE CLINIC | Facility: CLINIC | Age: 51
End: 2021-02-27

## 2021-03-01 NOTE — TELEPHONE ENCOUNTER
From: Rosa Melgoza  To: Kathie Romeo DO  Sent: 2/27/2021 5:54 PM EST  Subject: Prescription Question    Just wanted to update you on the Reyvow. I have taken it twice so far. 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 I took a dose of my fioricet so I could finish my day, and it took the edge off but the migraine was still there. When I got home for the day, I took the reyvow, a phenergan, put a heating pad on my neck, and an ice pack on my forehead and eyes and laid down for a nap. I slept for about an hour and a half, but still felt bad and migraine wasn't totally gone. I went to bed early that night, and when I woke up the next morning it was gone. The second time I tried it was a day I woke up with a migraine but I was off work so I took the reyvow, laid back down, and woke up 2 hrs later and the migraine was gone. I did still have some of what I call my migraine hangover that I get the day after a bad one, where I am very tired, achey all over, kind of in a mental fog. So far, I think it will be a great option for   migraines when I can stay home or go out with someone else driving. I will, of course, still have to use my fioricet and zofran on days I have to work/drive but the combo of phenergan and reyvow seems great so far. Just thought I would pass that along for your other migraine patients. Thanks, Rosa

## 2021-03-08 ENCOUNTER — LAB (OUTPATIENT)
Dept: LAB | Facility: HOSPITAL | Age: 51
End: 2021-03-08

## 2021-03-08 ENCOUNTER — OFFICE VISIT (OUTPATIENT)
Dept: FAMILY MEDICINE CLINIC | Facility: CLINIC | Age: 51
End: 2021-03-08

## 2021-03-08 ENCOUNTER — HOSPITAL ENCOUNTER (OUTPATIENT)
Dept: CT IMAGING | Facility: HOSPITAL | Age: 51
Discharge: HOME | End: 2021-03-08

## 2021-03-08 VITALS
RESPIRATION RATE: 18 BRPM | HEART RATE: 109 BPM | BODY MASS INDEX: 38.29 KG/M2 | OXYGEN SATURATION: 98 % | DIASTOLIC BLOOD PRESSURE: 76 MMHG | HEIGHT: 65 IN | TEMPERATURE: 98.2 F | SYSTOLIC BLOOD PRESSURE: 128 MMHG | WEIGHT: 229.8 LBS

## 2021-03-08 DIAGNOSIS — K52.9 ENTERITIS: ICD-10-CM

## 2021-03-08 DIAGNOSIS — K50.00 TERMINAL ILEITIS WITHOUT COMPLICATION (HCC): ICD-10-CM

## 2021-03-08 DIAGNOSIS — R19.7 DIARRHEA, UNSPECIFIED TYPE: ICD-10-CM

## 2021-03-08 DIAGNOSIS — R19.8 ABDOMINAL GUARDING: Primary | ICD-10-CM

## 2021-03-08 DIAGNOSIS — E87.6 HYPOKALEMIA: ICD-10-CM

## 2021-03-08 DIAGNOSIS — R10.13 EPIGASTRIC PAIN: Primary | ICD-10-CM

## 2021-03-08 DIAGNOSIS — E86.0 DEHYDRATION: ICD-10-CM

## 2021-03-08 DIAGNOSIS — R10.13 EPIGASTRIC PAIN: ICD-10-CM

## 2021-03-08 DIAGNOSIS — R19.8 ABDOMINAL GUARDING: ICD-10-CM

## 2021-03-08 DIAGNOSIS — K57.92 DIVERTICULITIS: ICD-10-CM

## 2021-03-08 LAB
ALBUMIN SERPL-MCNC: 3.7 G/DL (ref 3.5–5.2)
ALBUMIN/GLOB SERPL: 1.1 G/DL
ALP SERPL-CCNC: 67 U/L (ref 39–117)
ALT SERPL W P-5'-P-CCNC: 16 U/L (ref 1–33)
AMYLASE SERPL-CCNC: 24 U/L (ref 28–100)
ANION GAP SERPL CALCULATED.3IONS-SCNC: 7.9 MMOL/L (ref 5–15)
AST SERPL-CCNC: 14 U/L (ref 1–32)
BASOPHILS # BLD AUTO: 0.03 10*3/MM3 (ref 0–0.2)
BASOPHILS NFR BLD AUTO: 0.3 % (ref 0–1.5)
BILIRUB SERPL-MCNC: 0.2 MG/DL (ref 0–1.2)
BUN SERPL-MCNC: 9 MG/DL (ref 6–20)
BUN/CREAT SERPL: 7.4 (ref 7–25)
CALCIUM SPEC-SCNC: 8.6 MG/DL (ref 8.6–10.5)
CHLORIDE SERPL-SCNC: 94 MMOL/L (ref 98–107)
CO2 SERPL-SCNC: 30.1 MMOL/L (ref 22–29)
CREAT SERPL-MCNC: 1.21 MG/DL (ref 0.57–1)
DEPRECATED RDW RBC AUTO: 40.7 FL (ref 37–54)
EOSINOPHIL # BLD AUTO: 0.07 10*3/MM3 (ref 0–0.4)
EOSINOPHIL NFR BLD AUTO: 0.6 % (ref 0.3–6.2)
ERYTHROCYTE [DISTWIDTH] IN BLOOD BY AUTOMATED COUNT: 12.8 % (ref 12.3–15.4)
GFR SERPL CREATININE-BSD FRML MDRD: 47 ML/MIN/1.73
GLOBULIN UR ELPH-MCNC: 3.3 GM/DL
GLUCOSE SERPL-MCNC: 96 MG/DL (ref 65–99)
HCT VFR BLD AUTO: 36.3 % (ref 34–46.6)
HGB BLD-MCNC: 12.3 G/DL (ref 12–15.9)
IMM GRANULOCYTES # BLD AUTO: 0.04 10*3/MM3 (ref 0–0.05)
IMM GRANULOCYTES NFR BLD AUTO: 0.3 % (ref 0–0.5)
LIPASE SERPL-CCNC: 22 U/L (ref 13–60)
LYMPHOCYTES # BLD AUTO: 1.76 10*3/MM3 (ref 0.7–3.1)
LYMPHOCYTES NFR BLD AUTO: 15.4 % (ref 19.6–45.3)
MCH RBC QN AUTO: 29.4 PG (ref 26.6–33)
MCHC RBC AUTO-ENTMCNC: 33.9 G/DL (ref 31.5–35.7)
MCV RBC AUTO: 86.8 FL (ref 79–97)
MONOCYTES # BLD AUTO: 0.55 10*3/MM3 (ref 0.1–0.9)
MONOCYTES NFR BLD AUTO: 4.8 % (ref 5–12)
NEUTROPHILS NFR BLD AUTO: 78.6 % (ref 42.7–76)
NEUTROPHILS NFR BLD AUTO: 8.98 10*3/MM3 (ref 1.7–7)
NRBC BLD AUTO-RTO: 0 /100 WBC (ref 0–0.2)
PLATELET # BLD AUTO: 243 10*3/MM3 (ref 140–450)
PMV BLD AUTO: 9.5 FL (ref 6–12)
POTASSIUM SERPL-SCNC: 2.9 MMOL/L (ref 3.5–5.2)
PROT SERPL-MCNC: 7 G/DL (ref 6–8.5)
RBC # BLD AUTO: 4.18 10*6/MM3 (ref 3.77–5.28)
SODIUM SERPL-SCNC: 132 MMOL/L (ref 136–145)
WBC # BLD AUTO: 11.43 10*3/MM3 (ref 3.4–10.8)

## 2021-03-08 PROCEDURE — 83690 ASSAY OF LIPASE: CPT

## 2021-03-08 PROCEDURE — 0 DIATRIZOATE MEGLUMINE & SODIUM PER 1 ML: Performed by: FAMILY MEDICINE

## 2021-03-08 PROCEDURE — 36415 COLL VENOUS BLD VENIPUNCTURE: CPT

## 2021-03-08 PROCEDURE — 99214 OFFICE O/P EST MOD 30 MIN: CPT | Performed by: FAMILY MEDICINE

## 2021-03-08 PROCEDURE — 82565 ASSAY OF CREATININE: CPT

## 2021-03-08 PROCEDURE — 85025 COMPLETE CBC W/AUTO DIFF WBC: CPT

## 2021-03-08 PROCEDURE — 74177 CT ABD & PELVIS W/CONTRAST: CPT

## 2021-03-08 PROCEDURE — 82150 ASSAY OF AMYLASE: CPT

## 2021-03-08 PROCEDURE — 25010000002 IOPAMIDOL 61 % SOLUTION: Performed by: FAMILY MEDICINE

## 2021-03-08 PROCEDURE — 80053 COMPREHEN METABOLIC PANEL: CPT

## 2021-03-08 RX ORDER — METRONIDAZOLE 500 MG/1
500 TABLET ORAL 3 TIMES DAILY
Qty: 30 TABLET | Refills: 0 | Status: SHIPPED | OUTPATIENT
Start: 2021-03-08 | End: 2021-03-08 | Stop reason: SDUPTHER

## 2021-03-08 RX ORDER — KETOROLAC TROMETHAMINE 30 MG/ML
60 INJECTION, SOLUTION INTRAMUSCULAR; INTRAVENOUS ONCE
Status: SHIPPED | OUTPATIENT
Start: 2021-03-08 | End: 2021-03-13

## 2021-03-08 RX ORDER — METRONIDAZOLE 500 MG/1
500 TABLET ORAL 3 TIMES DAILY
Qty: 30 TABLET | Refills: 0 | Status: SHIPPED | OUTPATIENT
Start: 2021-03-08 | End: 2021-03-18

## 2021-03-08 RX ORDER — POTASSIUM CHLORIDE 750 MG/1
20 TABLET, FILM COATED, EXTENDED RELEASE ORAL 2 TIMES DAILY
Qty: 28 TABLET | Refills: 0 | Status: SHIPPED | OUTPATIENT
Start: 2021-03-08 | End: 2021-03-08 | Stop reason: SDUPTHER

## 2021-03-08 RX ORDER — POTASSIUM CHLORIDE 750 MG/1
20 TABLET, FILM COATED, EXTENDED RELEASE ORAL 2 TIMES DAILY
Qty: 28 TABLET | Refills: 0 | Status: SHIPPED | OUTPATIENT
Start: 2021-03-08 | End: 2021-03-15

## 2021-03-08 RX ADMIN — IOPAMIDOL 90 ML: 612 INJECTION, SOLUTION INTRAVENOUS at 14:58

## 2021-03-08 RX ADMIN — DIATRIZOATE MEGLUMINE AND DIATRIZOATE SODIUM 30 ML: 660; 100 LIQUID ORAL; RECTAL at 13:40

## 2021-03-08 NOTE — PROGRESS NOTES
"Chief Complaint  Abdominal Pain    Subjective          Rosa Melgoza presents to [facility]  Has had the same abdominal pain since Friday.  This is the fouth spell in the past 8 months or so.   Slept all day Friday.  Would get up and have diarrhea and then go back to sleep.  Had to work Saturday.  Has had diarrhea the whole time since Friday.  There has not been any blood in the diarrhea.  Kept running between patients.   Has not eaten anything over the past 3 days.  Pain is all upper abdomen.  Pain is intermittent.  Unable to indentify aggravating and alleviating factors.   Pain gets up to 8-9 out of 10.  It is sharp and stabbing in her epigastrium.  She denies any fever or chills.  She is nauseated but has not vomited.  Has had 2 CTs in the past.  First one showed enteritis and the second one showed cecal inflammation.  Had GI consultation and colonoscopy and EGD were nondiagnostic.  This is the fourth or fifth episode of the same pain that she has had over the past 8 months.        Objective   Vital Signs:   /76 (BP Location: Left arm, Patient Position: Sitting, Cuff Size: Large Adult)   Pulse 109   Temp 98.2 °F (36.8 °C) (Temporal)   Resp 18   Ht 165.1 cm (65\")   Wt 104 kg (229 lb 12.8 oz)   SpO2 98%   BMI 38.24 kg/m²     Physical Exam  Constitutional:       General: She is not in acute distress.     Appearance: Normal appearance. She is well-developed.   HENT:      Head: Normocephalic and atraumatic.      Right Ear: Tympanic membrane, ear canal and external ear normal.      Left Ear: Tympanic membrane, ear canal and external ear normal.      Mouth/Throat:      Mouth: Mucous membranes are dry.      Pharynx: Oropharynx is clear.   Eyes:      Conjunctiva/sclera: Conjunctivae normal.      Pupils: Pupils are equal, round, and reactive to light.   Neck:      Thyroid: No thyromegaly.   Cardiovascular:      Rate and Rhythm: Normal rate and regular rhythm.      Heart sounds: " No murmur.   Pulmonary:      Effort: Pulmonary effort is normal.      Breath sounds: Normal breath sounds. No wheezing.   Abdominal:      General: Bowel sounds are normal. There is no distension.      Palpations: Abdomen is soft. There is no mass.      Tenderness: There is abdominal tenderness. There is guarding. There is no right CVA tenderness, left CVA tenderness or rebound.   Musculoskeletal:         General: Normal range of motion.      Cervical back: Neck supple.   Lymphadenopathy:      Cervical: No cervical adenopathy.   Skin:     General: Skin is warm and dry.   Neurological:      Mental Status: She is alert and oriented to person, place, and time.   Psychiatric:         Mood and Affect: Mood normal.         Behavior: Behavior normal.        Result Review :            CT Abdomen Pelvis With Contrast Enterography (09/21/2020 12:31)  CT Abdomen Pelvis With Contrast (06/19/2020 18:19)  UPPER GI ENDOSCOPY (09/24/2020 07:49)  COLONOSCOPY (09/24/2020 07:48)       Assessment and Plan    Diagnoses and all orders for this visit:    1. Epigastric pain (Primary)  -     CBC & Differential; Future  -     Comprehensive Metabolic Panel; Future  -     Amylase; Future  -     Lipase; Future  -     ketorolac (TORADOL) injection 60 mg  -     CT Abdomen Pelvis With Contrast; Future    2. Diarrhea, unspecified type  -     CBC & Differential; Future  -     Comprehensive Metabolic Panel; Future  -     Amylase; Future  -     Lipase; Future  -     CT Abdomen Pelvis With Contrast; Future    3. Abdominal guarding    4. Dehydration        Follow Up   No follow-ups on file.  Patient was given instructions and counseling regarding her condition or for health maintenance advice. Please see specific information pulled into the AVS if appropriate.

## 2021-03-09 ENCOUNTER — PATIENT MESSAGE (OUTPATIENT)
Dept: FAMILY MEDICINE CLINIC | Facility: CLINIC | Age: 51
End: 2021-03-09

## 2021-03-09 DIAGNOSIS — R91.1 PULMONARY NODULE, LEFT: Primary | ICD-10-CM

## 2021-03-09 DIAGNOSIS — R10.13 EPIGASTRIC PAIN: Primary | ICD-10-CM

## 2021-03-09 LAB — CREAT BLDA-MCNC: 1.2 MG/DL (ref 0.6–1.3)

## 2021-03-09 RX ORDER — HYOSCYAMINE SULFATE 0.12 MG/1
1 TABLET SUBLINGUAL EVERY 6 HOURS PRN
Qty: 60 EACH | Refills: 1 | Status: SHIPPED | OUTPATIENT
Start: 2021-03-09 | End: 2021-06-22 | Stop reason: SDUPTHER

## 2021-03-09 NOTE — TELEPHONE ENCOUNTER
From: Rosa Melgoza  To: Meredith Lea Kehrer, MD  Sent: 3/9/2021 2:44 AM EST  Subject: Prescription Question    I forgot to ask when you called with my ct results, but would hyoscamine or bentyl help with the cramping pains?     Thanks,  Rosa

## 2021-03-10 ENCOUNTER — TELEPHONE (OUTPATIENT)
Dept: GASTROENTEROLOGY | Facility: CLINIC | Age: 51
End: 2021-03-10

## 2021-03-18 ENCOUNTER — TELEPHONE (OUTPATIENT)
Dept: FAMILY MEDICINE CLINIC | Facility: CLINIC | Age: 51
End: 2021-03-18

## 2021-03-18 DIAGNOSIS — R11.2 NON-INTRACTABLE VOMITING WITH NAUSEA, UNSPECIFIED VOMITING TYPE: ICD-10-CM

## 2021-03-18 DIAGNOSIS — E06.3 HYPOTHYROIDISM DUE TO HASHIMOTO'S THYROIDITIS: ICD-10-CM

## 2021-03-18 DIAGNOSIS — F43.22 ADJUSTMENT DISORDER WITH ANXIOUS MOOD: ICD-10-CM

## 2021-03-18 DIAGNOSIS — G43.109 MIGRAINE WITH AURA AND WITHOUT STATUS MIGRAINOSUS, NOT INTRACTABLE: ICD-10-CM

## 2021-03-18 DIAGNOSIS — K21.9 GASTROESOPHAGEAL REFLUX DISEASE: ICD-10-CM

## 2021-03-18 DIAGNOSIS — I10 ESSENTIAL HYPERTENSION: ICD-10-CM

## 2021-03-18 DIAGNOSIS — J30.1 SEASONAL ALLERGIC RHINITIS DUE TO POLLEN: ICD-10-CM

## 2021-03-18 DIAGNOSIS — E03.8 HYPOTHYROIDISM DUE TO HASHIMOTO'S THYROIDITIS: ICD-10-CM

## 2021-03-18 RX ORDER — FLUTICASONE PROPIONATE 50 MCG
2 SPRAY, SUSPENSION (ML) NASAL DAILY
Qty: 16 G | Refills: 5 | Status: SHIPPED | OUTPATIENT
Start: 2021-03-18 | End: 2022-03-24 | Stop reason: SDUPTHER

## 2021-03-18 RX ORDER — PANTOPRAZOLE SODIUM 40 MG/1
40 TABLET, DELAYED RELEASE ORAL DAILY
Qty: 90 TABLET | Refills: 3 | Status: SHIPPED | OUTPATIENT
Start: 2021-03-18 | End: 2022-03-24 | Stop reason: SDUPTHER

## 2021-03-18 RX ORDER — ESCITALOPRAM OXALATE 10 MG/1
10 TABLET ORAL DAILY
Qty: 90 TABLET | Refills: 0 | Status: SHIPPED | OUTPATIENT
Start: 2021-03-18 | End: 2021-06-22 | Stop reason: SDUPTHER

## 2021-03-18 RX ORDER — ONDANSETRON 4 MG/1
4 TABLET, FILM COATED ORAL EVERY 8 HOURS PRN
Qty: 60 TABLET | Refills: 0 | Status: SHIPPED | OUTPATIENT
Start: 2021-03-18 | End: 2021-06-22 | Stop reason: SDUPTHER

## 2021-03-18 RX ORDER — BUTALBITAL, ACETAMINOPHEN AND CAFFEINE 50; 325; 40 MG/1; MG/1; MG/1
1 TABLET ORAL EVERY 6 HOURS PRN
Qty: 180 TABLET | Refills: 2 | Status: SHIPPED | OUTPATIENT
Start: 2021-03-18 | End: 2021-10-19 | Stop reason: SDUPTHER

## 2021-03-18 RX ORDER — PROMETHAZINE HYDROCHLORIDE 25 MG/1
25 TABLET ORAL EVERY 6 HOURS PRN
Qty: 60 TABLET | Refills: 0 | Status: SHIPPED | OUTPATIENT
Start: 2021-03-18 | End: 2021-06-22 | Stop reason: SDUPTHER

## 2021-03-18 RX ORDER — ERENUMAB-AOOE 140 MG/ML
INJECTION, SOLUTION SUBCUTANEOUS
Qty: 3 ML | Refills: 0 | Status: SHIPPED | OUTPATIENT
Start: 2021-03-18 | End: 2021-06-22 | Stop reason: SDUPTHER

## 2021-03-18 RX ORDER — AMITRIPTYLINE HYDROCHLORIDE 50 MG/1
50 TABLET, FILM COATED ORAL NIGHTLY
Qty: 90 TABLET | Refills: 3 | Status: SHIPPED | OUTPATIENT
Start: 2021-03-18 | End: 2021-10-19

## 2021-03-18 RX ORDER — LEVOTHYROXINE SODIUM 88 UG/1
88 TABLET ORAL DAILY
Qty: 90 TABLET | Refills: 1 | Status: SHIPPED | OUTPATIENT
Start: 2021-03-18 | End: 2021-10-19 | Stop reason: SDUPTHER

## 2021-03-18 RX ORDER — LISINOPRIL AND HYDROCHLOROTHIAZIDE 12.5; 1 MG/1; MG/1
1 TABLET ORAL DAILY
Qty: 90 TABLET | Refills: 1 | Status: SHIPPED | OUTPATIENT
Start: 2021-03-18 | End: 2021-10-19 | Stop reason: SDUPTHER

## 2021-03-18 RX ORDER — LEVOCETIRIZINE DIHYDROCHLORIDE 5 MG/1
5 TABLET, FILM COATED ORAL EVERY EVENING
Qty: 90 TABLET | Refills: 0 | Status: SHIPPED | OUTPATIENT
Start: 2021-03-18 | End: 2021-06-22 | Stop reason: SDUPTHER

## 2021-03-18 NOTE — TELEPHONE ENCOUNTER
Caller: lilliam     Relationship to patient: cover my meds     Best call back number:  [phone]  Ref # [id number]    Patient is needing: Lilliam called to let us know Lasmiditan Succinate (Reyvow) 50 MG tablet . The medication is denied through Pharmacy but was accepted through medical insurance . We would need to call the number on the back of the card to bill insurance .   They will allow  8 tablets 30 day supply  From 3/09/2021 to 9/08 /2021  .

## 2021-03-23 ENCOUNTER — HOSPITAL ENCOUNTER (OUTPATIENT)
Dept: CT IMAGING | Facility: HOSPITAL | Age: 51
Discharge: HOME | End: 2021-03-23
Admitting: FAMILY MEDICINE

## 2021-03-23 DIAGNOSIS — R91.1 PULMONARY NODULE, LEFT: ICD-10-CM

## 2021-03-23 DIAGNOSIS — R91.1 PULMONARY NODULE, LEFT: Primary | ICD-10-CM

## 2021-03-23 LAB — CREAT BLDA-MCNC: 0.8 MG/DL (ref 0.6–1.3)

## 2021-03-23 PROCEDURE — 71260 CT THORAX DX C+: CPT

## 2021-03-23 PROCEDURE — 25010000002 IOPAMIDOL 61 % SOLUTION: Performed by: FAMILY MEDICINE

## 2021-03-23 PROCEDURE — 82565 ASSAY OF CREATININE: CPT

## 2021-03-23 RX ADMIN — IOPAMIDOL 80 ML: 612 INJECTION, SOLUTION INTRAVENOUS at 08:41

## 2021-03-31 ENCOUNTER — PATIENT MESSAGE (OUTPATIENT)
Dept: FAMILY MEDICINE CLINIC | Facility: CLINIC | Age: 51
End: 2021-03-31

## 2021-03-31 DIAGNOSIS — R19.7 DIARRHEA, UNSPECIFIED TYPE: Primary | ICD-10-CM

## 2021-03-31 RX ORDER — METRONIDAZOLE 500 MG/1
500 TABLET ORAL 3 TIMES DAILY
Qty: 30 TABLET | Refills: 0 | Status: SHIPPED | OUTPATIENT
Start: 2021-03-31 | End: 2021-04-10

## 2021-03-31 NOTE — TELEPHONE ENCOUNTER
From: Rosa Melgoza  To: Kathie Romeo DO  Sent: 3/31/2021 11:31 AM EDT  Subject: Non-Urgent Medical Question    Hey, just wanted to let you know the pain is back. Started last night. I have been up all night with pain and watery diarrhea again. I haven't thrown up yet but am very nauseous.     Rosa

## 2021-04-14 ENCOUNTER — OFFICE VISIT (OUTPATIENT)
Dept: GASTROENTEROLOGY | Facility: CLINIC | Age: 51
End: 2021-04-14

## 2021-04-14 VITALS
DIASTOLIC BLOOD PRESSURE: 84 MMHG | BODY MASS INDEX: 40.15 KG/M2 | WEIGHT: 241 LBS | TEMPERATURE: 97.1 F | HEIGHT: 65 IN | HEART RATE: 95 BPM | OXYGEN SATURATION: 99 % | RESPIRATION RATE: 16 BRPM | SYSTOLIC BLOOD PRESSURE: 130 MMHG

## 2021-04-14 DIAGNOSIS — R10.33 PERIUMBILICAL ABDOMINAL PAIN: ICD-10-CM

## 2021-04-14 DIAGNOSIS — R19.7 DIARRHEA, UNSPECIFIED TYPE: Primary | ICD-10-CM

## 2021-04-14 DIAGNOSIS — R93.5 ABNORMAL CT OF THE ABDOMEN: ICD-10-CM

## 2021-04-14 DIAGNOSIS — R11.2 NAUSEA AND VOMITING, INTRACTABILITY OF VOMITING NOT SPECIFIED, UNSPECIFIED VOMITING TYPE: ICD-10-CM

## 2021-04-14 LAB
ALBUMIN SERPL-MCNC: 3.8 G/DL (ref 3.5–5.2)
ALBUMIN/GLOB SERPL: 1.5 G/DL
ALP SERPL-CCNC: 70 U/L (ref 39–117)
ALT SERPL-CCNC: 23 U/L (ref 1–33)
AST SERPL-CCNC: 15 U/L (ref 1–32)
BASOPHILS # BLD AUTO: 0.05 10*3/MM3 (ref 0–0.2)
BASOPHILS NFR BLD AUTO: 0.6 % (ref 0–1.5)
BILIRUB SERPL-MCNC: <0.2 MG/DL (ref 0–1.2)
BUN SERPL-MCNC: 10 MG/DL (ref 6–20)
BUN/CREAT SERPL: 12.7 (ref 7–25)
CALCIUM SERPL-MCNC: 8.8 MG/DL (ref 8.6–10.5)
CHLORIDE SERPL-SCNC: 98 MMOL/L (ref 98–107)
CO2 SERPL-SCNC: 31.9 MMOL/L (ref 22–29)
CREAT SERPL-MCNC: 0.79 MG/DL (ref 0.57–1)
CRP SERPL-MCNC: 1.58 MG/DL (ref 0–0.5)
EOSINOPHIL # BLD AUTO: 0.11 10*3/MM3 (ref 0–0.4)
EOSINOPHIL NFR BLD AUTO: 1.3 % (ref 0.3–6.2)
ERYTHROCYTE [DISTWIDTH] IN BLOOD BY AUTOMATED COUNT: 12.9 % (ref 12.3–15.4)
ERYTHROCYTE [SEDIMENTATION RATE] IN BLOOD BY WESTERGREN METHOD: 12 MM/HR (ref 0–20)
GLOBULIN SER CALC-MCNC: 2.5 GM/DL
GLUCOSE SERPL-MCNC: 71 MG/DL (ref 65–99)
HCT VFR BLD AUTO: 33.7 % (ref 34–46.6)
HGB BLD-MCNC: 11.9 G/DL (ref 12–15.9)
IMM GRANULOCYTES # BLD AUTO: 0.03 10*3/MM3 (ref 0–0.05)
IMM GRANULOCYTES NFR BLD AUTO: 0.4 % (ref 0–0.5)
LYMPHOCYTES # BLD AUTO: 1.8 10*3/MM3 (ref 0.7–3.1)
LYMPHOCYTES NFR BLD AUTO: 21.7 % (ref 19.6–45.3)
MCH RBC QN AUTO: 30.6 PG (ref 26.6–33)
MCHC RBC AUTO-ENTMCNC: 35.3 G/DL (ref 31.5–35.7)
MCV RBC AUTO: 86.6 FL (ref 79–97)
MONOCYTES # BLD AUTO: 0.51 10*3/MM3 (ref 0.1–0.9)
MONOCYTES NFR BLD AUTO: 6.1 % (ref 5–12)
NEUTROPHILS # BLD AUTO: 5.8 10*3/MM3 (ref 1.7–7)
NEUTROPHILS NFR BLD AUTO: 69.9 % (ref 42.7–76)
NRBC BLD AUTO-RTO: 0 /100 WBC (ref 0–0.2)
PLATELET # BLD AUTO: 247 10*3/MM3 (ref 140–450)
POTASSIUM SERPL-SCNC: 3.4 MMOL/L (ref 3.5–5.2)
PROT SERPL-MCNC: 6.3 G/DL (ref 6–8.5)
RBC # BLD AUTO: 3.89 10*6/MM3 (ref 3.77–5.28)
SODIUM SERPL-SCNC: 138 MMOL/L (ref 136–145)
TSH SERPL DL<=0.005 MIU/L-ACNC: 2.74 UIU/ML (ref 0.27–4.2)
WBC # BLD AUTO: 8.3 10*3/MM3 (ref 3.4–10.8)

## 2021-04-14 PROCEDURE — 99214 OFFICE O/P EST MOD 30 MIN: CPT | Performed by: NURSE PRACTITIONER

## 2021-04-14 NOTE — PROGRESS NOTES
Chief Complaint   Patient presents with   • Abdominal Pain      N/V/D follow up;still having sxs     HPI    Rosa Melgoza is a  50 y.o. female here for a follow up visit for abdominal pain.  This patient follows with Dr. Garza, new to me.  Work-up to date has included a bidirectional endoscopic evaluation which I reviewed as follows dated 2020:    EGD: Medium-sized hiatal hernia, LA Grade A esophagitis, otherwise normal.   Cscope: Diverticulosis otherwise normal.   Pathology with gastritis but no H. pylori.  Patient was treated with PPI and Carafate.  Colon biopsies were normal, recall 10 years.    Recently she had a CT abdomen pelvis with contrast for complaints of abdominal pain and diarrhea demonstrating diverticulitis with coexisting enteritis involving the terminal ileum suggestive of IBD.  She was treated with antibiotic therapy (Flagyl) and referred back to our services.    On visit today she tells me for approximately the past year she has been having episodes of diarrhea, mid abdominal pain, nausea and vomiting that seem to occur more frequently over the past several months on average once a month.  No identifiable triggers.  No relationship to menses.  Symptoms come on suddenly and last for several days then yahaira.  In between she feels fine.  She is currently on Protonix 40 mg once daily.  No dysphagia or odynophagia.  No rectal bleeding.    Past Medical History:   Diagnosis Date   • Disease of thyroid gland    • Elevated blood pressure reading without diagnosis of hypertension    • GERD (gastroesophageal reflux disease)    • Headache, migraine    • History of sore throat    • Hypothyroidism    • Migraine    • Need for DTaP and Hib vaccine     History of    • Vaginal yeast infection        Past Surgical History:   Procedure Laterality Date   • BREAST BIOPSY     •  SECTION     • CHOLECYSTECTOMY     • COLONOSCOPY  approx     Tavon LIU  benign polyps per patient   • COLONOSCOPY N/A  9/24/2020    Procedure: COLONOSCOPY  into cecum and TI with biopsies;  Surgeon: Maicol Garza MD;  Location: [facility] ENDOSCOPY;  Service: Gastroenterology;  Laterality: N/A;  Pre: abn CT scan  post: diverticulosis   • ENDOSCOPY N/A 9/24/2020    Procedure: ESOPHAGOGASTRODUODENOSCOPY WITH BIOPSY;  Surgeon: Maicol Garza MD;  Location: [facility] ENDOSCOPY;  Service: Gastroenterology;  Laterality: N/A;  Pre: abn CT scan  post: hiatal hernia, esophagitis   • TONSILLECTOMY     • UPPER GASTROINTESTINAL ENDOSCOPY  approx 2010    Montes De Oca M.D.  normal per patient       Scheduled Meds:  Outpatient Encounter Medications as of 4/14/2021   Medication Sig Dispense Refill   • amitriptyline (ELAVIL) 50 MG tablet Take 1 tablet by mouth Every Night. 90 tablet 3   • butalbital-acetaminophen-caffeine (FIORICET, ESGIC) -40 MG per tablet Take 1 tablet by mouth Every 6 (Six) Hours As Needed for Headache. 180 tablet 2   • Cholecalciferol (VITAMIN D3) 125 MCG (5000 UT) tablet tablet Take 5,000 Units by mouth Daily.     • Erenumab-aooe (Aimovig) 140 MG/ML prefilled syringe Inject 1 ML under the skin in appropriate area as directed every 30 days 3 mL 0   • escitalopram (LEXAPRO) 10 MG tablet Take 1 tablet by mouth Daily. 90 tablet 0   • fluticasone (FLONASE) 50 MCG/ACT nasal spray Use 2 sprays into the nostril(s) as directed by provider Daily. 16 g 5   • hydrOXYzine (ATARAX) 25 MG tablet Take 1 tablet by mouth Every 8 (Eight) Hours As Needed for Anxiety. 90 tablet 3   • Hyoscyamine Sulfate SL (Levsin/SL) 0.125 MG sublingual tablet Place 1 tablet under the tongue Every 6 (Six) Hours As Needed (abdominal pain/cramping). 60 each 1   • Lasmiditan Succinate (Reyvow) 50 MG tablet Take 1 tablet by mouth Daily As Needed (headache). 32 tablet 0   • levocetirizine (XYZAL) 5 MG tablet Take 1 tablet by mouth Every Evening. 90 tablet 0   • levothyroxine (SYNTHROID, LEVOTHROID) 88 MCG tablet Take 1 tablet by mouth Daily. 90 tablet 1   •  lisinopril-hydrochlorothiazide (PRINZIDE,ZESTORETIC) 10-12.5 MG per tablet Take 1 tablet by mouth Daily. 90 tablet 1   • ondansetron (ZOFRAN) 4 MG tablet Take 1 tablet by mouth Every 8 (Eight) Hours As Needed for Nausea or Vomiting. 60 tablet 0   • pantoprazole (PROTONIX) 40 MG EC tablet Take 1 tablet by mouth Daily. 90 tablet 3   • promethazine (PHENERGAN) 25 MG tablet Take 1 tablet by mouth Every 6 (Six) Hours As Needed for Nausea or Vomiting. 60 tablet 0   • [DISCONTINUED] ondansetron (ZOFRAN) 4 MG tablet TAKE 1 TABLET BY MOUTH EVERY 8 HOURS AS NEEDED FOR NAUSEA OR VOMITING 60 tablet 0   • [DISCONTINUED] promethazine (PHENERGAN) 25 MG tablet TAKE 1 TABLET BY MOUTH EVERY 6 HOURS AS NEEDED FOR NAUSEA OR VOMITING 60 tablet 0     No facility-administered encounter medications on file as of 4/14/2021.       Continuous Infusions:No current facility-administered medications for this visit.      PRN Meds:.    No Known Allergies    Social History     Socioeconomic History   • Marital status:      Spouse name: Not on file   • Number of children: Not on file   • Years of education: Not on file   • Highest education level: Not on file   Tobacco Use   • Smoking status: Former Smoker     Types: Cigarettes     Quit date: 3/1/2011     Years since quitting: 10.1   • Smokeless tobacco: Never Used   Vaping Use   • Vaping Use: Never used   Substance and Sexual Activity   • Alcohol use: Yes     Comment: Occasional   • Drug use: Never   • Sexual activity: Yes     Partners: Male       Family History   Problem Relation Age of Onset   • Breast cancer Mother    • Hypertension Father    • Heart disease Father    • Breast cancer Other    • Alcohol abuse Other    • Depression Other    • Anxiety disorder Other    • Hypertension Other    • Other Other         Pure hypercholesterolemia and esophageal reflux   • Lung cancer Other        Review of Systems   Constitutional: Negative for activity change, appetite change, fatigue, fever and  unexpected weight change.   HENT: Negative for trouble swallowing and voice change.    Respiratory: Negative for apnea, cough, choking, chest tightness, shortness of breath and wheezing.    Cardiovascular: Negative for chest pain, palpitations and leg swelling.   Gastrointestinal: Positive for abdominal pain, diarrhea, nausea and vomiting. Negative for abdominal distention, anal bleeding, blood in stool, constipation and rectal pain.       Vitals:    04/14/21 0918   BP: 130/84   Pulse: 95   Resp: 16   Temp: 97.1 °F (36.2 °C)   SpO2: 99%       Physical Exam  Constitutional:       Appearance: She is well-developed. She is obese.   Cardiovascular:      Rate and Rhythm: Normal rate and regular rhythm.      Heart sounds: Normal heart sounds.   Pulmonary:      Effort: Pulmonary effort is normal. No respiratory distress.      Breath sounds: Normal breath sounds. No wheezing.   Abdominal:      General: Bowel sounds are normal. There is no distension.      Palpations: Abdomen is soft. There is no mass.      Tenderness: There is no abdominal tenderness. There is no guarding.      Hernia: No hernia is present.          Comments: Episodic abdominal pain about once a month in the area marked above   Skin:     General: Skin is warm and dry.   Neurological:      Mental Status: She is alert and oriented to person, place, and time.   Psychiatric:         Behavior: Behavior normal.     Assessment    Diarrhea  Abdominal pain  Nausea and vomiting  Abnormal CT of the small intestine  History of diverticulitis    Plan    Pleasant 50-year-old female presented today in follow-up with episodic diarrhea, abdominal pain, nausea and vomiting occurring approximately once a month.  She was recently diagnosed and treated for diverticulitis and CT showed possible abnormalities of the terminal ileum concerning for possible IBD.  That being said she had a bidirectional endoscopic evaluation in September of last year which did not show evidence of  Crohn's or ulcerative colitis.    At this point recommend stool testing to rule out fissures pathogens with GI PCR and C. difficile.  Check fecal calprotectin.  Labs today to include CBC, CMP, CRP, sed rate, TSH, and food allergy panel.  Arrange IBD diagnostic panel.  I think her symptoms and recent abnormality seen on CT certainly warrant further investigation with CT enterography.  She is agreeable to plan of care.  All questions were answered.  We will make further recommendations when the aforementioned work-up is in.

## 2021-04-16 NOTE — PROGRESS NOTES
Please call Rosa and let her know that her lab work shows elevation in one of her inflammatory markers which could be coming from her bowel wall.  Normal thyroid function, normal liver function, normal kidney function.     Lets see what CTE reveals and please complete stool testing and IBD serology which I have ordered.

## 2021-04-19 LAB
CLAM IGE QN: <0.1 KU/L
CODFISH IGE QN: <0.1 KU/L
CONV CLASS DESCRIPTION: ABNORMAL
CORN IGE QN: <0.1 KU/L
COW MILK IGE QN: <0.1 KU/L
EGG WHITE IGE QN: 0.16 KU/L
PEANUT IGE QN: <0.1 KU/L
SCALLOP IGE QN: <0.1 KU/L
SESAME SEED IGE QN: <0.1 KU/L
SHRIMP IGE QN: <0.1 KU/L
SOYBEAN IGE QN: <0.1 KU/L
WALNUT IGE QN: <0.1 KU/L
WHEAT IGE QN: <0.1 KU/L

## 2021-04-19 NOTE — PROGRESS NOTES
Her food allergy panel came back with a low sensitivity to egg white.  I would recommend she avoid egg whites for now.

## 2021-05-20 ENCOUNTER — HOSPITAL ENCOUNTER (OUTPATIENT)
Dept: CT IMAGING | Facility: HOSPITAL | Age: 51
Discharge: HOME | End: 2021-05-20

## 2021-05-20 ENCOUNTER — LAB (OUTPATIENT)
Dept: LAB | Facility: HOSPITAL | Age: 51
End: 2021-05-20

## 2021-05-20 LAB — CREAT BLDA-MCNC: 1 MG/DL (ref 0.6–1.3)

## 2021-05-20 PROCEDURE — 82397 CHEMILUMINESCENT ASSAY: CPT | Performed by: NURSE PRACTITIONER

## 2021-05-20 PROCEDURE — 81479 UNLISTED MOLECULAR PATHOLOGY: CPT | Performed by: NURSE PRACTITIONER

## 2021-05-20 PROCEDURE — 83520 IMMUNOASSAY QUANT NOS NONAB: CPT | Performed by: NURSE PRACTITIONER

## 2021-05-20 PROCEDURE — 36415 COLL VENOUS BLD VENIPUNCTURE: CPT | Performed by: NURSE PRACTITIONER

## 2021-05-20 PROCEDURE — 88346 IMFLUOR 1ST 1ANTB STAIN PX: CPT | Performed by: NURSE PRACTITIONER

## 2021-05-20 PROCEDURE — 74177 CT ABD & PELVIS W/CONTRAST: CPT

## 2021-05-20 PROCEDURE — 25010000002 IOPAMIDOL 61 % SOLUTION: Performed by: NURSE PRACTITIONER

## 2021-05-20 PROCEDURE — 86140 C-REACTIVE PROTEIN: CPT | Performed by: NURSE PRACTITIONER

## 2021-05-20 PROCEDURE — 82565 ASSAY OF CREATININE: CPT

## 2021-05-20 PROCEDURE — 88350 IMFLUOR EA ADDL 1ANTB STN PX: CPT | Performed by: NURSE PRACTITIONER

## 2021-05-20 RX ADMIN — IOPAMIDOL 85 ML: 612 INJECTION, SOLUTION INTRAVENOUS at 10:57

## 2021-06-05 LAB — REF LAB TEST METHOD: NORMAL

## 2021-06-08 DIAGNOSIS — R93.5 ABNORMAL CT OF THE ABDOMEN: Primary | ICD-10-CM

## 2021-06-09 ENCOUNTER — LAB (OUTPATIENT)
Dept: LAB | Facility: HOSPITAL | Age: 51
End: 2021-06-09

## 2021-06-09 PROCEDURE — 83520 IMMUNOASSAY QUANT NOS NONAB: CPT | Performed by: NURSE PRACTITIONER

## 2021-06-09 PROCEDURE — 36415 COLL VENOUS BLD VENIPUNCTURE: CPT | Performed by: NURSE PRACTITIONER

## 2021-06-09 PROCEDURE — 86141 C-REACTIVE PROTEIN HS: CPT | Performed by: NURSE PRACTITIONER

## 2021-06-20 LAB — REF LAB TEST METHOD: NORMAL

## 2021-06-22 DIAGNOSIS — R11.2 NON-INTRACTABLE VOMITING WITH NAUSEA, UNSPECIFIED VOMITING TYPE: ICD-10-CM

## 2021-06-22 DIAGNOSIS — G43.109 MIGRAINE WITH AURA AND WITHOUT STATUS MIGRAINOSUS, NOT INTRACTABLE: ICD-10-CM

## 2021-06-22 DIAGNOSIS — R10.13 EPIGASTRIC PAIN: ICD-10-CM

## 2021-06-22 DIAGNOSIS — F43.22 ADJUSTMENT DISORDER WITH ANXIOUS MOOD: ICD-10-CM

## 2021-06-23 ENCOUNTER — TELEPHONE (OUTPATIENT)
Dept: GASTROENTEROLOGY | Facility: CLINIC | Age: 51
End: 2021-06-23

## 2021-06-23 RX ORDER — ONDANSETRON 4 MG/1
4 TABLET, FILM COATED ORAL EVERY 8 HOURS PRN
Qty: 60 TABLET | Refills: 0 | Status: SHIPPED | OUTPATIENT
Start: 2021-06-23 | End: 2021-08-08 | Stop reason: SDUPTHER

## 2021-06-23 RX ORDER — LEVOCETIRIZINE DIHYDROCHLORIDE 5 MG/1
5 TABLET, FILM COATED ORAL EVERY EVENING
Qty: 90 TABLET | Refills: 0 | Status: SHIPPED | OUTPATIENT
Start: 2021-06-23 | End: 2021-10-19 | Stop reason: SDUPTHER

## 2021-06-23 RX ORDER — HYOSCYAMINE SULFATE 0.12 MG/1
1 TABLET SUBLINGUAL EVERY 6 HOURS PRN
Qty: 60 EACH | Refills: 0 | Status: SHIPPED | OUTPATIENT
Start: 2021-06-23 | End: 2021-10-19 | Stop reason: SDUPTHER

## 2021-06-23 RX ORDER — ERENUMAB-AOOE 140 MG/ML
INJECTION, SOLUTION SUBCUTANEOUS
Qty: 3 ML | Refills: 0 | Status: SHIPPED | OUTPATIENT
Start: 2021-06-23 | End: 2022-02-17 | Stop reason: SDUPTHER

## 2021-06-23 RX ORDER — ESCITALOPRAM OXALATE 10 MG/1
10 TABLET ORAL DAILY
Qty: 90 TABLET | Refills: 0 | Status: SHIPPED | OUTPATIENT
Start: 2021-06-23 | End: 2021-10-19 | Stop reason: SDUPTHER

## 2021-06-23 RX ORDER — PROMETHAZINE HYDROCHLORIDE 25 MG/1
25 TABLET ORAL EVERY 6 HOURS PRN
Qty: 60 TABLET | Refills: 0 | Status: SHIPPED | OUTPATIENT
Start: 2021-06-23 | End: 2021-12-01 | Stop reason: SDUPTHER

## 2021-06-27 RX ORDER — MESALAMINE 500 MG/1
1000 CAPSULE, EXTENDED RELEASE ORAL 4 TIMES DAILY
Qty: 224 CAPSULE | Refills: 3 | Status: SHIPPED | OUTPATIENT
Start: 2021-06-27 | End: 2021-12-07 | Stop reason: SDUPTHER

## 2021-08-08 DIAGNOSIS — R11.2 NON-INTRACTABLE VOMITING WITH NAUSEA, UNSPECIFIED VOMITING TYPE: ICD-10-CM

## 2021-08-08 RX ORDER — ONDANSETRON 4 MG/1
4 TABLET, FILM COATED ORAL EVERY 8 HOURS PRN
Qty: 60 TABLET | Refills: 0 | Status: CANCELLED | OUTPATIENT
Start: 2021-08-08

## 2021-08-09 DIAGNOSIS — R11.2 NON-INTRACTABLE VOMITING WITH NAUSEA, UNSPECIFIED VOMITING TYPE: ICD-10-CM

## 2021-08-09 RX ORDER — ONDANSETRON 4 MG/1
4 TABLET, FILM COATED ORAL EVERY 8 HOURS PRN
Qty: 60 TABLET | Refills: 0 | Status: SHIPPED | OUTPATIENT
Start: 2021-08-09 | End: 2021-10-19 | Stop reason: SDUPTHER

## 2021-10-19 ENCOUNTER — PATIENT MESSAGE (OUTPATIENT)
Dept: FAMILY MEDICINE CLINIC | Facility: CLINIC | Age: 51
End: 2021-10-19

## 2021-10-19 DIAGNOSIS — G43.109 MIGRAINE WITH AURA AND WITHOUT STATUS MIGRAINOSUS, NOT INTRACTABLE: ICD-10-CM

## 2021-10-19 DIAGNOSIS — R11.2 NON-INTRACTABLE VOMITING WITH NAUSEA, UNSPECIFIED VOMITING TYPE: ICD-10-CM

## 2021-10-19 DIAGNOSIS — R10.13 EPIGASTRIC PAIN: ICD-10-CM

## 2021-10-19 DIAGNOSIS — Z79.899 ENCOUNTER FOR LONG-TERM (CURRENT) USE OF MEDICATIONS: ICD-10-CM

## 2021-10-19 DIAGNOSIS — F43.22 ADJUSTMENT DISORDER WITH ANXIOUS MOOD: ICD-10-CM

## 2021-10-19 DIAGNOSIS — E03.8 HYPOTHYROIDISM DUE TO HASHIMOTO'S THYROIDITIS: ICD-10-CM

## 2021-10-19 DIAGNOSIS — I10 ESSENTIAL HYPERTENSION: ICD-10-CM

## 2021-10-19 DIAGNOSIS — E06.3 HYPOTHYROIDISM DUE TO HASHIMOTO'S THYROIDITIS: ICD-10-CM

## 2021-10-19 RX ORDER — ONDANSETRON 4 MG/1
4 TABLET, FILM COATED ORAL EVERY 8 HOURS PRN
Qty: 60 TABLET | Refills: 0 | Status: SHIPPED | OUTPATIENT
Start: 2021-10-19 | End: 2022-02-17 | Stop reason: SDUPTHER

## 2021-10-19 RX ORDER — BUTALBITAL, ACETAMINOPHEN AND CAFFEINE 50; 325; 40 MG/1; MG/1; MG/1
1 TABLET ORAL EVERY 6 HOURS PRN
Qty: 180 TABLET | Refills: 0 | Status: SHIPPED | OUTPATIENT
Start: 2021-10-19 | End: 2022-03-24 | Stop reason: SDUPTHER

## 2021-10-19 RX ORDER — LASMIDITAN 50 MG/1
1 TABLET ORAL DAILY PRN
Qty: 32 TABLET | Refills: 0 | Status: SHIPPED | OUTPATIENT
Start: 2021-10-19 | End: 2022-06-28

## 2021-10-19 RX ORDER — LEVOCETIRIZINE DIHYDROCHLORIDE 5 MG/1
5 TABLET, FILM COATED ORAL EVERY EVENING
Qty: 90 TABLET | Refills: 0 | Status: SHIPPED | OUTPATIENT
Start: 2021-10-19 | End: 2022-03-24 | Stop reason: SDUPTHER

## 2021-10-19 RX ORDER — HYOSCYAMINE SULFATE 0.12 MG/1
1 TABLET SUBLINGUAL EVERY 6 HOURS PRN
Qty: 60 EACH | Refills: 0 | Status: SHIPPED | OUTPATIENT
Start: 2021-10-19 | End: 2022-06-03 | Stop reason: SDUPTHER

## 2021-10-19 RX ORDER — LISINOPRIL AND HYDROCHLOROTHIAZIDE 12.5; 1 MG/1; MG/1
1 TABLET ORAL DAILY
Qty: 90 TABLET | Refills: 0 | Status: SHIPPED | OUTPATIENT
Start: 2021-10-19 | End: 2022-03-25 | Stop reason: SDUPTHER

## 2021-10-19 RX ORDER — LEVOTHYROXINE SODIUM 88 UG/1
88 TABLET ORAL DAILY
Qty: 90 TABLET | Refills: 0 | Status: SHIPPED | OUTPATIENT
Start: 2021-10-19 | End: 2022-03-24 | Stop reason: SDUPTHER

## 2021-10-19 RX ORDER — ESCITALOPRAM OXALATE 10 MG/1
10 TABLET ORAL DAILY
Qty: 90 TABLET | Refills: 0 | Status: SHIPPED | OUTPATIENT
Start: 2021-10-19 | End: 2022-03-24 | Stop reason: SDUPTHER

## 2021-10-19 NOTE — TELEPHONE ENCOUNTER
From: Rosa Melgoza  To: Kathie Romeo DO  Sent: 10/19/2021 2:11 PM EDT  Subject: Prescription Question    Hi, I just saw an ad for a new migraine med, and wondered if you had heard anything about it yet. It is called Trudhesa. It is a nasal spray. You know I am always willing to try anything new in hopes of getting rid of these evil things. I have stopped taking the amitriptyline, I didnt feel like it was helping anymore. I also may need to stop the aimovig, I don't think it is helping much either anymore. It is so frustrating that some things work only for a while, some not at all, etc. Let me know what you think about the trudhesa if you get a chance.   Thanks,  Rosa Alexandre,   Rosa

## 2021-12-02 RX ORDER — PROMETHAZINE HYDROCHLORIDE 25 MG/1
25 TABLET ORAL EVERY 6 HOURS PRN
Qty: 60 TABLET | Refills: 0 | Status: SHIPPED | OUTPATIENT
Start: 2021-12-02 | End: 2022-02-17 | Stop reason: SDUPTHER

## 2021-12-03 ENCOUNTER — TELEPHONE (OUTPATIENT)
Dept: GASTROENTEROLOGY | Facility: CLINIC | Age: 51
End: 2021-12-03

## 2021-12-03 NOTE — TELEPHONE ENCOUNTER
----- Message from NADEGE Sandra sent at 12/3/2021  1:07 PM EST -----  Regarding: FW: pentasa  Can we clarify if is she taking Pentasa 2 tablets 4 times a day?    ----- Message -----  From: Judy Ruiz RN  Sent: 2021   1:23 PM EST  To: NADEGE Sandra  Subject: FW: pentasa                                        ----- Message -----  From: Rosa Melgoza  Sent: 2021  11:17 AM EST  [facility]  Subject: pentasa                                          Hi, can you send a new rx in for my pentasa?  The other one must have  bc it isnt showing up in my mychart.   Thanks,  Rosa Melgoza

## 2021-12-07 ENCOUNTER — TELEPHONE (OUTPATIENT)
Dept: GASTROENTEROLOGY | Facility: CLINIC | Age: 51
End: 2021-12-07

## 2021-12-07 RX ORDER — MESALAMINE 500 MG/1
1000 CAPSULE, EXTENDED RELEASE ORAL 4 TIMES DAILY
Qty: 224 CAPSULE | Refills: 3 | Status: SHIPPED | OUTPATIENT
Start: 2021-12-07 | End: 2022-03-30

## 2021-12-07 RX ORDER — MESALAMINE 500 MG/1
1000 CAPSULE, EXTENDED RELEASE ORAL 4 TIMES DAILY
Qty: 224 CAPSULE | Refills: 3 | Status: SHIPPED | OUTPATIENT
Start: 2021-12-07 | End: 2021-12-07 | Stop reason: SDUPTHER

## 2021-12-07 NOTE — TELEPHONE ENCOUNTER
----- Message from Rosa Melgoza sent at 12/7/2021 12:58 PM EST -----  Regarding: pentasa  Did you send it to the [facility] pharmacy?  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

## 2022-02-17 DIAGNOSIS — G43.109 MIGRAINE WITH AURA AND WITHOUT STATUS MIGRAINOSUS, NOT INTRACTABLE: ICD-10-CM

## 2022-02-17 DIAGNOSIS — R11.2 NON-INTRACTABLE VOMITING WITH NAUSEA, UNSPECIFIED VOMITING TYPE: ICD-10-CM

## 2022-02-18 DIAGNOSIS — G43.109 MIGRAINE WITH AURA AND WITHOUT STATUS MIGRAINOSUS, NOT INTRACTABLE: ICD-10-CM

## 2022-02-18 DIAGNOSIS — R11.2 NON-INTRACTABLE VOMITING WITH NAUSEA, UNSPECIFIED VOMITING TYPE: ICD-10-CM

## 2022-02-18 RX ORDER — ONDANSETRON 4 MG/1
4 TABLET, FILM COATED ORAL EVERY 8 HOURS PRN
Qty: 60 TABLET | Refills: 0 | Status: CANCELLED | OUTPATIENT
Start: 2022-02-18

## 2022-02-18 RX ORDER — ERENUMAB-AOOE 140 MG/ML
INJECTION, SOLUTION SUBCUTANEOUS
Qty: 3 ML | Refills: 0 | Status: CANCELLED | OUTPATIENT
Start: 2022-02-18

## 2022-02-18 RX ORDER — PROMETHAZINE HYDROCHLORIDE 25 MG/1
25 TABLET ORAL EVERY 6 HOURS PRN
Qty: 60 TABLET | Refills: 0 | Status: SHIPPED | OUTPATIENT
Start: 2022-02-18 | End: 2022-06-03 | Stop reason: SDUPTHER

## 2022-02-18 RX ORDER — ERENUMAB-AOOE 140 MG/ML
INJECTION, SOLUTION SUBCUTANEOUS
Qty: 3 ML | Refills: 0 | Status: SHIPPED | OUTPATIENT
Start: 2022-02-18 | End: 2022-06-03 | Stop reason: SDUPTHER

## 2022-02-18 RX ORDER — ONDANSETRON 4 MG/1
4 TABLET, FILM COATED ORAL EVERY 8 HOURS PRN
Qty: 60 TABLET | Refills: 0 | Status: SHIPPED | OUTPATIENT
Start: 2022-02-18 | End: 2022-08-01 | Stop reason: SDUPTHER

## 2022-02-18 RX ORDER — PROMETHAZINE HYDROCHLORIDE 25 MG/1
25 TABLET ORAL EVERY 6 HOURS PRN
Qty: 60 TABLET | Refills: 0 | Status: CANCELLED | OUTPATIENT
Start: 2022-02-18

## 2022-02-22 ENCOUNTER — TELEPHONE (OUTPATIENT)
Dept: FAMILY MEDICINE CLINIC | Facility: CLINIC | Age: 52
End: 2022-02-22

## 2022-02-22 NOTE — TELEPHONE ENCOUNTER
Caller: APRIL     Relationship to patient: Other    Best call back number:[phone]    Patient is needing: April FROM [facility] CALLED IN STATING THAT A PRIOR AUTHORIZATION NEEDS TO BE SUBMITTED FOR THE Erenumab-aooe (Aimovig) 140 MG/ML prefilled syringe THROUGH [facility]. PLEASE CALL AND ADVISE THANK YOU.

## 2022-02-23 ENCOUNTER — TELEPHONE (OUTPATIENT)
Dept: FAMILY MEDICINE CLINIC | Facility: CLINIC | Age: 52
End: 2022-02-23

## 2022-02-23 NOTE — TELEPHONE ENCOUNTER
Left message for patient stating that we need her new pharmacy card since we have switch to [facility]

## 2022-02-23 NOTE — TELEPHONE ENCOUNTER
April CALLING FROM [employer] TO INQUIRE ABOUT A PRIOR AUTH FOR Erenumab-aooe (Aimovig) 140 MG/ML prefilled syringe   SHE IS REQUESTING THAT AN ONLINE PRIOR AUTH BE SUBMITTED THROUGH [email or web address]. PLEASE ADVISE THANK YOU!

## 2022-03-21 ENCOUNTER — TELEPHONE (OUTPATIENT)
Dept: GASTROENTEROLOGY | Facility: CLINIC | Age: 52
End: 2022-03-21

## 2022-03-21 DIAGNOSIS — R19.7 DIARRHEA, UNSPECIFIED TYPE: Primary | ICD-10-CM

## 2022-03-21 NOTE — TELEPHONE ENCOUNTER
----- Message from NADEGE Sandra sent at 3/21/2022  1:50 PM EDT -----  Regarding: FW: crohns   I think she needs to complete labs and stool testing which I have ordered.  See if we can work her in this week with Breonna Villagran or Manda. ([facility] patient)     ----- Message -----  From: Judy Ruiz RN  Sent: 3/21/2022   9:13 AM EDT  To: NADEGE Sandra  Subject: FW: crohns                                         ----- Message -----  From: Rosa Melgoza  Sent: 3/20/2022   8:43 PM EDT  [facility]  Subject: crohns                                           Hi Loly, I am having a crohns flare that started Saturday.   I have been in bed or the bathroom since it started.  My diarrhea is brown water at this point, and the pain in awful.  I take my mesalamine, and also hyoscamine, zofran, and phenergan.  Is there any other med you can give me for during a flare?  I hate to go to the ER bc they will give me a dose of IV pain med and send me home, only to be miserable again as soon as it wears off.  I am honestly miserable right now and would try anything.     Thanks,   Rosa Melgoza

## 2022-03-22 ENCOUNTER — HOSPITAL ENCOUNTER (EMERGENCY)
Facility: HOSPITAL | Age: 52
Discharge: HOME | End: 2022-03-22
Attending: EMERGENCY MEDICINE | Admitting: EMERGENCY MEDICINE

## 2022-03-22 ENCOUNTER — APPOINTMENT (OUTPATIENT)
Dept: CT IMAGING | Facility: HOSPITAL | Age: 52
End: 2022-03-22

## 2022-03-22 VITALS
HEIGHT: 64 IN | TEMPERATURE: 97.7 F | BODY MASS INDEX: 37.56 KG/M2 | SYSTOLIC BLOOD PRESSURE: 126 MMHG | DIASTOLIC BLOOD PRESSURE: 84 MMHG | WEIGHT: 220 LBS | HEART RATE: 76 BPM | OXYGEN SATURATION: 84 % | RESPIRATION RATE: 15 BRPM

## 2022-03-22 DIAGNOSIS — R10.9 ACUTE ABDOMINAL PAIN: Primary | ICD-10-CM

## 2022-03-22 DIAGNOSIS — K50.10 CROHN'S DISEASE OF COLON WITHOUT COMPLICATION: ICD-10-CM

## 2022-03-22 DIAGNOSIS — N39.0 ACUTE UTI: ICD-10-CM

## 2022-03-22 DIAGNOSIS — E87.6 HYPOKALEMIA: ICD-10-CM

## 2022-03-22 DIAGNOSIS — E86.9 VOLUME DEPLETION, GASTROINTESTINAL LOSS: ICD-10-CM

## 2022-03-22 LAB
ALBUMIN SERPL-MCNC: 4.3 G/DL (ref 3.5–5.2)
ALBUMIN/GLOB SERPL: 1.4 G/DL
ALP SERPL-CCNC: 93 U/L (ref 39–117)
ALT SERPL W P-5'-P-CCNC: 38 U/L (ref 1–33)
AMORPH URATE CRY URNS QL MICRO: ABNORMAL /HPF
ANION GAP SERPL CALCULATED.3IONS-SCNC: 13 MMOL/L (ref 5–15)
AST SERPL-CCNC: 29 U/L (ref 1–32)
BACTERIA UR QL AUTO: ABNORMAL /HPF
BASOPHILS # BLD AUTO: 0.02 10*3/MM3 (ref 0–0.2)
BASOPHILS NFR BLD AUTO: 0.2 % (ref 0–1.5)
BILIRUB SERPL-MCNC: 0.3 MG/DL (ref 0–1.2)
BILIRUB UR QL STRIP: NEGATIVE
BUN SERPL-MCNC: 5 MG/DL (ref 6–20)
BUN/CREAT SERPL: 5 (ref 7–25)
CALCIUM SPEC-SCNC: 9.1 MG/DL (ref 8.6–10.5)
CHLORIDE SERPL-SCNC: 101 MMOL/L (ref 98–107)
CLARITY UR: ABNORMAL
CO2 SERPL-SCNC: 27 MMOL/L (ref 22–29)
COLOR UR: ABNORMAL
CREAT SERPL-MCNC: 1 MG/DL (ref 0.57–1)
DEPRECATED RDW RBC AUTO: 42.1 FL (ref 37–54)
EGFRCR SERPLBLD CKD-EPI 2021: 68.3 ML/MIN/1.73
EOSINOPHIL # BLD AUTO: 0.11 10*3/MM3 (ref 0–0.4)
EOSINOPHIL NFR BLD AUTO: 1.1 % (ref 0.3–6.2)
ERYTHROCYTE [DISTWIDTH] IN BLOOD BY AUTOMATED COUNT: 13.3 % (ref 12.3–15.4)
GLOBULIN UR ELPH-MCNC: 3 GM/DL
GLUCOSE SERPL-MCNC: 110 MG/DL (ref 65–99)
GLUCOSE UR STRIP-MCNC: NEGATIVE MG/DL
HCG SERPL QL: NEGATIVE
HCT VFR BLD AUTO: 37.3 % (ref 34–46.6)
HGB BLD-MCNC: 12.6 G/DL (ref 12–15.9)
HGB UR QL STRIP.AUTO: NEGATIVE
HYALINE CASTS UR QL AUTO: ABNORMAL /LPF
IMM GRANULOCYTES # BLD AUTO: 0.03 10*3/MM3 (ref 0–0.05)
IMM GRANULOCYTES NFR BLD AUTO: 0.3 % (ref 0–0.5)
KETONES UR QL STRIP: ABNORMAL
LEUKOCYTE ESTERASE UR QL STRIP.AUTO: ABNORMAL
LIPASE SERPL-CCNC: 20 U/L (ref 13–60)
LYMPHOCYTES # BLD AUTO: 1.58 10*3/MM3 (ref 0.7–3.1)
LYMPHOCYTES NFR BLD AUTO: 16.3 % (ref 19.6–45.3)
MAGNESIUM SERPL-MCNC: 2 MG/DL (ref 1.6–2.6)
MCH RBC QN AUTO: 29.4 PG (ref 26.6–33)
MCHC RBC AUTO-ENTMCNC: 33.8 G/DL (ref 31.5–35.7)
MCV RBC AUTO: 86.9 FL (ref 79–97)
MONOCYTES # BLD AUTO: 0.46 10*3/MM3 (ref 0.1–0.9)
MONOCYTES NFR BLD AUTO: 4.8 % (ref 5–12)
NEUTROPHILS NFR BLD AUTO: 7.47 10*3/MM3 (ref 1.7–7)
NEUTROPHILS NFR BLD AUTO: 77.3 % (ref 42.7–76)
NITRITE UR QL STRIP: NEGATIVE
NRBC BLD AUTO-RTO: 0 /100 WBC (ref 0–0.2)
PH UR STRIP.AUTO: 5.5 [PH] (ref 5–8)
PLATELET # BLD AUTO: 224 10*3/MM3 (ref 140–450)
PMV BLD AUTO: 10.3 FL (ref 6–12)
POTASSIUM SERPL-SCNC: 3 MMOL/L (ref 3.5–5.2)
PROT SERPL-MCNC: 7.3 G/DL (ref 6–8.5)
PROT UR QL STRIP: ABNORMAL
RBC # BLD AUTO: 4.29 10*6/MM3 (ref 3.77–5.28)
RBC # UR STRIP: ABNORMAL /HPF
REF LAB TEST METHOD: ABNORMAL
SARS-COV-2 RNA PNL SPEC NAA+PROBE: NOT DETECTED
SODIUM SERPL-SCNC: 141 MMOL/L (ref 136–145)
SP GR UR STRIP: >=1.03 (ref 1–1.03)
SQUAMOUS #/AREA URNS HPF: ABNORMAL /HPF
UROBILINOGEN UR QL STRIP: ABNORMAL
WBC # UR STRIP: ABNORMAL /HPF
WBC NRBC COR # BLD: 9.67 10*3/MM3 (ref 3.4–10.8)

## 2022-03-22 PROCEDURE — 83690 ASSAY OF LIPASE: CPT | Performed by: EMERGENCY MEDICINE

## 2022-03-22 PROCEDURE — 25010000002 IOPAMIDOL 61 % SOLUTION: Performed by: EMERGENCY MEDICINE

## 2022-03-22 PROCEDURE — 25010000002 ONDANSETRON PER 1 MG: Performed by: EMERGENCY MEDICINE

## 2022-03-22 PROCEDURE — 96365 THER/PROPH/DIAG IV INF INIT: CPT

## 2022-03-22 PROCEDURE — 87635 SARS-COV-2 COVID-19 AMP PRB: CPT | Performed by: EMERGENCY MEDICINE

## 2022-03-22 PROCEDURE — 85025 COMPLETE CBC W/AUTO DIFF WBC: CPT | Performed by: EMERGENCY MEDICINE

## 2022-03-22 PROCEDURE — 99284 EMERGENCY DEPT VISIT MOD MDM: CPT

## 2022-03-22 PROCEDURE — 84703 CHORIONIC GONADOTROPIN ASSAY: CPT | Performed by: EMERGENCY MEDICINE

## 2022-03-22 PROCEDURE — 81001 URINALYSIS AUTO W/SCOPE: CPT | Performed by: EMERGENCY MEDICINE

## 2022-03-22 PROCEDURE — 74177 CT ABD & PELVIS W/CONTRAST: CPT

## 2022-03-22 PROCEDURE — 63710000001 PREDNISONE PER 1 MG: Performed by: EMERGENCY MEDICINE

## 2022-03-22 PROCEDURE — 83735 ASSAY OF MAGNESIUM: CPT | Performed by: EMERGENCY MEDICINE

## 2022-03-22 PROCEDURE — 96361 HYDRATE IV INFUSION ADD-ON: CPT

## 2022-03-22 PROCEDURE — 25010000002 CEFTRIAXONE PER 250 MG: Performed by: EMERGENCY MEDICINE

## 2022-03-22 PROCEDURE — 80053 COMPREHEN METABOLIC PANEL: CPT | Performed by: EMERGENCY MEDICINE

## 2022-03-22 PROCEDURE — 25010000002 HYDROMORPHONE 1 MG/ML SOLUTION: Performed by: EMERGENCY MEDICINE

## 2022-03-22 PROCEDURE — 36415 COLL VENOUS BLD VENIPUNCTURE: CPT

## 2022-03-22 PROCEDURE — 96375 TX/PRO/DX INJ NEW DRUG ADDON: CPT

## 2022-03-22 RX ORDER — POTASSIUM CHLORIDE 750 MG/1
40 TABLET, FILM COATED, EXTENDED RELEASE ORAL ONCE
Status: COMPLETED | OUTPATIENT
Start: 2022-03-22 | End: 2022-03-22

## 2022-03-22 RX ORDER — PREDNISONE 10 MG/1
TABLET ORAL
Qty: 50 TABLET | Refills: 0 | Status: SHIPPED | OUTPATIENT
Start: 2022-03-22 | End: 2022-03-30

## 2022-03-22 RX ORDER — FAMOTIDINE 10 MG/ML
20 INJECTION, SOLUTION INTRAVENOUS ONCE
Status: COMPLETED | OUTPATIENT
Start: 2022-03-22 | End: 2022-03-22

## 2022-03-22 RX ORDER — PREDNISONE 20 MG/1
60 TABLET ORAL ONCE
Status: COMPLETED | OUTPATIENT
Start: 2022-03-22 | End: 2022-03-22

## 2022-03-22 RX ORDER — HYDROCODONE BITARTRATE AND ACETAMINOPHEN 5; 325 MG/1; MG/1
2 TABLET ORAL ONCE
Status: COMPLETED | OUTPATIENT
Start: 2022-03-22 | End: 2022-03-22

## 2022-03-22 RX ORDER — CEFUROXIME AXETIL 500 MG/1
500 TABLET ORAL 2 TIMES DAILY
Qty: 14 TABLET | Refills: 0 | Status: SHIPPED | OUTPATIENT
Start: 2022-03-22 | End: 2022-03-30

## 2022-03-22 RX ORDER — HYDROCODONE BITARTRATE AND ACETAMINOPHEN 5; 325 MG/1; MG/1
TABLET ORAL
Qty: 15 TABLET | Refills: 0 | Status: SHIPPED | OUTPATIENT
Start: 2022-03-22 | End: 2022-09-18

## 2022-03-22 RX ORDER — FLUCONAZOLE 150 MG/1
TABLET ORAL
Qty: 2 TABLET | Refills: 0 | Status: SHIPPED | OUTPATIENT
Start: 2022-03-22 | End: 2022-03-30

## 2022-03-22 RX ORDER — ONDANSETRON 2 MG/ML
4 INJECTION INTRAMUSCULAR; INTRAVENOUS ONCE
Status: COMPLETED | OUTPATIENT
Start: 2022-03-22 | End: 2022-03-22

## 2022-03-22 RX ORDER — SODIUM CHLORIDE 9 MG/ML
125 INJECTION, SOLUTION INTRAVENOUS CONTINUOUS
Status: DISCONTINUED | OUTPATIENT
Start: 2022-03-22 | End: 2022-03-22 | Stop reason: HOSPADM

## 2022-03-22 RX ADMIN — ONDANSETRON 4 MG: 2 INJECTION INTRAMUSCULAR; INTRAVENOUS at 16:25

## 2022-03-22 RX ADMIN — FAMOTIDINE 20 MG: 10 INJECTION INTRAVENOUS at 16:26

## 2022-03-22 RX ADMIN — SODIUM CHLORIDE 1000 ML: 9 INJECTION, SOLUTION INTRAVENOUS at 16:27

## 2022-03-22 RX ADMIN — PREDNISONE 60 MG: 20 TABLET ORAL at 19:47

## 2022-03-22 RX ADMIN — POTASSIUM CHLORIDE 40 MEQ: 750 TABLET, EXTENDED RELEASE ORAL at 19:26

## 2022-03-22 RX ADMIN — HYDROCODONE BITARTRATE AND ACETAMINOPHEN 2 TABLET: 5; 325 TABLET ORAL at 19:46

## 2022-03-22 RX ADMIN — SODIUM CHLORIDE 125 ML/HR: 9 INJECTION, SOLUTION INTRAVENOUS at 17:26

## 2022-03-22 RX ADMIN — IOPAMIDOL 95 ML: 612 INJECTION, SOLUTION INTRAVENOUS at 17:52

## 2022-03-22 RX ADMIN — CEFTRIAXONE 1 G: 1 INJECTION, POWDER, FOR SOLUTION INTRAMUSCULAR; INTRAVENOUS at 19:27

## 2022-03-22 RX ADMIN — HYDROMORPHONE HYDROCHLORIDE 1 MG: 1 INJECTION, SOLUTION INTRAMUSCULAR; INTRAVENOUS; SUBCUTANEOUS at 16:27

## 2022-03-22 NOTE — ED NOTES
Pt presents to ED with complains of abd pain with N/D since Saturday. Pt reports hx of Chron's. Pt is A&Ox4, able to ambulate, and in a mask at this time.     Patient was placed in face mask during first look triage.  Patient was wearing a face mask throughout encounter.  I wore personal protective equipment throughout the encounter.  Hand hygiene was performed before and after patient encounter.

## 2022-03-22 NOTE — ED PROVIDER NOTES
EMERGENCY DEPARTMENT ENCOUNTER    Room Number:  14/14  Date of encounter:  3/22/2022  PCP: Kathie Romeo DO  Historian: Pt    Patient was placed in face mask during triage process. Patient was wearing facemask when I entered the room and throughout our encounter. I wore full protective equipment throughout this patient encounter including a face mask, eye protection, and gloves. Hand hygiene was performed before donning protective equipment and again following doffing of PPE after leaving the room.    HPI:  Chief Complaint: Abdominal pain  A complete HPI/ROS/PMH/PSH/SH/FH are unobtainable due to: N/A   Context: Rosa Melgoza is a 51 y.o. female with prior diagnosis of Crohn's who follows with Dr. Ann, GI, who presents to the ED c/o mid abdominal discomfort with associated nausea and copious diarrhea over the last 3 and half days.  No associated fever, chest pain, syncope.  Diminished urine output but no dysuria.  Denies possibility of pregnancy.  No black or bloody stools.  Poor p.o. intake.  Discomfort moderately severe at this time.  Worsened with any attempted eating.      MEDICAL HISTORY REVIEW  EMR reviewed:    PAST MEDICAL HISTORY  Active Ambulatory Problems     Diagnosis Date Noted   • Hypothyroidism 01/13/2020   • GERD (gastroesophageal reflux disease) 01/13/2020   • Headache, migraine 01/13/2020   • Adjustment disorder with anxious mood 01/13/2020   • Encounter for long-term (current) use of medications 01/13/2020   • Essential hypertension 01/13/2020   • Epigastric pain 09/09/2020     Resolved Ambulatory Problems     Diagnosis Date Noted   • No Resolved Ambulatory Problems     Past Medical History:   Diagnosis Date   • Crohn disease (HCC)    • Disease of thyroid gland    • Elevated blood pressure reading without diagnosis of hypertension    • History of sore throat    • Migraine    • Need for DTaP and Hib vaccine    • Vaginal yeast infection          PAST SURGICAL HISTORY  Past Surgical History:    Procedure Laterality Date   • BREAST BIOPSY     •  SECTION     • CHOLECYSTECTOMY     • COLONOSCOPY  approx     Tavon M.D.  benign polyps per patient   • COLONOSCOPY N/A 2020    Procedure: COLONOSCOPY  into cecum and TI with biopsies;  Surgeon: Maicol Garza MD;  Location: [facility] ENDOSCOPY;  Service: Gastroenterology;  Laterality: N/A;  Pre: abn CT scan  post: diverticulosis   • ENDOSCOPY N/A 2020    Procedure: ESOPHAGOGASTRODUODENOSCOPY WITH BIOPSY;  Surgeon: Maicol Garza MD;  Location: [facility] ENDOSCOPY;  Service: Gastroenterology;  Laterality: N/A;  Pre: abn CT scan  post: hiatal hernia, esophagitis   • TONSILLECTOMY     • UPPER GASTROINTESTINAL ENDOSCOPY  approx     Tavon M.D.  normal per patient         FAMILY HISTORY  Family History   Problem Relation Age of Onset   • Breast cancer Mother    • Hypertension Father    • Heart disease Father    • Breast cancer Other    • Alcohol abuse Other    • Depression Other    • Anxiety disorder Other    • Hypertension Other    • Other Other         Pure hypercholesterolemia and esophageal reflux   • Lung cancer Other          SOCIAL HISTORY  Social History     Socioeconomic History   • Marital status:    Tobacco Use   • Smoking status: Former Smoker     Types: Cigarettes     Quit date: 3/1/2011     Years since quittin.0   • Smokeless tobacco: Never Used   Vaping Use   • Vaping Use: Never used   Substance and Sexual Activity   • Alcohol use: Yes     Comment: Occasional   • Drug use: Never   • Sexual activity: Yes     Partners: Male         ALLERGIES  Patient has no known allergies.        REVIEW OF SYSTEMS  Review of Systems     All systems reviewed and negative except for those discussed in HPI.       PHYSICAL EXAM    I have reviewed the triage vital signs and nursing notes.    ED Triage Vitals [22 1505]   Temp Heart Rate Resp BP SpO2   97.7 °F (36.5 °C) 108 15 -- 94 %      Temp src Heart Rate Source Patient  Position BP Location FiO2 (%)   Tympanic Monitor -- -- --       Physical Exam    Physical Exam   Constitutional: No distress.  Uncomfortable appearing but not overtly toxic.  HENT:  Head: Normocephalic and atraumatic.   Oropharynx: Mucous membranes are moist.   Eyes: No scleral icterus. No conjunctival pallor.  Neck: Painless range of motion noted. Neck supple.   Cardiovascular: Normal rate, regular rhythm and intact distal pulses.  Pulmonary/Chest: No respiratory distress.  No tachypnea or increased work of breathing.  Abdominal: Soft. There is moderate diffuse tenderness. There is no rebound and no guarding.  No rigidity  Musculoskeletal: Moves all extremities equally. There is no pedal edema or calf tenderness.   Neurological: Alert.  Baseline strength and sensation noted.   Skin: Skin is pink, warm, and dry. No pallor.   Psychiatric: Mood and affect normal.   Nursing note and vitals reviewed.    LAB RESULTS  Recent Results (from the past 24 hour(s))   Comprehensive Metabolic Panel    Collection Time: 03/22/22  4:20 PM    Specimen: Blood   Result Value Ref Range    Glucose 110 (H) 65 - 99 mg/dL    BUN 5 (L) 6 - 20 mg/dL    Creatinine 1.00 0.57 - 1.00 mg/dL    Sodium 141 136 - 145 mmol/L    Potassium 3.0 (L) 3.5 - 5.2 mmol/L    Chloride 101 98 - 107 mmol/L    CO2 27.0 22.0 - 29.0 mmol/L    Calcium 9.1 8.6 - 10.5 mg/dL    Total Protein 7.3 6.0 - 8.5 g/dL    Albumin 4.30 3.50 - 5.20 g/dL    ALT (SGPT) 38 (H) 1 - 33 U/L    AST (SGOT) 29 1 - 32 U/L    Alkaline Phosphatase 93 39 - 117 U/L    Total Bilirubin 0.3 0.0 - 1.2 mg/dL    Globulin 3.0 gm/dL    A/G Ratio 1.4 g/dL    BUN/Creatinine Ratio 5.0 (L) 7.0 - 25.0    Anion Gap 13.0 5.0 - 15.0 mmol/L    eGFR 68.3 >60.0 mL/min/1.73   Lipase    Collection Time: 03/22/22  4:20 PM    Specimen: Blood   Result Value Ref Range    Lipase 20 13 - 60 U/L   hCG, Serum, Qualitative    Collection Time: 03/22/22  4:20 PM    Specimen: Blood   Result Value Ref Range    HCG Qualitative  Negative Negative   Urinalysis With Microscopic If Indicated (No Culture) - Urine, Clean Catch    Collection Time: 03/22/22  4:20 PM    Specimen: Urine, Clean Catch   Result Value Ref Range    Color, UA Dark Yellow (A) Yellow, Straw    Appearance, UA Turbid (A) Clear    pH, UA 5.5 5.0 - 8.0    Specific Gravity, UA >=1.030 1.005 - 1.030    Glucose, UA Negative Negative    Ketones, UA Trace (A) Negative    Bilirubin, UA Negative Negative    Blood, UA Negative Negative    Protein, UA 30 mg/dL (1+) (A) Negative    Leuk Esterase, UA Small (1+) (A) Negative    Nitrite, UA Negative Negative    Urobilinogen, UA 1.0 E.U./dL 0.2 - 1.0 E.U./dL   CBC Auto Differential    Collection Time: 03/22/22  4:20 PM    Specimen: Blood   Result Value Ref Range    WBC 9.67 3.40 - 10.80 10*3/mm3    RBC 4.29 3.77 - 5.28 10*6/mm3    Hemoglobin 12.6 12.0 - 15.9 g/dL    Hematocrit 37.3 34.0 - 46.6 %    MCV 86.9 79.0 - 97.0 fL    MCH 29.4 26.6 - 33.0 pg    MCHC 33.8 31.5 - 35.7 g/dL    RDW 13.3 12.3 - 15.4 %    RDW-SD 42.1 37.0 - 54.0 fl    MPV 10.3 6.0 - 12.0 fL    Platelets 224 140 - 450 10*3/mm3    Neutrophil % 77.3 (H) 42.7 - 76.0 %    Lymphocyte % 16.3 (L) 19.6 - 45.3 %    Monocyte % 4.8 (L) 5.0 - 12.0 %    Eosinophil % 1.1 0.3 - 6.2 %    Basophil % 0.2 0.0 - 1.5 %    Immature Grans % 0.3 0.0 - 0.5 %    Neutrophils, Absolute 7.47 (H) 1.70 - 7.00 10*3/mm3    Lymphocytes, Absolute 1.58 0.70 - 3.10 10*3/mm3    Monocytes, Absolute 0.46 0.10 - 0.90 10*3/mm3    Eosinophils, Absolute 0.11 0.00 - 0.40 10*3/mm3    Basophils, Absolute 0.02 0.00 - 0.20 10*3/mm3    Immature Grans, Absolute 0.03 0.00 - 0.05 10*3/mm3    nRBC 0.0 0.0 - 0.2 /100 WBC   Urinalysis, Microscopic Only - Urine, Clean Catch    Collection Time: 03/22/22  4:20 PM    Specimen: Urine, Clean Catch   Result Value Ref Range    RBC, UA Unable to determine due to loaded field (A) None Seen, 0-2 /HPF    WBC, UA Unable to determine due to loaded field (A) None Seen, 0-2 /HPF    Bacteria, UA  Unable to determine due to loaded field (A) None Seen /HPF    Squamous Epithelial Cells, UA Unable to determine due to loaded field (A) None Seen, 0-2 /HPF    Hyaline Casts, UA Unable to determine due to loaded field None Seen /LPF    Amorphous Crystals, UA Large/3+ None Seen /HPF    Methodology Manual Light Microscopy    Magnesium    Collection Time: 03/22/22  4:20 PM    Specimen: Blood   Result Value Ref Range    Magnesium 2.0 1.6 - 2.6 mg/dL   COVID-19,[facility] IN-HOUSE CEPHEID/LUDA NP SWAB IN TRANSPORT MEDIA 8-12 HR TAT - Swab, Nasopharynx    Collection Time: 03/22/22  4:21 PM    Specimen: Nasopharynx; Swab   Result Value Ref Range    COVID19 Not Detected Not Detected - Ref. Range       Ordered the above labs and independently reviewed the results.        RADIOLOGY  CT Abdomen Pelvis With Contrast    Result Date: 3/22/2022  CT ABDOMEN PELVIS W CONTRAST-  HISTORY:  Abdominal pain, history of Crohn's disease.  TECHNIQUE:  CT of the abdomen and pelvis was performed following the administration of intravenous contrast.  Radiation dose reduction techniques were utilized, including automated exposure control and exposure modulation based on body size.  COMPARISON:  CT abdomen and pelvis with contrast 05/20/2021  FINDINGS:  Heart size is normal. There is no pericardial effusion. A 1.5 cm solid left lower lobe nodule and a 0.5 cm left lower lobe nodule are not significantly changed dating back to 06/19/2020 but new from 2013. There is mild curvilinear left basilar atelectasis. Pleural spaces are clear. The liver is mildly enlarged measuring 17.7 cm in craniocaudal length, previously 17.5 cm when remeasured. No suspicious focal intrahepatic lesion is identified. The gallbladder is surgically absent. The spleen is normal in size. The pancreas is within normal limits. The adrenal glands are within normal limits. The kidneys are within normal limits. No pathologically enlarged abdominal or pelvic lymph nodes are identified.  The abdominal aorta is normal in caliber. There is a small hiatal hernia. There is no bowel obstruction. The appendix is present and normal in size. There is colonic diverticulosis without CT evidence of acute diverticulitis. There is submucosal fat deposition within the portions of the small bowel, suggestive of prior infection/inflammation. There is mild wall thickening and mucosal hyperenhancement of portions of the distal small bowel with trace adjacent mesenteric free fluid. The bladder is well distended and within normal limits. The uterus is present. There is no adnexal mass. There is a tiny fat-containing umbilical hernia. There are small degenerative endplate osteophytes at a few levels in the spine.        1.  Mild wall thickening and mucosal hyperenhancement of portions of the distal small bowel with mild adjacent fat stranding and trace free fluid, likely reactive. Findings can be seen with active inflammation of the patient's known Crohn's disease. 2.  Left lower lobe pulmonary nodules measuring up to 1.5 cm are not significantly changed dating back to 06/19/2020 but new from 2013. Recommend follow-up CT chest in 3-6 months.  Discussed with Dr. Doyle at 6:22 PM.  This report was finalized on 3/22/2022 6:23 PM by Dr. Barbara Rasmussen M.D.        I ordered the above noted radiological studies. Reviewed by me and discussed with radiologist.  See dictation for official radiology interpretation.      PROCEDURES    Procedures        MEDICATIONS GIVEN IN ER    Medications   sodium chloride 0.9 % infusion (125 mL/hr Intravenous New Bag 3/22/22 1726)   cefTRIAXone (ROCEPHIN) 1 g in sodium chloride 0.9 % 100 mL IVPB-VTB (1 g Intravenous New Bag 3/22/22 1927)   predniSONE (DELTASONE) tablet 60 mg (has no administration in time range)   HYDROcodone-acetaminophen (NORCO) 5-325 MG per tablet 2 tablet (has no administration in time range)   sodium chloride 0.9 % bolus 1,000 mL (0 mL Intravenous Stopped 3/22/22 1726)    famotidine (PEPCID) injection 20 mg (20 mg Intravenous Given 3/22/22 1626)   HYDROmorphone (DILAUDID) injection 1 mg (1 mg Intravenous Given 3/22/22 1627)   ondansetron (ZOFRAN) injection 4 mg (4 mg Intravenous Given 3/22/22 1625)   iopamidol (ISOVUE-300) 61 % injection 100 mL (95 mL Intravenous Given by Other 3/22/22 1752)   potassium chloride (K-DUR,KLOR-CON) ER tablet 40 mEq (40 mEq Oral Given 3/22/22 1926)         PROGRESS, DATA ANALYSIS, CONSULTS, AND MEDICAL DECISION MAKING    My differential diagnosis for abdominal pain includes but is not limited to:  Gastritis, gastroenteritis, peptic ulcer disease, GERD, esophageal perforation, acute appendicitis, mesenteric adenitis, Meckel’s diverticulum, epiploic appendagitis, diverticulitis, colon cancer, ulcerative colitis, Crohn’s disease, intussusception, small bowel obstruction, adhesions, ischemic bowel, perforated viscus, ileus, obstipation, biliary colic, cholecystitis, cholelithiasis, Malcolm-Corey Tyrone, hepatitis, pancreatitis, common bile duct obstruction, cholangitis, bile leak, splenic trauma, splenic rupture, splenic infarction, splenic abscess, abdominal abscess, ascites, spontaneous bacterial peritonitis, hernia, UTI, cystitis, prostatitis, ureterolithiasis, urinary obstruction, AAA, myocardial infarction, pneumonia, cancer, porphyria, DKA, medications, sickle cell, viral syndrome, zoster      All labs have been independently reviewed by me.  All radiology studies have been reviewed by me and discussed with radiologist dictating the report.   EKG's independently viewed and interpreted by me.  Discussion below represents my analysis of pertinent findings related to patient's condition, differential diagnosis, treatment plan and final disposition.      ED Course as of 03/22/22 1946   Tue Mar 22, 2022   1715 COVID19: Not Detected [RS]   1715 Lipase: 20 [RS]   1715 BUN(!): 5 [RS]   1715 Creatinine: 1.00 [RS]   1715 Sodium: 141 [RS]   1716 Potassium(!): 3.0  [RS]   1716 ALT (SGPT)(!): 38 [RS]   1716 AST (SGOT): 29 [RS]   1716 Total Bilirubin: 0.3 [RS]   1716 WBC: 9.67 [RS]   1716 Hemoglobin: 12.6 [RS]   1716 Nitrite, UA: Negative [RS]   1716 RBC, UA(!): Unable to determine due to loaded field [RS]   1716 WBC, UA(!): Unable to determine due to loaded field [RS]   1716 Bacteria, UA(!): Unable to determine due to loaded field [RS]   1716 Squamous Epithelial Cells, UA(!): Unable to determine due to loaded field [RS]   1819 HCG Qualitative: Negative [RS]   1856 SpO2(!): 84 %  Patient is not hypoxemic.  She is awake and talking to me and 95% on room air.  This is considered a recording/transcription error. [RS]   1857 Reviewed CT and lab results with the patient.  Plan antibiotics.  Her pain is much improved at this time.  Will discuss case with gastroenterology for disposition planning. [RS]   1926 CONSULT        Provider: Dr. Gomez-gastroenterology    Discussion: Reviewed patient history, ED presentation and evaluation including labs and CT result.  The patient is comfortable at this time it is reasonable to let her go home.  Taper course of prednisone over the next 2 weeks may help return her to normal.  Outpatient follow-up in the office within 3 weeks is recommended.    Agreeable c treatment and planned disposition.         [RS]   1933 Hero query complete. Treatment plan to include limited course of prescribed controlled substance.  Risks including addiction, tolerance, sedation, benefits and alternatives presented to patient. [RS]      ED Course User Index  [RS] Mumtaz Doyle MD       AS OF 19:46 EDT VITALS:    BP - 126/84  HR - 76  TEMP - 97.7 °F (36.5 °C) (Tympanic)  O2 SATS - (!) 84%        DIAGNOSIS  Final diagnoses:   Acute abdominal pain   Crohn's disease of colon without complication (HCC)   Acute UTI   Volume depletion, gastrointestinal loss   Hypokalemia         DISPOSITION  DISCHARGE    Patient discharged in stable condition.    Reviewed implications of  results, diagnosis, meds, responsibility to follow up, warning signs and symptoms of possible worsening, potential complications and reasons to return to ER.    Patient/Family voiced understanding of above instructions.    Discussed plan for discharge, as there is no emergent indication for admission. Patient referred to primary care provider for regular health maintenance. Pt/family is agreeable and understands need for follow up and possible repeat testing.  Pt is aware that discharge does not mean that nothing is wrong but it indicates no emergency is present that requires admission and they must continue care with follow-up as given below or physician of their choice.     FOLLOW-UP  Kathie Romeo DO  [address]  [address]  [facility] [address] [phone]    Schedule an appointment as soon as possible for a visit   As needed    Maicol Garza MD  [address]  [facility] [address] [phone]    Schedule an appointment as soon as possible for a visit in 3 weeks           Medication List      New Prescriptions    cefuroxime 500 MG tablet  Commonly known as: CEFTIN  Take 1 tablet by mouth 2 (Two) Times a Day.     HYDROcodone-acetaminophen 5-325 MG per tablet  Commonly known as: NORCO  Take 1 tab by mouth every 6 hours as needed for pain     predniSONE 10 MG tablet  Commonly known as: DELTASONE  40 mg daily 5 days, then 30 mg daily 5 days, then 20 mg daily 5 days, then 10 mg daily 5 days        Changed    * fluconazole 150 MG tablet  Commonly known as: DIFLUCAN  Please take 1 tab po, repeat dose in 48 hours  What changed: Another medication with the same name was added. Make sure you understand how and when to take each.     * fluconazole 150 MG tablet  Commonly known as: DIFLUCAN  Take 1 tablet by mouth once; may repeat in 1 week if needed  What changed: You were already taking a medication with the same name, and this prescription was added. Make sure you understand how and  when to take each.         * This list has 2 medication(s) that are the same as other medications prescribed for you. Read the directions carefully, and ask your doctor or other care provider to review them with you.               Where to Get Your Medications      These medications were sent to [id number] - [place], [place] - [address] [address] [facility] [address] AT [facility] [address] - [phone]  - [phone]   [address] [facility] [address]    Phone: [phone]   · cefuroxime 500 MG tablet  · fluconazole 150 MG tablet  · HYDROcodone-acetaminophen 5-325 MG per tablet  · predniSONE 10 MG tablet            Mumtaz Doyle MD  03/22/22 7518

## 2022-03-24 DIAGNOSIS — G43.109 MIGRAINE WITH AURA AND WITHOUT STATUS MIGRAINOSUS, NOT INTRACTABLE: ICD-10-CM

## 2022-03-24 DIAGNOSIS — K21.9 GASTROESOPHAGEAL REFLUX DISEASE: ICD-10-CM

## 2022-03-24 DIAGNOSIS — F43.22 ADJUSTMENT DISORDER WITH ANXIOUS MOOD: ICD-10-CM

## 2022-03-24 DIAGNOSIS — E06.3 HYPOTHYROIDISM DUE TO HASHIMOTO'S THYROIDITIS: ICD-10-CM

## 2022-03-24 DIAGNOSIS — E03.8 HYPOTHYROIDISM DUE TO HASHIMOTO'S THYROIDITIS: ICD-10-CM

## 2022-03-24 DIAGNOSIS — J30.1 SEASONAL ALLERGIC RHINITIS DUE TO POLLEN: ICD-10-CM

## 2022-03-24 RX ORDER — ESCITALOPRAM OXALATE 10 MG/1
10 TABLET ORAL DAILY
Qty: 90 TABLET | Refills: 0 | Status: SHIPPED | OUTPATIENT
Start: 2022-03-24 | End: 2022-06-03 | Stop reason: SDUPTHER

## 2022-03-24 RX ORDER — HYDROXYZINE HYDROCHLORIDE 25 MG/1
25 TABLET, FILM COATED ORAL EVERY 8 HOURS PRN
Qty: 90 TABLET | Refills: 0 | Status: SHIPPED | OUTPATIENT
Start: 2022-03-24 | End: 2022-10-25 | Stop reason: SDUPTHER

## 2022-03-24 RX ORDER — FLUTICASONE PROPIONATE 50 MCG
2 SPRAY, SUSPENSION (ML) NASAL DAILY
Qty: 16 G | Refills: 2 | Status: SHIPPED | OUTPATIENT
Start: 2022-03-24 | End: 2022-10-25 | Stop reason: SDUPTHER

## 2022-03-24 RX ORDER — PANTOPRAZOLE SODIUM 40 MG/1
40 TABLET, DELAYED RELEASE ORAL DAILY
Qty: 90 TABLET | Refills: 0 | Status: SHIPPED | OUTPATIENT
Start: 2022-03-24 | End: 2022-06-03 | Stop reason: SDUPTHER

## 2022-03-24 RX ORDER — LEVOTHYROXINE SODIUM 88 UG/1
88 TABLET ORAL DAILY
Qty: 90 TABLET | Refills: 0 | Status: SHIPPED | OUTPATIENT
Start: 2022-03-24 | End: 2022-03-31 | Stop reason: SDUPTHER

## 2022-03-24 RX ORDER — BUTALBITAL, ACETAMINOPHEN AND CAFFEINE 50; 325; 40 MG/1; MG/1; MG/1
1 TABLET ORAL EVERY 6 HOURS PRN
Qty: 180 TABLET | Refills: 0 | Status: SHIPPED | OUTPATIENT
Start: 2022-03-24 | End: 2022-06-03 | Stop reason: SDUPTHER

## 2022-03-24 RX ORDER — LEVOCETIRIZINE DIHYDROCHLORIDE 5 MG/1
5 TABLET, FILM COATED ORAL EVERY EVENING
Qty: 90 TABLET | Refills: 0 | Status: SHIPPED | OUTPATIENT
Start: 2022-03-24 | End: 2022-06-03 | Stop reason: SDUPTHER

## 2022-03-25 DIAGNOSIS — I10 ESSENTIAL HYPERTENSION: ICD-10-CM

## 2022-03-25 RX ORDER — LISINOPRIL AND HYDROCHLOROTHIAZIDE 12.5; 1 MG/1; MG/1
1 TABLET ORAL DAILY
Qty: 90 TABLET | Refills: 0 | Status: CANCELLED | OUTPATIENT
Start: 2022-03-25

## 2022-03-27 DIAGNOSIS — I10 ESSENTIAL HYPERTENSION: ICD-10-CM

## 2022-03-28 ENCOUNTER — OFFICE VISIT (OUTPATIENT)
Dept: GASTROENTEROLOGY | Facility: CLINIC | Age: 52
End: 2022-03-28

## 2022-03-28 VITALS — BODY MASS INDEX: 39.85 KG/M2 | TEMPERATURE: 96 F | WEIGHT: 239.2 LBS | HEIGHT: 65 IN

## 2022-03-28 DIAGNOSIS — R19.7 DIARRHEA, UNSPECIFIED TYPE: ICD-10-CM

## 2022-03-28 DIAGNOSIS — R10.84 DIFFUSE ABDOMINAL PAIN: ICD-10-CM

## 2022-03-28 DIAGNOSIS — K50.00 CROHN'S ILEITIS, WITHOUT COMPLICATIONS: Primary | ICD-10-CM

## 2022-03-28 DIAGNOSIS — R11.2 NAUSEA AND VOMITING, INTRACTABILITY OF VOMITING NOT SPECIFIED, UNSPECIFIED VOMITING TYPE: ICD-10-CM

## 2022-03-28 PROCEDURE — 99214 OFFICE O/P EST MOD 30 MIN: CPT | Performed by: PHYSICIAN ASSISTANT

## 2022-03-28 RX ORDER — LISINOPRIL AND HYDROCHLOROTHIAZIDE 12.5; 1 MG/1; MG/1
1 TABLET ORAL DAILY
Qty: 90 TABLET | Refills: 0 | Status: SHIPPED | OUTPATIENT
Start: 2022-03-28 | End: 2022-06-03 | Stop reason: SDUPTHER

## 2022-03-30 ENCOUNTER — OFFICE VISIT (OUTPATIENT)
Dept: FAMILY MEDICINE CLINIC | Facility: CLINIC | Age: 52
End: 2022-03-30

## 2022-03-30 VITALS
DIASTOLIC BLOOD PRESSURE: 62 MMHG | SYSTOLIC BLOOD PRESSURE: 104 MMHG | HEART RATE: 85 BPM | OXYGEN SATURATION: 99 % | WEIGHT: 238 LBS | TEMPERATURE: 97.8 F | BODY MASS INDEX: 40.63 KG/M2 | HEIGHT: 64 IN

## 2022-03-30 DIAGNOSIS — F43.22 ADJUSTMENT DISORDER WITH ANXIOUS MOOD: ICD-10-CM

## 2022-03-30 DIAGNOSIS — E06.3 HYPOTHYROIDISM DUE TO HASHIMOTO'S THYROIDITIS: ICD-10-CM

## 2022-03-30 DIAGNOSIS — E03.8 HYPOTHYROIDISM DUE TO HASHIMOTO'S THYROIDITIS: ICD-10-CM

## 2022-03-30 DIAGNOSIS — K21.9 GASTROESOPHAGEAL REFLUX DISEASE WITHOUT ESOPHAGITIS: ICD-10-CM

## 2022-03-30 DIAGNOSIS — G43.109 MIGRAINE WITH AURA AND WITHOUT STATUS MIGRAINOSUS, NOT INTRACTABLE: ICD-10-CM

## 2022-03-30 DIAGNOSIS — J30.1 SEASONAL ALLERGIC RHINITIS DUE TO POLLEN: ICD-10-CM

## 2022-03-30 DIAGNOSIS — Z79.899 ENCOUNTER FOR LONG-TERM (CURRENT) USE OF MEDICATIONS: ICD-10-CM

## 2022-03-30 DIAGNOSIS — I10 ESSENTIAL HYPERTENSION: Primary | ICD-10-CM

## 2022-03-30 PROCEDURE — 99214 OFFICE O/P EST MOD 30 MIN: CPT | Performed by: FAMILY MEDICINE

## 2022-03-30 RX ORDER — PREDNISONE 10 MG/1
TABLET ORAL
Qty: 50 TABLET | Refills: 0
Start: 2022-03-30 | End: 2022-04-08 | Stop reason: SDUPTHER

## 2022-03-30 NOTE — PROGRESS NOTES
"Chief Complaint  Hypothyroidism    Subjective          Rosa Melgoza presents to [facility]  Patient is here to follow-up on her chronic health conditions:    Migraine with aura.  Patient states that overall the Aimovig has reduced the number of migraines a month but she still is suffering with pretty severe migraine headaches.  She also takes Reyvow and it helps but she is not allowed to drive within 12 hours after taking it. She takes Fioricet as needed. Hero is consistent.  Patient did not keep appointment with neurology after being referred last year.    GERD.  The patient takes pantoprazole 40 mg daily.  She denies any symptoms of reflux currently.  She denies any side effects of medication.    Hypothyroidism.  The patient currently takes levothyroxine 88 mcg daily.  Patient feels her thyroid is in range.  No side effects of medication.    Hypertension.  The patient takes lisinopril/ hydrochlorothiazide 10/12.5 for blood pressure.  She denies any side effects and states that her blood pressures have been good.    Anxiety and depression.  The patient takes Lexapro 10 mg daily.  Doing well.      Of note the patient currently is under the care of GI for a recent flare of Crohn's.  She is taking prednisone.      Objective   Vital Signs:   /62   Pulse 85   Temp 97.8 °F (36.6 °C)   Ht 162.6 cm (64\")   Wt 108 kg (238 lb)   SpO2 99%   BMI 40.85 kg/m²     BMI is above normal parameters. Recommendations: exercise counseling/recommendations and nutrition counseling/recommendations       Physical Exam  Vitals and nursing note reviewed.   Constitutional:       Appearance: Normal appearance. She is well-developed. She is obese.   HENT:      Head: Normocephalic and atraumatic.      Right Ear: External ear normal.      Left Ear: External ear normal.      Nose: Nose normal.   Eyes:      General: No scleral icterus.     Conjunctiva/sclera: Conjunctivae normal.   Cardiovascular:      " Rate and Rhythm: Normal rate and regular rhythm.      Heart sounds: Normal heart sounds.   Pulmonary:      Effort: Pulmonary effort is normal.      Breath sounds: Normal breath sounds.   Musculoskeletal:      Cervical back: Normal range of motion and neck supple.      Right lower leg: No edema.      Left lower leg: No edema.   Lymphadenopathy:      Cervical: No cervical adenopathy.   Skin:     General: Skin is warm and dry.      Findings: No rash.   Neurological:      Mental Status: She is alert and oriented to person, place, and time.   Psychiatric:         Mood and Affect: Mood normal.         Behavior: Behavior normal.         Thought Content: Thought content normal.         Judgment: Judgment normal.        Result Review :   The following data was reviewed by: Kathie Romeo DO on 03/30/2022:  Common labs    Common Labsle 4/14/21 4/14/21 5/20/21 3/22/22 3/22/22    1010 1010  1620 1620   Glucose  71   110 (A)   BUN  10   5 (A)   Creatinine  0.79 1.00  1.00   eGFR Non  Am  77      eGFR African Am  93      Sodium  138   141   Potassium  3.4 (A)   3.0 (A)   Chloride  98   101   Calcium  8.8   9.1   Total Protein  6.3      Albumin  3.80   4.30   Total Bilirubin  <0.2   0.3   Alkaline Phosphatase  70   93   AST (SGOT)  15   29   ALT (SGPT)  23   38 (A)   WBC 8.30   9.67    Hemoglobin 11.9 (A)   12.6    Hematocrit 33.7 (A)   37.3    Platelets 247   224    (A) Abnormal value       Comments are available for some flowsheets but are not being displayed.           TSH    TSH 4/14/21   TSH 2.740                     Assessment and Plan    Diagnoses and all orders for this visit:    1. Essential hypertension (Primary)  -     Comprehensive Metabolic Panel    2. Adjustment disorder with anxious mood    3. Gastroesophageal reflux disease without esophagitis    4. Seasonal allergic rhinitis due to pollen    5. Hypothyroidism due to Hashimoto's thyroiditis  -     TSH    6. Migraine with aura and without status  migrainosus, not intractable  -     Ambulatory Referral to Neurology    7. Encounter for long-term (current) use of medications  -     TSH  -     Comprehensive Metabolic Panel    Other orders  -     predniSONE (DELTASONE) 10 MG tablet; 40 mg daily 5 days, then 30 mg daily 5 days, then 20 mg daily 5 days, then 10 mg daily 5 days  Dispense: 50 tablet; Refill: 0    Patient is here today for chronic stable adjustment disorder, GERD, hypothyroidism, and hypertension.  Surveillance labs were obtained today and any medication changes will be made based on lab results and will be called to the patient later this week.    She is also here to follow-up on worsening migraine headaches.  I have again asked the patient to see neurology.  I have tried everything I feel comfortable with in the way of prevention and acute management of migraines.  Patient was encouraged to keep appointment this time.  Continue current management until she can be seen.      Follow Up   Return in about 6 months (around 9/30/2022) for Annual physical, HTN, Thyroid.  Patient was given instructions and counseling regarding her condition or for health maintenance advice. Please see specific information pulled into the AVS if appropriate.       Answers for HPI/ROS submitted by the patient on 3/24/2022  Please describe your symptoms.: Need thyroid labs drawn. Was recently seen in ER for crohns flare, follow up with GI on 3/28.  Have you had these symptoms before?: Yes  How long have you been having these symptoms?: Greater than 2 weeks  Please list any medications you are currently taking for this condition.: levothyroxine for thyroid. , for crohns- pentasa, added prednisone and pain meds for flare.  Also have uti and Im on abx for that as well  Please describe any probable cause for these symptoms. : chronic illness  What is the primary reason for your visit?: Other

## 2022-03-31 DIAGNOSIS — E03.8 HYPOTHYROIDISM DUE TO HASHIMOTO'S THYROIDITIS: ICD-10-CM

## 2022-03-31 DIAGNOSIS — E06.3 HYPOTHYROIDISM DUE TO HASHIMOTO'S THYROIDITIS: ICD-10-CM

## 2022-03-31 LAB
ALBUMIN SERPL-MCNC: 4.1 G/DL (ref 3.8–4.9)
ALBUMIN/GLOB SERPL: 1.5 {RATIO} (ref 1.2–2.2)
ALP SERPL-CCNC: 90 IU/L (ref 44–121)
ALT SERPL-CCNC: 31 IU/L (ref 0–32)
AST SERPL-CCNC: 9 IU/L (ref 0–40)
BILIRUB SERPL-MCNC: <0.2 MG/DL (ref 0–1.2)
BUN SERPL-MCNC: 10 MG/DL (ref 6–24)
BUN/CREAT SERPL: 12 (ref 9–23)
CALCIUM SERPL-MCNC: 9.4 MG/DL (ref 8.7–10.2)
CHLORIDE SERPL-SCNC: 98 MMOL/L (ref 96–106)
CO2 SERPL-SCNC: 29 MMOL/L (ref 20–29)
CREAT SERPL-MCNC: 0.82 MG/DL (ref 0.57–1)
EGFRCR SERPLBLD CKD-EPI 2021: 87 ML/MIN/1.73
GLOBULIN SER CALC-MCNC: 2.7 G/DL (ref 1.5–4.5)
GLUCOSE SERPL-MCNC: 84 MG/DL (ref 65–99)
POTASSIUM SERPL-SCNC: 4 MMOL/L (ref 3.5–5.2)
PROT SERPL-MCNC: 6.8 G/DL (ref 6–8.5)
SODIUM SERPL-SCNC: 142 MMOL/L (ref 134–144)
TSH SERPL DL<=0.005 MIU/L-ACNC: 1.62 UIU/ML (ref 0.45–4.5)

## 2022-03-31 RX ORDER — LEVOTHYROXINE SODIUM 88 UG/1
88 TABLET ORAL DAILY
Qty: 90 TABLET | Refills: 1 | Status: SHIPPED | OUTPATIENT
Start: 2022-03-31 | End: 2023-01-12 | Stop reason: SDUPTHER

## 2022-04-08 RX ORDER — PREDNISONE 10 MG/1
10 TABLET ORAL DAILY
Qty: 30 TABLET | Refills: 0
Start: 2022-04-08 | End: 2022-04-08 | Stop reason: SDUPTHER

## 2022-04-11 ENCOUNTER — TELEPHONE (OUTPATIENT)
Dept: GASTROENTEROLOGY | Facility: CLINIC | Age: 52
End: 2022-04-11

## 2022-04-11 DIAGNOSIS — K50.00 CROHN'S ILEITIS, WITHOUT COMPLICATIONS: Primary | ICD-10-CM

## 2022-04-11 DIAGNOSIS — Z79.899 HIGH RISK MEDICATION USE: ICD-10-CM

## 2022-04-11 RX ORDER — PREDNISONE 10 MG/1
10 TABLET ORAL DAILY
Qty: 30 TABLET | Refills: 0
Start: 2022-04-11 | End: 2022-04-12 | Stop reason: SDUPTHER

## 2022-04-11 NOTE — TELEPHONE ENCOUNTER
----- Message from Maicol Garza MD sent at 4/10/2022  3:28 PM EDT -----  Agree. I would get her prepared for Entyvio, would check Hep B serology and T Spot.        ----- Message -----  From: Manda Butler PA-C  Sent: 4/2/2022   7:19 PM EDT  To: Maicol Garza MD    50 y/o female with dx of Crohn's enteritis dx via CT scans and IBD diagnostic testing in [id number]. Colonoscopy was unremarkable but with multi CT scan that showed enteritis. She was placed on Pentasa but ended up with flare recently that sent her to ED. Currently on Prednisone (first time). Previously flare treated with Flagyl. I think she is going to need biologics which we discussed. Told her I would review with you to see what you thought. I did order stool studies given diarrhea (not done at ED). She is doing better on Prednisone currently.

## 2022-04-11 NOTE — TELEPHONE ENCOUNTER
Will proceed with getting Entyvio approved once patient has had her labs drawn and resulted. We need the results before we can initiate PA

## 2022-04-11 NOTE — TELEPHONE ENCOUNTER
I put in an order for the hepatitis B profile and TB Gold.  I left message on patient's voicemail to call us back for the plan which will be to work on approval for Entyvio infusions.  These will be every 8-week infusions.  She will need to have loading doses initially more frequently.  I did have a long conversation with her in the office regarding Biologics, side effects, risks versus benefits as well as different ones.  If she is okay with the Entyvio, let me know and we will send message to Marissa to get the prior Auth.

## 2022-04-11 NOTE — TELEPHONE ENCOUNTER
I talked to patient and gave her recommendations with Dr. Garza to start Entyvio infusions.  She is agreeable to this.  We will send this message to Marissa Butler MA to get the process started.

## 2022-04-11 NOTE — TELEPHONE ENCOUNTER
----- Message from Judy Ruiz RN sent at 4/11/2022 11:58 AM EDT -----  Regarding: FW: phone call    ----- Message -----  From: Rosa Melgoza  Sent: 4/11/2022  11:16 AM EDT  To: Raheem [facility]  Subject: phone call                                       I ttied calling you back a couple of times, but it just rings and rings, so Im guessing yall are having a super busy Monday, can you try to call me back later when you have a minute. Also, the pharmacy hasnt filled the prednisone so I'm thinking its stuck out in escribe space.     thanks, Rosa

## 2022-04-12 ENCOUNTER — TRANSCRIBE ORDERS (OUTPATIENT)
Dept: ADMINISTRATIVE | Facility: HOSPITAL | Age: 52
End: 2022-04-12

## 2022-04-12 DIAGNOSIS — Z12.39 SCREENING BREAST EXAMINATION: Primary | ICD-10-CM

## 2022-04-12 RX ORDER — PREDNISONE 10 MG/1
10 TABLET ORAL DAILY
Qty: 30 TABLET | Refills: 0 | Status: SHIPPED | OUTPATIENT
Start: 2022-04-12 | End: 2022-04-30 | Stop reason: SDUPTHER

## 2022-04-12 RX ORDER — PREDNISONE 10 MG/1
10 TABLET ORAL DAILY
Qty: 30 TABLET | Refills: 0
Start: 2022-04-12 | End: 2022-05-12

## 2022-04-12 NOTE — TELEPHONE ENCOUNTER
----- Message from Judy Ruiz RN sent at 4/12/2022  4:14 PM EDT -----  Regarding: FW: phone call    ----- Message -----  From: Rosa Melgoza  Sent: 4/12/2022   3:45 PM EDT  To: Raheem [facility]  Subject: phone call                                       yes I will.do labs at hosp on Wed.   They are sending the prednisone script back, not sure why but they said they caht fill as it is.  They are a hot mess.   Thanks, Rosa

## 2022-04-12 NOTE — TELEPHONE ENCOUNTER
I sent the prednisone prescription again.  We might need to call the pharmacy to see what is going on if this keeps getting kicked back.

## 2022-04-13 ENCOUNTER — TELEPHONE (OUTPATIENT)
Dept: GASTROENTEROLOGY | Facility: CLINIC | Age: 52
End: 2022-04-13

## 2022-04-13 ENCOUNTER — PATIENT MESSAGE (OUTPATIENT)
Dept: FAMILY MEDICINE CLINIC | Facility: CLINIC | Age: 52
End: 2022-04-13

## 2022-04-13 ENCOUNTER — LAB (OUTPATIENT)
Dept: LAB | Facility: HOSPITAL | Age: 52
End: 2022-04-13

## 2022-04-13 PROCEDURE — 86705 HEP B CORE ANTIBODY IGM: CPT | Performed by: PHYSICIAN ASSISTANT

## 2022-04-13 PROCEDURE — 86707 HEPATITIS BE ANTIBODY: CPT | Performed by: PHYSICIAN ASSISTANT

## 2022-04-13 PROCEDURE — 86704 HEP B CORE ANTIBODY TOTAL: CPT | Performed by: PHYSICIAN ASSISTANT

## 2022-04-13 PROCEDURE — 86480 TB TEST CELL IMMUN MEASURE: CPT | Performed by: PHYSICIAN ASSISTANT

## 2022-04-13 PROCEDURE — 86706 HEP B SURFACE ANTIBODY: CPT | Performed by: PHYSICIAN ASSISTANT

## 2022-04-13 PROCEDURE — 87340 HEPATITIS B SURFACE AG IA: CPT | Performed by: PHYSICIAN ASSISTANT

## 2022-04-13 PROCEDURE — 87350 HEPATITIS BE AG IA: CPT | Performed by: PHYSICIAN ASSISTANT

## 2022-04-13 NOTE — TELEPHONE ENCOUNTER
Waiting for patient to complete labs before moving forward with [employer] start PA. Patient is also checking with her PCP about Hep B immunization since her last lab result in 2020 showed that she was not immune. Once labs are completed and resulted I will start the PA. Paperwork is all filled out and ready to be submitted for the Entyvio start PA just waiting for the lab results.

## 2022-04-13 NOTE — TELEPHONE ENCOUNTER
From: Rosa Melgoza  To: Kathie Romeo, DO  Sent: 4/13/2022 7:45 AM EDT  Subject: immunizations     0The GI  needs me to get some additional immunizations since I will start entyvio soon, and I wanted to check to see if you all do them there or if I need to go to [facility] or someplace. I need the following:    pneumonia vax  hep A  hep B  shingles 2 dose  kelley godwin, Rosa

## 2022-04-15 ENCOUNTER — CLINICAL SUPPORT (OUTPATIENT)
Dept: FAMILY MEDICINE CLINIC | Facility: CLINIC | Age: 52
End: 2022-04-15

## 2022-04-15 VITALS — TEMPERATURE: 98.6 F

## 2022-04-15 DIAGNOSIS — Z23 NEED FOR HEPATITIS A VACCINATION: ICD-10-CM

## 2022-04-15 DIAGNOSIS — Z23 NEED FOR PNEUMOCOCCAL VACCINE: Primary | ICD-10-CM

## 2022-04-15 DIAGNOSIS — B18.1 HEPATITIS B, CHRONIC: Primary | ICD-10-CM

## 2022-04-15 DIAGNOSIS — Z23 NEED FOR TDAP VACCINATION: ICD-10-CM

## 2022-04-15 DIAGNOSIS — Z23 NEED FOR HEPATITIS B VACCINATION: ICD-10-CM

## 2022-04-15 PROCEDURE — 90715 TDAP VACCINE 7 YRS/> IM: CPT | Performed by: NURSE PRACTITIONER

## 2022-04-15 PROCEDURE — 90746 HEPB VACCINE 3 DOSE ADULT IM: CPT | Performed by: NURSE PRACTITIONER

## 2022-04-15 PROCEDURE — 90472 IMMUNIZATION ADMIN EACH ADD: CPT | Performed by: NURSE PRACTITIONER

## 2022-04-15 PROCEDURE — 90471 IMMUNIZATION ADMIN: CPT | Performed by: NURSE PRACTITIONER

## 2022-04-15 PROCEDURE — 90632 HEPA VACCINE ADULT IM: CPT | Performed by: NURSE PRACTITIONER

## 2022-04-15 NOTE — TELEPHONE ENCOUNTER
Still waiting on TB Gold results. Hep profile finalized. Waiting for TB results and for the provider to review the patient Hep B labs before moving forward with Mena MARTIN.

## 2022-04-18 ENCOUNTER — LAB (OUTPATIENT)
Dept: LAB | Facility: HOSPITAL | Age: 52
End: 2022-04-18

## 2022-04-18 PROCEDURE — 87517 HEPATITIS B DNA QUANT: CPT | Performed by: PHYSICIAN ASSISTANT

## 2022-04-18 PROCEDURE — 86707 HEPATITIS BE ANTIBODY: CPT | Performed by: PHYSICIAN ASSISTANT

## 2022-04-18 NOTE — TELEPHONE ENCOUNTER
TB Gold results are in the chart but I am now waiting for additional Hep labs to be drawn and resulted that Manda MARTIN ordered on the patient on 4/16/2022

## 2022-04-19 NOTE — TELEPHONE ENCOUNTER
Spoke with Manda MARTIN this morning 4/19/22 about this patient and starting Entyvio. Per Manda MARTIN we need to wait until patient lab results come back and she is cleared to start Entyvio. Manda Butler will let me know when we need to submit her information for an Entyvio start PA. I will hold off on starting this prior authorization process until Manda MARTIN authorizes to move forward.

## 2022-04-20 NOTE — TELEPHONE ENCOUNTER
Spoke with patient regarding FMLA needs.  Patient stated when she is out of work up to 1 week when she has a flare-up.  Advised patient FMLA will be filled out for 1 episode monthly, and lasting 1-4 days.  Patient voiced understanding.  Advised patient to keep in contact with our office if she has worsening symptoms, so we have documentation to support increasing FMLA if needed.  Patient voiced understanding.

## 2022-04-20 NOTE — TELEPHONE ENCOUNTER
FMLA faxed to [employer] [phone]    Patient notified FMLA was faxed.   Worker's Compensation Initial Visit     Employer: [employer] ( [employer])  Date of Injury: 12/11/2017  Date of Visit: 12/14/2017    CC:   Chief Complaint   Patient presents with   • Worker's Compensation     WRI PINKY/RING FING LAC/[employer] DOI 12/11/17         HPI: Jeff Hoffman is a 21 year old male, who presents with a complaint of left hand laceration that occurred while he was at work.     He states that while he was at work on 12/11/2017, he was opening a can of pizza sauce when he got cut across the fourth and fifth fingers. Patient states that he was working in the [employer] when he opened up a new can of pizza sauce. Patient states that he went to gently push down on the lid of the saw when it cut his left hand on the fourth and fifth fingers. Patient was seen in the urgent care and for sutures were placed into the left fifth finger just distal to the metacarpophalangeal joint on the palmar surface. 2 sutures were placed on the left fourth finger at the same location. Patient's tetanus was in 2009 study did not update patient's tetanus. Patient states that he has been working without restrictions and notes some increased discomfort to the left hand. Patient denies any fevers or chills. Patient denies any injuries elsewhere. Patient has been keeping the wound clean, dry, and covered. He does note some irritation to the left fifth finger with removal of his gloves. Patient denies any other concerns.    He denies any other precipitating event or activity.  He has no history of previous problems with this part of the body.  He localizes the pain to the left fourth and fifth fingers with no radiation.  The severity of the pain is 2-3 out of 10 and it has been increasing.  The pain is exacerbated by movement and palpation of the left fifth finger.  The pain is relieved by nothing.    Denies back pain or joint pain Denies headache, focal weakness or sensory changes Denies fever or chills   The patient  denies  leg symptoms such as leg pain, paresthesias or weakness.   The patient denies any loss of bowel or bladder control.    ROS: A six point review of system was performed including general constitutional, ENT, pulmonary, GI, musculoskeletal and dermatological symptoms.  Findings that are pertinent to this evaluaton are included in the HPI     MEDS: The patient's medication list is reviewed in the electronic record.  ALLERGY: The patient's allergies are reviewed in the electronic record.    PMH: Past medical history is reviewed and findings pertinent to the injury are included in the HPI.  PSH: Past surgical history is reviewed and findings pertinent to the injury are included in the HPI  Smoking HIstory:  reports that he has been smoking.  He has been smoking about 0.25 packs per day. He has never used smokeless tobacco.    PE: The patient is a well developed male, who appears in no acute distress.    Visit Vitals  /64   Pulse 84   Resp 16   Ht 5' 9\" (1.753 m)   Wt 83.9 kg   BMI 27.32 kg/m²      Extremities:   Patient has full active and passive range of motion noted to the left hand and fingers and all directions. Moderate swelling and erythema is noted to the left fifth finger at the laceration site. Moderate tenderness to palpation is also noted to this area. No exudates are noted with palpation of the wound. Strength and sensation does appear to be intact and equal bilaterally. The laceration to the left fourth finger does appear to be healing well with no significant swelling or erythema. Distal pulses are intact and cap refill is less than 2 seconds.    DIAGNOSTICS:  None currently    ASSESSMENT:  1. Laceration of finger of left hand without foreign body without damage to nail, unspecified finger, initial encounter    2. Wound infection        This injury is determined to be work-related.    Anticipated maximum medical improvement: 2-3 week    PLAN:  Return to work: with restrictions  Restrictions  are to be followed at work and at home.  Restrictions are in effect until next follow-up visit.  Keep the wound clean, dry, and covered while working.  Avoid a tight gripping and grasping activities with the left hand.    TREATMENT PLAN:  Medications for this injury/condition:   Keflex antibiotic 1 tablet 4 times daily for the next 10 days. Tylenol or ibuprofen as needed for increased discomfort  Referral/Consult:  Diagnostic Testing:   None         Instructions:   Watch closely for infection to increase. Should you develop any significant swelling or redness that may extend to the finger or up the arm please go directly to the emergency room or return to our clinic for additional antibiotic treatment. Return to the clinic should your symptoms worsen, change, or should you have any other concerns. Keep the wound clean, dry, and covered while working. Change gloves frequently as hands do sweat.    NEXT RETURN VISIT: Monday December 18th at 10:45 AM    Thank you for the privilege of providing medical care for this injury/condition.  If there are any questions, please call the occupational health clinic at Dept: [phone].    Electronically signed on 12/14/2017 at 1:47 PM by:   Rosamaria Cleveland PA-C   [facility]    The physician below agrees with the plan and restrictions placed on the patient by the provider above.  Andrew Celeste MD          The patient's employer was notified.

## 2022-04-22 ENCOUNTER — HOSPITAL ENCOUNTER (OUTPATIENT)
Dept: MAMMOGRAPHY | Facility: HOSPITAL | Age: 52
Discharge: HOME | End: 2022-04-22
Admitting: FAMILY MEDICINE

## 2022-04-22 DIAGNOSIS — Z12.39 SCREENING BREAST EXAMINATION: ICD-10-CM

## 2022-04-22 PROCEDURE — 77063 BREAST TOMOSYNTHESIS BI: CPT

## 2022-04-22 PROCEDURE — 77067 SCR MAMMO BI INCL CAD: CPT

## 2022-04-26 ENCOUNTER — TELEPHONE (OUTPATIENT)
Dept: GASTROENTEROLOGY | Facility: CLINIC | Age: 52
End: 2022-04-26

## 2022-04-26 ENCOUNTER — PATIENT MESSAGE (OUTPATIENT)
Dept: FAMILY MEDICINE CLINIC | Facility: CLINIC | Age: 52
End: 2022-04-26

## 2022-04-26 DIAGNOSIS — Z79.899 HIGH RISK MEDICATION USE: Primary | ICD-10-CM

## 2022-04-26 DIAGNOSIS — R91.1 PULMONARY NODULE, LEFT: Primary | ICD-10-CM

## 2022-04-26 NOTE — TELEPHONE ENCOUNTER
Thank you for following up on this.  Received message back from Dr. Garza who stated that it was okay to start Entyvio.  I asked the nurses on separate message to call her and let her know.  We will recheck hep B DNA in 8 weeks just to make sure.  It appears she has natural immunity and not chronic hepatitis B.  please start prior Auth for this.

## 2022-04-26 NOTE — TELEPHONE ENCOUNTER
Message sent to Manda MARTIN to see if there has been any updates/approval with moving forward with the Entyvio start PA.

## 2022-04-26 NOTE — TELEPHONE ENCOUNTER
Please let patient know Dr. Garza's okay with proceeding with Entyvio as she appears to have natural immunity to the hepatitis B virus.  He does recommend rechecking hepatitis B DNA lab every 8 weeks after initiation of Entyvio to monitor for any evidence of reactivation.  However he feels is unlikely.  I will put in lab order for this.

## 2022-04-26 NOTE — TELEPHONE ENCOUNTER
From: Rosa Melgoza  To: Kathie Romeo,   Sent: 4/26/2022 4:06 PM EDT  Subject: Chest CT    I missee my chest ct last time you ordered it, maybe covid or something going on at the time. Just wondering if you can reorder it so I can get it over with since I am having everything else checked for entyvio.     thanks, Rosa

## 2022-04-26 NOTE — TELEPHONE ENCOUNTER
----- Message from Maicol Garza MD sent at 4/22/2022  6:51 AM EDT -----  I think its ok to start Entyvio.  I suspect she has natural immunity, not sure why her HBcAB IgM is positive but looks like it was positive 2 years ago as well.  I would check HBV DNA every 8 weeks after initiation of Entyvio just to monitor for any evidence of reactivation but I think this is very unlikely.  Thanks      ----- Message -----  From: Manda Butler PA-C  Sent: 4/20/2022   6:30 PM EDT  To: Maicol Garza MD    This is a Crohn's patient of yours that was not responding to Pentasa and I saw after ED visit on prednisone.  We were going to start her on Entyvio but her hep B labs came back abnormal.  She had positive hep B core IgM and reactive hep B surface antibody.  Her hepatitis B surface antigen and hepatitis B E antigen and antibodies were negative.  I checked a viral load and this said there was no HBV DNA detected.  What do you recommend for the next step?  Can we go ahead and submit the paperwork for Entyvio to get her started?

## 2022-04-29 ENCOUNTER — CLINICAL SUPPORT (OUTPATIENT)
Dept: FAMILY MEDICINE CLINIC | Facility: CLINIC | Age: 52
End: 2022-04-29

## 2022-04-29 DIAGNOSIS — Z23 NEED FOR SHINGLES VACCINE: Primary | ICD-10-CM

## 2022-04-29 PROCEDURE — 90750 HZV VACC RECOMBINANT IM: CPT | Performed by: FAMILY MEDICINE

## 2022-04-29 PROCEDURE — 90471 IMMUNIZATION ADMIN: CPT | Performed by: FAMILY MEDICINE

## 2022-04-30 RX ORDER — PREDNISONE 10 MG/1
10 TABLET ORAL DAILY
Qty: 30 TABLET | Refills: 0 | Status: CANCELLED | OUTPATIENT
Start: 2022-04-30 | End: 2022-05-30

## 2022-05-03 ENCOUNTER — TELEPHONE (OUTPATIENT)
Dept: GASTROENTEROLOGY | Facility: CLINIC | Age: 52
End: 2022-05-03

## 2022-05-03 RX ORDER — PREDNISONE 10 MG/1
10 TABLET ORAL DAILY
Qty: 30 TABLET | Refills: 0 | Status: SHIPPED | OUTPATIENT
Start: 2022-05-03 | End: 2022-05-16 | Stop reason: SDUPTHER

## 2022-05-03 NOTE — TELEPHONE ENCOUNTER
----- Message from Judy Ruiz RN sent at 4/28/2022  2:30 PM EDT -----  Regarding: FW: letter    ----- Message -----  From: Rosa Melgoza  Sent: 4/28/2022   2:25 PM EDT  To: Raheem [facility]  Subject: letter                                           Can you also put on there that mine is active and not remission or that I'm in a flare, or something to that effrct.      [Use of Plain Language] : use of plain language

## 2022-05-05 ENCOUNTER — TELEPHONE (OUTPATIENT)
Dept: GASTROENTEROLOGY | Facility: CLINIC | Age: 52
End: 2022-05-05

## 2022-05-05 NOTE — TELEPHONE ENCOUNTER
----- Message from Rosa Melgoza sent at 5/4/2022 10:55 PM EDT -----  Regarding: entyvio  I just got the letter today from my insurance that my entyvio was approved.  I also signed up with the entyvio copay assistance program on line.  What happens next?  Thanks,   Rosa Melgoza

## 2022-05-05 NOTE — TELEPHONE ENCOUNTER
Please see email response below from Tootie with [facility]. Tootie will call patient again today.     Leigh Ann,  I actually reached out to her earlier this week and left her a vm with my direct # but I will call her again.  Thank you.  Tootie Oconnell  Patient Registration Lead  Office : [phone] Ext : [phone]  Direct : [phone]  Fax : [phone]

## 2022-05-05 NOTE — TELEPHONE ENCOUNTER
Please see other encounter from 04/11/2022 in the patient chart for other documentation.    I emailed Tootie with [facility] to let her know that the patient has reached out to our office to inquire about getting scheduled for her Entyvio infusion and requested that she reaches out to the patient some time today. Tootie calls the patients to schedule them for the infusions and to go over any financial assistance needs. Our office forwards the prior authorization to Tootie and she handles getting the PA approved, scheduling and any questions that the patient may have.     Thank you

## 2022-05-16 RX ORDER — PREDNISONE 10 MG/1
TABLET ORAL
Qty: 35 TABLET | Refills: 1 | Status: SHIPPED | OUTPATIENT
Start: 2022-05-16 | End: 2023-01-11

## 2022-05-18 ENCOUNTER — APPOINTMENT (OUTPATIENT)
Dept: CT IMAGING | Facility: HOSPITAL | Age: 52
End: 2022-05-18

## 2022-05-26 ENCOUNTER — PATIENT MESSAGE (OUTPATIENT)
Dept: FAMILY MEDICINE CLINIC | Facility: CLINIC | Age: 52
End: 2022-05-26

## 2022-05-26 DIAGNOSIS — E06.3 HYPOTHYROIDISM DUE TO HASHIMOTO'S THYROIDITIS: Primary | ICD-10-CM

## 2022-05-26 DIAGNOSIS — E03.8 HYPOTHYROIDISM DUE TO HASHIMOTO'S THYROIDITIS: Primary | ICD-10-CM

## 2022-05-27 ENCOUNTER — TELEPHONE (OUTPATIENT)
Dept: FAMILY MEDICINE CLINIC | Facility: CLINIC | Age: 52
End: 2022-05-27

## 2022-05-31 DIAGNOSIS — R53.81 MALAISE AND FATIGUE: ICD-10-CM

## 2022-05-31 DIAGNOSIS — R05.9 COUGH: Primary | ICD-10-CM

## 2022-05-31 DIAGNOSIS — Z79.899 HIGH RISK MEDICATION USE: ICD-10-CM

## 2022-05-31 DIAGNOSIS — R53.83 MALAISE AND FATIGUE: ICD-10-CM

## 2022-06-01 ENCOUNTER — OFFICE VISIT (OUTPATIENT)
Dept: FAMILY MEDICINE CLINIC | Facility: CLINIC | Age: 52
End: 2022-06-01

## 2022-06-01 ENCOUNTER — HOSPITAL ENCOUNTER (OUTPATIENT)
Dept: GENERAL RADIOLOGY | Facility: HOSPITAL | Age: 52
Discharge: HOME | End: 2022-06-01
Admitting: PHYSICIAN ASSISTANT

## 2022-06-01 VITALS
HEART RATE: 113 BPM | TEMPERATURE: 98.2 F | SYSTOLIC BLOOD PRESSURE: 128 MMHG | WEIGHT: 244.8 LBS | OXYGEN SATURATION: 96 % | HEIGHT: 65 IN | BODY MASS INDEX: 40.79 KG/M2 | DIASTOLIC BLOOD PRESSURE: 76 MMHG

## 2022-06-01 DIAGNOSIS — Z79.899 HIGH RISK MEDICATION USE: ICD-10-CM

## 2022-06-01 DIAGNOSIS — R05.9 COUGH: ICD-10-CM

## 2022-06-01 DIAGNOSIS — J02.9 SORE THROAT: ICD-10-CM

## 2022-06-01 DIAGNOSIS — R53.83 MALAISE AND FATIGUE: ICD-10-CM

## 2022-06-01 DIAGNOSIS — Z00.00 ENCOUNTER FOR WELLNESS EXAMINATION IN ADULT: Primary | ICD-10-CM

## 2022-06-01 DIAGNOSIS — R53.81 MALAISE AND FATIGUE: ICD-10-CM

## 2022-06-01 DIAGNOSIS — B33.8 RSV INFECTION: ICD-10-CM

## 2022-06-01 DIAGNOSIS — J06.9 VIRAL UPPER RESPIRATORY TRACT INFECTION: ICD-10-CM

## 2022-06-01 DIAGNOSIS — J10.1 INFLUENZA A: ICD-10-CM

## 2022-06-01 DIAGNOSIS — Z12.4 SCREENING FOR MALIGNANT NEOPLASM OF CERVIX: ICD-10-CM

## 2022-06-01 LAB
EXPIRATION DATE: ABNORMAL
EXPIRATION DATE: ABNORMAL
EXPIRATION DATE: NORMAL
FLUAV AG NPH QL: POSITIVE
FLUBV AG NPH QL: NEGATIVE
HETEROPH AB SER QL LA: NEGATIVE
INTERNAL CONTROL: ABNORMAL
INTERNAL CONTROL: ABNORMAL
INTERNAL CONTROL: NORMAL
Lab: ABNORMAL
Lab: ABNORMAL
Lab: NORMAL
RSV AG SPEC QL: POSITIVE
S PYO AG THROAT QL: NEGATIVE
SARS-COV-2 AG UPPER RESP QL IA.RAPID: NOT DETECTED

## 2022-06-01 PROCEDURE — 87804 INFLUENZA ASSAY W/OPTIC: CPT | Performed by: FAMILY MEDICINE

## 2022-06-01 PROCEDURE — 87880 STREP A ASSAY W/OPTIC: CPT | Performed by: FAMILY MEDICINE

## 2022-06-01 PROCEDURE — 87426 SARSCOV CORONAVIRUS AG IA: CPT | Performed by: FAMILY MEDICINE

## 2022-06-01 PROCEDURE — 99396 PREV VISIT EST AGE 40-64: CPT | Performed by: FAMILY MEDICINE

## 2022-06-01 PROCEDURE — 87807 RSV ASSAY W/OPTIC: CPT | Performed by: FAMILY MEDICINE

## 2022-06-01 PROCEDURE — 86308 HETEROPHILE ANTIBODY SCREEN: CPT | Performed by: FAMILY MEDICINE

## 2022-06-01 PROCEDURE — 99213 OFFICE O/P EST LOW 20 MIN: CPT | Performed by: FAMILY MEDICINE

## 2022-06-01 PROCEDURE — 71046 X-RAY EXAM CHEST 2 VIEWS: CPT

## 2022-06-01 RX ORDER — VEDOLIZUMAB 300 MG/5ML
INJECTION, POWDER, LYOPHILIZED, FOR SOLUTION INTRAVENOUS
COMMUNITY
Start: 2022-05-14

## 2022-06-01 RX ORDER — DEXTROMETHORPHAN HYDROBROMIDE AND PROMETHAZINE HYDROCHLORIDE 15; 6.25 MG/5ML; MG/5ML
5 SYRUP ORAL NIGHTLY PRN
Qty: 118 ML | Refills: 0 | Status: SHIPPED | OUTPATIENT
Start: 2022-06-01 | End: 2022-06-03

## 2022-06-01 NOTE — PROGRESS NOTES
Subjective   Rosa Melgoza is a 51 y.o. female who presents for annual female wellness exam.  Chief Complaint   Patient presents with   • Gynecologic Exam   • Hypertension     Patient is also here today to discuss some new symptoms she is having.  She started with sore throat and congestion 5 days ago.  She has taken home COVID test which have all been negative.  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     Menstrual History: Perimenopausal.  Last menstrual cycle was 2021  Pregnancy History:   Sexual History: Monogamous male partner for the past 15 years  Contraception: Partner vasectomy  Hormone Replacement Therapy: Never  Diet: needs improvement  Exercise: no routine exercise  Do you feel safe? Yes  Have you ever been abused? No    Mammogram: 2022  Pap Smear: 10 years ago, normal.  Abnormal Pap smear years ago and also had a colposcopy at the age of 25 no intervention necessary.   Bone Density: Never  Colon Cancer Screenin2020 per GI.  Patient has Crohn's and gets regular colonoscopies.    Immunization History   Administered Date(s) Administered   • COVID-19 (PFIZER) PURPLE CAP 2020, 2021, 2021   • Covid-19 (Pfizer) Gray Cap 2022   • Flu Vaccine Split Quad 2021   • FluLaval/Fluarix/Fluzone >6 2020   • Hepatitis A 04/15/2022   • Hepatitis B Vaccine Adult IM 04/15/2022   • Pneumococcal Conjugate 13-Valent (PCV13) 04/15/2022   • Shingrix 2022   • Tdap 04/15/2022       The following portions of the patient's history were reviewed and updated as appropriate: allergies, current medications, past family history, past medical history, past social history, past surgical history and problem list.    Past Medical History:   Diagnosis Date   • Allergic    • Anxiety    • Cholelithiasis    • Crohn disease (HCC)    • Depression    • Disease of thyroid gland    • Elevated blood pressure reading without diagnosis of hypertension     • Fatty liver    • GERD (gastroesophageal reflux disease)    • Headache, migraine    • History of sore throat    • Hyperlipidemia    • Hypertension    • Hypothyroidism    • Migraine    • Need for DTaP and Hib vaccine     History of    • Obesity    • Urinary tract infection    • Vaginal yeast infection        Past Surgical History:   Procedure Laterality Date   • BREAST BIOPSY     •  SECTION     • CHOLECYSTECTOMY     • COLONOSCOPY  approx     Montes De Oca M.D.  benign polyps per patient   • COLONOSCOPY N/A 2020    Procedure: COLONOSCOPY  into cecum and TI with biopsies;  Surgeon: Maicol Garza MD;  Location: [facility] ENDOSCOPY;  Service: Gastroenterology;  Laterality: N/A;  Pre: abn CT scan  post: diverticulosis   • ENDOSCOPY N/A 2020    Procedure: ESOPHAGOGASTRODUODENOSCOPY WITH BIOPSY;  Surgeon: Maicol Garza MD;  Location: [facility] ENDOSCOPY;  Service: Gastroenterology;  Laterality: N/A;  Pre: abn CT scan  post: hiatal hernia, esophagitis   • TONSILLECTOMY     • UPPER GASTROINTESTINAL ENDOSCOPY  approx     Tavon M.D.  normal per patient       Family History   Problem Relation Age of Onset   • Breast cancer Mother    • Hypertension Father    • Heart disease Father    • Alcohol abuse Father    • Breast cancer Maternal Grandmother    • Alcohol abuse Other    • Depression Other    • Anxiety disorder Other    • Hypertension Other    • Other Other         Pure hypercholesterolemia and esophageal reflux   • Lung cancer Other        Social History     Socioeconomic History   • Marital status:    Tobacco Use   • Smoking status: Former Smoker     Packs/day: 1.00     Years: 10.00     Pack years: 10.00     Types: Cigarettes     Quit date: 3/1/2011     Years since quittin.2   • Smokeless tobacco: Never Used   Vaping Use   • Vaping Use: Never used   Substance and Sexual Activity   • Alcohol use: Not Currently     Comment: Occasional   • Drug use: Never   • Sexual activity: Yes      Partners: Male     Birth control/protection: Surgical       Review of Systems   Constitutional: Negative for activity change, appetite change, fatigue and unexpected weight change.   HENT: Negative for congestion, ear pain, nosebleeds, sore throat and tinnitus.    Eyes: Negative for pain, redness and visual disturbance.   Respiratory: Negative for cough, shortness of breath and wheezing.    Cardiovascular: Negative for chest pain, palpitations and leg swelling.   Gastrointestinal: Negative for abdominal pain, blood in stool and nausea.   Endocrine: Negative for polydipsia and polyuria.   Genitourinary: Negative for dysuria, frequency, menstrual problem and vaginal discharge.   Musculoskeletal: Negative for arthralgias, joint swelling and myalgias.   Skin: Negative for rash.   Allergic/Immunologic: Negative for environmental allergies, food allergies and immunocompromised state.   Neurological: Negative for dizziness, speech difficulty, weakness and headaches.   Hematological: Negative for adenopathy. Does not bruise/bleed easily.   Psychiatric/Behavioral: Negative for decreased concentration and dysphoric mood. The patient is not nervous/anxious.        Objective   Vitals:    06/01/22 1510   BP: 128/76   Pulse: 113   Temp: 98.2 °F (36.8 °C)   SpO2: 96%     Body mass index is 40.74 kg/m².  Physical Exam  Vitals and nursing note reviewed.   Constitutional:       Appearance: Normal appearance. She is well-developed. She is obese.   HENT:      Head: Normocephalic and atraumatic.   Eyes:      General: No scleral icterus.     Conjunctiva/sclera: Conjunctivae normal.   Cardiovascular:      Rate and Rhythm: Normal rate and regular rhythm.      Heart sounds: Normal heart sounds.   Pulmonary:      Effort: Pulmonary effort is normal.      Breath sounds: Normal breath sounds.   Abdominal:      General: Bowel sounds are normal.      Palpations: Abdomen is soft.   Genitourinary:     General: Normal vulva.      Vagina: Normal.       Cervix: Normal.   Musculoskeletal:         General: Normal range of motion.      Cervical back: Normal range of motion and neck supple.      Right lower leg: No edema.      Left lower leg: No edema.   Skin:     General: Skin is warm and dry.      Capillary Refill: Capillary refill takes less than 2 seconds.      Findings: No rash.   Neurological:      Mental Status: She is alert and oriented to person, place, and time.   Psychiatric:         Mood and Affect: Mood normal.         Behavior: Behavior normal.         Thought Content: Thought content normal.         Judgment: Judgment normal.     In office strep test negative  In office monotest negative  In office Influenza A test is positive  In office influenza B test is negative  In office COVID test is negative   In office RSV is positive      Assessment & Plan   Diagnoses and all orders for this visit:    1. Encounter for wellness examination in adult (Primary)    2. Viral upper respiratory tract infection    3. Influenza A  -     promethazine-dextromethorphan (PROMETHAZINE-DM) 6.25-15 MG/5ML syrup; Take 5 mL by mouth At Night As Needed for Cough.  Dispense: 118 mL; Refill: 0    4. RSV infection    5. Sore throat  -     POCT rapid strep A  -     POCT Influenza A/B  -     POCT Infectious mononucleosis antibody  -     POCT SARS-CoV-2 Antigen TIFFANIE  -     POCT RSV    6. Screening for malignant neoplasm of cervix  -     IGP, Aptima HPV, CtNg Age Gdln      Patient was also seen for influenza A and RSV positive upper respiratory infection.  Patient was advised to wear a mask and practice good hand hygiene.  She was advised to drink plenty of fluids and follow-up if symptoms are worsening.    Routine health maintenance is up-to-date as above    Discussed the importance of maintaining a healthy weight and getting regular exercise.  Educated patient on the benefits of healthy diet.  Advise follow-up annually for wellness exams.      There are no Patient Instructions on  file for this visit.         Answers for HPI/ROS submitted by the patient on 5/25/2022  Please describe your symptoms.: pap smear, routine  Have you had these symptoms before?: No  How long have you been having these symptoms?: Greater than 2 weeks  Please list any medications you are currently taking for this condition.: none  Please describe any probable cause for these symptoms. : birth  What is the primary reason for your visit?: Other

## 2022-06-02 ENCOUNTER — TELEPHONE (OUTPATIENT)
Dept: GASTROENTEROLOGY | Facility: CLINIC | Age: 52
End: 2022-06-02

## 2022-06-02 ENCOUNTER — PATIENT MESSAGE (OUTPATIENT)
Dept: FAMILY MEDICINE CLINIC | Facility: CLINIC | Age: 52
End: 2022-06-02

## 2022-06-02 DIAGNOSIS — J10.1 INFLUENZA A: Primary | ICD-10-CM

## 2022-06-02 RX ORDER — BENZONATATE 200 MG/1
200 CAPSULE ORAL 3 TIMES DAILY PRN
Qty: 30 CAPSULE | Refills: 0 | Status: SHIPPED | OUTPATIENT
Start: 2022-06-02 | End: 2022-06-28

## 2022-06-02 RX ORDER — PROMETHAZINE HYDROCHLORIDE AND CODEINE PHOSPHATE 6.25; 1 MG/5ML; MG/5ML
5-10 SYRUP ORAL NIGHTLY PRN
Qty: 118 ML | Refills: 0 | Status: SHIPPED | OUTPATIENT
Start: 2022-06-02 | End: 2022-06-03

## 2022-06-02 NOTE — TELEPHONE ENCOUNTER
----- Message from Manda Butler PA-C sent at 6/1/2022  4:44 PM EDT -----  Please let patient know chest x-ray does not show any evidence of pneumonia or acute lung infection.  I I saw her results from her doctor's appointment today and it appears that she has flu and RSV.  Since her next infusion is not for an additional 25 days, she should be better from these infections.  If she is still symptomatic she should not proceed with Entyvio infusion and let me know.  Please impress upon her that she is immunosuppressed due to the steroids and the Entyvio infusion.  She should rest and hydrate well.  If she does not feel like she is getting better or her symptoms get worse she should go right to the hospital.    Incidentally the chest x-ray shows a lung nodule that was seen previously.  Looks like she is already scheduled for chest CT on 6/23/2022 by Dr. Romeo.  Proceed with that as recommended.

## 2022-06-02 NOTE — TELEPHONE ENCOUNTER
From: Rosa Melgoza  To: Kathie Romeo DO  Sent: 6/2/2022 4:38 PM EDT  Subject: cough    Can you call me in something stronger for my cough? I am coughing so much I cant sleep. My head is pounding. my entire body is sore from it. I tried the promethazine and otc tussin and I am still absolutely miserable with this stupid cough.   thank you, Rosa

## 2022-06-03 DIAGNOSIS — G43.109 MIGRAINE WITH AURA AND WITHOUT STATUS MIGRAINOSUS, NOT INTRACTABLE: ICD-10-CM

## 2022-06-03 DIAGNOSIS — R10.13 EPIGASTRIC PAIN: ICD-10-CM

## 2022-06-03 DIAGNOSIS — K21.9 GASTROESOPHAGEAL REFLUX DISEASE: ICD-10-CM

## 2022-06-03 DIAGNOSIS — F43.22 ADJUSTMENT DISORDER WITH ANXIOUS MOOD: ICD-10-CM

## 2022-06-03 DIAGNOSIS — I10 ESSENTIAL HYPERTENSION: ICD-10-CM

## 2022-06-03 LAB
AGE GDLN ACOG TESTING: NORMAL
CYTOLOGIST CVX/VAG CYTO: NORMAL
CYTOLOGY CVX/VAG DOC CYTO: NORMAL
CYTOLOGY CVX/VAG DOC THIN PREP: NORMAL
DX ICD CODE: NORMAL
HIV 1 & 2 AB SER-IMP: NORMAL
HPV I/H RISK 4 DNA CVX QL PROBE+SIG AMP: NEGATIVE
OTHER STN SPEC: NORMAL
STAT OF ADQ CVX/VAG CYTO-IMP: NORMAL

## 2022-06-03 RX ORDER — ALBUTEROL SULFATE 90 UG/1
2 AEROSOL, METERED RESPIRATORY (INHALATION) EVERY 4 HOURS PRN
Qty: 6.7 G | Refills: 0 | Status: SHIPPED | OUTPATIENT
Start: 2022-06-03

## 2022-06-06 RX ORDER — BUTALBITAL, ACETAMINOPHEN AND CAFFEINE 50; 325; 40 MG/1; MG/1; MG/1
1 TABLET ORAL EVERY 6 HOURS PRN
Qty: 180 TABLET | Refills: 0 | Status: SHIPPED | OUTPATIENT
Start: 2022-06-06 | End: 2022-09-06 | Stop reason: SDUPTHER

## 2022-06-06 RX ORDER — ERENUMAB-AOOE 140 MG/ML
1 INJECTION, SOLUTION SUBCUTANEOUS
Qty: 3 ML | Refills: 1 | Status: SHIPPED | OUTPATIENT
Start: 2022-06-06 | End: 2022-09-15 | Stop reason: SDUPTHER

## 2022-06-06 RX ORDER — ESCITALOPRAM OXALATE 10 MG/1
10 TABLET ORAL DAILY
Qty: 90 TABLET | Refills: 3 | Status: SHIPPED | OUTPATIENT
Start: 2022-06-06

## 2022-06-06 RX ORDER — HYOSCYAMINE SULFATE 0.12 MG/1
1 TABLET SUBLINGUAL EVERY 6 HOURS PRN
Qty: 60 EACH | Refills: 0 | Status: SHIPPED | OUTPATIENT
Start: 2022-06-06 | End: 2022-10-25 | Stop reason: SDUPTHER

## 2022-06-06 RX ORDER — PROMETHAZINE HYDROCHLORIDE 25 MG/1
25 TABLET ORAL EVERY 6 HOURS PRN
Qty: 60 TABLET | Refills: 1 | Status: SHIPPED | OUTPATIENT
Start: 2022-06-06 | End: 2023-01-12 | Stop reason: SDUPTHER

## 2022-06-06 RX ORDER — LEVOCETIRIZINE DIHYDROCHLORIDE 5 MG/1
5 TABLET, FILM COATED ORAL EVERY EVENING
Qty: 90 TABLET | Refills: 3 | Status: SHIPPED | OUTPATIENT
Start: 2022-06-06

## 2022-06-06 RX ORDER — PANTOPRAZOLE SODIUM 40 MG/1
40 TABLET, DELAYED RELEASE ORAL DAILY
Qty: 90 TABLET | Refills: 3 | Status: SHIPPED | OUTPATIENT
Start: 2022-06-06 | End: 2022-06-29

## 2022-06-06 RX ORDER — LISINOPRIL AND HYDROCHLOROTHIAZIDE 12.5; 1 MG/1; MG/1
1 TABLET ORAL DAILY
Qty: 90 TABLET | Refills: 1 | Status: SHIPPED | OUTPATIENT
Start: 2022-06-06 | End: 2023-02-08 | Stop reason: SDUPTHER

## 2022-06-08 ENCOUNTER — TELEPHONE (OUTPATIENT)
Dept: FAMILY MEDICINE CLINIC | Facility: CLINIC | Age: 52
End: 2022-06-08

## 2022-06-08 NOTE — TELEPHONE ENCOUNTER
Spoke to patient she is stating that she is only taking the fioricet as needed and the aimovig monthly.  Informed pt that we are working on getting her meds approved. Pt voiced understanding           Left message for [facility]

## 2022-06-08 NOTE — TELEPHONE ENCOUNTER
Attempted to call [facility] pharmacy  aimovig was approved on 3/25,  not scanned in chart need to know how long approval was for

## 2022-06-08 NOTE — TELEPHONE ENCOUNTER
Caller: JAMAL WITH [facility]     Relationship: [unfilled]     Best call back number: [phone]    What is your medical concern?  PA IS NEEDED FOR THE Erenumab-aooe (Aimovig) 140 MG/ML prefilled syringe TO BE FILLED AT [facility]

## 2022-06-12 ENCOUNTER — PATIENT MESSAGE (OUTPATIENT)
Dept: FAMILY MEDICINE CLINIC | Facility: CLINIC | Age: 52
End: 2022-06-12

## 2022-06-12 DIAGNOSIS — J10.1 INFLUENZA A: ICD-10-CM

## 2022-06-13 NOTE — TELEPHONE ENCOUNTER
From: Rosa Melgoza  To: Kathie Romeo, DO  Sent: 6/12/2022 4:26 PM EDT  Subject: cough syrup    Dr Romeo, Can you call in more of the tussionex for me? My insurance would only cover a 7 day supply last time, and it was written for 5 ml at bedtime so they could only fill 35 ml without a PA, even though your original script for for more than that. If you can write it for 10 ml, twice a day or something like that so a 7 day script is really longer, then hopefully it will last until this goes away. I still have the cough and sore throat, my GI said it will take longer for me to get over stuff like this bc of the entyvio making me immunocompromised so she wasn't shocked that I still felt bad. I also have another infusion coming up in 2 weeks so I am hoping to get over this before that infusion too.   Rosa hernandez

## 2022-06-17 ENCOUNTER — TELEPHONE (OUTPATIENT)
Dept: FAMILY MEDICINE CLINIC | Facility: CLINIC | Age: 52
End: 2022-06-17

## 2022-06-17 ENCOUNTER — LAB (OUTPATIENT)
Dept: LAB | Facility: HOSPITAL | Age: 52
End: 2022-06-17

## 2022-06-17 DIAGNOSIS — Z79.899 HIGH RISK MEDICATION USE: ICD-10-CM

## 2022-06-17 PROCEDURE — 86800 THYROGLOBULIN ANTIBODY: CPT | Performed by: FAMILY MEDICINE

## 2022-06-17 PROCEDURE — 86376 MICROSOMAL ANTIBODY EACH: CPT | Performed by: FAMILY MEDICINE

## 2022-06-17 NOTE — TELEPHONE ENCOUNTER
POPEYE FROM [facility] IS CALLING STATING THAT THEY DO NOT NEED TO DO A CT WITH CONTRAST FOR THE HISTORY OF PULMONARY NODULES.  TO GET A DX PLEASE ADVISE IF YOU WANT TO CHANGE THE ORDER THEY CAN DO A VERBAL AND REVISE THE ORDER

## 2022-06-23 ENCOUNTER — HOSPITAL ENCOUNTER (OUTPATIENT)
Dept: CT IMAGING | Facility: HOSPITAL | Age: 52
Discharge: HOME | End: 2022-06-23
Admitting: FAMILY MEDICINE

## 2022-06-23 DIAGNOSIS — R91.1 PULMONARY NODULE, LEFT: ICD-10-CM

## 2022-06-23 PROCEDURE — 71250 CT THORAX DX C-: CPT

## 2022-06-28 ENCOUNTER — TELEPHONE (OUTPATIENT)
Dept: FAMILY MEDICINE CLINIC | Facility: CLINIC | Age: 52
End: 2022-06-28

## 2022-06-28 ENCOUNTER — TELEMEDICINE (OUTPATIENT)
Dept: FAMILY MEDICINE CLINIC | Facility: CLINIC | Age: 52
End: 2022-06-28

## 2022-06-28 DIAGNOSIS — R91.1 PULMONARY NODULE, LEFT: Primary | ICD-10-CM

## 2022-06-28 PROCEDURE — 99213 OFFICE O/P EST LOW 20 MIN: CPT | Performed by: FAMILY MEDICINE

## 2022-06-28 NOTE — PROGRESS NOTES
Answers for HPI/ROS submitted by the patient on 6/28/2022  Please describe your symptoms.: ct follow up  Have you had these symptoms before?: Yes  How long have you been having these symptoms?: Greater than 2 weeks  What is the primary reason for your visit?: Other    Chief Complaint  Results (Ct of chest )    Subjective         Rosa Melgoza presents to [facility]  Patient requested a video visit during the coronavirus pandemic to discuss results of her recent abnormal CT of the chest.  Patient has been monitoring pulmonary nodules since late 2020.  On her most recent chest CT it revealed a slightly increased size of a left lower lobe pulmonary nodule from 1.4 cm to 1.6.  Patient was a light smoker of cigarettes only for about 10 years and quit 10 years ago.  She is asymptomatic.  Options were reviewed with the patient.    Objective   Vital Signs:   There were no vitals taken for this visit.    Physical Exam   Constitutional: She appears well-developed and well-nourished.   HENT:   Head: Normocephalic and atraumatic.   Eyes: Conjunctivae are normal.   Neck: Neck normal appearance.  Pulmonary/Chest: Effort normal.   Neurological: She is alert.   Psychiatric: She has a normal mood and affect.     Result Review :       Data reviewed: Radiologic studies see below     CT Chest Without Contrast Diagnostic (06/23/2022 12:05)       Assessment and Plan    Diagnoses and all orders for this visit:    1. Pulmonary nodule, left (Primary)  -     CT Chest Without Contrast; Future    Reviewed all the findings of the chest CT with the patient.  Discussed options of referral to pulmonology for biopsy, PET CT now, or continued observance in 6 months with repeat chest CT without contrast.  Patient elected to continue observance and repeat chest CT in 6 months.  She was advised to contact me sooner if she changes her mind.    I spent 25 minutes caring for Rosa on this date of service. This time  includes time spent by me in the following activities:reviewing tests, performing a medically appropriate examination and/or evaluation , counseling and educating the patient/family/caregiver, ordering medications, tests, or procedures and documenting information in the medical record  Follow Up   Return if symptoms worsen or fail to improve.  Patient was given instructions and counseling regarding her condition or for health maintenance advice. Please see specific information pulled into the AVS if appropriate.     Mode of Visit: Video  Location of patient: home  You have chosen to receive care through a telehealth visit.  The patient has signed the video visit consent form.  The visit included audio and video interaction. No technical issues occurred during this visit.

## 2022-06-29 DIAGNOSIS — K21.9 GASTROESOPHAGEAL REFLUX DISEASE: ICD-10-CM

## 2022-06-29 RX ORDER — PANTOPRAZOLE SODIUM 40 MG/1
40 TABLET, DELAYED RELEASE ORAL 2 TIMES DAILY
Qty: 180 TABLET | Refills: 3 | Status: SHIPPED | OUTPATIENT
Start: 2022-06-29

## 2022-06-30 ENCOUNTER — CLINICAL SUPPORT (OUTPATIENT)
Dept: FAMILY MEDICINE CLINIC | Facility: CLINIC | Age: 52
End: 2022-06-30

## 2022-06-30 DIAGNOSIS — Z23 NEED FOR SHINGLES VACCINE: Primary | ICD-10-CM

## 2022-06-30 PROCEDURE — 90750 HZV VACC RECOMBINANT IM: CPT | Performed by: FAMILY MEDICINE

## 2022-07-06 ENCOUNTER — TELEPHONE (OUTPATIENT)
Dept: NEUROLOGY | Facility: OTHER | Age: 52
End: 2022-07-06

## 2022-07-06 NOTE — TELEPHONE ENCOUNTER
Patient is coming in to [facility] office today to  a new patient packet. I let her know we should be able to print one off for her.

## 2022-07-11 ENCOUNTER — TELEPHONE (OUTPATIENT)
Dept: NEUROLOGY | Facility: CLINIC | Age: 52
End: 2022-07-11

## 2022-07-11 ENCOUNTER — LAB (OUTPATIENT)
Dept: LAB | Facility: HOSPITAL | Age: 52
End: 2022-07-11

## 2022-07-11 PROCEDURE — 87517 HEPATITIS B DNA QUANT: CPT

## 2022-07-11 PROCEDURE — 36415 COLL VENOUS BLD VENIPUNCTURE: CPT

## 2022-07-12 ENCOUNTER — TELEMEDICINE (OUTPATIENT)
Dept: NEUROLOGY | Facility: CLINIC | Age: 52
End: 2022-07-12

## 2022-07-12 DIAGNOSIS — G43.109 MIGRAINE WITH AURA AND WITHOUT STATUS MIGRAINOSUS, NOT INTRACTABLE: Primary | ICD-10-CM

## 2022-07-12 PROCEDURE — 99203 OFFICE O/P NEW LOW 30 MIN: CPT | Performed by: PSYCHIATRY & NEUROLOGY

## 2022-07-12 RX ORDER — ZOLMITRIPTAN 5 MG/1
1 SPRAY NASAL AS NEEDED
Qty: 6 EACH | Refills: 4 | Status: SHIPPED | OUTPATIENT
Start: 2022-07-12 | End: 2022-10-06 | Stop reason: HOSPADM

## 2022-07-12 NOTE — PROGRESS NOTES
Chief complaint: History of headaches    Patient ID: Rosa Melgoza is a 51 y.o. female.    HPI:This was an audio and video enabled telemedicine encounter.  The patient did consent to this type of encounter.  She is in her car at the moment and I am at home.  I had the pleasure of seeing your patient today.  As you know she is a 51-year-old female being seen at the request of and referred to us by Dr. Romeo for a history of headaches.  The patient says that she has had bad headaches since she was in high school.  Her typical headache consists of an aura that she sees in both visual fields.  It is a rainbow type visualization.  This is followed by left retro-orbital headache.  It is sharp and throbbing or achy.  The headache can then radiate holocephalic or on top of her head.  She will have significant sensitivity to light and sound.  She also has sensitivity to smell.  She has been triggered by certain smells in the past.  She has some nausea however no vomiting usually.  The duration of her headaches is anywhere from a couple of hours to all day.  If she is able to take the Fioricet that was prescribed for her and to go to sleep her headache will resolve in about 2 hours typically.  However she says that if she is working her headache will last the entire day despite taking Fioricet and Zofran.  She denies any history of head injuries.  She has had a few whiplash injuries and has had PT for neck issues.  Her mother has a history of migraines.  Her sister has a history of headaches.  She was prescribed Aimovig a few years ago.  She says that this was very helpful in reducing the headache frequency from 15 or more per month to the 10 that she may experience now.  She does use Fioricet as I mentioned above as abortive therapy.  She denies any ringing in her ears.  No visual blackouts or spots in her visual fields.  She has tried Imitrex, Relpax, Nurtec, Ubrelvy, Reyvow, Elavil as well as topiramate.  She has even  tried magnesium and riboflavin.  She has not tried Emgality.  She denies any focal weakness or numbness of her arms or legs with her headaches.  No double vision.    The following portions of the patient's history were reviewed and updated as appropriate: allergies, current medications, past family history, past medical history, past social history, past surgical history and problem list.    Review of Systems   I have reviewed the review of systems above performed by my medical assistant.      There were no vitals filed for this visit.    Neurologic Exam    Physical Exam    Procedures    Assessment/Plan: Our plan is to schedule an MRI of her brain both with and without contrast.  I would like for her to try Zomig nasal as abortive therapy.  I feel that her preventative therapy is helping at this time.  I would not change that currently however as I mentioned she has not yet tried Emgality.  We will see her back in 3 months or sooner if needed.  A total of 45 minutes was spent face-to-face with the patient today.  Of that greater than 50% of this time was spent discussing signs and symptoms of migraine headaches, patient education, plan of care and prognosis.       Diagnoses and all orders for this visit:    1. Migraine with aura and without status migrainosus, not intractable (Primary)  -     ZOLMitriptan (Zomig) 5 MG nasal solution; 1 spray into the nostril(s) as directed by provider As Needed for Migraine for up to 30 days. May repeat x1 in 1 hour if headache recurs.  Max 2 sprays per 24 hours and 4/week.  Dispense: 6 each; Refill: 4  -     MRI Brain With & Without Contrast; Future           Nemesio Vasquez II, MD

## 2022-07-13 ENCOUNTER — TELEPHONE (OUTPATIENT)
Dept: NEUROLOGY | Facility: CLINIC | Age: 52
End: 2022-07-13

## 2022-07-13 NOTE — TELEPHONE ENCOUNTER
Provider: GHASSAN  Caller: [facility]  Relationship to Patient: N/A  Pharmacy: N/A  Phone Number: [phone]  Reason for Call: IN NEED OF ADDITIONAL INFORMATION FOR PRIOR AUTHORIZATION FOR: ZOLMITRIPTAN (ZOMIG) 5 MG NASAL SOLUTION.    THIS INFO IS ALSO BEING SENT TO THE OFFICE VIA FAX.    THANK YOU.

## 2022-07-13 NOTE — TELEPHONE ENCOUNTER
Spoke to Mick from [facility], faxed over last office not and finished questionnaire while on the telephone with her.

## 2022-07-14 ENCOUNTER — PATIENT MESSAGE (OUTPATIENT)
Dept: NEUROLOGY | Facility: CLINIC | Age: 52
End: 2022-07-14

## 2022-07-14 LAB
HBV DNA SERPL NAA+PROBE-ACNC: NORMAL IU/ML
HBV DNA SERPL NAA+PROBE-LOG IU: NORMAL {LOG_IU}/ML
REF LAB TEST REF RANGE: NORMAL

## 2022-07-25 DIAGNOSIS — G43.109 MIGRAINE WITH AURA AND WITHOUT STATUS MIGRAINOSUS, NOT INTRACTABLE: Primary | ICD-10-CM

## 2022-07-25 RX ORDER — DIAZEPAM 10 MG/1
10 TABLET ORAL
Qty: 1 TABLET | Refills: 0 | Status: SHIPPED | OUTPATIENT
Start: 2022-07-25 | End: 2022-07-26

## 2022-07-26 ENCOUNTER — HOSPITAL ENCOUNTER (OUTPATIENT)
Dept: MRI IMAGING | Facility: HOSPITAL | Age: 52
Discharge: HOME | End: 2022-07-26
Admitting: PSYCHIATRY & NEUROLOGY

## 2022-07-26 DIAGNOSIS — G43.109 MIGRAINE WITH AURA AND WITHOUT STATUS MIGRAINOSUS, NOT INTRACTABLE: ICD-10-CM

## 2022-07-26 PROCEDURE — 70553 MRI BRAIN STEM W/O & W/DYE: CPT

## 2022-07-26 PROCEDURE — A9577 INJ MULTIHANCE: HCPCS | Performed by: PSYCHIATRY & NEUROLOGY

## 2022-07-26 PROCEDURE — 0 GADOBENATE DIMEGLUMINE 529 MG/ML SOLUTION: Performed by: PSYCHIATRY & NEUROLOGY

## 2022-07-26 RX ADMIN — GADOBENATE DIMEGLUMINE 20 ML: 529 INJECTION, SOLUTION INTRAVENOUS at 14:05

## 2022-08-01 DIAGNOSIS — R11.2 NON-INTRACTABLE VOMITING WITH NAUSEA, UNSPECIFIED VOMITING TYPE: ICD-10-CM

## 2022-08-01 RX ORDER — ONDANSETRON 4 MG/1
4 TABLET, FILM COATED ORAL EVERY 8 HOURS PRN
Qty: 60 TABLET | Refills: 0 | Status: CANCELLED | OUTPATIENT
Start: 2022-08-01

## 2022-08-01 RX ORDER — ONDANSETRON 4 MG/1
4 TABLET, FILM COATED ORAL EVERY 8 HOURS PRN
Qty: 60 TABLET | Refills: 0 | Status: SHIPPED | OUTPATIENT
Start: 2022-08-01 | End: 2022-09-06 | Stop reason: SDUPTHER

## 2022-08-09 ENCOUNTER — PATIENT MESSAGE (OUTPATIENT)
Dept: NEUROLOGY | Facility: CLINIC | Age: 52
End: 2022-08-09

## 2022-08-10 NOTE — TELEPHONE ENCOUNTER
Spoke to patient. Scheduled appointment in October and patient will be picking up qulipta sample tomorrow.

## 2022-08-18 ENCOUNTER — LAB (OUTPATIENT)
Dept: LAB | Facility: HOSPITAL | Age: 52
End: 2022-08-18

## 2022-08-18 ENCOUNTER — PATIENT MESSAGE (OUTPATIENT)
Dept: FAMILY MEDICINE CLINIC | Facility: CLINIC | Age: 52
End: 2022-08-18

## 2022-08-18 DIAGNOSIS — R30.0 DYSURIA: Primary | ICD-10-CM

## 2022-08-18 DIAGNOSIS — R82.2 BILIRUBINURIA: ICD-10-CM

## 2022-08-18 DIAGNOSIS — R30.0 DYSURIA: ICD-10-CM

## 2022-08-18 DIAGNOSIS — Z79.899 HIGH RISK MEDICATION USE: ICD-10-CM

## 2022-08-18 LAB
BACTERIA UR QL AUTO: ABNORMAL /HPF
BILIRUB UR QL STRIP: ABNORMAL
CLARITY UR: CLEAR
COLOR UR: YELLOW
GLUCOSE UR STRIP-MCNC: NEGATIVE MG/DL
HGB UR QL STRIP.AUTO: NEGATIVE
HYALINE CASTS UR QL AUTO: ABNORMAL /LPF
KETONES UR QL STRIP: ABNORMAL
LEUKOCYTE ESTERASE UR QL STRIP.AUTO: ABNORMAL
NITRITE UR QL STRIP: NEGATIVE
PH UR STRIP.AUTO: 5.5 [PH] (ref 5–8)
PROT UR QL STRIP: NEGATIVE
RBC # UR STRIP: ABNORMAL /HPF
REF LAB TEST METHOD: ABNORMAL
SP GR UR STRIP: 1.02 (ref 1–1.03)
SQUAMOUS #/AREA URNS HPF: ABNORMAL /HPF
UROBILINOGEN UR QL STRIP: ABNORMAL
WBC # UR STRIP: ABNORMAL /HPF

## 2022-08-18 PROCEDURE — 81001 URINALYSIS AUTO W/SCOPE: CPT

## 2022-08-18 PROCEDURE — 87086 URINE CULTURE/COLONY COUNT: CPT

## 2022-08-18 NOTE — TELEPHONE ENCOUNTER
From: Rosa Melgoza  To: Kathie Romeo, DO  Sent: 8/18/2022 12:00 PM EDT  Subject: poss UTI    Dr Romeo,    Im not sure if its bc Im on entyvio and immunocompromised or what, but I think I may have a UTI. I am having urgency, frequency, a little burning with urination, and sometimes a cold chill when I urinate. I know you guys are probably swamped with [facility] out for maternity leave, but do you have any virtual visits or can I drop a urine specimen off? I have cups that I coulr collect it myself if I need to.   thanks,  Rosa Melgoza

## 2022-08-19 ENCOUNTER — LAB (OUTPATIENT)
Dept: LAB | Facility: HOSPITAL | Age: 52
End: 2022-08-19

## 2022-08-19 LAB — BACTERIA SPEC AEROBE CULT: NORMAL

## 2022-08-19 PROCEDURE — 80076 HEPATIC FUNCTION PANEL: CPT | Performed by: PHYSICIAN ASSISTANT

## 2022-08-19 PROCEDURE — 85025 COMPLETE CBC W/AUTO DIFF WBC: CPT | Performed by: PHYSICIAN ASSISTANT

## 2022-08-19 RX ORDER — NITROFURANTOIN 25; 75 MG/1; MG/1
100 CAPSULE ORAL 2 TIMES DAILY
Qty: 14 CAPSULE | Refills: 0 | Status: SHIPPED | OUTPATIENT
Start: 2022-08-19 | End: 2022-08-26

## 2022-08-23 DIAGNOSIS — G43.109 MIGRAINE WITH AURA AND WITHOUT STATUS MIGRAINOSUS, NOT INTRACTABLE: Primary | ICD-10-CM

## 2022-08-23 RX ORDER — ATOGEPANT 60 MG/1
60 TABLET ORAL DAILY
Qty: 30 TABLET | Refills: 4 | Status: SHIPPED | OUTPATIENT
Start: 2022-08-23 | End: 2022-09-07 | Stop reason: SDUPTHER

## 2022-08-24 ENCOUNTER — TELEPHONE (OUTPATIENT)
Dept: NEUROLOGY | Facility: CLINIC | Age: 52
End: 2022-08-24

## 2022-08-24 NOTE — TELEPHONE ENCOUNTER
[facility]  PH: [phone]    FX: [phone]    SCRIPT FOR QULIPTA 60 MG TABLET  REQUIRES PRIOR AUTHORIZATION.    THANK YOU

## 2022-09-06 DIAGNOSIS — G43.109 MIGRAINE WITH AURA AND WITHOUT STATUS MIGRAINOSUS, NOT INTRACTABLE: ICD-10-CM

## 2022-09-07 DIAGNOSIS — G43.109 MIGRAINE WITH AURA AND WITHOUT STATUS MIGRAINOSUS, NOT INTRACTABLE: ICD-10-CM

## 2022-09-07 RX ORDER — ATOGEPANT 60 MG/1
60 TABLET ORAL DAILY
Qty: 30 TABLET | Refills: 3 | Status: SHIPPED | OUTPATIENT
Start: 2022-09-07 | End: 2022-10-07

## 2022-09-07 RX ORDER — ONDANSETRON 4 MG/1
4 TABLET, FILM COATED ORAL EVERY 8 HOURS PRN
Qty: 60 TABLET | Refills: 0 | Status: SHIPPED | OUTPATIENT
Start: 2022-09-07 | End: 2022-10-25 | Stop reason: SDUPTHER

## 2022-09-07 RX ORDER — BUTALBITAL, ACETAMINOPHEN AND CAFFEINE 50; 325; 40 MG/1; MG/1; MG/1
1 TABLET ORAL EVERY 6 HOURS PRN
Qty: 180 TABLET | Refills: 0 | Status: SHIPPED | OUTPATIENT
Start: 2022-09-07 | End: 2022-11-08 | Stop reason: SDUPTHER

## 2022-09-15 DIAGNOSIS — G43.109 MIGRAINE WITH AURA AND WITHOUT STATUS MIGRAINOSUS, NOT INTRACTABLE: ICD-10-CM

## 2022-09-15 RX ORDER — ERENUMAB-AOOE 140 MG/ML
1 INJECTION, SOLUTION SUBCUTANEOUS
Qty: 3 ML | Refills: 1 | Status: SHIPPED | OUTPATIENT
Start: 2022-09-15 | End: 2022-11-17

## 2022-09-16 ENCOUNTER — TELEPHONE (OUTPATIENT)
Dept: NEUROLOGY | Facility: CLINIC | Age: 52
End: 2022-09-16

## 2022-09-16 ENCOUNTER — OFFICE VISIT (OUTPATIENT)
Dept: NEUROLOGY | Facility: CLINIC | Age: 52
End: 2022-09-16

## 2022-09-16 VITALS
HEART RATE: 78 BPM | HEIGHT: 65 IN | WEIGHT: 248.6 LBS | SYSTOLIC BLOOD PRESSURE: 128 MMHG | OXYGEN SATURATION: 98 % | BODY MASS INDEX: 41.42 KG/M2 | DIASTOLIC BLOOD PRESSURE: 78 MMHG

## 2022-09-16 DIAGNOSIS — G44.40 MEDICATION OVERUSE HEADACHE: ICD-10-CM

## 2022-09-16 DIAGNOSIS — G43.109 MIGRAINE WITH AURA AND WITHOUT STATUS MIGRAINOSUS, NOT INTRACTABLE: Primary | ICD-10-CM

## 2022-09-16 PROCEDURE — 99215 OFFICE O/P EST HI 40 MIN: CPT | Performed by: NURSE PRACTITIONER

## 2022-09-16 NOTE — PROGRESS NOTES
"DOS: 2022  NAME: Rosa Melgoza   : 1970  PCP: Kathie Romeo DO    Chief Complaint   Patient presents with   • Migraine      SUBJECTIVE  Neurological Problem:  52 y.o. RHW female with Migraines, Chron's (gets infusions, not in remission), Hashimotos,  (), HTN who presents today for f/u of  Migraines. She is unaccompanied. Patient and problem are new to examiner. History is provided by patient and review of records that are summarized below.     Interval History:   Ms. Melgoza was initially evaluated by Dr. Vasquez in 2022 for long history of migraines. Review of records indicates she's had migraines since high school, typically aura in both visual fields, left retro-orbital that can radiate superiorly or holoscephalic, sharp,throbbing or achy. Sensitivity to light, sound and smell. Odors can be triggers at times.  She was treating with Fioricet and zofan.  An MRI brain w/wo was ordered that was unremarkable. She was tried on Zomig but had intolerable residual nasal drip with this. She was subsequently tried on Qulepta which was not effective.    Patient presents today and corroborates history as noted above and the medications listed that she has failed.   Patient tells me today that she has had a headache for about 19 days straight, but no consistently severe, variable in intensity, she did work today, pain at \"2/10\", \"not bad\"; she had to call in on Monday with \"8/10\" headache. She denies any focal symptoms or other neurologic changes with headaches. She denies any changes in nature or character of her migraines. 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 Also does not eat much during the days when she works d/t her Crohns.  Review of labs from 2022 shows a CMP that tis WNL; TSH 1.62;      Current preventive therapy: Aimovig   Current abortive treatment: Fioricet, " "Zofran  List of failed medications:   Aimovig has been helpful but still with more than 15 headaches days/month  Rescue: Relpax (heart palpitaitons), imitrex, nurtec, ubrelvy, zomig nasal spray (residual nasal dripping)  Preventive: Topamax (cognitive changes), Amitriptyline (weight gain), Qulepta (not effective), Magnesium and Riboflavin (can't take with crohns), Reyvow (not effective and can't drive for 12 hours)  Alternatives: Chiropractor, \"ice cap\", peppermint oil, accupuncture     Personal history of whiplash injuries.  Mom with history of migraines, sister with migraines,       Review of Systems:Review of Systems   Constitutional: Negative for activity change, appetite change, chills, diaphoresis, fatigue, fever and unexpected weight change.   HENT: Negative for congestion, dental problem, drooling, ear discharge, ear pain, facial swelling, hearing loss, mouth sores, nosebleeds, postnasal drip, rhinorrhea, sinus pressure, sinus pain, sneezing, sore throat, tinnitus, trouble swallowing and voice change.    Eyes: Negative for photophobia, pain, discharge, redness, itching and visual disturbance.   Musculoskeletal: Negative for arthralgias, back pain, gait problem, joint swelling, myalgias, neck pain and neck stiffness.   Neurological: Positive for headaches. Negative for dizziness, tremors, seizures, syncope, facial asymmetry, speech difficulty, weakness, light-headedness and numbness.   Psychiatric/Behavioral: Negative for agitation, behavioral problems, confusion, decreased concentration, dysphoric mood, hallucinations, self-injury, sleep disturbance and suicidal ideas. The patient is not nervous/anxious and is not hyperactive.     Above ROS reviewed    The following portions of the patient's history were reviewed and updated as appropriate: allergies, current medications, past family history, past medical history, past social history, past surgical history and problem list.    Current Medications:   Current " Outpatient Medications:   •  albuterol sulfate  (90 Base) MCG/ACT inhaler, Inhale 2 puffs Every 4 (Four) Hours As Needed for Wheezing., Disp: 6.7 g, Rfl: 0  •  B Complex-C-Folic Acid (STRESS B COMPLEX PO), , Disp: , Rfl:   •  butalbital-acetaminophen-caffeine (FIORICET, ESGIC) -40 MG per tablet, Take 1 tablet by mouth Every 6 (Six) Hours As Needed for Headache., Disp: 180 tablet, Rfl: 0  •  Cholecalciferol (VITAMIN D3 GUMMIES ADULT PO), , Disp: , Rfl:   •  Erenumab-aooe (Aimovig) 140 MG/ML prefilled syringe, Inject 1 mL under the skin into the appropriate area as directed Every 30 (Thirty) Days., Disp: 3 mL, Rfl: 1  •  escitalopram (LEXAPRO) 10 MG tablet, Take 1 tablet by mouth Daily., Disp: 90 tablet, Rfl: 3  •  fluticasone (FLONASE) 50 MCG/ACT nasal spray, Use 2 sprays into the nostril(s) as directed by provider Daily., Disp: 16 g, Rfl: 2  •  hydrOXYzine (ATARAX) 25 MG tablet, Take 1 tablet by mouth Every 8 (Eight) Hours As Needed for Anxiety., Disp: 90 tablet, Rfl: 0  •  Hyoscyamine Sulfate SL (Levsin/SL) 0.125 MG sublingual tablet, Place 1 tablet under the tongue Every 6 (Six) Hours As Needed (abdominal pain/cramping)., Disp: 60 each, Rfl: 0  •  levocetirizine (XYZAL) 5 MG tablet, Take 1 tablet by mouth Every Evening., Disp: 90 tablet, Rfl: 3  •  levothyroxine (SYNTHROID, LEVOTHROID) 88 MCG tablet, Take 1 tablet by mouth Daily., Disp: 90 tablet, Rfl: 1  •  lisinopril-hydrochlorothiazide (PRINZIDE,ZESTORETIC) 10-12.5 MG per tablet, Take 1 tablet by mouth Daily., Disp: 90 tablet, Rfl: 1  •  Multiple Vitamins-Minerals (MULTI ADULT GUMMIES PO), , Disp: , Rfl:   •  ondansetron (ZOFRAN) 4 MG tablet, Take 1 tablet by mouth Every 8 (Eight) Hours As Needed for Nausea or Vomiting., Disp: 60 tablet, Rfl: 0  •  pantoprazole (PROTONIX) 40 MG EC tablet, Take 1 tablet by mouth 2 (Two) Times a Day., Disp: 180 tablet, Rfl: 3  •  promethazine (PHENERGAN) 25 MG tablet, Take 1 tablet by mouth Every 6 (Six) Hours As  Needed for Nausea or Vomiting., Disp: 60 tablet, Rfl: 1  •  vedolizumab (Entyvio) 300 MG injection, , Disp: , Rfl:   •  Atogepant (Qulipta) 60 MG tablet, Take 1 tablet by mouth Daily for 30 days., Disp: 30 tablet, Rfl: 3  •  predniSONE (DELTASONE) 10 MG tablet, Take 20 mg (2 tablets) daily for 14 days then 1.5 tablets for 7 days then 1 tablet for 7 days then 0.5 tablets for 7 days then off, Disp: 35 tablet, Rfl: 1  •  ZOLMitriptan (Zomig) 5 MG nasal solution, 1 spray into the nostril(s) As Needed for Migraine for up to 30 days. May repeat x1 in 1 hour if headache recurs.  Max 2 sprays per 24 hours and 4/week., Disp: 6 each, Rfl: 4  **I did not stop or change the above medications.  Patient's medication list was updated to reflect medications they have reported as currently taking, including medication changes made by other providers.    OBJECTIVE  Vitals:    09/16/22 1457   BP: 128/78   Pulse: 78   SpO2: 98%     Body mass index is 41.37 kg/m².    Diagnostics:  MRI brain 7/26/22 as noted above    Laboratory Results:         Lab Results   Component Value Date    WBC 12.93 (H) 08/19/2022    HGB 12.9 08/19/2022    HCT 38.3 08/19/2022    MCV 89.1 08/19/2022     08/19/2022     Lab Results   Component Value Date    GLUCOSE 84 03/30/2022    BUN 10 03/30/2022    CREATININE 0.82 03/30/2022    EGFRIFNONA 77 04/14/2021    EGFRIFAFRI 93 04/14/2021    BCR 12 03/30/2022    K 4.0 03/30/2022    CO2 29 03/30/2022    CALCIUM 9.4 03/30/2022    PROTENTOTREF 6.8 03/30/2022    ALBUMIN 4.00 08/19/2022    LABIL2 1.5 03/30/2022    AST 15 08/19/2022    ALT 15 08/19/2022     No results found for: HGBA1C  No results found for: CHOL  Lab Results   Component Value Date    HDL 41 01/13/2020     Lab Results   Component Value Date     (H) 01/13/2020     Lab Results   Component Value Date    TRIG 223 (H) 01/13/2020     No results found for: RPR  Lab Results   Component Value Date    TSH 1.620 03/30/2022     No results found for:  [id number]    Physical Exam:  GENERAL: NAD, obese  HEENT: Normocephalic, atraumatic   COR: RRR  Resp: Even and unlabored  Extremities: No signs of distal embolization.   Skin: No rashes, lesions or ulcers.  Psychiatric: Normal mood and affect.    Neurological:   MS: AO. Language normal. No neglect. Follows all commands.  CN: II-XII grossly normal  Motor: Normal strength and tone throughout.  Sensory: Intact to light touch in arms and legs  Station and Gait: Normal gait and station.    Coordination: Normal finger to nose bilaterally    Impression/Plan: Ms. Melgoza was initially evaluated by Dr. Vasquez for long-time history of migraine headaches. She has an extensive list of failed medications, detailed above. She continues to have nearly daily headaches with variable intensity on Aimovig for prevention and using Fioricet several times a week. We reviewed the very few alternatives available for prevention, will trial emgality in lieu of Aimovig (samples given today along with instructions on use, indication, benefits, possible SEs).  She is interested in botox therapy given the many failed preventive agents. Will submit for approval.    In regards to rescue medication, long discussion regarding rebound headaches associated with use of Fioricet which I have strongly encouraged her to minimize use. As an alterative for moderate to severe headaches, will trial of Trudhesa, nasal spray. Samples given today.  Reviewed instructions on use, indications, risks, benefits and possible SEs.    She already has f/u with Dr. Vasquez scheduled that she will keep. She will let me know about samples given today, if effective, will send in scripts. In the interim, will submit for botox approval.       I spent a total of 55 minutes on day of visit in reviewing records, prior diagnostics, examination of patient as well as counseling and educating patient regarding diagnoses, symptoms, reviewing diagnostics with patient, pharmacologic  treatment options including purpose, risk, benefits, possible side-effects, recommendations, lifestyle modifications, coordination of care and documenting plan of care.        Diagnoses and all orders for this visit:    1. Migraine with aura and without status migrainosus, not intractable (Primary)    2. Medication overuse headache        Coding      Dictated using Dragon

## 2022-09-16 NOTE — TELEPHONE ENCOUNTER
Pharmacy Name:  [facility]    Pharmacy representative name: RALPH    Pharmacy representative phone number: [phone]    What medication are you calling in regards to: AIMOVIG    What question does the pharmacy have: AIMOVIG NEEDS A PA    Who is the provider that prescribed the medication: DR. FERRELL    Additional notes: N/A

## 2022-10-03 DIAGNOSIS — G43.719 INTRACTABLE CHRONIC MIGRAINE WITHOUT AURA AND WITHOUT STATUS MIGRAINOSUS: Primary | ICD-10-CM

## 2022-10-03 NOTE — TELEPHONE ENCOUNTER
Botox has been approved. LVM for pt to call back to get scheduled for botox. Please transfer to me. Thank you.  Medication sent to [facility] to be shipped to office. Thank you.

## 2022-10-05 ENCOUNTER — PROCEDURE VISIT (OUTPATIENT)
Dept: NEUROLOGY | Facility: CLINIC | Age: 52
End: 2022-10-05

## 2022-10-05 DIAGNOSIS — G43.109 MIGRAINE WITH AURA AND WITHOUT STATUS MIGRAINOSUS, NOT INTRACTABLE: Primary | ICD-10-CM

## 2022-10-05 PROCEDURE — 64615 CHEMODENERV MUSC MIGRAINE: CPT | Performed by: PSYCHIATRY & NEUROLOGY

## 2022-10-05 NOTE — PROGRESS NOTES
Botox injection: Botox injection for neuromuscular block.  Indication: Chronic migraines.  Risks, benefits and alternatives were discussed with the patient.  EMG guidance was not used in identifying the injection site.  Procerus: 5 units was injected.  : 5 units was injected on the right and 5 units injected on the left.  Frontalis: 10 units injected on the right and 10 units injected on the left.  Temporalis: 20 units injected on the right and 20 units injected on the left.  Occipitalis: 15 units injected on the right and 15 units injected on the left.  Cervical paraspinal: 10 units injected on the right and 10 units injected on the left.  Trapezius: 15 units injected on the right and 15 units injected on the left.  200 unit vial, 1 vials, 45 wasted and 155 used.  The patient tolerated the procedure well.  There were no complications.  Patient instructions: The patient was instructed that they may experience localized discomfort over the next 12 to 24 hours and may take over the counter pain medication if needed.  Follow-up in the office in 3 months for next Botox injection.

## 2022-10-06 RX ORDER — DIHYDROERGOTAMINE MESYLATE 4 MG/ML
0.72 SPRAY, METERED NASAL AS NEEDED
Qty: 8 ML | Refills: 2 | Status: SHIPPED | OUTPATIENT
Start: 2022-10-06 | End: 2023-01-17 | Stop reason: SDUPTHER

## 2022-10-18 ENCOUNTER — TELEPHONE (OUTPATIENT)
Dept: NEUROLOGY | Facility: CLINIC | Age: 52
End: 2022-10-18

## 2022-10-18 NOTE — TELEPHONE ENCOUNTER
Caller: ASHLEY WITH [facility]    Relationship:     Best call back number: [phone]    What medications are you currently taking:   Current Outpatient Medications on File Prior to Visit   Medication Sig Dispense Refill   • albuterol sulfate  (90 Base) MCG/ACT inhaler Inhale 2 puffs Every 4 (Four) Hours As Needed for Wheezing. 6.7 g 0   • B Complex-C-Folic Acid (STRESS B COMPLEX PO)      • butalbital-acetaminophen-caffeine (FIORICET, ESGIC) -40 MG per tablet Take 1 tablet by mouth Every 6 (Six) Hours As Needed for Headache. 180 tablet 0   • Cholecalciferol (VITAMIN D3 GUMMIES ADULT PO)      • Dihydroergotamine Mesylate HFA (Trudhesa) 0.725 MG/ACT aerosol solution 0.725 mg into the nostril(s) as directed by provider As Needed (Moderate to severe headache). One spray into each nostril, maybe repeated after 1 hour if needed, NO more than 2 doses in 24-hour period or 3 doses in 7-day period. 8 mL 2   • Erenumab-aooe (Aimovig) 140 MG/ML prefilled syringe Inject 1 mL under the skin into the appropriate area as directed Every 30 (Thirty) Days. 3 mL 1   • escitalopram (LEXAPRO) 10 MG tablet Take 1 tablet by mouth Daily. 90 tablet 3   • fluticasone (FLONASE) 50 MCG/ACT nasal spray Use 2 sprays into the nostril(s) as directed by provider Daily. 16 g 2   • hydrOXYzine (ATARAX) 25 MG tablet Take 1 tablet by mouth Every 8 (Eight) Hours As Needed for Anxiety. 90 tablet 0   • Hyoscyamine Sulfate SL (Levsin/SL) 0.125 MG sublingual tablet Place 1 tablet under the tongue Every 6 (Six) Hours As Needed (abdominal pain/cramping). 60 each 0   • levocetirizine (XYZAL) 5 MG tablet Take 1 tablet by mouth Every Evening. 90 tablet 3   • levothyroxine (SYNTHROID, LEVOTHROID) 88 MCG tablet Take 1 tablet by mouth Daily. 90 tablet 1   • lisinopril-hydrochlorothiazide (PRINZIDE,ZESTORETIC) 10-12.5 MG per tablet Take 1 tablet by mouth Daily. 90 tablet 1   • Multiple Vitamins-Minerals (MULTI ADULT GUMMIES PO)      •  OnabotulinumtoxinA 200 units reconstituted solution Inject IM up to 200 units in head and neck area q 12 weeks for Chronic Migraine 1 each 3   • ondansetron (ZOFRAN) 4 MG tablet Take 1 tablet by mouth Every 8 (Eight) Hours As Needed for Nausea or Vomiting. 60 tablet 0   • pantoprazole (PROTONIX) 40 MG EC tablet Take 1 tablet by mouth 2 (Two) Times a Day. 180 tablet 3   • predniSONE (DELTASONE) 10 MG tablet Take 20 mg (2 tablets) daily for 14 days then 1.5 tablets for 7 days then 1 tablet for 7 days then 0.5 tablets for 7 days then off 35 tablet 1   • promethazine (PHENERGAN) 25 MG tablet Take 1 tablet by mouth Every 6 (Six) Hours As Needed for Nausea or Vomiting. 60 tablet 1   • vedolizumab (Entyvio) 300 MG injection        No current facility-administered medications on file prior to visit.          When did you start taking these medications: N/A    Which medication are you concerned about: TRUDHESA    Who prescribed you this medication: THEO LOPEZ    What are your concerns: NEEDS PA    How long have you had these concerns: N/A

## 2022-10-25 DIAGNOSIS — J30.1 SEASONAL ALLERGIC RHINITIS DUE TO POLLEN: ICD-10-CM

## 2022-10-25 DIAGNOSIS — F43.22 ADJUSTMENT DISORDER WITH ANXIOUS MOOD: ICD-10-CM

## 2022-10-25 DIAGNOSIS — R10.13 EPIGASTRIC PAIN: ICD-10-CM

## 2022-10-25 RX ORDER — HYOSCYAMINE SULFATE 0.12 MG/1
1 TABLET SUBLINGUAL EVERY 6 HOURS PRN
Qty: 60 EACH | Refills: 3 | Status: SHIPPED | OUTPATIENT
Start: 2022-10-25

## 2022-10-25 RX ORDER — FLUTICASONE PROPIONATE 50 MCG
2 SPRAY, SUSPENSION (ML) NASAL DAILY
Qty: 16 G | Refills: 6 | Status: SHIPPED | OUTPATIENT
Start: 2022-10-25

## 2022-10-25 RX ORDER — HYDROXYZINE HYDROCHLORIDE 25 MG/1
25 TABLET, FILM COATED ORAL EVERY 8 HOURS PRN
Qty: 90 TABLET | Refills: 1 | Status: SHIPPED | OUTPATIENT
Start: 2022-10-25

## 2022-10-25 RX ORDER — ONDANSETRON 4 MG/1
4 TABLET, FILM COATED ORAL EVERY 8 HOURS PRN
Qty: 60 TABLET | Refills: 2 | Status: SHIPPED | OUTPATIENT
Start: 2022-10-25

## 2022-11-08 DIAGNOSIS — G43.109 MIGRAINE WITH AURA AND WITHOUT STATUS MIGRAINOSUS, NOT INTRACTABLE: ICD-10-CM

## 2022-11-08 RX ORDER — BUTALBITAL, ACETAMINOPHEN AND CAFFEINE 50; 325; 40 MG/1; MG/1; MG/1
1 TABLET ORAL EVERY 6 HOURS PRN
Qty: 180 TABLET | Refills: 0 | Status: SHIPPED | OUTPATIENT
Start: 2022-11-08 | End: 2023-01-12 | Stop reason: SDUPTHER

## 2022-11-18 ENCOUNTER — TELEPHONE (OUTPATIENT)
Dept: NEUROLOGY | Facility: CLINIC | Age: 52
End: 2022-11-18

## 2022-11-18 NOTE — TELEPHONE ENCOUNTER
Provider: IVANA FERRELL MD    Caller: RALPH      Relationship to Patient: PHARMACY    Pharmacy: [facility]    Phone Number: [phone]    Reason for Call: STATED THE EMGALITY NEEDS A PA.      PLEASE ADVISE

## 2022-11-22 NOTE — TELEPHONE ENCOUNTER
Provider: IVANA FERRELL MD    Caller: SO    Relationship to Patient: [facility]    Phone Number: [phone]      Reason for Call: CALLING REGARDING PA THEY RECEIVED.  STATED THEY HAVE A FEW QUESTIONS.    1) WILL THE PT BE USING THE EMGALITY WITH ANOTHER CGRP AGENT FOR PROPHYLAXIS?    2) VERIFY PT DOES NOT HAVE ANY CONTRA INDICATION TO USING EMGALITY?    PLEASE ADVISE

## 2022-11-22 NOTE — TELEPHONE ENCOUNTER
Caller: SO    Relationship: PHARMACY      What was the call regarding: CANCELLING REQUEST FOR MORE INFO.  Do you require a callback: NO

## 2022-12-09 ENCOUNTER — HOSPITAL ENCOUNTER (OUTPATIENT)
Dept: CT IMAGING | Facility: HOSPITAL | Age: 52
Discharge: HOME | End: 2022-12-09
Admitting: FAMILY MEDICINE

## 2022-12-09 DIAGNOSIS — R91.1 PULMONARY NODULE, LEFT: ICD-10-CM

## 2022-12-09 PROCEDURE — 71250 CT THORAX DX C-: CPT

## 2023-01-05 ENCOUNTER — PROCEDURE VISIT (OUTPATIENT)
Dept: NEUROLOGY | Facility: CLINIC | Age: 53
End: 2023-01-05
Payer: COMMERCIAL

## 2023-01-05 DIAGNOSIS — G43.719 INTRACTABLE CHRONIC MIGRAINE WITHOUT AURA AND WITHOUT STATUS MIGRAINOSUS: Primary | ICD-10-CM

## 2023-01-05 PROCEDURE — 64615 CHEMODENERV MUSC MIGRAINE: CPT | Performed by: PSYCHIATRY & NEUROLOGY

## 2023-01-09 ENCOUNTER — TELEPHONE (OUTPATIENT)
Dept: GASTROENTEROLOGY | Facility: CLINIC | Age: 53
End: 2023-01-09
Payer: COMMERCIAL

## 2023-01-09 NOTE — TELEPHONE ENCOUNTER
----- Message from Rosa Melgoza sent at 1/7/2023  6:44 PM EST -----  Regarding: Mena  Contact: [phone]  Brad Holman, I am not sure how to get a message to the person who helps with this, but I need to make sure I am current with the [employer] program.  I think someone in your office helped get me signed up with them to start so I dont know if you all need to send anything to update them or not.      Thanks so much  Rosa Melgoza

## 2023-01-10 NOTE — TELEPHONE ENCOUNTER
Stephanie states patient is up to date with the co pay card program. I called patient and advised.

## 2023-01-11 ENCOUNTER — OFFICE VISIT (OUTPATIENT)
Dept: FAMILY MEDICINE CLINIC | Facility: CLINIC | Age: 53
End: 2023-01-11
Payer: COMMERCIAL

## 2023-01-11 VITALS
OXYGEN SATURATION: 98 % | HEIGHT: 65 IN | WEIGHT: 240.4 LBS | BODY MASS INDEX: 40.05 KG/M2 | DIASTOLIC BLOOD PRESSURE: 80 MMHG | TEMPERATURE: 97.6 F | SYSTOLIC BLOOD PRESSURE: 124 MMHG | HEART RATE: 84 BPM

## 2023-01-11 DIAGNOSIS — Z79.899 ENCOUNTER FOR LONG-TERM (CURRENT) USE OF MEDICATIONS: ICD-10-CM

## 2023-01-11 DIAGNOSIS — E03.8 HYPOTHYROIDISM DUE TO HASHIMOTO'S THYROIDITIS: ICD-10-CM

## 2023-01-11 DIAGNOSIS — E06.3 HYPOTHYROIDISM DUE TO HASHIMOTO'S THYROIDITIS: ICD-10-CM

## 2023-01-11 DIAGNOSIS — F43.22 ADJUSTMENT DISORDER WITH ANXIOUS MOOD: ICD-10-CM

## 2023-01-11 DIAGNOSIS — G43.109 MIGRAINE WITH AURA AND WITHOUT STATUS MIGRAINOSUS, NOT INTRACTABLE: ICD-10-CM

## 2023-01-11 DIAGNOSIS — K21.9 GASTROESOPHAGEAL REFLUX DISEASE WITHOUT ESOPHAGITIS: Primary | ICD-10-CM

## 2023-01-11 DIAGNOSIS — I10 ESSENTIAL HYPERTENSION: ICD-10-CM

## 2023-01-11 PROCEDURE — 99214 OFFICE O/P EST MOD 30 MIN: CPT | Performed by: FAMILY MEDICINE

## 2023-01-11 NOTE — PROGRESS NOTES
"Chief Complaint  Hypothyroidism    Subjective        Rosa Melgoza presents to [facility]  History of Present Illness  Patient is here to follow-up on her chronic health conditions:    Migraine with aura.  Patient is currently seeing neurology.  She has started Botox injections.  She is also taking Emgality and Tudhesa nasal spray.  She feels that since the Botox injections she has had some improvement of her migraines.  She will takes Fioricet as needed.     GERD.  The patient takes pantoprazole 40 mg daily.  She denies any symptoms of reflux currently.  She denies any side effects of medication.    Hypothyroidism.  The patient currently takes levothyroxine 88 mcg daily.  Patient feels her thyroid is in range.  No side effects of medication.    Hypertension.  The patient takes lisinopril/ hydrochlorothiazide 10/12.5 for blood pressure.  She denies any side effects and states that her blood pressures have been good.    Anxiety and depression.  The patient takes Lexapro 10 mg daily.  Doing well.         Objective   Vital Signs:  /80   Pulse 84   Temp 97.6 °F (36.4 °C)   Ht 165.1 cm (65\")   Wt 109 kg (240 lb 6.4 oz)   SpO2 98%   BMI 40.00 kg/m²   Estimated body mass index is 40 kg/m² as calculated from the following:    Height as of this encounter: 165.1 cm (65\").    Weight as of this encounter: 109 kg (240 lb 6.4 oz).             Physical Exam  Vitals and nursing note reviewed.   Constitutional:       Appearance: She is well-developed.   HENT:      Head: Normocephalic and atraumatic.      Right Ear: External ear normal.      Left Ear: External ear normal.      Nose: Nose normal.   Eyes:      General: No scleral icterus.     Conjunctiva/sclera: Conjunctivae normal.   Cardiovascular:      Rate and Rhythm: Normal rate and regular rhythm.      Heart sounds: Normal heart sounds.   Pulmonary:      Effort: Pulmonary effort is normal.      Breath sounds: Normal breath sounds. "   Musculoskeletal:      Cervical back: Normal range of motion and neck supple.   Lymphadenopathy:      Cervical: No cervical adenopathy.   Skin:     General: Skin is warm and dry.      Findings: No rash.   Neurological:      Mental Status: She is alert and oriented to person, place, and time.   Psychiatric:         Mood and Affect: Mood normal.         Behavior: Behavior normal.         Thought Content: Thought content normal.         Judgment: Judgment normal.        Result Review :  The following data was reviewed by: Kathie Romeo DO on 01/11/2023:  Common labs    Common Labs 3/22/22 3/22/22 3/30/22 8/19/22 8/19/22    1620 1620  1251 1251   Glucose  110 (A) 84     BUN  5 (A) 10     Creatinine  1.00 0.82     Sodium  141 142     Potassium  3.0 (A) 4.0     Chloride  101 98     Calcium  9.1 9.4     Total Protein   6.8     Albumin  4.30 4.1  4.00   Total Bilirubin  0.3 <0.2  0.2   Alkaline Phosphatase  93 90  89   AST (SGOT)  29 9  15   ALT (SGPT)  38 (A) 31  15   WBC 9.67   12.93 (A)    Hemoglobin 12.6   12.9    Hematocrit 37.3   38.3    Platelets 224   233    (A) Abnormal value            TSH    TSH 3/30/22   TSH 1.620                        Assessment and Plan   Diagnoses and all orders for this visit:    1. Gastroesophageal reflux disease without esophagitis (Primary)    2. Essential hypertension  -     Comprehensive Metabolic Panel    3. Migraine with aura and without status migrainosus, not intractable    4. Adjustment disorder with anxious mood    5. Hypothyroidism due to Hashimoto's thyroiditis  -     TSH    6. Encounter for long-term (current) use of medications  -     Comprehensive Metabolic Panel  -     CBC & Differential  -     TSH  -     Lipid Panel      Patient is here today to follow-up on chronic stable hypothyroidism, adjustment disorder, hypertension, GERD, and migraines.  Since she is currently seeing neurology for her migraine headaches I have advised her to ask them to take over the  prescription for Fioricet.  Surveillance labs were obtained today and any medication changes will be made based on lab results and will be called to the patient later this week.    Answers for HPI/ROS submitted by the patient on 1/10/2023  Please describe your symptoms.: thyroid check up.  Have you had these symptoms before?: Yes  How long have you been having these symptoms?: Greater than 2 weeks  Please list any medications you are currently taking for this condition.: levothyroxine  Please describe any probable cause for these symptoms. : hypothyroidism  What is the primary reason for your visit?: Other           Follow Up   Return in about 21 weeks (around 6/7/2023) for Annual physical, HTN.  Patient was given instructions and counseling regarding her condition or for health maintenance advice. Please see specific information pulled into the AVS if appropriate.

## 2023-01-12 ENCOUNTER — PATIENT MESSAGE (OUTPATIENT)
Dept: FAMILY MEDICINE CLINIC | Facility: CLINIC | Age: 53
End: 2023-01-12
Payer: COMMERCIAL

## 2023-01-12 DIAGNOSIS — G43.109 MIGRAINE WITH AURA AND WITHOUT STATUS MIGRAINOSUS, NOT INTRACTABLE: ICD-10-CM

## 2023-01-12 DIAGNOSIS — E06.3 HYPOTHYROIDISM DUE TO HASHIMOTO'S THYROIDITIS: ICD-10-CM

## 2023-01-12 DIAGNOSIS — E03.8 HYPOTHYROIDISM DUE TO HASHIMOTO'S THYROIDITIS: ICD-10-CM

## 2023-01-12 DIAGNOSIS — Z79.899 ENCOUNTER FOR LONG-TERM (CURRENT) USE OF MEDICATIONS: ICD-10-CM

## 2023-01-12 DIAGNOSIS — E78.2 MIXED HYPERLIPIDEMIA: Primary | ICD-10-CM

## 2023-01-12 LAB
ALBUMIN SERPL-MCNC: 4 G/DL (ref 3.8–4.9)
ALBUMIN/GLOB SERPL: 1.4 {RATIO} (ref 1.2–2.2)
ALP SERPL-CCNC: 93 IU/L (ref 44–121)
ALT SERPL-CCNC: 13 IU/L (ref 0–32)
AST SERPL-CCNC: 6 IU/L (ref 0–40)
BASOPHILS # BLD AUTO: 0 X10E3/UL (ref 0–0.2)
BASOPHILS NFR BLD AUTO: 0 %
BILIRUB SERPL-MCNC: <0.2 MG/DL (ref 0–1.2)
BUN SERPL-MCNC: 10 MG/DL (ref 6–24)
BUN/CREAT SERPL: 11 (ref 9–23)
CALCIUM SERPL-MCNC: 9.1 MG/DL (ref 8.7–10.2)
CHLORIDE SERPL-SCNC: 103 MMOL/L (ref 96–106)
CHOLEST SERPL-MCNC: 271 MG/DL (ref 100–199)
CO2 SERPL-SCNC: 25 MMOL/L (ref 20–29)
CREAT SERPL-MCNC: 0.89 MG/DL (ref 0.57–1)
EGFRCR SERPLBLD CKD-EPI 2021: 78 ML/MIN/1.73
EOSINOPHIL # BLD AUTO: 0.2 X10E3/UL (ref 0–0.4)
EOSINOPHIL NFR BLD AUTO: 2 %
ERYTHROCYTE [DISTWIDTH] IN BLOOD BY AUTOMATED COUNT: 12.8 % (ref 11.7–15.4)
GLOBULIN SER CALC-MCNC: 2.8 G/DL (ref 1.5–4.5)
GLUCOSE SERPL-MCNC: 98 MG/DL (ref 70–99)
HCT VFR BLD AUTO: 36.8 % (ref 34–46.6)
HDLC SERPL-MCNC: 43 MG/DL
HGB BLD-MCNC: 12.3 G/DL (ref 11.1–15.9)
IMM GRANULOCYTES # BLD AUTO: 0 X10E3/UL (ref 0–0.1)
IMM GRANULOCYTES NFR BLD AUTO: 0 %
LABORATORY COMMENT REPORT: ABNORMAL
LDLC SERPL CALC-MCNC: 192 MG/DL (ref 0–99)
LYMPHOCYTES # BLD AUTO: 2 X10E3/UL (ref 0.7–3.1)
LYMPHOCYTES NFR BLD AUTO: 25 %
MCH RBC QN AUTO: 28.9 PG (ref 26.6–33)
MCHC RBC AUTO-ENTMCNC: 33.4 G/DL (ref 31.5–35.7)
MCV RBC AUTO: 86 FL (ref 79–97)
MONOCYTES # BLD AUTO: 0.4 X10E3/UL (ref 0.1–0.9)
MONOCYTES NFR BLD AUTO: 6 %
NEUTROPHILS # BLD AUTO: 5.3 X10E3/UL (ref 1.4–7)
NEUTROPHILS NFR BLD AUTO: 67 %
PLATELET # BLD AUTO: 221 X10E3/UL (ref 150–450)
POTASSIUM SERPL-SCNC: 3.7 MMOL/L (ref 3.5–5.2)
PROT SERPL-MCNC: 6.8 G/DL (ref 6–8.5)
RBC # BLD AUTO: 4.26 X10E6/UL (ref 3.77–5.28)
SODIUM SERPL-SCNC: 143 MMOL/L (ref 134–144)
TRIGL SERPL-MCNC: 191 MG/DL (ref 0–149)
TSH SERPL DL<=0.005 MIU/L-ACNC: 1.97 UIU/ML (ref 0.45–4.5)
VLDLC SERPL CALC-MCNC: 36 MG/DL (ref 5–40)
WBC # BLD AUTO: 8 X10E3/UL (ref 3.4–10.8)

## 2023-01-12 RX ORDER — PROMETHAZINE HYDROCHLORIDE 25 MG/1
25 TABLET ORAL EVERY 6 HOURS PRN
Qty: 60 TABLET | Refills: 1 | Status: SHIPPED | OUTPATIENT
Start: 2023-01-12

## 2023-01-12 RX ORDER — LEVOTHYROXINE SODIUM 88 UG/1
88 TABLET ORAL DAILY
Qty: 90 TABLET | Refills: 1 | Status: SHIPPED | OUTPATIENT
Start: 2023-01-12

## 2023-01-12 RX ORDER — BUTALBITAL, ACETAMINOPHEN AND CAFFEINE 50; 325; 40 MG/1; MG/1; MG/1
1 TABLET ORAL EVERY 6 HOURS PRN
Qty: 180 TABLET | Refills: 0 | Status: CANCELLED | OUTPATIENT
Start: 2023-01-12

## 2023-01-12 RX ORDER — BUTALBITAL, ACETAMINOPHEN AND CAFFEINE 50; 325; 40 MG/1; MG/1; MG/1
1 TABLET ORAL EVERY 6 HOURS PRN
Qty: 180 TABLET | Refills: 0 | Status: SHIPPED | OUTPATIENT
Start: 2023-01-12

## 2023-01-12 RX ORDER — ATORVASTATIN CALCIUM 10 MG/1
10 TABLET, FILM COATED ORAL DAILY
Qty: 30 TABLET | Refills: 3 | Status: SHIPPED | OUTPATIENT
Start: 2023-01-12

## 2023-01-12 NOTE — TELEPHONE ENCOUNTER
From: Rosa Melgoza  To: Kathie Romeo  Sent: 1/12/2023 9:45 AM EST  Subject: fioricet    I went ahead and put in for one refill on my fioricet in case it takes a few to get it switched over. Thats one I am always worried to run out of since I only get 8 trudhesa a month, then fioricet is my only other abortive. Can you ok the refill this time and I will message Nicolette to ask Dr Vasquez to do it from now on.     thanks,  Rosa

## 2023-01-12 NOTE — TELEPHONE ENCOUNTER
From: Rosa Melgoza  To: Kathie Romeo  Sent: 1/12/2023 2:14 PM EST  Subject: Question regarding LIPID PANEL    I just double checked w my mom and she said we have family hx of high cholesterol on both her side and my dads. so apparently I got allllll the good genes lol. I told Christine that with my crohns limiting what I am able to eat so much already, I dont know that I could change my diet enough to help without causing my crohns to flare.   thanks,  Rosa

## 2023-01-17 RX ORDER — DIHYDROERGOTAMINE MESYLATE 4 MG/ML
0.72 SPRAY, METERED NASAL AS NEEDED
Qty: 8 ML | Refills: 2 | Status: SHIPPED | OUTPATIENT
Start: 2023-01-17 | End: 2023-01-17 | Stop reason: SDUPTHER

## 2023-01-17 RX ORDER — DIHYDROERGOTAMINE MESYLATE 4 MG/ML
0.72 SPRAY, METERED NASAL AS NEEDED
Qty: 8 ML | Refills: 2 | Status: SHIPPED | OUTPATIENT
Start: 2023-01-17

## 2023-01-25 ENCOUNTER — PATIENT MESSAGE (OUTPATIENT)
Dept: FAMILY MEDICINE CLINIC | Facility: CLINIC | Age: 53
End: 2023-01-25
Payer: COMMERCIAL

## 2023-01-25 NOTE — TELEPHONE ENCOUNTER
From: Rosa Melgoza  To: Kathie Romeo  Sent: 1/25/2023 2:54 AM EST  Subject: Cmp    Do I need to schedule an appt w you for the cmp or just come in?     thanks,  Rosa

## 2023-02-08 DIAGNOSIS — I10 ESSENTIAL HYPERTENSION: ICD-10-CM

## 2023-02-08 RX ORDER — LISINOPRIL AND HYDROCHLOROTHIAZIDE 12.5; 1 MG/1; MG/1
1 TABLET ORAL DAILY
Qty: 90 TABLET | Refills: 1 | Status: SHIPPED | OUTPATIENT
Start: 2023-02-08

## 2023-02-15 ENCOUNTER — TELEPHONE (OUTPATIENT)
Dept: NEUROLOGY | Facility: CLINIC | Age: 53
End: 2023-02-15
Payer: COMMERCIAL

## 2023-03-14 ENCOUNTER — TELEPHONE (OUTPATIENT)
Dept: NEUROLOGY | Facility: CLINIC | Age: 53
End: 2023-03-14
Payer: COMMERCIAL

## 2023-03-31 ENCOUNTER — OFFICE VISIT (OUTPATIENT)
Dept: NEUROLOGY | Facility: CLINIC | Age: 53
End: 2023-03-31
Payer: COMMERCIAL

## 2023-03-31 VITALS
DIASTOLIC BLOOD PRESSURE: 76 MMHG | WEIGHT: 241 LBS | BODY MASS INDEX: 40.15 KG/M2 | HEIGHT: 65 IN | SYSTOLIC BLOOD PRESSURE: 126 MMHG | OXYGEN SATURATION: 96 % | HEART RATE: 86 BPM

## 2023-03-31 DIAGNOSIS — G43.719 INTRACTABLE CHRONIC MIGRAINE WITHOUT AURA AND WITHOUT STATUS MIGRAINOSUS: Primary | ICD-10-CM

## 2023-03-31 DIAGNOSIS — G43.109 MIGRAINE WITH AURA AND WITHOUT STATUS MIGRAINOSUS, NOT INTRACTABLE: ICD-10-CM

## 2023-03-31 PROCEDURE — 99212 OFFICE O/P EST SF 10 MIN: CPT | Performed by: PSYCHIATRY & NEUROLOGY

## 2023-03-31 NOTE — PROGRESS NOTES
Chief Complaint   Patient presents with   • Migraine       Patient ID: Rosa Melgoza is a 52 y.o. female.    HPI: I had the pleasure of seeing your patient again today.  As you may know she is a 52-year-old female who for the management of migraine headaches.  She has had good results with Botox therapy.  She is also receiving Emgality injections q. monthly.  She does use TriNessa abortively and occasional Fioricet.  She takes Zofran for nausea.  She reports 10 or less days of headaches within the last 30 days.  The second Botox injection series seems to have been quite helpful for her.  She denies any new onset focal weakness or numbness of her arms or legs.  No double vision or loss of vision.  No slurred speech or other new neuro symptoms.  The following portions of the patient's history were reviewed and updated as appropriate: allergies, current medications, past family history, past medical history, past social history, past surgical history and problem list.    Review of Systems   Eyes: Positive for photophobia and visual disturbance. Negative for redness.   Gastrointestinal: Positive for nausea. Negative for diarrhea and vomiting.   Musculoskeletal: Positive for neck pain and neck stiffness. Negative for back pain.   Neurological: Positive for dizziness and headaches. Negative for tremors, seizures, syncope, facial asymmetry, speech difficulty, weakness, light-headedness and numbness.      I have reviewed the review of systems above performed by my medical assistant.      Vitals:    03/31/23 1121   BP: 126/76   Pulse: 86   SpO2: 96%       Neurologic Exam     Mental Status   Oriented to person, place, and time.   Concentration: normal.   Level of consciousness: alert  Knowledge: consistent with education (No deficits found.).     Cranial Nerves     CN II   Visual fields full to confrontation.     CN III, IV, VI   Pupils are equal, round, and reactive to light.  Extraocular motions are normal.   CN III: no CN  III palsy  CN VI: no CN VI palsy    CN V   Facial sensation intact.     CN VII   Facial expression full, symmetric.     CN VIII   CN VIII normal.     CN IX, X   CN IX normal.   CN X normal.     CN XI   CN XI normal.     CN XII   CN XII normal.     Motor Exam     Strength   Right neck flexion: 5/5  Left neck flexion: 5/5  Right neck extension: 5/5  Left neck extension: 5/5  Right deltoid: 5/5  Left deltoid: 5/5  Right biceps: 5/5  Left biceps: 5/5  Right triceps: 5/5  Left triceps: 5/5  Right wrist flexion: 5/5  Left wrist flexion: 5/5  Right wrist extension: 5/5  Left wrist extension: 5/5  Right interossei: 5/5  Left interossei: 5/5  Right abdominals: 5/5  Left abdominals: 5/5  Right iliopsoas: 5/5  Left iliopsoas: 5/5  Right quadriceps: 5/5  Left quadriceps: 5/5  Right hamstrin/5  Left hamstrin/5  Right glutei: 5/5  Left glutei: 5/5  Right anterior tibial: 5/5  Left anterior tibial: 5/5  Right posterior tibial: 5/5  Left posterior tibial: 5/5  Right peroneal: 5/5  Left peroneal: 5/5  Right gastroc: 5/5  Left gastroc: 5/5    Sensory Exam   Light touch normal.   Vibration normal.     Gait, Coordination, and Reflexes     Gait  Gait: normal    Reflexes   Right brachioradialis: 2+  Left brachioradialis: 2+  Right biceps: 2+  Left biceps: 2+  Right triceps: 2+  Left triceps: 2+  Right patellar: 2+  Left patellar: 2+  Right achilles: 2+  Left achilles: 2+  Right : 2+  Left : 2+Station is normal.       Physical Exam  Vitals reviewed.   Constitutional:       Appearance: She is well-developed.   HENT:      Head: Normocephalic and atraumatic.   Eyes:      Extraocular Movements: EOM normal.      Pupils: Pupils are equal, round, and reactive to light.   Cardiovascular:      Rate and Rhythm: Normal rate and regular rhythm.   Pulmonary:      Breath sounds: Normal breath sounds.   Musculoskeletal:         General: Normal range of motion.   Skin:     General: Skin is warm.   Neurological:      Mental Status: She is  oriented to person, place, and time.      Gait: Gait is intact.      Deep Tendon Reflexes:      Reflex Scores:       Tricep reflexes are 2+ on the right side and 2+ on the left side.       Bicep reflexes are 2+ on the right side and 2+ on the left side.       Brachioradialis reflexes are 2+ on the right side and 2+ on the left side.       Patellar reflexes are 2+ on the right side and 2+ on the left side.       Achilles reflexes are 2+ on the right side and 2+ on the left side.        Procedures    Assessment/Plan: We will continue with the Botox therapy as scheduled.  Continue current medication regimen and we will see her back for her next injection series soon.  A total of 15 minutes was spent face-to-face with the patient today.  Of that greater than 50% of this time was spent discussing signs and symptoms of migraine headaches, patient education, plan of care and prognosis.       Diagnoses and all orders for this visit:    1. Intractable chronic migraine without aura and without status migrainosus (Primary)    2. Migraine with aura and without status migrainosus, not intractable           Nemesio Vasquez II, MD

## 2023-04-10 ENCOUNTER — PATIENT MESSAGE (OUTPATIENT)
Dept: FAMILY MEDICINE CLINIC | Facility: CLINIC | Age: 53
End: 2023-04-10
Payer: COMMERCIAL

## 2023-04-10 DIAGNOSIS — R91.1 PULMONARY NODULE, LEFT: Primary | ICD-10-CM

## 2023-04-11 NOTE — TELEPHONE ENCOUNTER
From: Rosa Melgoza  To: Kathie Romeo  Sent: 4/10/2023 6:59 PM EDT  Subject: CT chest    It is time for my follow up CT for my lung nodules. The hospital sent me a letter. Can you go ahead and send in an order for the CT so I can get it scheduled.     Thanks,    Rosa

## 2023-04-12 ENCOUNTER — TELEPHONE (OUTPATIENT)
Dept: GASTROENTEROLOGY | Facility: CLINIC | Age: 53
End: 2023-04-12
Payer: COMMERCIAL

## 2023-04-12 NOTE — TELEPHONE ENCOUNTER
----- Message from Angella Estes RN sent at 4/12/2023  8:15 AM EDT -----  Regarding: FW: Update from email  Contact: [phone]    ----- Message -----  From: Rosa Melgoza  Sent: 4/11/2023   6:27 PM EDT  [facility]  Subject: Update from email                                I am feeling better off and on.  I keep having that intermitten pain in the upper middle of my abdomen thay I had before, nausea, fatigue, joint pain.  No vomiting as of yet, and the watery stools have started to thicken back up.  Should I schedule with you ir Dr Chapa?  I have 100% trust in you and am fine to keep seeing you if that's ok.  Dr Chapa is nice and all, I just haven't seen him much so more comfy with you.  I had an infusion last weekend.      Thanks   Rosa

## 2023-04-12 NOTE — TELEPHONE ENCOUNTER
Can you call patient today and see if she can come in tomorrow?  Come down to 13 patients.  Otherwise see what kind of openings I have for her.

## 2023-04-13 ENCOUNTER — OFFICE VISIT (OUTPATIENT)
Dept: GASTROENTEROLOGY | Facility: CLINIC | Age: 53
End: 2023-04-13
Payer: COMMERCIAL

## 2023-04-13 VITALS
SYSTOLIC BLOOD PRESSURE: 116 MMHG | BODY MASS INDEX: 40.65 KG/M2 | HEART RATE: 87 BPM | TEMPERATURE: 96.6 F | HEIGHT: 65 IN | WEIGHT: 244 LBS | DIASTOLIC BLOOD PRESSURE: 78 MMHG

## 2023-04-13 DIAGNOSIS — K21.00 GASTROESOPHAGEAL REFLUX DISEASE WITH ESOPHAGITIS WITHOUT HEMORRHAGE: ICD-10-CM

## 2023-04-13 DIAGNOSIS — K50.00 CROHN'S ILEITIS, WITHOUT COMPLICATIONS: Primary | ICD-10-CM

## 2023-04-13 PROCEDURE — 99214 OFFICE O/P EST MOD 30 MIN: CPT | Performed by: PHYSICIAN ASSISTANT

## 2023-04-13 NOTE — PROGRESS NOTES
"Chief Complaint  Crohn's Disease    Subjective          History of Present Illness    Rosa Melgoza is a  52 y.o. female presents for follow-up on Crohn's disease.  She is a patient of Dr. Garza last seen by me on 3/28/2022.    She was started on Entyvio around May 2023 after CT scan showed active inflammation in the distal small bowel.  At that time she was having mid abdominal discomfort, nausea, and copious diarrhea.  Since that time she had been doing much better on Entyvio every 8 weeks until very recently.  She reports intermittent upper to mid abdominal pain, nausea, fatigue, and joint pain similar to previous flares. Pain moved to the RLQ and woke her up at night once. Symptoms started 1 week ago.  Some watery stools but they have thickened back up some.  3 bowel movements yesterday but none today.  Denies vomiting, melena, hematochezia, abnormal weight loss, fever.    Failed Pentasa.    Takes Protonix 40 mg daily for her GERD.  Denies dyspepsia, reflux, dysphagia.  She feels she is well controlled on PPI.    From 3/28/2022 note:  5/20/21 IBD serology consistent with Crohn's disease.     9/24/20 EGD: Medium-sized hiatal hernia, LA Grade A esophagitis, otherwise normal. Path with gastritis, negative H. Pylori.  9/24/20 Colonoscopy: Diverticulosis otherwise normal. Colon biopsies were normal, recall 10 years     Objective   Vital Signs:   /78   Pulse 87   Temp 96.6 °F (35.9 °C)   Ht 165.1 cm (65\")   Wt 111 kg (244 lb)   BMI 40.60 kg/m²       Physical Exam  Vitals reviewed.   Constitutional:       General: She is awake. She is not in acute distress.     Appearance: Normal appearance. She is well-developed and well-groomed.   HENT:      Head: Normocephalic.   Pulmonary:      Effort: Pulmonary effort is normal. No respiratory distress.   Skin:     Coloration: Skin is not pale.   Neurological:      Mental Status: She is alert and oriented to person, place, and time.      Gait: Gait is intact. "   Psychiatric:         Mood and Affect: Mood and affect normal.         Speech: Speech normal.         Behavior: Behavior is cooperative.         Judgment: Judgment normal.          Result Review :             Assessment and Plan    Diagnoses and all orders for this visit:    1. Crohn's ileitis, without complications (Primary)  -     CBC & Differential  -     Comprehensive Metabolic Panel  -     C-reactive Protein  -     QuantiFERON TB Gold  -     Sedimentation Rate  -     Hepatitis B Virus (HBV) Screening and Diagnosis  -     Hepatitis B DNA, Quantitative, PCR  -     Calprotectin, Fecal - Stool, Per Rectum    2. Gastroesophageal reflux disease with esophagitis without hemorrhage    Recommend proceeding with blood work as above.  She will be due her yearly TB gold and hep B so we will check those as well.  We will check inflammatory markers including fecal calprotectin for evidence of inflammation.  May consider course of prednisone or budesonide depending on results.    Would recommend considering Entyvio levels and antibodies prior to her next infusion which is due on May 27.  Last Entyvio infusion on April 1.    May need CT scan and/or colonoscopy for further evaluation of possible Crohn's flare.    Follow Up   Return for Follow-up based on test results.    Dragon dictation used throughout this note.     Manda Butler PA-C

## 2023-04-14 ENCOUNTER — LAB (OUTPATIENT)
Dept: GASTROENTEROLOGY | Facility: CLINIC | Age: 53
End: 2023-04-14
Payer: COMMERCIAL

## 2023-04-15 LAB
ALBUMIN SERPL-MCNC: 4 G/DL (ref 3.8–4.9)
ALBUMIN/GLOB SERPL: 1.6 {RATIO} (ref 1.2–2.2)
ALP SERPL-CCNC: 97 IU/L (ref 44–121)
ALT SERPL-CCNC: 17 IU/L (ref 0–32)
AST SERPL-CCNC: 11 IU/L (ref 0–40)
BASOPHILS # BLD AUTO: 0 X10E3/UL (ref 0–0.2)
BASOPHILS NFR BLD AUTO: 0 %
BILIRUB SERPL-MCNC: <0.2 MG/DL (ref 0–1.2)
BUN SERPL-MCNC: 14 MG/DL (ref 6–24)
BUN/CREAT SERPL: 14 (ref 9–23)
CALCIUM SERPL-MCNC: 9.1 MG/DL (ref 8.7–10.2)
CHLORIDE SERPL-SCNC: 103 MMOL/L (ref 96–106)
CO2 SERPL-SCNC: 29 MMOL/L (ref 20–29)
CREAT SERPL-MCNC: 0.97 MG/DL (ref 0.57–1)
CRP SERPL-MCNC: 20 MG/L (ref 0–10)
EGFRCR SERPLBLD CKD-EPI 2021: 70 ML/MIN/1.73
EOSINOPHIL # BLD AUTO: 0.2 X10E3/UL (ref 0–0.4)
EOSINOPHIL NFR BLD AUTO: 2 %
ERYTHROCYTE [DISTWIDTH] IN BLOOD BY AUTOMATED COUNT: 12.5 % (ref 11.7–15.4)
ERYTHROCYTE [SEDIMENTATION RATE] IN BLOOD BY WESTERGREN METHOD: 19 MM/HR (ref 0–40)
GLOBULIN SER CALC-MCNC: 2.5 G/DL (ref 1.5–4.5)
GLUCOSE SERPL-MCNC: 97 MG/DL (ref 70–99)
HBV CORE AB SERPL QL IA: POSITIVE
HBV SURFACE AB SER QL: REACTIVE
HBV SURFACE AG SERPL QL IA: NEGATIVE
HCT VFR BLD AUTO: 34.3 % (ref 34–46.6)
HGB BLD-MCNC: 11.2 G/DL (ref 11.1–15.9)
IMM GRANULOCYTES # BLD AUTO: 0 X10E3/UL (ref 0–0.1)
IMM GRANULOCYTES NFR BLD AUTO: 0 %
IMP & REVIEW OF LAB RESULTS: ABNORMAL
LABORATORY COMMENT REPORT: ABNORMAL
LYMPHOCYTES # BLD AUTO: 1.8 X10E3/UL (ref 0.7–3.1)
LYMPHOCYTES NFR BLD AUTO: 19 %
MCH RBC QN AUTO: 28.8 PG (ref 26.6–33)
MCHC RBC AUTO-ENTMCNC: 32.7 G/DL (ref 31.5–35.7)
MCV RBC AUTO: 88 FL (ref 79–97)
MONOCYTES # BLD AUTO: 0.5 X10E3/UL (ref 0.1–0.9)
MONOCYTES NFR BLD AUTO: 6 %
NEUTROPHILS # BLD AUTO: 6.7 X10E3/UL (ref 1.4–7)
NEUTROPHILS NFR BLD AUTO: 73 %
PLATELET # BLD AUTO: 185 X10E3/UL (ref 150–450)
POTASSIUM SERPL-SCNC: 3.6 MMOL/L (ref 3.5–5.2)
PROT SERPL-MCNC: 6.5 G/DL (ref 6–8.5)
RBC # BLD AUTO: 3.89 X10E6/UL (ref 3.77–5.28)
SODIUM SERPL-SCNC: 143 MMOL/L (ref 134–144)
WBC # BLD AUTO: 9.2 X10E3/UL (ref 3.4–10.8)

## 2023-04-17 ENCOUNTER — TELEPHONE (OUTPATIENT)
Dept: NEUROLOGY | Facility: CLINIC | Age: 53
End: 2023-04-17

## 2023-04-17 ENCOUNTER — TELEPHONE (OUTPATIENT)
Dept: GASTROENTEROLOGY | Facility: CLINIC | Age: 53
End: 2023-04-17
Payer: COMMERCIAL

## 2023-04-17 DIAGNOSIS — K50.00 CROHN'S ILEITIS, WITHOUT COMPLICATIONS: Primary | ICD-10-CM

## 2023-04-17 DIAGNOSIS — M25.50 PAIN IN JOINT, MULTIPLE SITES: ICD-10-CM

## 2023-04-17 DIAGNOSIS — R79.82 ELEVATED C-REACTIVE PROTEIN (CRP): ICD-10-CM

## 2023-04-17 LAB
HBV DNA SERPL NAA+PROBE-ACNC: NORMAL IU/ML
HBV DNA SERPL NAA+PROBE-LOG IU: NORMAL LOG10 IU/ML
REF LAB TEST REF RANGE: NORMAL

## 2023-04-17 NOTE — TELEPHONE ENCOUNTER
Reviewed patient labs.  Look good other than elevated CRP at 20.  Sed rate is normal.  Awaiting fecal calprotectin to see if her Crohn's is inflamed.  She asked about whether she could have RA as she has symptoms.  We will check SUMIT and rheumatoid factor.  Will need referral to rheumatology for more definitive diagnosis.  Symptoms of RA can overlap with Crohn's disease as Crohn's can also cause joint pains.

## 2023-04-17 NOTE — TELEPHONE ENCOUNTER
Pharmacy Name:  [facility]    Pharmacy representative name: ADELE    Pharmacy representative phone number: [phone]    What medication are you calling in regards to: BOTOX    What question does the pharmacy have: PRIOR AUTHORIZATION FOR BOTOX IS NEEDED. AUTH # [id number] AND THE TURN AROUND TIME IS 22 HOUR HOURS    Who is the provider that prescribed the medication: IVANA FERRELL    PLEASE REVIEW AND ADVISE    THANK YOU

## 2023-04-17 NOTE — TELEPHONE ENCOUNTER
----- Message from Angella Estes RN sent at 4/17/2023  9:32 AM EDT -----  Regarding: FW: test results  Contact: [phone]    ----- Message -----  From: Rosa Melgoza  Sent: 4/16/2023   4:12 AM EDT  To: [facility]  Subject: test results                                     Manda, I just checked my results and my CRP is elevated,  but I am not shocked by that.  I did have another positve on my Hep test, and at the risk of sounding like a hypochondriac,  I started doing a little research on positive results.  I found that it can be a sign of RA.  I know that people with one autoimmune disease are more likely to have more than one.  I have crohns and hashimotos, do you think it is possible I also have RA.  I do have s/s of RA, but thought the soreness and painful joints was probably my crohns.  What do you auggest we do next? I feel like I am completely falling apart.  Which sucks because I am only 52 so I would like my body to hold out for a few more decades lol.     Thanks,  Rosa

## 2023-04-18 LAB
GAMMA INTERFERON BACKGROUND BLD IA-ACNC: 0.05 IU/ML
M TB IFN-G BLD-IMP: NEGATIVE
M TB IFN-G CD4+ T-CELLS BLD-ACNC: 0.04 IU/ML
M TBIFN-G CD4+ CD8+T-CELLS BLD-ACNC: 0.02 IU/ML
MITOGEN IGNF BLD-ACNC: >10 IU/ML
QUANTIFERON INCUBATION: NORMAL
SERVICE CMNT-IMP: NORMAL

## 2023-04-19 DIAGNOSIS — R19.7 DIARRHEA, UNSPECIFIED TYPE: ICD-10-CM

## 2023-04-19 DIAGNOSIS — K50.00 CROHN'S ILEITIS, WITHOUT COMPLICATIONS: Primary | ICD-10-CM

## 2023-04-19 DIAGNOSIS — R79.82 ELEVATED C-REACTIVE PROTEIN (CRP): ICD-10-CM

## 2023-04-19 DIAGNOSIS — R10.84 DIFFUSE ABDOMINAL PAIN: ICD-10-CM

## 2023-04-19 LAB — CALPROTECTIN STL-MCNT: 59 UG/G (ref 0–120)

## 2023-04-20 ENCOUNTER — PROCEDURE VISIT (OUTPATIENT)
Dept: NEUROLOGY | Facility: CLINIC | Age: 53
End: 2023-04-20
Payer: COMMERCIAL

## 2023-04-20 DIAGNOSIS — G43.719 INTRACTABLE CHRONIC MIGRAINE WITHOUT AURA AND WITHOUT STATUS MIGRAINOSUS: Primary | ICD-10-CM

## 2023-04-20 NOTE — PROGRESS NOTES
Botox injection: Botox injection for neuromuscular block.  Indication: Chronic migraines.  Risks, benefits and alternatives were discussed with the patient.  EMG guidance was not used in identifying the injection site.  Procerus: 5 units was injected.  : 5 units was injected on the right and 5 units injected on the left.  Frontalis: 10 units injected on the right and 10 units injected on the left.  Temporalis: 20 units injected on the right and 20 units injected on the left.  Occipitalis: 15 units injected on the right and 15 units injected on the left.  Cervical paraspinal: 10 units injected on the right and 10 units injected on the left.  Trapezius: 15 units injected on the right and 15 units injected on the left.  200 unit vial, 1 vials, 45 wasted and 155 used.  The patient tolerated the procedure well.  There were no complications.  Patient instructions: The patient was instructed that they may experience localized discomfort over the next 12 to 24 hours and may take over the counter pain medication if needed.  Follow-up in the office in 3 months for next Botox injection.    Botox   Temitope and Bill

## 2023-04-21 ENCOUNTER — TELEPHONE (OUTPATIENT)
Dept: GASTROENTEROLOGY | Facility: CLINIC | Age: 53
End: 2023-04-21
Payer: COMMERCIAL

## 2023-04-21 NOTE — TELEPHONE ENCOUNTER
----- Message from Lisa Dominguez RN sent at 4/20/2023  8:24 AM EDT -----  Regarding: FW: test results  Contact: [phone]    ----- Message -----  From: Rosa Melgoza  Sent: 4/19/2023   6:43 PM EDT  To: [facility]  Subject: test results                                     Ok I will go ahead with the labs for now.  I have a chest CT coming up for nodules in my lung that they are keeping an eye on.  If it is acting up still I will get them done together.  Right now it is easing up a lot so I am hopeful.  Now it is just my joints that are bad.  Every joint in my body is aching so bad.  Every time I get out of my car or get up it takes a couple of steps before I can even walk right.

## 2023-04-24 ENCOUNTER — LAB (OUTPATIENT)
Dept: LAB | Facility: HOSPITAL | Age: 53
End: 2023-04-24
Payer: COMMERCIAL

## 2023-04-24 PROCEDURE — 86431 RHEUMATOID FACTOR QUANT: CPT | Performed by: PHYSICIAN ASSISTANT

## 2023-04-24 PROCEDURE — 86235 NUCLEAR ANTIGEN ANTIBODY: CPT | Performed by: PHYSICIAN ASSISTANT

## 2023-04-24 PROCEDURE — 86225 DNA ANTIBODY NATIVE: CPT | Performed by: PHYSICIAN ASSISTANT

## 2023-04-24 PROCEDURE — 83516 IMMUNOASSAY NONANTIBODY: CPT | Performed by: PHYSICIAN ASSISTANT

## 2023-04-24 PROCEDURE — 86038 ANTINUCLEAR ANTIBODIES: CPT | Performed by: PHYSICIAN ASSISTANT

## 2023-04-26 RX ORDER — DIHYDROERGOTAMINE MESYLATE 4 MG/ML
0.72 SPRAY, METERED NASAL AS NEEDED
Qty: 8 ML | Refills: 2 | Status: SHIPPED | OUTPATIENT
Start: 2023-04-26

## 2023-05-03 DIAGNOSIS — K50.00 CROHN'S ILEITIS, WITHOUT COMPLICATIONS: Primary | ICD-10-CM

## 2023-05-04 ENCOUNTER — OFFICE VISIT (OUTPATIENT)
Dept: FAMILY MEDICINE CLINIC | Facility: CLINIC | Age: 53
End: 2023-05-04
Payer: COMMERCIAL

## 2023-05-08 DIAGNOSIS — F43.22 ADJUSTMENT DISORDER WITH ANXIOUS MOOD: ICD-10-CM

## 2023-05-08 DIAGNOSIS — E78.2 MIXED HYPERLIPIDEMIA: ICD-10-CM

## 2023-05-08 RX ORDER — ATORVASTATIN CALCIUM 10 MG/1
10 TABLET, FILM COATED ORAL DAILY
Qty: 30 TABLET | Refills: 0 | Status: SHIPPED | OUTPATIENT
Start: 2023-05-08

## 2023-05-08 RX ORDER — HYDROXYZINE HYDROCHLORIDE 25 MG/1
25 TABLET, FILM COATED ORAL EVERY 8 HOURS PRN
Qty: 90 TABLET | Refills: 1 | Status: SHIPPED | OUTPATIENT
Start: 2023-05-08

## 2023-05-10 RX ORDER — ONDANSETRON 4 MG/1
4 TABLET, FILM COATED ORAL EVERY 8 HOURS PRN
Qty: 60 TABLET | Refills: 2 | Status: SHIPPED | OUTPATIENT
Start: 2023-05-10

## 2023-05-12 ENCOUNTER — HOSPITAL ENCOUNTER (OUTPATIENT)
Dept: CT IMAGING | Facility: HOSPITAL | Age: 53
Discharge: HOME | End: 2023-05-12
Admitting: FAMILY MEDICINE
Payer: COMMERCIAL

## 2023-05-12 DIAGNOSIS — R10.84 DIFFUSE ABDOMINAL PAIN: ICD-10-CM

## 2023-05-12 DIAGNOSIS — R19.7 DIARRHEA, UNSPECIFIED TYPE: ICD-10-CM

## 2023-05-12 DIAGNOSIS — R79.82 ELEVATED C-REACTIVE PROTEIN (CRP): ICD-10-CM

## 2023-05-12 DIAGNOSIS — R91.1 PULMONARY NODULE, LEFT: ICD-10-CM

## 2023-05-12 DIAGNOSIS — K50.00 CROHN'S ILEITIS, WITHOUT COMPLICATIONS: ICD-10-CM

## 2023-05-12 PROCEDURE — 74177 CT ABD & PELVIS W/CONTRAST: CPT

## 2023-05-12 PROCEDURE — 0 DIATRIZOATE MEGLUMINE & SODIUM PER 1 ML: Performed by: PHYSICIAN ASSISTANT

## 2023-05-12 PROCEDURE — 71250 CT THORAX DX C-: CPT

## 2023-05-12 PROCEDURE — 25510000001 IOPAMIDOL 61 % SOLUTION: Performed by: PHYSICIAN ASSISTANT

## 2023-05-12 RX ADMIN — DIATRIZOATE MEGLUMINE AND DIATRIZOATE SODIUM 30 ML: 660; 100 LIQUID ORAL; RECTAL at 11:00

## 2023-05-12 RX ADMIN — IOPAMIDOL 100 ML: 612 INJECTION, SOLUTION INTRAVENOUS at 12:07

## 2023-05-12 NOTE — PROGRESS NOTES
[facility] message sent to patient: Your CT scan looked good.  I did not see any evidence of inflammation in your intestines.  You do have a hiatal hernia.  If your joints continue to bother you, we may need to consider switching the Entyvio to something that would help the joints.  Lets get you a routine follow-up in August unless you need to see us sooner.

## 2023-05-19 ENCOUNTER — LAB (OUTPATIENT)
Dept: LAB | Facility: HOSPITAL | Age: 53
End: 2023-05-19
Payer: COMMERCIAL

## 2023-05-19 PROCEDURE — 82397 CHEMILUMINESCENT ASSAY: CPT | Performed by: PHYSICIAN ASSISTANT

## 2023-05-19 PROCEDURE — 80280 DRUG ASSAY VEDOLIZUMAB: CPT | Performed by: PHYSICIAN ASSISTANT

## 2023-06-04 DIAGNOSIS — E78.2 MIXED HYPERLIPIDEMIA: ICD-10-CM

## 2023-06-04 DIAGNOSIS — G43.109 MIGRAINE WITH AURA AND WITHOUT STATUS MIGRAINOSUS, NOT INTRACTABLE: ICD-10-CM

## 2023-06-05 RX ORDER — BUTALBITAL, ACETAMINOPHEN AND CAFFEINE 50; 325; 40 MG/1; MG/1; MG/1
1 TABLET ORAL EVERY 6 HOURS PRN
Qty: 180 TABLET | Refills: 0 | Status: SHIPPED | OUTPATIENT
Start: 2023-06-05

## 2023-06-05 RX ORDER — ATORVASTATIN CALCIUM 10 MG/1
10 TABLET, FILM COATED ORAL DAILY
Qty: 30 TABLET | Refills: 0 | Status: SHIPPED | OUTPATIENT
Start: 2023-06-05

## 2023-06-13 ENCOUNTER — OFFICE VISIT (OUTPATIENT)
Dept: FAMILY MEDICINE CLINIC | Facility: CLINIC | Age: 53
End: 2023-06-13
Payer: COMMERCIAL

## 2023-06-13 VITALS
DIASTOLIC BLOOD PRESSURE: 76 MMHG | OXYGEN SATURATION: 99 % | BODY MASS INDEX: 39.79 KG/M2 | TEMPERATURE: 98.2 F | WEIGHT: 238.8 LBS | HEIGHT: 65 IN | HEART RATE: 76 BPM | SYSTOLIC BLOOD PRESSURE: 128 MMHG

## 2023-06-13 DIAGNOSIS — Z78.0 MENOPAUSE: ICD-10-CM

## 2023-06-13 DIAGNOSIS — I10 ESSENTIAL HYPERTENSION: ICD-10-CM

## 2023-06-13 DIAGNOSIS — E03.8 HYPOTHYROIDISM DUE TO HASHIMOTO'S THYROIDITIS: ICD-10-CM

## 2023-06-13 DIAGNOSIS — E78.2 MIXED HYPERLIPIDEMIA: ICD-10-CM

## 2023-06-13 DIAGNOSIS — Z79.899 ENCOUNTER FOR LONG-TERM (CURRENT) USE OF MEDICATIONS: ICD-10-CM

## 2023-06-13 DIAGNOSIS — Z12.31 SCREENING MAMMOGRAM FOR BREAST CANCER: Primary | ICD-10-CM

## 2023-06-13 DIAGNOSIS — K21.9 GASTROESOPHAGEAL REFLUX DISEASE: ICD-10-CM

## 2023-06-13 DIAGNOSIS — E06.3 HYPOTHYROIDISM DUE TO HASHIMOTO'S THYROIDITIS: ICD-10-CM

## 2023-06-13 DIAGNOSIS — T75.3XXA MOTION SICKNESS, INITIAL ENCOUNTER: ICD-10-CM

## 2023-06-13 RX ORDER — PANTOPRAZOLE SODIUM 40 MG/1
40 TABLET, DELAYED RELEASE ORAL 2 TIMES DAILY
Qty: 180 TABLET | Refills: 3 | Status: SHIPPED | OUTPATIENT
Start: 2023-06-13

## 2023-06-13 RX ORDER — SCOLOPAMINE TRANSDERMAL SYSTEM 1 MG/1
1 PATCH, EXTENDED RELEASE TRANSDERMAL
Qty: 6 PATCH | Refills: 0 | Status: SHIPPED | OUTPATIENT
Start: 2023-06-13

## 2023-06-13 RX ORDER — PROMETHAZINE HYDROCHLORIDE 25 MG/1
25 TABLET ORAL EVERY 6 HOURS PRN
Qty: 60 TABLET | Refills: 1 | Status: SHIPPED | OUTPATIENT
Start: 2023-06-13

## 2023-06-13 RX ORDER — LISINOPRIL AND HYDROCHLOROTHIAZIDE 12.5; 1 MG/1; MG/1
1 TABLET ORAL DAILY
Qty: 90 TABLET | Refills: 1 | Status: SHIPPED | OUTPATIENT
Start: 2023-06-13

## 2023-06-13 NOTE — PROGRESS NOTES
Subjective   Rosa Melgoza is a 52 y.o. female who presents for annual female wellness exam.  Chief Complaint   Patient presents with    Annual Exam     Patient is here to follow-up on her chronic health conditions:    Migraine with aura.  Patient is currently seeing neurology.  She has started Botox injections.  She is also taking Emgality and Tudhesa nasal spray.  She feels that since the Botox injections she has had some improvement of her migraines.  She will takes Fioricet as needed.     GERD.  The patient takes pantoprazole 40 mg daily.  She denies any symptoms of reflux currently.  She denies any side effects of medication.    Hypothyroidism.  The patient currently takes levothyroxine 88 mcg daily.  Patient feels her thyroid is in range.  No side effects of medication.    Hypertension.  The patient takes lisinopril/ hydrochlorothiazide 10/12.5 for blood pressure.  She denies any side effects and states that her blood pressures have been good.    Anxiety and depression.  The patient takes Lexapro 10 mg daily.  Doing well.     Hyperlipidemia.  The patient started atorvastatin 10 mg daily after her last visit.  She has been taking it regularly for the last 5 months.  No side effects.       Menstrual History: postmenopausal.  Last menstrual cycle was 2021  Pregnancy History:   Sexual History: Monogamous male partner for the past 16 years  Contraception: Partner vasectomy  Hormone Replacement Therapy: Never  Diet: needs improvement  Exercise: no routine exercise  Do you feel safe? Yes  Have you ever been abused? No    Mammogram: 2022  Pap Smear: 2022 normal.  History of Abnormal Pap smear years ago and also had a colposcopy at the age of 25 no intervention necessary.   Bone Density: Never  Colon Cancer Screenin2020 per GI.  Patient has Crohn's and gets regular colonoscopies.    Immunization History   Administered Date(s) Administered    COVID-19 (PFIZER) BIVALENT 12+YRS  2022    COVID-19 (PFIZER) Purple Cap Monovalent 2020, 2021    Flu Vaccine Split Quad 2021    FluLaval/Fluzone >6mos 2020    Hepatitis A 04/15/2022    Hepatitis B 04/15/2022    Pneumococcal Conjugate 13-Valent (PCV13) 04/15/2022    Shingrix 2022, 2022    Tdap 04/15/2022       The following portions of the patient's history were reviewed and updated as appropriate: allergies, current medications, past family history, past medical history, past social history, past surgical history and problem list.    Past Medical History:   Diagnosis Date    Allergic     Anxiety     Cholelithiasis     Crohn disease     Depression     Disease of thyroid gland     Elevated blood pressure reading without diagnosis of hypertension     Fatty liver     GERD (gastroesophageal reflux disease)     Headache, migraine     History of medical problems     not sure of date, but very painful joints and muscles.  Poss r/t crohns    History of sore throat     Hyperlipidemia     Hypertension     Hypothyroidism     Migraine     Need for DTaP and Hib vaccine     History of     Obesity     Urinary tract infection     Vaginal yeast infection        Past Surgical History:   Procedure Laterality Date    BREAST BIOPSY       SECTION      CHOLECYSTECTOMY      COLONOSCOPY  approx     Tavon HOBBS.JOSSELIN  benign polyps per patient    COLONOSCOPY N/A 2020    Procedure: COLONOSCOPY  into cecum and TI with biopsies;  Surgeon: Maicol Garza MD;  Location: [facility] ENDOSCOPY;  Service: Gastroenterology;  Laterality: N/A;  Pre: abn CT scan  post: diverticulosis    ENDOSCOPY N/A 2020    Procedure: ESOPHAGOGASTRODUODENOSCOPY WITH BIOPSY;  Surgeon: Maicol Garza MD;  Location: [facility] ENDOSCOPY;  Service: Gastroenterology;  Laterality: N/A;  Pre: abn CT scan  post: hiatal hernia, esophagitis    FRACTURE SURGERY      broken hand, 3 screws in place    TONSILLECTOMY      UPPER GASTROINTESTINAL  ENDOSCOPY  approx     Tavon LIU  normal per patient       Family History   Problem Relation Age of Onset    Breast cancer Mother     Arthritis Mother     Cancer Mother         breast ca    Miscarriages / Stillbirths Mother     Hypertension Father     Heart disease Father     Alcohol abuse Father     Hyperlipidemia Father     Breast cancer Maternal Grandmother     Anxiety disorder Maternal Grandmother     Arthritis Maternal Grandmother     Cancer Maternal Grandmother         breast and brain    Depression Maternal Grandmother     Diabetes Maternal Grandmother     Heart disease Maternal Grandmother     Kidney disease Maternal Grandmother     Thyroid disease Maternal Grandmother     Mental illness Maternal Grandmother     Alcohol abuse Other     Depression Other     Anxiety disorder Other     Hypertension Other     Other Other         Pure hypercholesterolemia and esophageal reflux    Lung cancer Other     Inflammatory bowel disease Maternal Grandfather     Irritable bowel syndrome Maternal Grandfather         Honestly not sure which he had    Arthritis Maternal Grandfather     Cancer Maternal Grandfather         lung    Stroke Maternal Grandfather        Social History     Socioeconomic History    Marital status:    Tobacco Use    Smoking status: Former     Packs/day: 1.00     Years: 10.00     Pack years: 10.00     Types: Cigarettes     Quit date: 3/1/2011     Years since quittin.2    Smokeless tobacco: Never    Tobacco comments:     smoked off and on   Vaping Use    Vaping Use: Never used   Substance and Sexual Activity    Alcohol use: Not Currently     Comment: once a year or so I have a drink w dinner    Drug use: Never    Sexual activity: Yes     Partners: Male     Birth control/protection: Surgical     Comment:  had vasectomy       Review of Systems   Constitutional:  Negative for activity change, appetite change, fatigue and unexpected weight change.   HENT:  Negative for congestion, ear  pain, nosebleeds, sore throat and tinnitus.    Eyes:  Negative for pain, redness and visual disturbance.   Respiratory:  Negative for cough, shortness of breath and wheezing.    Cardiovascular:  Negative for chest pain, palpitations and leg swelling.   Gastrointestinal:  Negative for abdominal pain, blood in stool and nausea.   Endocrine: Negative for polydipsia and polyuria.   Genitourinary:  Negative for dysuria, frequency, menstrual problem and vaginal discharge.   Musculoskeletal:  Negative for arthralgias, joint swelling and myalgias.   Skin:  Negative for rash.   Allergic/Immunologic: Negative for environmental allergies, food allergies and immunocompromised state.   Neurological:  Negative for dizziness, speech difficulty, weakness and headaches.   Hematological:  Negative for adenopathy. Does not bruise/bleed easily.   Psychiatric/Behavioral:  Negative for decreased concentration and dysphoric mood. The patient is not nervous/anxious.      Objective   Vitals:    06/13/23 1517   BP: 128/76   Pulse: 76   Temp: 98.2 °F (36.8 °C)   SpO2: 99%     Body mass index is 39.74 kg/m².  Physical Exam  Vitals and nursing note reviewed.   Constitutional:       Appearance: She is well-developed.   HENT:      Head: Normocephalic and atraumatic.      Right Ear: External ear normal.      Left Ear: External ear normal.      Nose: Nose normal.   Eyes:      General: No scleral icterus.     Conjunctiva/sclera: Conjunctivae normal.   Cardiovascular:      Rate and Rhythm: Normal rate and regular rhythm.      Heart sounds: Normal heart sounds.   Pulmonary:      Effort: Pulmonary effort is normal.      Breath sounds: Normal breath sounds.   Musculoskeletal:      Cervical back: Normal range of motion and neck supple.   Lymphadenopathy:      Cervical: No cervical adenopathy.   Skin:     General: Skin is warm and dry.      Findings: No rash.   Neurological:      Mental Status: She is alert and oriented to person, place, and time.    Psychiatric:         Mood and Affect: Mood normal.         Behavior: Behavior normal.         Thought Content: Thought content normal.         Judgment: Judgment normal.         Assessment & Plan   Diagnoses and all orders for this visit:    1. Screening mammogram for breast cancer (Primary)  -     Mammo Screening Digital Tomosynthesis Bilateral With CAD; Future    2. Mixed hyperlipidemia  -     Lipid Panel    3. Essential hypertension  -     lisinopril-hydrochlorothiazide (PRINZIDE,ZESTORETIC) 10-12.5 MG per tablet; Take 1 tablet by mouth Daily.  Dispense: 90 tablet; Refill: 1    4. Hypothyroidism due to Hashimoto's thyroiditis  -     TSH    5. Encounter for long-term (current) use of medications    6. Menopause  -     FSH & LH    7. Motion sickness, initial encounter  -     Scopolamine 1 MG/3DAYS patch; Place 1 patch on the skin as directed by provider Every 72 (Seventy-Two) Hours.  Dispense: 6 patch; Refill: 0    8. Gastroesophageal reflux disease  -     pantoprazole (PROTONIX) 40 MG EC tablet; Take 1 tablet by mouth 2 (Two) Times a Day.  Dispense: 180 tablet; Refill: 3    Other orders  -     promethazine (PHENERGAN) 25 MG tablet; Take 1 tablet by mouth Every 6 (Six) Hours As Needed for Nausea or Vomiting.  Dispense: 60 tablet; Refill: 1      RHM.  Mammogram ordered.  Patient is up-to-date on Pap smear, colonoscopy.      Patient is also here to follow-up on chronic stable hypertension, hypothyroidism, and GERD.  Surveillance labs were obtained today and any medication changes will be made based on lab results and will be called to the patient later this week.     She is also here to follow-up on new onset hyperlipidemia.  Surveillance labs will be obtained today and any changes in medication or management will be called to the patient later this week.    Patient is going on a cruise and in October and would like scopolamine patch just in case.  Order was sent to the pharmacy    Discussed the importance of  maintaining a healthy weight and getting regular exercise.  Educated patient on the benefits of healthy diet.  Advise follow-up annually for wellness exams.      There are no Patient Instructions on file for this visit.

## 2023-06-14 ENCOUNTER — TELEPHONE (OUTPATIENT)
Dept: GASTROENTEROLOGY | Facility: CLINIC | Age: 53
End: 2023-06-14
Payer: COMMERCIAL

## 2023-06-14 DIAGNOSIS — E03.8 HYPOTHYROIDISM DUE TO HASHIMOTO'S THYROIDITIS: ICD-10-CM

## 2023-06-14 DIAGNOSIS — E78.2 MIXED HYPERLIPIDEMIA: ICD-10-CM

## 2023-06-14 DIAGNOSIS — E06.3 HYPOTHYROIDISM DUE TO HASHIMOTO'S THYROIDITIS: ICD-10-CM

## 2023-06-14 LAB
CHOLEST SERPL-MCNC: 186 MG/DL (ref 100–199)
FSH SERPL-ACNC: 83.3 MIU/ML
HDLC SERPL-MCNC: 37 MG/DL
LDLC SERPL CALC-MCNC: 110 MG/DL (ref 0–99)
LH SERPL-ACNC: 41.6 MIU/ML
TRIGL SERPL-MCNC: 227 MG/DL (ref 0–149)
TSH SERPL DL<=0.005 MIU/L-ACNC: 0.56 UIU/ML (ref 0.45–4.5)
VLDLC SERPL CALC-MCNC: 39 MG/DL (ref 5–40)

## 2023-06-14 RX ORDER — ATORVASTATIN CALCIUM 10 MG/1
10 TABLET, FILM COATED ORAL DAILY
Qty: 90 TABLET | Refills: 3 | Status: SHIPPED | OUTPATIENT
Start: 2023-06-14

## 2023-06-14 RX ORDER — LEVOTHYROXINE SODIUM 88 UG/1
88 TABLET ORAL DAILY
Qty: 90 TABLET | Refills: 1 | Status: SHIPPED | OUTPATIENT
Start: 2023-06-14

## 2023-06-14 NOTE — TELEPHONE ENCOUNTER
----- Message from Judy Ruiz RN sent at 6/12/2023  4:16 PM EDT -----  Regarding: FW: joint pain   Contact: [phone]    ----- Message -----  From: Rosa Melgoza  Sent: 6/12/2023  12:32 AM EDT  To: [facility]  Subject: joint pain                                       Brad Holman, just checking to see if you had heard anything from Dr Chapa on a poss switch in meds to something that helps joint pain also.  It is getting worse and I am absolutely miserable.  It feels like every joint in my body is how a person who is 90 years old would feel.      thanksRosa

## 2023-06-14 NOTE — TELEPHONE ENCOUNTER
Dr. Garza-patient with Crohn disease diagnosed a couple years ago.  Started on Entyvio May 2022 which seems to have helped her GI symptoms but she is reporting worsening joint pains for some time.  She has been trying to work through them but has not had any luck.  What are your feelings on changing her Entyvio to something that might help her joint pains such as Stelara or Humira?  Entyvio is the only thing that she has been on other than a 5-ASA.

## 2023-06-16 DIAGNOSIS — K50.00 CROHN'S ILEITIS, WITHOUT COMPLICATIONS: ICD-10-CM

## 2023-06-16 DIAGNOSIS — M25.50 PAIN IN JOINT, MULTIPLE SITES: Primary | ICD-10-CM

## 2023-06-16 NOTE — TELEPHONE ENCOUNTER
Maicol Garza MD  YouYesterday (7:47 AM)     I think she should see rheumatology if she hasn't.  Her ESR was normal, which I would have expected to be elevated.  She also has a positive SUMIT with elevated RNP which can be seen in MCTD.  I would order plain films of there thoracic and lumbar spine and pelvic Xray as well.    A switch to Stelara would not be unreasonable.  Thanks

## 2023-06-16 NOTE — TELEPHONE ENCOUNTER
Left message for patient to call me back.  Went ahead and sent her a MyChart message.  Wanted to make sure she is okay with change to Stelara.    At 1 point I recommended referral to rheumatology, just wanted to see what the status of this appointment is.  I also ordered x-rays of the thoracic, lumbar spine, and hip x-rays.

## 2023-06-19 DIAGNOSIS — K50.00 CROHN'S ILEITIS, WITHOUT COMPLICATIONS: ICD-10-CM

## 2023-06-19 DIAGNOSIS — M25.50 PAIN IN JOINT, MULTIPLE SITES: Primary | ICD-10-CM

## 2023-06-22 PROBLEM — K50.919 CROHN'S DISEASE WITH COMPLICATION: Status: ACTIVE | Noted: 2023-06-22

## 2023-07-24 ENCOUNTER — INFUSION (OUTPATIENT)
Dept: ONCOLOGY | Facility: HOSPITAL | Age: 53
End: 2023-07-24
Payer: COMMERCIAL

## 2023-07-24 VITALS
TEMPERATURE: 97.6 F | WEIGHT: 239.6 LBS | SYSTOLIC BLOOD PRESSURE: 118 MMHG | DIASTOLIC BLOOD PRESSURE: 81 MMHG | OXYGEN SATURATION: 97 % | BODY MASS INDEX: 39.92 KG/M2 | HEIGHT: 65 IN | HEART RATE: 67 BPM | RESPIRATION RATE: 15 BRPM

## 2023-07-24 DIAGNOSIS — K50.919 CROHN'S DISEASE WITH COMPLICATION, UNSPECIFIED GASTROINTESTINAL TRACT LOCATION: Primary | ICD-10-CM

## 2023-07-24 PROCEDURE — 96365 THER/PROPH/DIAG IV INF INIT: CPT

## 2023-07-24 PROCEDURE — 25010000002 USTEKINUMAB 130 MG/26ML SOLUTION 26 ML VIAL: Performed by: PHYSICIAN ASSISTANT

## 2023-07-24 PROCEDURE — 63710000001 DIPHENHYDRAMINE PER 50 MG: Performed by: PHYSICIAN ASSISTANT

## 2023-07-24 RX ORDER — DIPHENHYDRAMINE HCL 25 MG
25 CAPSULE ORAL ONCE
Status: COMPLETED | OUTPATIENT
Start: 2023-07-24 | End: 2023-07-24

## 2023-07-24 RX ORDER — ACETAMINOPHEN 325 MG/1
650 TABLET ORAL ONCE
OUTPATIENT
Start: 2023-09-18

## 2023-07-24 RX ORDER — DIPHENHYDRAMINE HCL 25 MG
25 CAPSULE ORAL ONCE
OUTPATIENT
Start: 2023-09-18

## 2023-07-24 RX ORDER — ACETAMINOPHEN 325 MG/1
650 TABLET ORAL ONCE
Status: COMPLETED | OUTPATIENT
Start: 2023-07-24 | End: 2023-07-24

## 2023-07-24 RX ADMIN — DIPHENHYDRAMINE HYDROCHLORIDE 25 MG: 25 CAPSULE ORAL at 13:51

## 2023-07-24 RX ADMIN — USTEKINUMAB 520 MG: 130 SOLUTION INTRAVENOUS at 14:26

## 2023-07-24 RX ADMIN — ACETAMINOPHEN 650 MG: 325 TABLET ORAL at 13:51

## 2023-07-27 ENCOUNTER — OFFICE VISIT (OUTPATIENT)
Dept: GASTROENTEROLOGY | Facility: CLINIC | Age: 53
End: 2023-07-27
Payer: COMMERCIAL

## 2023-07-27 ENCOUNTER — SPECIALTY PHARMACY (OUTPATIENT)
Dept: GASTROENTEROLOGY | Facility: CLINIC | Age: 53
End: 2023-07-27
Payer: COMMERCIAL

## 2023-07-27 VITALS
HEART RATE: 80 BPM | BODY MASS INDEX: 39.15 KG/M2 | SYSTOLIC BLOOD PRESSURE: 125 MMHG | HEIGHT: 65 IN | TEMPERATURE: 96 F | DIASTOLIC BLOOD PRESSURE: 84 MMHG | WEIGHT: 235 LBS

## 2023-07-27 DIAGNOSIS — B37.0 THRUSH: Primary | ICD-10-CM

## 2023-07-27 DIAGNOSIS — K50.919 CROHN'S DISEASE WITH COMPLICATION, UNSPECIFIED GASTROINTESTINAL TRACT LOCATION: ICD-10-CM

## 2023-07-27 NOTE — PROGRESS NOTES
"Chief Complaint  Crohn's Disease and stelara reaction    Subjective          History of Present Illness    Rosa Melgoza is a  52 y.o. female presents for acute follow-up for possible Stelara reaction with history of Crohn's disease.  She is a patient of Dr. Garza    She received her first Stelara infusion on 7/24/2023.  Give benadryl and tylenol prior to stelara infsuion. That night she noticed a nonpainful bump on oral mucosa.  Over the next several days she developed bumps spreading to rest of mouth, tongue.  Overall the bumps are not painful but now her tongue is burning.  She admits to poor sensation of taste. Took 50mg Benadryl last night and takes xyzal daily. No change today. Has known history of geographic tongue.  She is on prednisone 20 mg daily.    For her Crohn's disease she has failed Pentasa, Entyvio.  She takes Protonix 40 mg daily for her GERD.    Objective   Vital Signs:   /84   Pulse 80   Temp 96 °F (35.6 °C)   Ht 165.1 cm (65\")   Wt 107 kg (235 lb)   BMI 39.11 kg/m²       Physical Exam  Vitals reviewed.   Constitutional:       General: She is awake. She is not in acute distress.     Appearance: Normal appearance. She is well-developed and well-groomed.   HENT:      Head: Normocephalic.      Mouth/Throat:      Lips: Pink. No lesions.      Mouth: Mucous membranes are moist. Oral lesions present.      Tongue: Lesions present.      Palate: Lesions present.      Pharynx: No pharyngeal swelling, oropharyngeal exudate or posterior oropharyngeal erythema.      Comments: Scattered spots of white on palate and buccal mucosa.  Easily cleared with spots of erythematous elevated lesions in those areas.  Geographic tongue with white laterally-scraped with erythema beneath.  No evidence of aphthous ulcers or edema.  Pulmonary:      Effort: Pulmonary effort is normal. No respiratory distress.   Skin:     Coloration: Skin is not pale.   Neurological:      Mental Status: She is alert and oriented to " person, place, and time.      Gait: Gait is intact.   Psychiatric:         Mood and Affect: Mood and affect normal.         Speech: Speech normal.         Behavior: Behavior is cooperative.         Judgment: Judgment normal.        Result Review :             Assessment and Plan    Diagnoses and all orders for this visit:    1. Thrush (Primary)    2. Crohn's disease with complication, unspecified gastrointestinal tract location    Other orders  -     nystatin (MYCOSTATIN) 100,000 unit/mL suspension; Swish and swallow 5 mL 4 (Four) Times a Day for 14 days.  Dispense: 300 mL; Refill: 0    Examination consistent with oral candidiasis which we discussed.  She will start nystatin swish and swallow for 14 days.  If no improvement by next week, she will let me know.  Okay to continue with neck Stelara injection in about 8 weeks.  She will continue to wean her prednisone as directed.    Follow Up   Return in about 3 months (around 10/27/2023).    Dragon dictation used throughout this note.     Manda Butler PA-C

## 2023-07-27 NOTE — Clinical Note
Can you make her follow-up in October/November with myself or Dr. Garza?  It would be a follow-up on her Crohn's disease.

## 2023-07-28 ENCOUNTER — TELEPHONE (OUTPATIENT)
Dept: GASTROENTEROLOGY | Facility: CLINIC | Age: 53
End: 2023-07-28
Payer: COMMERCIAL

## 2023-07-31 ENCOUNTER — TELEPHONE (OUTPATIENT)
Dept: GASTROENTEROLOGY | Facility: CLINIC | Age: 53
End: 2023-07-31
Payer: COMMERCIAL

## 2023-08-01 ENCOUNTER — SPECIALTY PHARMACY (OUTPATIENT)
Dept: GASTROENTEROLOGY | Facility: CLINIC | Age: 53
End: 2023-08-01
Payer: COMMERCIAL

## 2023-08-02 DIAGNOSIS — G43.719 INTRACTABLE CHRONIC MIGRAINE WITHOUT AURA AND WITHOUT STATUS MIGRAINOSUS: Primary | ICD-10-CM

## 2023-08-03 ENCOUNTER — SPECIALTY PHARMACY (OUTPATIENT)
Dept: GASTROENTEROLOGY | Facility: CLINIC | Age: 53
End: 2023-08-03
Payer: COMMERCIAL

## 2023-08-05 DIAGNOSIS — F43.22 ADJUSTMENT DISORDER WITH ANXIOUS MOOD: ICD-10-CM

## 2023-08-07 ENCOUNTER — SPECIALTY PHARMACY (OUTPATIENT)
Dept: NEUROLOGY | Facility: CLINIC | Age: 53
End: 2023-08-07
Payer: COMMERCIAL

## 2023-08-07 RX ORDER — LEVOCETIRIZINE DIHYDROCHLORIDE 5 MG/1
5 TABLET, FILM COATED ORAL EVERY EVENING
Qty: 90 TABLET | Refills: 3 | Status: SHIPPED | OUTPATIENT
Start: 2023-08-07

## 2023-08-07 RX ORDER — ESCITALOPRAM OXALATE 10 MG/1
10 TABLET ORAL DAILY
Qty: 90 TABLET | Refills: 3 | Status: SHIPPED | OUTPATIENT
Start: 2023-08-07

## 2023-08-29 ENCOUNTER — SPECIALTY PHARMACY (OUTPATIENT)
Dept: NEUROLOGY | Facility: CLINIC | Age: 53
End: 2023-08-29
Payer: COMMERCIAL

## 2023-08-29 NOTE — PROGRESS NOTES
Specialty Pharmacy Patient Management Program  Neurology Initial Assessment     Rosa Melgoza is a 52 y.o. female with chronic migraine seen by a Neurology provider and enrolled in the Neurology Patient Management program offered by [facility].  An initial outreach was conducted, including assessment of therapy appropriateness and specialty medication education for galcanezumab (Emgality). The patient was introduced to services offered by [facility], including: regular assessments, refill coordination, curbside pick-up or mail order delivery options, prior authorization maintenance, and financial assistance programs as applicable. The patient was also provided with contact information for the pharmacy team.     Insurance Coverage & Financial Support  [employer] and savings card    Relevant Past Medical History and Comorbidities  Relevant medical history and concomitant health conditions were discussed with the patient. The patient's chart has been reviewed for relevant past medical history and comorbid health conditions and updated as necessary.   Past Medical History:   Diagnosis Date    Allergic     Anxiety     Cholelithiasis     Crohn disease     Depression     Disease of thyroid gland     Elevated blood pressure reading without diagnosis of hypertension     Fatty liver     GERD (gastroesophageal reflux disease)     Headache, migraine     History of medical problems 01/23    not sure of date, but very painful joints and muscles.  Poss r/t crohns    History of sore throat     Hyperlipidemia     Hypertension     Hypothyroidism     Migraine     Need for DTaP and Hib vaccine     History of     Obesity     Urinary tract infection     Vaginal yeast infection      Social History     Socioeconomic History    Marital status:    Tobacco Use    Smoking status: Former     Packs/day: 1.00     Years: 10.00     Pack years: 10.00     Types: Cigarettes     Quit date:  3/1/2011     Years since quittin.5    Smokeless tobacco: Never    Tobacco comments:     smoked off and on   Vaping Use    Vaping Use: Never used   Substance and Sexual Activity    Alcohol use: Not Currently     Comment: once a year or so I have a drink w dinner    Drug use: Never    Sexual activity: Yes     Partners: Male     Birth control/protection: Surgical     Comment:  had vasectomy     Problem list reviewed by Devon Golden RPH on 2023 at  9:38 AM    Allergies  Known allergies and reactions were discussed with the patient. The patient's chart has been reviewed for  allergy information and updated as necessary.   No Known Allergies  Allergies reviewed by Devon Golden RPH on 2023 at  9:38 AM    Relevant Laboratory Values  Common labs          2023    11:15 2023    08:15 2023    15:56   Common Labs   Glucose 125  97     BUN 13  14     Creatinine 0.92  0.97     Sodium 147  143     Potassium 4.0  3.6     Chloride 104  103     Calcium 8.8  9.1     Total Protein 6.2  6.5     Albumin 3.7  4.0     Total Bilirubin <0.2  <0.2     Alkaline Phosphatase 88  97     AST (SGOT) 20  11     ALT (SGPT) 23  17     WBC  9.2     Hemoglobin  11.2     Hematocrit  34.3     Platelets  185     Total Cholesterol   186    Triglycerides   227    HDL Cholesterol   37    LDL Cholesterol    110        Current Medication List  This medication list has been reviewed with the patient and evaluated for any interactions or necessary modifications/recommendations, and updated to include all prescription medications, OTC medications, and supplements the patient is currently taking.  This list reflects what is contained in the patient's profile, which has also been marked as reviewed to communicate to other providers it is the most up to date version of the patient's current medication therapy.     Current Outpatient Medications:     atorvastatin (LIPITOR) 10 MG tablet, Take 1 tablet by mouth Daily., Disp:  90 tablet, Rfl: 3    B Complex-C-Folic Acid (STRESS B COMPLEX PO), , Disp: , Rfl:     butalbital-acetaminophen-caffeine (FIORICET, ESGIC) -40 MG per tablet, Take 1 tablet by mouth Every 6 (Six) Hours As Needed for Headache., Disp: 180 tablet, Rfl: 0    Cholecalciferol (VITAMIN D3 GUMMIES ADULT PO), , Disp: , Rfl:     Dihydroergotamine Mesylate HFA (Trudhesa) 0.725 MG/ACT aerosol solution, 0.725 mg into the nostril(s) as directed by provider As Needed (Moderate to severe headache). One spray into each nostril, maybe repeated after 1 hour if needed, NO more than 2 doses in 24-hour period or 3 doses in 7-day period., Disp: 8 mL, Rfl: 2    escitalopram (LEXAPRO) 10 MG tablet, Take 1 tablet by mouth Daily., Disp: 90 tablet, Rfl: 3    fluticasone (FLONASE) 50 MCG/ACT nasal spray, Use 2 sprays into each nostril as directed by provider Daily., Disp: 16 g, Rfl: 6    galcanezumab-gnlm (EMGALITY) 120 MG/ML auto-injector pen, Inject 1 mL under the skin into the appropriate area as directed Every 30 (Thirty) Days., Disp: 1 mL, Rfl: 6    hydrOXYzine (ATARAX) 25 MG tablet, Take 1 tablet by mouth Every 8 (Eight) Hours As Needed for Anxiety., Disp: 90 tablet, Rfl: 1    Hyoscyamine Sulfate SL (Levsin/SL) 0.125 MG sublingual tablet, Place 1 tablet under the tongue Every 6 (Six) Hours As Needed (abdominal pain/cramping)., Disp: 60 each, Rfl: 3    levocetirizine (XYZAL) 5 MG tablet, Take 1 tablet by mouth Every Evening., Disp: 90 tablet, Rfl: 3    levothyroxine (SYNTHROID, LEVOTHROID) 88 MCG tablet, Take 1 tablet by mouth Daily., Disp: 90 tablet, Rfl: 1    lisinopril-hydrochlorothiazide (PRINZIDE,ZESTORETIC) 10-12.5 MG per tablet, Take 1 tablet by mouth Daily., Disp: 90 tablet, Rfl: 1    Multiple Vitamins-Minerals (MULTI ADULT GUMMIES PO), , Disp: , Rfl:     nystatin (MYCOSTATIN) 100,000 unit/mL suspension, Swish and swallow 5 mL 4 (Four) Times a Day for 14 days., Disp: 300 mL, Rfl: 0    ondansetron (ZOFRAN) 4 MG tablet, Take 1  tablet by mouth Every 8 (Eight) Hours As Needed for Nausea or Vomiting., Disp: 60 tablet, Rfl: 2    pantoprazole (PROTONIX) 40 MG EC tablet, Take 1 tablet by mouth 2 (Two) Times a Day., Disp: 180 tablet, Rfl: 3    promethazine (PHENERGAN) 25 MG tablet, Take 1 tablet by mouth Every 6 (Six) Hours As Needed for Nausea or Vomiting., Disp: 60 tablet, Rfl: 1    Scopolamine 1 MG/3DAYS patch, Place 1 patch on the skin as directed by provider Every 72 (Seventy-Two) Hours., Disp: 6 patch, Rfl: 0    Ubiquinol 100 MG capsule, , Disp: , Rfl:     Current Facility-Administered Medications:     Ustekinumab (STELARA) Injection solution prefilled syringe 90 mg, 90 mg, Subcutaneous, Q2 Months, Manda Butler PA-C    Medicines reviewed by Devon Golden [facility] on 8/29/2023 at  9:38 AM    Drug Interactions  None identified.     Initial Education Provided for Specialty Medication  The patient has been provided with the following education and any applicable administration techniques (i.e. self-injection) have been demonstrated for the therapies indicated. All questions and concerns have been addressed prior to the patient receiving the medication, and the patient has verbalized understanding of the education and any materials provided.  Additional patient education shall be provided and documented upon request by the patient, provider or payer.         Emgality (Galcanezumab-gnlm) 120 mg subcutaneous every 30 days    Medication Expectations   Why am I taking this medication? You are taking this medication for migraine prophylaxis.   What should I expect while on this medication? You should expect to a decrease in the frequency and severity of your migraines.   How does the medication work? Emgality is a monoclonal antibody that binds to calcitonin gene-related peptide (CGRP) and blocks its binding to the receptor decreasing the severity of migraines.   How long will I be on this medication for? The amount of time you will be on  this medication will be determined by your doctor and your response to the medication.    How do I take this medication? Take as directed on your prescription label. This medication is a self-injection given every 30 days.    What are some possible side effects? Injection site reactions and hypersensitivity reactions.   What happens if I miss a dose? If you miss a dose, take it as soon as you remember, and time next dose 30 days from last dose.            Medication Safety   What are things I should warn my doctor immediately about? Hypersensitivity reactions.   What are things that I should be cautious of? Injection site reaction.   What are some medications that can interact with this one? No drug interactions identified.            Medication Storage/Handling   How should I handle this medication? Keep this medication our of reach of pets/children in original container.  On the day your Emgality is due let it set at room temperature for 30 minutes prior to injection. (do NOT warm using a heat source such as hot water or a microwave).  Administer in the abdomen, thigh, back of the upper arm, or buttocks.  Do not inject where the skin is tender, bruised, red or hard.  Rotate injection sites.   How does this medication need to be stored? Store in refrigerator and keep dry.   How should I dispose of this medication? You can dispose of the empty syringe in a sharps container, and if this is not available you may use an empty hard plastic container such as a milk jug or tide container.            Resources/Support   How can I remind myself to take this medication? You can download a reminder rachana on your phone or use a calandar  to help with your monthly injection.   Is financial support available?  Yes, [employer] can provide co-pay cards if you have commercial insurance or patient assistance if you have Medicare or no insurance.    Which vaccines are recommended for me? Talk to your doctor about these vaccines: Flu,  Coronavirus (COVID-19), Pneumococcal (pneumonia), Tdap, Hepatitis B, Zoster (shingles)                 Adherence and Self-Administration  Adherence related to the patient's specialty therapy was discussed with the patient. The Adherence segment of this outreach has been reviewed and updated.   Is there a concern with patient's ability to self administer the medication correctly and without issue?: No  Were any potential barriers to adherence identified during the initial assessment or patient education?: No  Are there any concerns regarding the patient's understanding of the importance of medication adherence?: No  Methods for Supporting Patient Adherence and/or Self-Administration: None needed at this time    Goals of Therapy  Goals related to the patient's specialty therapy were discussed with the patient. The Patient Goals segment of this outreach has been reviewed and updated.   Goals        Specialty Pharmacy General Goal      At least 50% symptom reduction and mucosal healing          Specialty Pharmacy General Goal      Reduce frequency and severity of migraines. Currently with ~9-10 headache days per month.                Reassessment Plan & Follow-Up  Medication Therapy Changes: continuing galcanezumab (Emgality) for migraine prevention per Neurologist.  Related Plans, Therapy Recommendations, or Therapy Problems to Be Addressed: Nothing further to be addressed at this time.  Pharmacist to perform regular reassessments no more than (6) months from the previous assessment.  Care Coordinator to set up future refill outreaches, coordinate prescription delivery, and escalate clinical questions to pharmacist.   Welcome information and patient satisfaction survey to be sent by specialty pharmacy team with patient's initial fill.    Attestation  Therapeutic appropriateness: Appropriate   I attest the patient was actively involved in and has agreed to the above plan of care. If the prescribed therapy is at any  point deemed not appropriate based on the current or future assessments, a consultation will be initiated with the patient's specialty care provider to determine the best course of action. The revised plan of therapy will be documented along with any additional patient education provided. Discussed aforementioned material with patient by phone.    Erik Golden, PharmD, [facility]  Clinic Specialty Pharmacist, Neurology  8/29/2023  09:43 EDT

## 2023-08-31 ENCOUNTER — TELEPHONE (OUTPATIENT)
Dept: FAMILY MEDICINE CLINIC | Facility: CLINIC | Age: 53
End: 2023-08-31

## 2023-08-31 NOTE — TELEPHONE ENCOUNTER
Caller: Rosa Melgoza    Relationship: Self    Best call back number: [phone]     What form or medical record are you requesting: AFTER CARE SUMMARY FROM PHYSICAL ON 06/13/2023     Who is requesting this form or medical record from you: [facility]    How would you like to receive the form or medical records (pick-up, mail, fax): FAX OR EMAIL   If fax, what is the fax number: [phone]                 EMAIL: [email or web address]    Timeframe paperwork needed: ASAP

## 2023-09-05 DIAGNOSIS — K50.919 CROHN'S DISEASE WITH COMPLICATION, UNSPECIFIED GASTROINTESTINAL TRACT LOCATION: Primary | ICD-10-CM

## 2023-09-11 DIAGNOSIS — F43.22 ADJUSTMENT DISORDER WITH ANXIOUS MOOD: ICD-10-CM

## 2023-09-12 RX ORDER — HYDROXYZINE HYDROCHLORIDE 25 MG/1
25 TABLET, FILM COATED ORAL EVERY 8 HOURS PRN
Qty: 90 TABLET | Refills: 1 | Status: SHIPPED | OUTPATIENT
Start: 2023-09-12

## 2023-09-14 ENCOUNTER — SPECIALTY PHARMACY (OUTPATIENT)
Dept: GASTROENTEROLOGY | Facility: CLINIC | Age: 53
End: 2023-09-14
Payer: COMMERCIAL

## 2023-09-14 ENCOUNTER — PATIENT OUTREACH (OUTPATIENT)
Dept: CASE MANAGEMENT | Facility: OTHER | Age: 53
End: 2023-09-14
Payer: COMMERCIAL

## 2023-09-15 ENCOUNTER — TELEPHONE (OUTPATIENT)
Dept: FAMILY MEDICINE CLINIC | Facility: CLINIC | Age: 53
End: 2023-09-15
Payer: COMMERCIAL

## 2023-09-22 ENCOUNTER — TELEPHONE (OUTPATIENT)
Dept: FAMILY MEDICINE CLINIC | Facility: CLINIC | Age: 53
End: 2023-09-22
Payer: COMMERCIAL

## 2023-09-22 RX ORDER — METRONIDAZOLE 500 MG/1
500 TABLET ORAL 3 TIMES DAILY
Qty: 21 TABLET | Refills: 0 | Status: SHIPPED | OUTPATIENT
Start: 2023-09-22 | End: 2023-09-29

## 2023-09-25 ENCOUNTER — SPECIALTY PHARMACY (OUTPATIENT)
Dept: NEUROLOGY | Facility: CLINIC | Age: 53
End: 2023-09-25

## 2023-09-25 DIAGNOSIS — R10.13 EPIGASTRIC PAIN: ICD-10-CM

## 2023-09-25 RX ORDER — PREDNISONE 10 MG/1
TABLET ORAL
Qty: 30 TABLET | Refills: 0 | Status: SHIPPED | OUTPATIENT
Start: 2023-09-25 | End: 2023-10-09

## 2023-09-25 RX ORDER — HYOSCYAMINE SULFATE 0.12 MG/1
1 TABLET SUBLINGUAL EVERY 6 HOURS PRN
Qty: 60 EACH | Refills: 3 | Status: SHIPPED | OUTPATIENT
Start: 2023-09-25

## 2023-09-25 NOTE — PROGRESS NOTES
Specialty Pharmacy Patient Management Program  Call Center Refill Outreach      Rosa is a 53 y.o. female contacted today regarding refills of her medication(s).    Specialty medication(s) and dose(s) confirmed: Emgality 120mg/ml      Refill Questions      Flowsheet Row Most Recent Value   Changes to allergies? No   Changes to medications? No   New conditions since last clinic visit No   Unplanned office visit, urgent care, ED, or hospital admission in the last 4 weeks  No   How does patient/caregiver feel medication is working? Very good   Financial problems or insurance changes  No   Since the previous refill, were any specialty medication doses or scheduled injections missed or delayed?  No   Does this patient require a clinical escalation to a pharmacist? No                       Follow-up: 21 Days     Rox Hernandez CPhT  Care Coordinator  [facility]  [phone]  9/25/2023  09:26 EDT

## 2023-10-20 ENCOUNTER — SPECIALTY PHARMACY (OUTPATIENT)
Dept: NEUROLOGY | Facility: CLINIC | Age: 53
End: 2023-10-20
Payer: COMMERCIAL

## 2023-10-20 NOTE — PROGRESS NOTES
Specialty Pharmacy Patient Management Program  Call Center Refill Outreach      Rosa is a 53 y.o. female contacted today regarding refills of her medication(s).    Specialty medication(s) and dose(s) confirmed: Emgality 120mg/ml      Refill Questions      Flowsheet Row Most Recent Value   Changes to allergies? No   Changes to medications? No   New conditions since last clinic visit No   Unplanned office visit, urgent care, ED, or hospital admission in the last 4 weeks  No   How does patient/caregiver feel medication is working? Very good   Financial problems or insurance changes  No   Since the previous refill, were any specialty medication doses or scheduled injections missed or delayed?  No   Does this patient require a clinical escalation to a pharmacist? No                       Follow-up: 21 Days     Rox Hernandez CPhT  Care Coordinator  [facility]  [phone]  10/20/2023  12:24 EDT

## 2023-11-02 ENCOUNTER — PROCEDURE VISIT (OUTPATIENT)
Dept: NEUROLOGY | Facility: CLINIC | Age: 53
End: 2023-11-02
Payer: COMMERCIAL

## 2023-11-02 DIAGNOSIS — G43.719 INTRACTABLE CHRONIC MIGRAINE WITHOUT AURA AND WITHOUT STATUS MIGRAINOSUS: Primary | ICD-10-CM

## 2023-11-02 NOTE — PROGRESS NOTES
Botox injection: Botox injection for neuromuscular block.  Indication: Chronic migraines.  Risks, benefits and alternatives were discussed with the patient.  EMG guidance was not used in identifying the injection site.  Procerus: 5 units was injected.  : 5 units was injected on the right and 5 units injected on the left.  Frontalis: 10 units injected on the right and 10 units injected on the left.  Temporalis: 20 units injected on the right and 20 units injected on the left.  Occipitalis: 15 units injected on the right and 15 units injected on the left.  Cervical paraspinal: 10 units injected on the right and 10 units injected on the left.  Trapezius: 15 units injected on the right and 15 units injected on the left.  200 unit vial, 1 vials, 45 wasted and 155 used.  The patient tolerated the procedure well.  There were no complications.  Patient instructions: The patient was instructed that they may experience localized discomfort over the next 12 to 24 hours and may take over the counter pain medication if needed.  Follow-up in the office in 3 months for next Botox injection.      BOTOX BUY & BILL. OFFICE SUPPLIED

## 2023-11-03 ENCOUNTER — PATIENT MESSAGE (OUTPATIENT)
Dept: FAMILY MEDICINE CLINIC | Facility: CLINIC | Age: 53
End: 2023-11-03
Payer: COMMERCIAL

## 2023-11-03 NOTE — TELEPHONE ENCOUNTER
From: Rosa Melgoza  To: Kathie Romeo  Sent: 11/3/2023 2:34 AM EDT  Subject: ondansetron    Dr Romeo,    I filled my ondansetron today and for some reason, it keeps going up in cost. Even at the hospital pharmacy, whichis generally cheaper on everything. It was $45 for 20 tablets today with my insurance. I did some research online and found a reputable site that I can order online, and pay out of pocket without insurance, and it costs much less. I can even get a larger count than my insurance approves. The site is [employer]. I checked their site and I can get thirty 8 mg tablets a month (and break them in half for 2 doses), a 90 day supply and it costs about 1/3 of what 20 tabs cost me normally. Can you send a new rx for the larger quantity and all to them so I can save some money on them. Their NCPDP # is [id number]. Just let me know if you can do that so I know to go on the rachana and pay for them so they will send them out to me.     Thanks so much!  oRsa

## 2023-11-04 RX ORDER — ONDANSETRON HYDROCHLORIDE 8 MG/1
4 TABLET, FILM COATED ORAL EVERY 8 HOURS PRN
Qty: 90 TABLET | Refills: 0 | Status: SHIPPED | OUTPATIENT
Start: 2023-11-04

## 2023-11-05 DIAGNOSIS — J30.1 SEASONAL ALLERGIC RHINITIS DUE TO POLLEN: ICD-10-CM

## 2023-11-05 DIAGNOSIS — G43.109 MIGRAINE WITH AURA AND WITHOUT STATUS MIGRAINOSUS, NOT INTRACTABLE: ICD-10-CM

## 2023-11-06 ENCOUNTER — SPECIALTY PHARMACY (OUTPATIENT)
Dept: GASTROENTEROLOGY | Facility: CLINIC | Age: 53
End: 2023-11-06
Payer: COMMERCIAL

## 2023-11-06 RX ORDER — BUTALBITAL, ACETAMINOPHEN AND CAFFEINE 50; 325; 40 MG/1; MG/1; MG/1
1 TABLET ORAL EVERY 6 HOURS PRN
Qty: 180 TABLET | Refills: 0 | Status: SHIPPED | OUTPATIENT
Start: 2023-11-06

## 2023-11-06 RX ORDER — FLUTICASONE PROPIONATE 50 MCG
2 SPRAY, SUSPENSION (ML) NASAL DAILY
Qty: 16 G | Refills: 6 | Status: SHIPPED | OUTPATIENT
Start: 2023-11-06

## 2023-11-06 NOTE — PROGRESS NOTES
Specialty Pharmacy Refill Coordination Note     Rosa is a 53 y.o. female contacted today regarding refills of  1 specialty medication(s).    Reviewed and verified with patient: Delia    Specialty medication(s) and dose(s) confirmed: yes    Refill Questions      Flowsheet Row Most Recent Value   Changes to allergies? No   Changes to medications? No   New conditions since last clinic visit No   Unplanned office visit, urgent care, ED, or hospital admission in the last 4 weeks  No   How does patient/caregiver feel medication is working? Very good   Financial problems or insurance changes  No   Since the previous refill, were any specialty medication doses or scheduled injections missed or delayed?  No   Does this patient require a clinical escalation to a pharmacist? No            Delivery Questions      Flowsheet Row Most Recent Value   Delivery method Other (Comment)  [beeline 11/6 for 11/7]   Delivery address correct? Yes   Delivery phone number [phone]   Number of medications in delivery 1   Medication(s) being filled and delivered Ustekinumab (Antipsoriatics)   Doses left of specialty medications 1 week   Is there any medication that is due not being filled? No   Supplies needed? No supplies needed   Cooler needed? Yes   Do any medications need mixed or dated? No   Copay form of payment Credit card on file   Additional comments $5   Questions or concerns for the pharmacist? No   Explain any questions or concerns for the pharmacist n/a   Are any medications first time fills? No   Shipment status Cooler packed                   Follow-up: 50 day(s)     Mari Huntley  Specialty Pharmacy Technician

## 2023-11-14 ENCOUNTER — SPECIALTY PHARMACY (OUTPATIENT)
Dept: INFUSION THERAPY | Facility: HOSPITAL | Age: 53
End: 2023-11-14
Payer: COMMERCIAL

## 2023-11-17 ENCOUNTER — LAB (OUTPATIENT)
Dept: LAB | Facility: HOSPITAL | Age: 53
End: 2023-11-17
Payer: COMMERCIAL

## 2023-11-17 ENCOUNTER — OFFICE VISIT (OUTPATIENT)
Dept: GASTROENTEROLOGY | Facility: CLINIC | Age: 53
End: 2023-11-17
Payer: COMMERCIAL

## 2023-11-17 VITALS
TEMPERATURE: 96.1 F | OXYGEN SATURATION: 96 % | SYSTOLIC BLOOD PRESSURE: 134 MMHG | DIASTOLIC BLOOD PRESSURE: 84 MMHG | BODY MASS INDEX: 39.82 KG/M2 | HEIGHT: 65 IN | WEIGHT: 239 LBS | HEART RATE: 89 BPM

## 2023-11-17 DIAGNOSIS — K50.919 CROHN'S DISEASE WITH COMPLICATION, UNSPECIFIED GASTROINTESTINAL TRACT LOCATION: Primary | ICD-10-CM

## 2023-11-17 LAB
ALBUMIN SERPL-MCNC: 4.1 G/DL (ref 3.5–5.2)
ALBUMIN/GLOB SERPL: 1.5 G/DL
ALP SERPL-CCNC: 88 U/L (ref 39–117)
ALT SERPL W P-5'-P-CCNC: 19 U/L (ref 1–33)
ANION GAP SERPL CALCULATED.3IONS-SCNC: 9.4 MMOL/L (ref 5–15)
AST SERPL-CCNC: 12 U/L (ref 1–32)
BASOPHILS # BLD AUTO: 0.03 10*3/MM3 (ref 0–0.2)
BASOPHILS NFR BLD AUTO: 0.3 % (ref 0–1.5)
BILIRUB SERPL-MCNC: <0.2 MG/DL (ref 0–1.2)
BUN SERPL-MCNC: 11 MG/DL (ref 6–20)
BUN/CREAT SERPL: 13.6 (ref 7–25)
CALCIUM SPEC-SCNC: 9.1 MG/DL (ref 8.6–10.5)
CHLORIDE SERPL-SCNC: 100 MMOL/L (ref 98–107)
CO2 SERPL-SCNC: 30.6 MMOL/L (ref 22–29)
CREAT SERPL-MCNC: 0.81 MG/DL (ref 0.57–1)
DEPRECATED RDW RBC AUTO: 39.8 FL (ref 37–54)
EGFRCR SERPLBLD CKD-EPI 2021: 86.9 ML/MIN/1.73
EOSINOPHIL # BLD AUTO: 0.19 10*3/MM3 (ref 0–0.4)
EOSINOPHIL NFR BLD AUTO: 1.9 % (ref 0.3–6.2)
ERYTHROCYTE [DISTWIDTH] IN BLOOD BY AUTOMATED COUNT: 12.6 % (ref 12.3–15.4)
GLOBULIN UR ELPH-MCNC: 2.8 GM/DL
GLUCOSE SERPL-MCNC: 102 MG/DL (ref 65–99)
HCT VFR BLD AUTO: 38 % (ref 34–46.6)
HGB BLD-MCNC: 12.8 G/DL (ref 12–15.9)
IMM GRANULOCYTES # BLD AUTO: 0.03 10*3/MM3 (ref 0–0.05)
IMM GRANULOCYTES NFR BLD AUTO: 0.3 % (ref 0–0.5)
LYMPHOCYTES # BLD AUTO: 1.77 10*3/MM3 (ref 0.7–3.1)
LYMPHOCYTES NFR BLD AUTO: 17.5 % (ref 19.6–45.3)
MCH RBC QN AUTO: 29 PG (ref 26.6–33)
MCHC RBC AUTO-ENTMCNC: 33.7 G/DL (ref 31.5–35.7)
MCV RBC AUTO: 86 FL (ref 79–97)
MONOCYTES # BLD AUTO: 0.44 10*3/MM3 (ref 0.1–0.9)
MONOCYTES NFR BLD AUTO: 4.4 % (ref 5–12)
NEUTROPHILS NFR BLD AUTO: 7.65 10*3/MM3 (ref 1.7–7)
NEUTROPHILS NFR BLD AUTO: 75.6 % (ref 42.7–76)
NRBC BLD AUTO-RTO: 0.1 /100 WBC (ref 0–0.2)
PLATELET # BLD AUTO: 212 10*3/MM3 (ref 140–450)
PMV BLD AUTO: 10.2 FL (ref 6–12)
POTASSIUM SERPL-SCNC: 3.6 MMOL/L (ref 3.5–5.2)
PROT SERPL-MCNC: 6.9 G/DL (ref 6–8.5)
RBC # BLD AUTO: 4.42 10*6/MM3 (ref 3.77–5.28)
SODIUM SERPL-SCNC: 140 MMOL/L (ref 136–145)
WBC NRBC COR # BLD AUTO: 10.11 10*3/MM3 (ref 3.4–10.8)

## 2023-11-17 PROCEDURE — 83520 IMMUNOASSAY QUANT NOS NONAB: CPT | Performed by: INTERNAL MEDICINE

## 2023-11-17 PROCEDURE — 82397 CHEMILUMINESCENT ASSAY: CPT | Performed by: INTERNAL MEDICINE

## 2023-11-17 PROCEDURE — 36415 COLL VENOUS BLD VENIPUNCTURE: CPT | Performed by: INTERNAL MEDICINE

## 2023-11-17 PROCEDURE — 80053 COMPREHEN METABOLIC PANEL: CPT | Performed by: INTERNAL MEDICINE

## 2023-11-17 PROCEDURE — 80299 QUANTITATIVE ASSAY DRUG: CPT | Performed by: INTERNAL MEDICINE

## 2023-11-17 PROCEDURE — 85025 COMPLETE CBC W/AUTO DIFF WBC: CPT | Performed by: INTERNAL MEDICINE

## 2023-11-17 PROCEDURE — 86141 C-REACTIVE PROTEIN HS: CPT | Performed by: INTERNAL MEDICINE

## 2023-11-17 PROCEDURE — 99213 OFFICE O/P EST LOW 20 MIN: CPT | Performed by: INTERNAL MEDICINE

## 2023-11-17 NOTE — PROGRESS NOTES
Specialty Pharmacy Refill Coordination Note     Rosa is a 53 y.o. female contacted today regarding refills of her specialty medication(s).    Specialty medication(s) and dose(s) confirmed: galcanezumab (Emgality) 120 mg subcutaneous every 30 days.  Changes to medications: no  Changes to insurance: no  Reviewed and verified with patient:  Allergies  Meds  Problems         Refill Questions      Flowsheet Row Most Recent Value   Changes to allergies? No   Changes to medications? No   New conditions since last clinic visit No   Unplanned office visit, urgent care, ED, or hospital admission in the last 4 weeks  No   How does patient/caregiver feel medication is working? Very good   Financial problems or insurance changes  No   Since the previous refill, were any specialty medication doses or scheduled injections missed or delayed?  No   Does this patient require a clinical escalation to a pharmacist? No                     Follow-up: 3 weeks     Devon Golden RPH  11/17/2023   14:02 EST

## 2023-11-17 NOTE — PROGRESS NOTES
Chief Complaint   Patient presents with    Crohn's Disease     Subjective     HPI  Rosa Melgoza is a 53 y.o. female who presents today for office follow up.     She has hx of small bowel Crohns diagnosed with cross sectional imaging and [facility] IBD panel  Currently managed with Stelara.  She just had her second injection maintenance dose on 7 November.  She was previously switched from Entyvio as she was having a lot of joint pains on this medication.  She reports those symptoms have resolved with Stelara.  Recently was on a [place] cruise and did quite well during the cruise.  Over last few days she is noted she had some increasing nausea and upper abdominal pain.  No significant change in bowel habit.  No fevers or chills.    She is UTD with vaccinations    Objective   Vitals:    11/17/23 0949   BP: 134/84   Pulse: 89   Temp: 96.1 °F (35.6 °C)   SpO2: 96%       Physical Exam  Vitals reviewed.   Constitutional:       Appearance: She is well-developed.   HENT:      Head: Normocephalic and atraumatic.   Abdominal:      General: Bowel sounds are normal. There is no distension.      Palpations: Abdomen is soft. There is no mass.      Tenderness: There is no abdominal tenderness.      Hernia: No hernia is present.   Skin:     General: Skin is warm and dry.   Neurological:      Mental Status: She is alert and oriented to person, place, and time.   Psychiatric:         Behavior: Behavior normal.         Thought Content: Thought content normal.         Judgment: Judgment normal.       The following data was reviewed by: Maicol Garza MD on 11/17/2023:  Common labs          2/8/2023    11:15 4/14/2023    08:15 6/13/2023    15:56   Common Labs   Glucose 125  97     BUN 13  14     Creatinine 0.92  0.97     Sodium 147  143     Potassium 4.0  3.6     Chloride 104  103     Calcium 8.8  9.1     Total Protein 6.2  6.5     Albumin 3.7  4.0     Total Bilirubin <0.2  <0.2     Alkaline Phosphatase 88  97     AST (SGOT)  20  11     ALT (SGPT) 23  17     WBC  9.2     Hemoglobin  11.2     Hematocrit  34.3     Platelets  185     Total Cholesterol   186    Triglycerides   227    HDL Cholesterol   37    LDL Cholesterol    110           Assessment & Plan   Assessment:     1. Crohn's disease with complication, unspecified gastrointestinal tract location       Plan:   Check routine labs today  Refer for Prometheus Monitor panel and Stelara levels/antibody panel  If Monitor elevated, would recommend f/u with CTE and short course of budesonide  Continue PPI    Follow up 3 mos with APC          Maicol Garza M.D.  [facility]  [address] [place] [address]  Office: [phone]

## 2023-11-27 LAB
REF LAB TEST METHOD: NORMAL
USTEKINUMAB AB SERPL IA-MCNC: <40 NG/ML
USTEKINUMAB SERPL IA-MCNC: 3.8 UG/ML

## 2023-12-01 ENCOUNTER — TELEPHONE (OUTPATIENT)
Dept: GASTROENTEROLOGY | Facility: CLINIC | Age: 53
End: 2023-12-01
Payer: COMMERCIAL

## 2023-12-04 DIAGNOSIS — K50.919 CROHN'S DISEASE WITH COMPLICATION, UNSPECIFIED GASTROINTESTINAL TRACT LOCATION: Primary | ICD-10-CM

## 2023-12-08 ENCOUNTER — HOSPITAL ENCOUNTER (OUTPATIENT)
Dept: MAMMOGRAPHY | Facility: HOSPITAL | Age: 53
Discharge: HOME | End: 2023-12-08
Admitting: FAMILY MEDICINE
Payer: COMMERCIAL

## 2023-12-08 DIAGNOSIS — Z12.31 SCREENING MAMMOGRAM FOR BREAST CANCER: ICD-10-CM

## 2023-12-08 PROCEDURE — 77067 SCR MAMMO BI INCL CAD: CPT

## 2023-12-08 PROCEDURE — 77063 BREAST TOMOSYNTHESIS BI: CPT

## 2023-12-15 ENCOUNTER — SPECIALTY PHARMACY (OUTPATIENT)
Dept: NEUROLOGY | Facility: CLINIC | Age: 53
End: 2023-12-15
Payer: COMMERCIAL

## 2023-12-21 ENCOUNTER — HOSPITAL ENCOUNTER (OUTPATIENT)
Dept: CT IMAGING | Facility: HOSPITAL | Age: 53
Discharge: HOME | End: 2023-12-21
Payer: COMMERCIAL

## 2023-12-21 DIAGNOSIS — K50.919 CROHN'S DISEASE WITH COMPLICATION, UNSPECIFIED GASTROINTESTINAL TRACT LOCATION: ICD-10-CM

## 2023-12-21 PROCEDURE — 25510000001 IOPAMIDOL 61 % SOLUTION: Performed by: INTERNAL MEDICINE

## 2023-12-21 PROCEDURE — 82565 ASSAY OF CREATININE: CPT

## 2023-12-21 PROCEDURE — 74177 CT ABD & PELVIS W/CONTRAST: CPT

## 2023-12-21 RX ADMIN — Medication 237 ML: at 10:22

## 2023-12-21 RX ADMIN — IOPAMIDOL 95 ML: 612 INJECTION, SOLUTION INTRAVENOUS at 10:52

## 2023-12-21 RX ADMIN — Medication 237 ML: at 10:02

## 2023-12-21 RX ADMIN — Medication 237 ML: at 09:42

## 2023-12-22 LAB — CREAT BLDA-MCNC: 1 MG/DL (ref 0.6–1.3)

## 2023-12-28 ENCOUNTER — TELEPHONE (OUTPATIENT)
Dept: GASTROENTEROLOGY | Facility: CLINIC | Age: 53
End: 2023-12-28
Payer: COMMERCIAL

## 2023-12-28 DIAGNOSIS — K50.919 CROHN'S DISEASE WITH COMPLICATION, UNSPECIFIED GASTROINTESTINAL TRACT LOCATION: Primary | ICD-10-CM

## 2023-12-28 RX ORDER — BUDESONIDE 3 MG/1
CAPSULE, COATED PELLETS ORAL
Qty: 84 CAPSULE | Refills: 0 | Status: SHIPPED | OUTPATIENT
Start: 2023-12-28 | End: 2024-02-08

## 2023-12-28 NOTE — TELEPHONE ENCOUNTER
Called pt and advised of Dr Garza's note. Verb understanding.     F/u appt made for 01/30/2024 at 1030a with Manda MARTIN.     Pt reports that she is symptomatic and has picked up the budesonide.     Update sent to Dr Garza.

## 2023-12-28 NOTE — TELEPHONE ENCOUNTER
----- Message from Maicol Garza MD sent at 12/26/2023  8:35 AM EST -----  CT shows some mild inflammation at terminal ileum  If still having symptoms recommend short taper of budesonide  9mg/day x 2 weeks, then 6mg/day x 2 weeks, then 3mg/day x 2 weeks.  Recommend repeat CTE in 6 mos  Office f/u with me or SM in 4-6 weeks   Spoke with patient and she stated that her swelling started going down in her feet and she went to wound care as well. I told patient to keep me informed with her progress so that I can let the provider know

## 2023-12-28 NOTE — TELEPHONE ENCOUNTER
----- Message from Rosa Melgoza sent at 12/28/2023 12:21 PM EST -----  Regarding: CT  Contact: [phone]  i think i would like to try the budesonide.  i use [facility].   thanks, Rosa

## 2023-12-28 NOTE — TELEPHONE ENCOUNTER
Per Dr. Garza:     CT shows some mild inflammation at terminal ileum  If still having symptoms recommend short taper of budesonide  9mg/day x 2 weeks, then 6mg/day x 2 weeks, then 3mg/day x 2 weeks.  Recommend repeat CTE in 6 mos  Office f/u with me or SM in 4-6 weeks.    Order placed for repeat CTE.

## 2024-01-03 ENCOUNTER — SPECIALTY PHARMACY (OUTPATIENT)
Dept: GASTROENTEROLOGY | Facility: CLINIC | Age: 54
End: 2024-01-03
Payer: COMMERCIAL

## 2024-01-03 NOTE — PROGRESS NOTES
Specialty Pharmacy     Left 3 voicemails for patient to refill Delia Huntley  Specialty Pharmacy Technician

## 2024-01-03 NOTE — PROGRESS NOTES
Specialty Pharmacy Refill Coordination Note     Rosa is a 53 y.o. female contacted today regarding refills of  1 specialty medication(s).    Reviewed and verified with patient: alysa    Specialty medication(s) and dose(s) confirmed: yes    Refill Questions      Flowsheet Row Most Recent Value   Changes to allergies? No   Changes to medications? No   New conditions or infections since last clinic visit No   Unplanned office visit, urgent care, ED, or hospital admission in the last 4 weeks  No   How does patient/caregiver feel medication is working? Very good   Financial problems or insurance changes  No   Since the previous refill, were any specialty medication doses or scheduled injections missed or delayed?  No   Does this patient require a clinical escalation to a pharmacist? No            Delivery Questions      Flowsheet Row Most Recent Value   Delivery method Beeline   Delivery address verified with patient/caregiver? Yes   Delivery address Home   Number of medications in delivery 1   Medication(s) being filled and delivered Ustekinumab (Antipsoriatics)   Doses left of specialty medications 1 week   Copay verified? Yes   Copay amount 5   Copay form of payment HAS/FSA on file                   Follow-up: 49 day(s)     Mari Huntley  Specialty Pharmacy Technician

## 2024-01-12 ENCOUNTER — SPECIALTY PHARMACY (OUTPATIENT)
Dept: NEUROLOGY | Facility: CLINIC | Age: 54
End: 2024-01-12
Payer: COMMERCIAL

## 2024-01-12 NOTE — PROGRESS NOTES
Patient awake and alert, rr unlabored, skin warm and dry. VSS. Discharge instructions provided and explained to parent. Tylenol/motrin dosing pamphlet provided and explained - went over frequency and dosing. Parent verbalized understanding of all instructions. Parent aware to follow up with PCP as instructed.  Aware to come back to ED if any difficulty breathing, not tolerating PO, uncontrolled vomiting or worsening symptoms. All questions were answered. Patient discharged home in stable condition.     Specialty Pharmacy Refill Coordination Note     Rosa is a 53 y.o. female contacted today regarding refills of  Emgality specialty medication(s).    Reviewed and verified with patient:       Specialty medication(s) and dose(s) confirmed: yes    Refill Questions      Flowsheet Row Most Recent Value   Changes to allergies? No   Changes to medications? No   New conditions or infections since last clinic visit No   Unplanned office visit, urgent care, ED, or hospital admission in the last 4 weeks  No   How does patient/caregiver feel medication is working? Very good   Financial problems or insurance changes  No   Since the previous refill, were any specialty medication doses or scheduled injections missed or delayed?  No   Does this patient require a clinical escalation to a pharmacist? No            Delivery Questions      Flowsheet Row Most Recent Value   Delivery method Beeline   Delivery address verified with patient/caregiver? Yes   Delivery address Home   Number of medications in delivery 1   Medication(s) being filled and delivered Galcanezumab-Gnlm   Copay verified? Yes   Copay amount $0   Copay form of payment No copayment ($0)                   Follow-up: 21 day(s)     Shelbie Foster, Pharmacy Technician  Specialty Pharmacy Technician

## 2024-01-30 ENCOUNTER — OFFICE VISIT (OUTPATIENT)
Dept: GASTROENTEROLOGY | Facility: CLINIC | Age: 54
End: 2024-01-30
Payer: COMMERCIAL

## 2024-01-30 VITALS
DIASTOLIC BLOOD PRESSURE: 85 MMHG | HEIGHT: 65 IN | SYSTOLIC BLOOD PRESSURE: 131 MMHG | OXYGEN SATURATION: 96 % | TEMPERATURE: 98.6 F | HEART RATE: 79 BPM | BODY MASS INDEX: 39.1 KG/M2 | WEIGHT: 234.7 LBS

## 2024-01-30 DIAGNOSIS — K21.00 GASTROESOPHAGEAL REFLUX DISEASE WITH ESOPHAGITIS WITHOUT HEMORRHAGE: ICD-10-CM

## 2024-01-30 DIAGNOSIS — K50.00 CROHN'S ILEITIS, WITHOUT COMPLICATIONS: Primary | ICD-10-CM

## 2024-01-30 LAB
ALBUMIN SERPL-MCNC: 3.8 G/DL (ref 3.5–5.2)
ALBUMIN/GLOB SERPL: 1.5 G/DL
ALP SERPL-CCNC: 87 U/L (ref 39–117)
ALT SERPL-CCNC: 22 U/L (ref 1–33)
AST SERPL-CCNC: 13 U/L (ref 1–32)
BASOPHILS # BLD AUTO: 0.03 10*3/MM3 (ref 0–0.2)
BASOPHILS NFR BLD AUTO: 0.4 % (ref 0–1.5)
BILIRUB SERPL-MCNC: <0.2 MG/DL (ref 0–1.2)
BUN SERPL-MCNC: 11 MG/DL (ref 6–20)
BUN/CREAT SERPL: 13.9 (ref 7–25)
CALCIUM SERPL-MCNC: 8.4 MG/DL (ref 8.6–10.5)
CHLORIDE SERPL-SCNC: 103 MMOL/L (ref 98–107)
CO2 SERPL-SCNC: 29 MMOL/L (ref 22–29)
CREAT SERPL-MCNC: 0.79 MG/DL (ref 0.57–1)
CRP SERPL-MCNC: 1.43 MG/DL (ref 0–0.5)
EGFRCR SERPLBLD CKD-EPI 2021: 89.6 ML/MIN/1.73
EOSINOPHIL # BLD AUTO: 0.24 10*3/MM3 (ref 0–0.4)
EOSINOPHIL NFR BLD AUTO: 3.2 % (ref 0.3–6.2)
ERYTHROCYTE [DISTWIDTH] IN BLOOD BY AUTOMATED COUNT: 13.1 % (ref 12.3–15.4)
ERYTHROCYTE [SEDIMENTATION RATE] IN BLOOD BY WESTERGREN METHOD: 17 MM/HR (ref 0–30)
GLOBULIN SER CALC-MCNC: 2.6 GM/DL
GLUCOSE SERPL-MCNC: 94 MG/DL (ref 65–99)
HCT VFR BLD AUTO: 36.3 % (ref 34–46.6)
HGB BLD-MCNC: 12.1 G/DL (ref 12–15.9)
IMM GRANULOCYTES # BLD AUTO: 0.02 10*3/MM3 (ref 0–0.05)
IMM GRANULOCYTES NFR BLD AUTO: 0.3 % (ref 0–0.5)
LYMPHOCYTES # BLD AUTO: 1.72 10*3/MM3 (ref 0.7–3.1)
LYMPHOCYTES NFR BLD AUTO: 22.8 % (ref 19.6–45.3)
MCH RBC QN AUTO: 28.5 PG (ref 26.6–33)
MCHC RBC AUTO-ENTMCNC: 33.3 G/DL (ref 31.5–35.7)
MCV RBC AUTO: 85.6 FL (ref 79–97)
MONOCYTES # BLD AUTO: 0.33 10*3/MM3 (ref 0.1–0.9)
MONOCYTES NFR BLD AUTO: 4.4 % (ref 5–12)
NEUTROPHILS # BLD AUTO: 5.19 10*3/MM3 (ref 1.7–7)
NEUTROPHILS NFR BLD AUTO: 68.9 % (ref 42.7–76)
NRBC BLD AUTO-RTO: 0 /100 WBC (ref 0–0.2)
PLATELET # BLD AUTO: 196 10*3/MM3 (ref 140–450)
POTASSIUM SERPL-SCNC: 3.7 MMOL/L (ref 3.5–5.2)
PROT SERPL-MCNC: 6.4 G/DL (ref 6–8.5)
RBC # BLD AUTO: 4.24 10*6/MM3 (ref 3.77–5.28)
SODIUM SERPL-SCNC: 141 MMOL/L (ref 136–145)
WBC # BLD AUTO: 7.53 10*3/MM3 (ref 3.4–10.8)

## 2024-01-30 NOTE — PROGRESS NOTES
"Chief Complaint  Heartburn and Crohn's Disease    Subjective          History of Present Illness    Rosa Melgoza is a  53 y.o. female presents for follow-up on Crohn's disease diagnosed via cross-sectional imaging and [facility] IBD panel.  She is a patient of Dr. Garza last seen on 11/17/2023.    She is on Stelara every 8 weeks started about 6 months ago.  Previously failed Entyvio as she was having joint pains.  After last visit she had Stelara levels and antibodies checked.  Stelara levels were 3.8.  No evidence of antibodies.  Prometheus monitor testing showed a level of 43 which suggested increased likelihood of endoscopically active disease.     She reports doing well, some episodes of constipation recently rather than diarrhea. Denies abdominal pain, blood in stool. Took course of Budesonide for flare in December. Joint pains are much better on Stelara.     She is on Protonix daily for her GERD.  This is also working well for her.    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  Recall 6 months.  Dr. Garza has already placed the order.    From 3/28/2022 note:  5/20/21 IBD serology consistent with Crohn's disease.     9/24/20 EGD: Medium-sized hiatal hernia, LA Grade A esophagitis, otherwise normal. Path with gastritis, negative H. Pylori.  9/24/20 Colonoscopy: Diverticulosis otherwise normal. Colon biopsies were normal, recall 10 years      Objective   Vital Signs:   /85   Pulse 79   Temp 98.6 °F (37 °C)   Ht 165.1 cm (65\")   Wt 106 kg (234 lb 11.2 oz)   SpO2 96%   BMI 39.06 kg/m²       Physical Exam  Vitals reviewed.   Constitutional:       General: She is awake. She is not in acute distress.     Appearance: Normal appearance. She is well-developed and well-groomed.   HENT:      Head: Normocephalic.   Pulmonary:      Effort: Pulmonary effort is normal. No respiratory distress.   Skin:     Coloration: " Skin is not pale.   Neurological:      Mental Status: She is alert and oriented to person, place, and time.      Gait: Gait is intact.   Psychiatric:         Mood and Affect: Mood and affect normal.         Speech: Speech normal.         Behavior: Behavior is cooperative.         Judgment: Judgment normal.          Result Review :             Assessment and Plan    Diagnoses and all orders for this visit:    1. Crohn's ileitis, without complications (Primary)  -     CBC & Differential  -     Comprehensive Metabolic Panel  -     C-reactive Protein  -     Sedimentation Rate    2. Gastroesophageal reflux disease with esophagitis without hemorrhage    Continue Stelara every 8 weeks.  This is working well for her.  Overall doing much better.  Check above routine labs.    Vaccinations UTD including COVID. Due for dermatology check-she agrees to schedule.  Routine gynecological exams up-to-date.    Recommend repeat CT enterography in June. Consider rechecking Promethius MONITR at that time as well.     Follow Up   Return in about 6 months (around 7/30/2024).    Dragon dictation used throughout this note.     Manda Butler PA-C

## 2024-02-01 DIAGNOSIS — E06.3 HYPOTHYROIDISM DUE TO HASHIMOTO'S THYROIDITIS: ICD-10-CM

## 2024-02-01 DIAGNOSIS — I10 ESSENTIAL HYPERTENSION: ICD-10-CM

## 2024-02-01 DIAGNOSIS — E03.8 HYPOTHYROIDISM DUE TO HASHIMOTO'S THYROIDITIS: ICD-10-CM

## 2024-02-03 RX ORDER — LISINOPRIL AND HYDROCHLOROTHIAZIDE 12.5; 1 MG/1; MG/1
1 TABLET ORAL DAILY
Qty: 90 TABLET | Refills: 0 | Status: SHIPPED | OUTPATIENT
Start: 2024-02-03

## 2024-02-03 RX ORDER — LEVOTHYROXINE SODIUM 88 UG/1
88 TABLET ORAL DAILY
Qty: 90 TABLET | Refills: 0 | Status: SHIPPED | OUTPATIENT
Start: 2024-02-03

## 2024-02-05 ENCOUNTER — TELEPHONE (OUTPATIENT)
Dept: FAMILY MEDICINE CLINIC | Facility: CLINIC | Age: 54
End: 2024-02-05
Payer: COMMERCIAL

## 2024-02-05 RX ORDER — ONDANSETRON HYDROCHLORIDE 8 MG/1
4 TABLET, FILM COATED ORAL EVERY 8 HOURS PRN
Qty: 90 TABLET | Refills: 0 | Status: SHIPPED | OUTPATIENT
Start: 2024-02-05

## 2024-02-19 ENCOUNTER — OFFICE VISIT (OUTPATIENT)
Dept: FAMILY MEDICINE CLINIC | Facility: CLINIC | Age: 54
End: 2024-02-19
Payer: COMMERCIAL

## 2024-02-19 VITALS
SYSTOLIC BLOOD PRESSURE: 126 MMHG | TEMPERATURE: 98.2 F | HEIGHT: 65 IN | BODY MASS INDEX: 39.79 KG/M2 | WEIGHT: 238.8 LBS | OXYGEN SATURATION: 99 % | DIASTOLIC BLOOD PRESSURE: 84 MMHG | HEART RATE: 84 BPM

## 2024-02-19 DIAGNOSIS — J40 BRONCHITIS DUE TO COVID-19 VIRUS: Primary | ICD-10-CM

## 2024-02-19 DIAGNOSIS — J01.00 ACUTE MAXILLARY SINUSITIS, RECURRENCE NOT SPECIFIED: ICD-10-CM

## 2024-02-19 DIAGNOSIS — U07.1 BRONCHITIS DUE TO COVID-19 VIRUS: Primary | ICD-10-CM

## 2024-02-19 DIAGNOSIS — H65.01 RIGHT ACUTE SEROUS OTITIS MEDIA, RECURRENCE NOT SPECIFIED: ICD-10-CM

## 2024-02-19 PROCEDURE — 99214 OFFICE O/P EST MOD 30 MIN: CPT | Performed by: NURSE PRACTITIONER

## 2024-02-19 RX ORDER — FLUCONAZOLE 150 MG/1
150 TABLET ORAL ONCE
Qty: 2 TABLET | Refills: 0 | Status: SHIPPED | OUTPATIENT
Start: 2024-02-19 | End: 2024-02-19

## 2024-02-19 RX ORDER — AMOXICILLIN AND CLAVULANATE POTASSIUM 875; 125 MG/1; MG/1
1 TABLET, FILM COATED ORAL 2 TIMES DAILY
Qty: 20 TABLET | Refills: 0 | Status: SHIPPED | OUTPATIENT
Start: 2024-02-19

## 2024-02-19 RX ORDER — PREDNISONE 20 MG/1
TABLET ORAL
Qty: 9 TABLET | Refills: 0 | Status: SHIPPED | OUTPATIENT
Start: 2024-02-19 | End: 2024-02-24

## 2024-02-19 RX ORDER — DEXTROMETHORPHAN HYDROBROMIDE AND PROMETHAZINE HYDROCHLORIDE 15; 6.25 MG/5ML; MG/5ML
5 SYRUP ORAL 4 TIMES DAILY PRN
Qty: 180 ML | Refills: 0 | Status: SHIPPED | OUTPATIENT
Start: 2024-02-19

## 2024-02-19 NOTE — PROGRESS NOTES
"Chief Complaint  covid positive  (2/8/2024)    Subjective        Rosa Melgoza presents to [facility]  History of Present Illness    Patient is a patient of Dr. Kathie Romeo.  She went to  on 2/8 and was diagnosed with COVID.  Symptoms started on February 7.    She reports today that she is still having shortness of breath with any exertion, cough with sputum production, can taste blood in sputum, but not seeing any blood in sputum.   Fatigue, joint pain, headache, ear pain, mild fever, stuffy nose, swollen glands in throat, sinus pressure/pain, diarrhea    Has Crohn's disease and on stelara.      She reports that she wasn't offered paxlovid and was told to reach out to PCP.   Was given cough suppressant and inhaler.    Dextromethorphan doesn't help.   Sitting up cough isn't bad, but anytime she lays down.      Gets yeast infection every time she gets and asking for fluconazole    Objective   Vital Signs:  /84   Pulse 84   Temp 98.2 °F (36.8 °C)   Ht 165.1 cm (65\")   Wt 108 kg (238 lb 12.8 oz)   SpO2 99%   BMI 39.74 kg/m²   Estimated body mass index is 39.74 kg/m² as calculated from the following:    Height as of this encounter: 165.1 cm (65\").    Weight as of this encounter: 108 kg (238 lb 12.8 oz).             Physical Exam  Constitutional:       Appearance: Normal appearance.   HENT:      Head: Normocephalic and atraumatic.      Right Ear: Tympanic membrane has decreased mobility.      Left Ear: Tympanic membrane has decreased mobility.      Nose: Rhinorrhea present.      Right Sinus: Maxillary sinus tenderness and frontal sinus tenderness present.      Left Sinus: Maxillary sinus tenderness and frontal sinus tenderness present.   Cardiovascular:      Rate and Rhythm: Normal rate and regular rhythm.      Pulses: Normal pulses.   Pulmonary:      Effort: Pulmonary effort is normal.      Breath sounds: Normal breath sounds.      Comments: Bronchial sounds noted  Skin:     " General: Skin is warm and dry.   Neurological:      Mental Status: She is alert and oriented to person, place, and time.   Psychiatric:         Mood and Affect: Mood normal.         Behavior: Behavior normal.         Thought Content: Thought content normal.         Judgment: Judgment normal.        Result Review :                     Assessment and Plan     Diagnoses and all orders for this visit:    1. Bronchitis due to COVID-19 virus (Primary)    2. Right acute serous otitis media, recurrence not specified    3. Acute maxillary sinusitis, recurrence not specified    Other orders  -     amoxicillin-clavulanate (AUGMENTIN) 875-125 MG per tablet; Take 1 tablet by mouth 2 (Two) Times a Day.  Dispense: 20 tablet; Refill: 0  -     predniSONE (DELTASONE) 20 MG tablet; Take 1 tablet by mouth 2 (Two) Times a Day for 3 days, THEN 1 tablet Daily for 3 days.  Dispense: 9 tablet; Refill: 0  -     fluconazole (DIFLUCAN) 150 MG tablet; Take 1 tablet by mouth 1 (One) Time for 1 dose. May repeat 1 time in 72 hours if necessary.  Dispense: 2 tablet; Refill: 0  -     promethazine-dextromethorphan (PROMETHAZINE-DM) 6.25-15 MG/5ML syrup; Take 5 mL by mouth 4 (Four) Times a Day As Needed for Cough.  Dispense: 180 mL; Refill: 0    Treating for bronchitis, otitis media, and sinusitis secondary to COVID infection.  Start antibiotic and steroid.  Continue to use albuterol as needed.  Cough medicine given.  Cautioned that this 1 can cause her sleepiness.  Use fluconazole if needed.  She did ask about quarantine status and I discussed with her that she is past the 10-day well and 1 dose so according to CDC no further recommendations for there.  She verbalized understanding is agreeable         Follow Up     No follow-ups on file.  Patient was given instructions and counseling regarding her condition or for health maintenance advice. Please see specific information pulled into the AVS if appropriate.

## 2024-02-20 ENCOUNTER — SPECIALTY PHARMACY (OUTPATIENT)
Dept: NEUROLOGY | Facility: CLINIC | Age: 54
End: 2024-02-20
Payer: COMMERCIAL

## 2024-02-20 NOTE — PROGRESS NOTES
Specialty Pharmacy Patient Management Program  Neurology Reassessment     Rosa Melgoza is a 53 y.o. female with chronic migraine seen by a Neurology provider and enrolled in the Neurology Patient Management program offered by [facility].  A follow-up outreach was conducted, including assessment of continued therapy appropriateness, medication adherence, and side effect incidence and management for galcanezumab (Emgality).     Changes to Insurance Coverage or Financial Support  No changes.       Relevant Past Medical History and Comorbidities  Relevant medical history and concomitant health conditions were discussed with the patient. The patient's chart has been reviewed for relevant past medical history and comorbid health conditions and updated as necessary.   Past Medical History:   Diagnosis Date    Allergic     Anxiety     Cholelithiasis     Crohn disease     Depression     Disease of thyroid gland     Diverticulitis of colon     Elevated blood pressure reading without diagnosis of hypertension     Encounter for long-term (current) use of medications 2020    Fatty liver     GERD (gastroesophageal reflux disease)     Headache, migraine     Hernia     History of medical problems     not sure of date, but very painful joints and muscles.  Poss r/t crohns    History of sore throat     Hyperlipidemia     Hypertension     Hypothyroidism     Migraine     Need for DTaP and Hib vaccine     History of     Obesity     Urinary tract infection     Vaginal yeast infection      Social History     Socioeconomic History    Marital status:    Tobacco Use    Smoking status: Former     Packs/day: 1.00     Years: 10.00     Additional pack years: 0.00     Total pack years: 10.00     Types: Cigarettes     Quit date: 3/1/2011     Years since quittin.9    Smokeless tobacco: Never    Tobacco comments:     smoked off and on   Vaping Use    Vaping Use: Never used   Substance and Sexual  Activity    Alcohol use: Not Currently     Comment: once a year or so I have a drink w dinner    Drug use: Never    Sexual activity: Yes     Partners: Male     Birth control/protection: Surgical     Comment:  had vasectomy     Problem list reviewed by Devon Golden RPH on 2/20/2024 at 11:58 AM      Hospitalizations and Urgent Care Since Last Assessment  ED Visits, Admissions, or Hospitalizations: None.   Urgent Office Visits: None.       Allergies  Known allergies and reactions were discussed with the patient. The patient's chart has been reviewed for allergy information and updated as necessary.   No Known Allergies  Allergies reviewed by Devon Golden RPH on 2/20/2024 at 11:58 AM      Relevant Laboratory Values  Common labs          11/17/2023    10:34 12/21/2023    10:30 1/30/2024    11:34   Common Labs   Glucose 102   94    BUN 11   11    Creatinine 0.81  1.00  0.79    Sodium 140   141    Potassium 3.6   3.7    Chloride 100   103    Calcium 9.1   8.4    Total Protein   6.4    Albumin 4.1   3.8    Total Bilirubin <0.2   <0.2    Alkaline Phosphatase 88   87    AST (SGOT) 12   13    ALT (SGPT) 19   22    WBC 10.11   7.53    Hemoglobin 12.8   12.1    Hematocrit 38.0   36.3    Platelets 212   196        Lab Assessment  The above labs have been reviewed. No dose adjustments are needed for the specialty medication(s) based on the labs.       Current Medication List  This medication list has been reviewed with the patient and evaluated for any interactions or necessary modifications/recommendations, and updated to include all prescription medications, OTC medications, and supplements the patient is currently taking.  This list reflects what is contained in the patient's profile, which has also been marked as reviewed to communicate to other providers it is the most up to date version of the patient's current medication therapy.     Current Outpatient Medications:     albuterol sulfate  (90 Base)  MCG/ACT inhaler, Inhale 2 puffs Every 6 (Six) Hours As Needed for Shortness of Air., Disp: 6.7 g, Rfl: 0    amoxicillin-clavulanate (AUGMENTIN) 875-125 MG per tablet, Take 1 tablet by mouth 2 (Two) Times a Day., Disp: 20 tablet, Rfl: 0    atorvastatin (LIPITOR) 10 MG tablet, Take 1 tablet by mouth Daily., Disp: 90 tablet, Rfl: 3    B Complex-C-Folic Acid (STRESS B COMPLEX PO), , Disp: , Rfl:     butalbital-acetaminophen-caffeine (FIORICET, ESGIC) -40 MG per tablet, Take 1 tablet by mouth Every 6 (Six) Hours As Needed for Headache., Disp: 180 tablet, Rfl: 0    Cholecalciferol (VITAMIN D3 GUMMIES ADULT PO), , Disp: , Rfl:     dextromethorphan 15 MG/5ML syrup, Take 10 mL by mouth 3 (Three) Times a Day As Needed for Cough. (Patient not taking: Reported on 2/19/2024), Disp: 118 mL, Rfl: 0    Dihydroergotamine Mesylate HFA (Trudhesa) 0.725 MG/ACT aerosol solution, 0.725 mg into the nostril(s) as directed by provider As Needed (Moderate to severe headache). One spray into each nostril, maybe repeated after 1 hour if needed, NO more than 2 doses in 24-hour period or 3 doses in 7-day period., Disp: 8 mL, Rfl: 2    escitalopram (LEXAPRO) 10 MG tablet, Take 1 tablet by mouth Daily., Disp: 90 tablet, Rfl: 3    fluticasone (FLONASE) 50 MCG/ACT nasal spray, Use 2 sprays into each nostril as directed by provider Daily., Disp: 16 g, Rfl: 6    galcanezumab-gnlm (EMGALITY) 120 MG/ML auto-injector pen, Inject 1 mL under the skin into the appropriate area as directed Every 30 (Thirty) Days., Disp: 1 mL, Rfl: 11    hydrOXYzine (ATARAX) 25 MG tablet, Take 1 tablet by mouth Every 8 (Eight) Hours As Needed for Anxiety., Disp: 90 tablet, Rfl: 1    Hyoscyamine Sulfate SL (Levsin/SL) 0.125 MG sublingual tablet, Place 1 tablet under the tongue Every 6 (Six) Hours As Needed (abdominal pain/cramping)., Disp: 60 each, Rfl: 3    levocetirizine (XYZAL) 5 MG tablet, Take 1 tablet by mouth Every Evening., Disp: 90 tablet, Rfl: 3     levothyroxine (SYNTHROID, LEVOTHROID) 88 MCG tablet, Take 1 tablet by mouth Daily., Disp: 90 tablet, Rfl: 0    lisinopril-hydrochlorothiazide (PRINZIDE,ZESTORETIC) 10-12.5 MG per tablet, Take 1 tablet by mouth Daily., Disp: 90 tablet, Rfl: 0    Multiple Vitamins-Minerals (MULTI ADULT GUMMIES PO), , Disp: , Rfl:     ondansetron (ZOFRAN) 8 MG tablet, Take 0.5 tablets by mouth Every 8 (Eight) Hours As Needed for Nausea or Vomiting., Disp: 90 tablet, Rfl: 0    pantoprazole (PROTONIX) 40 MG EC tablet, Take 1 tablet by mouth 2 (Two) Times a Day., Disp: 180 tablet, Rfl: 3    predniSONE (DELTASONE) 20 MG tablet, Take 1 tablet by mouth 2 (Two) Times a Day for 3 days, THEN 1 tablet Daily for 3 days., Disp: 9 tablet, Rfl: 0    promethazine (PHENERGAN) 25 MG tablet, Take 1 tablet by mouth Every 6 (Six) Hours As Needed for Nausea or Vomiting., Disp: 60 tablet, Rfl: 1    promethazine-dextromethorphan (PROMETHAZINE-DM) 6.25-15 MG/5ML syrup, Take 5 mL by mouth 4 (Four) Times a Day As Needed for Cough., Disp: 180 mL, Rfl: 0    Ubiquinol 100 MG capsule, , Disp: , Rfl:     Ustekinumab (STELARA) 90 MG/ML solution prefilled syringe Injection, Inject 90 mg (1 syringe) under the skin into the appropriate area as directed Every 8 (Eight) Weeks., Disp: 1 mL, Rfl: 8    Medicines reviewed by Devon Golden [facility] on 2/20/2024 at 11:58 AM    Drug Interactions  None identified.       Adverse Drug Reactions  Medication tolerability: Tolerating with no to minimal ADRs  Medication plan: Continue therapy with normal follow-up  Plan for ADR Management: N/A, no ADR's      Adherence, Self-Administration, and Current Therapy Problems  Adherence related patient's specialty therapy was discussed with the patient. The Adherence segment of this outreach has been reviewed and updated.   Adherence Questions  Linked Medication(s) Assessed: Galcanezumab-[facility]  On average, how many doses/injections does the patient miss per month?: 0  What are the identified  reasons for non-adherence or missed doses? : no problems identified  What is the estimated medication adherence level?: %  Based on the patient/caregiver response and refill history, does this patient require an MTP to track adherence improvements?: no    Additional Barriers to Patient Self-Administration: No barriers.  Methods for Supporting Patient Self-Administration: None needed at this time.    Recently Close Medication Therapy Problems  No medication therapy recommendations to display  Open Medication Therapy Problems  No medication therapy recommendations to display     Goals of Therapy  Goals related to the patient's specialty therapy was discussed with the patient. The Patient Goals segment of this outreach has been reviewed and updated.    Goals Addressed Today        Specialty Pharmacy General Goal      Reduce frequency and severity of migraines. Prior to Botox/galcanezumab therapy, patient reports experiencing about 15 up to 25 migraine days per month.    2/20/24 clinical reassessment: Patient reports migraine frequency decreased to 1-2 true migraine days per month and reduced migraine severity by about 50% with combination of Botox/galcanezumab. No side-effects. Trudhesa works to completely alleviate migraines when needed.                Quality of Life Assessment   Quality of Life related to the patient's enrollment in the patient management program and services provided was discussed with the patient. The QOL segment of this outreach has been reviewed and updated.   Quality of Life Improvement Scale: 9-A good deal better      Reassessment Plan & Follow-Up  Medication Therapy Changes: No changes, continuing galcanezumab for migraine prevention per patient's neurologist.   Related Plans, Therapy Recommendations, or Issues to Be Addressed: Nothing further to be addressed at this time.   Pharmacist to perform regular reassessments no more than (6) months from the previous assessment.  Care  Coordinator to set up future refill outreaches, coordinate prescription delivery, and escalate clinical questions to pharmacist.     Attestation  Therapeutic appropriateness: Appropriate  I attest the patient was actively involved in and has agreed to the above plan of care. If the prescribed therapy is at any point deemed not appropriate based on the current or future assessments, a consultation will be initiated with the patient's specialty care provider to determine the best course of action. The revised plan of therapy will be documented along with any additional patient education provided. Discussed aforementioned material with patient by phone.    Devon Golden, PharmD, [facility] Specialty Pharmacist, Neurology  2/20/2024  12:03 EST

## 2024-02-20 NOTE — PROGRESS NOTES
Specialty Pharmacy Refill Coordination Note     Rosa is a 53 y.o. female contacted today regarding refills of her specialty medication(s).    Specialty medication(s) and dose(s) confirmed: galcanezumab 120 mg subcutaneous every 30 days  Changes to medications: no  Changes to insurance: no  Reviewed and verified with patient:  Allergies  Meds  Problems         Refill Questions      Flowsheet Row Most Recent Value   Changes to allergies? No   Changes to medications? No   New conditions or infections since last clinic visit No   Unplanned office visit, urgent care, ED, or hospital admission in the last 4 weeks  No   How does patient/caregiver feel medication is working? Very good   Financial problems or insurance changes  No   Since the previous refill, were any specialty medication doses or scheduled injections missed or delayed?  No   Does this patient require a clinical escalation to a pharmacist? No            Delivery Questions      Flowsheet Row Most Recent Value   Delivery method Beeline   Delivery address verified with patient/caregiver? Yes   Delivery address Home   Number of medications in delivery 1   Medication(s) being filled and delivered Galcanezumab-[facility]   Copay verified? Yes   Copay amount $0   Copay form of payment No copayment ($0)                 Follow-up: 3 weeks     Devon Golden RPH  2/20/2024   12:03 EST

## 2024-02-21 ENCOUNTER — SPECIALTY PHARMACY (OUTPATIENT)
Dept: GASTROENTEROLOGY | Facility: CLINIC | Age: 54
End: 2024-02-21
Payer: COMMERCIAL

## 2024-02-21 NOTE — PROGRESS NOTES
Specialty Pharmacy Refill Coordination Note     Rosa is a 53 y.o. female contacted today regarding refills of  1 specialty medication(s).    Reviewed and verified with patient: stelara    Specialty medication(s) and dose(s) confirmed: yes    Refill Questions      Flowsheet Row Most Recent Value   Changes to allergies? No   Changes to medications? No   New conditions or infections since last clinic visit Yes   If yes, please describe  covid 2/8/24 bronchitis 2/19/24   Unplanned office visit, urgent care, ED, or hospital admission in the last 4 weeks  Yes   How does patient/caregiver feel medication is working? Very good   Financial problems or insurance changes  No   Since the previous refill, were any specialty medication doses or scheduled injections missed or delayed?  No   Does this patient require a clinical escalation to a pharmacist? No            Delivery Questions      Flowsheet Row Most Recent Value   Delivery method Beeline   Delivery address verified with patient/caregiver? Yes   Delivery address Home   Number of medications in delivery 1   Medication(s) being filled and delivered Ustekinumab ([employer])   Doses left of specialty medications 1 week   Copay verified? Yes   Copay amount 5   Copay form of payment Credit/debit on file                   Follow-up: 45 day(s)     Mari Huntley  Specialty Pharmacy Technician

## 2024-02-22 ENCOUNTER — PROCEDURE VISIT (OUTPATIENT)
Dept: NEUROLOGY | Facility: CLINIC | Age: 54
End: 2024-02-22
Payer: COMMERCIAL

## 2024-02-22 DIAGNOSIS — G43.719 INTRACTABLE CHRONIC MIGRAINE WITHOUT AURA AND WITHOUT STATUS MIGRAINOSUS: Primary | ICD-10-CM

## 2024-02-22 NOTE — PROGRESS NOTES
Botox injection: Botox injection for neuromuscular block.  Indication: Chronic migraines.  Risks, benefits and alternatives were discussed with the patient.  EMG guidance was not used in identifying the injection site.  Procerus: 5 units was injected.  : 5 units was injected on the right and 5 units injected on the left.  Frontalis: 10 units injected on the right and 10 units injected on the left.  Temporalis: 20 units injected on the right and 20 units injected on the left.  Occipitalis: 15 units injected on the right and 15 units injected on the left.  Cervical paraspinal: 10 units injected on the right and 10 units injected on the left.  Trapezius: 15 units injected on the right and 15 units injected on the left.  200 unit vial, 1 vials, 45 wasted and 155 used.  The patient tolerated the procedure well.  There were no complications.  Patient instructions: The patient was instructed that they may experience localized discomfort over the next 12 to 24 hours and may take over the counter pain medication if needed.  Follow-up in the office in 3 months for next Botox injection.    Buy & bill

## 2024-02-27 ENCOUNTER — SPECIALTY PHARMACY (OUTPATIENT)
Dept: GASTROENTEROLOGY | Facility: CLINIC | Age: 54
End: 2024-02-27
Payer: COMMERCIAL

## 2024-02-27 NOTE — PROGRESS NOTES
Specialty Pharmacy Patient Management Program  Gastroenterology Reassessment     Rosa Melgoza is a 53 y.o. female seen by a Gastroenterology provider for Crohn's Disease and enrolled in the Patient Management program offered by [facility]. A follow-up outreach was conducted, including assessment of continued therapy appropriateness, medication adherence, and side effect incidence and management for Stelara (ustekinumab).     Changes to Insurance Coverage or Financial Support  none    Relevant Past Medical History and Comorbidities  Relevant medical history and concomitant health conditions were discussed with the patient. The patient's chart has been reviewed for relevant past medical history and comorbid health conditions and updated as necessary.   Past Medical History:   Diagnosis Date    Allergic     Anxiety     Cholelithiasis     Crohn disease     Depression     Disease of thyroid gland     Diverticulitis of colon     Elevated blood pressure reading without diagnosis of hypertension     Encounter for long-term (current) use of medications 2020    Fatty liver     GERD (gastroesophageal reflux disease)     Headache, migraine     Hernia     History of medical problems     not sure of date, but very painful joints and muscles.  Poss r/t crohns    History of sore throat     Hyperlipidemia     Hypertension     Hypothyroidism     Migraine     Need for DTaP and Hib vaccine     History of     Obesity     Urinary tract infection     Vaginal yeast infection      Social History     Socioeconomic History    Marital status:    Tobacco Use    Smoking status: Former     Packs/day: 1.00     Years: 10.00     Additional pack years: 0.00     Total pack years: 10.00     Types: Cigarettes     Quit date: 3/1/2011     Years since quittin.0    Smokeless tobacco: Never    Tobacco comments:     smoked off and on   Vaping Use    Vaping Use: Never used   Substance and Sexual Activity     Alcohol use: Not Currently     Comment: once a year or so I have a drink w dinner    Drug use: Never    Sexual activity: Yes     Partners: Male     Birth control/protection: Surgical     Comment:  had vasectomy     Problem list reviewed by Tanya Ashley, PharmD on 2/27/2024 at  3:56 PM    Allergies  Known allergies and reactions were discussed with the patient. The patient's chart has been reviewed for allergy information and updated as necessary.   Patient has no known allergies.  Allergies reviewed by Tanya Ashley, PharmD on 2/27/2024 at  3:56 PM    Relevant Laboratory Values  Lab Results   Component Value Date    GLUCOSE 94 01/30/2024    BUN 11 01/30/2024    CREATININE 0.79 01/30/2024    BCR 13.9 01/30/2024     01/30/2024    K 3.7 01/30/2024     01/30/2024    CO2 29.0 01/30/2024    CALCIUM 8.4 (L) 01/30/2024    PROTEINTOT 6.9 11/17/2023    ALBUMIN 3.8 01/30/2024    ALT 22 01/30/2024    AST 13 01/30/2024    ALKPHOS 87 01/30/2024    BILITOT <0.2 01/30/2024    ANIONGAP 9.4 11/17/2023    MG 2.0 03/22/2022    EGFRRESULT 89.6 01/30/2024     Lab Results   Component Value Date    WBC 7.53 01/30/2024    HGB 12.1 01/30/2024    HCT 36.3 01/30/2024     01/30/2024     Lab Results   Component Value Date    HEPAIGM Negative 06/22/2020    HEPBCAB Positive (A) 04/14/2023    HEPBIGMCORE Positive (A) 04/13/2022    HEPBSAB Reactive 04/14/2023    HEPBSAG Negative 04/14/2023    HEPCVIRUSABY 0.2 06/22/2020     Lab Results   Component Value Date    QUANTITBGLDP Negative 08/30/2023    QUANTITBGLDP Negative 04/14/2023    QUANTITBGLDP Negative 08/17/2022    QUANTITBGLDP Negative 04/13/2022     Lab Value Review  The above lab values have been reviewed; the following specialty medication(s) dose adjustment(s) are recommended: none.    Current Medication List  This medication list has been reviewed with the patient and evaluated for any interactions or necessary modifications/recommendations, and updated to  include all prescription medications, OTC medications, and supplements the patient is currently taking. This list reflects what is contained in the patient's profile, which has also been marked as reviewed to communicate to other providers it is the most up to date version of the patient's current medication therapy.     Current Outpatient Medications:     albuterol sulfate  (90 Base) MCG/ACT inhaler, Inhale 2 puffs Every 6 (Six) Hours As Needed for Shortness of Air., Disp: 6.7 g, Rfl: 0    amoxicillin-clavulanate (AUGMENTIN) 875-125 MG per tablet, Take 1 tablet by mouth 2 (Two) Times a Day., Disp: 20 tablet, Rfl: 0    atorvastatin (LIPITOR) 10 MG tablet, Take 1 tablet by mouth Daily., Disp: 90 tablet, Rfl: 3    B Complex-C-Folic Acid (STRESS B COMPLEX PO), , Disp: , Rfl:     butalbital-acetaminophen-caffeine (FIORICET, ESGIC) -40 MG per tablet, Take 1 tablet by mouth Every 6 (Six) Hours As Needed for Headache., Disp: 180 tablet, Rfl: 0    Cholecalciferol (VITAMIN D3 GUMMIES ADULT PO), , Disp: , Rfl:     Dihydroergotamine Mesylate HFA (Trudhesa) 0.725 MG/ACT aerosol solution, 0.725 mg into the nostril(s) as directed by provider As Needed (Moderate to severe headache). One spray into each nostril, maybe repeated after 1 hour if needed, NO more than 2 doses in 24-hour period or 3 doses in 7-day period., Disp: 8 mL, Rfl: 2    escitalopram (LEXAPRO) 10 MG tablet, Take 1 tablet by mouth Daily., Disp: 90 tablet, Rfl: 3    fluticasone (FLONASE) 50 MCG/ACT nasal spray, Use 2 sprays into each nostril as directed by provider Daily., Disp: 16 g, Rfl: 6    galcanezumab-gnlm (EMGALITY) 120 MG/ML auto-injector pen, Inject 1 mL under the skin into the appropriate area as directed Every 30 (Thirty) Days., Disp: 1 mL, Rfl: 11    hydrOXYzine (ATARAX) 25 MG tablet, Take 1 tablet by mouth Every 8 (Eight) Hours As Needed for Anxiety., Disp: 90 tablet, Rfl: 1    Hyoscyamine Sulfate SL (Levsin/SL) 0.125 MG sublingual tablet,  Place 1 tablet under the tongue Every 6 (Six) Hours As Needed (abdominal pain/cramping)., Disp: 60 each, Rfl: 3    levocetirizine (XYZAL) 5 MG tablet, Take 1 tablet by mouth Every Evening., Disp: 90 tablet, Rfl: 3    levothyroxine (SYNTHROID, LEVOTHROID) 88 MCG tablet, Take 1 tablet by mouth Daily., Disp: 90 tablet, Rfl: 0    lisinopril-hydrochlorothiazide (PRINZIDE,ZESTORETIC) 10-12.5 MG per tablet, Take 1 tablet by mouth Daily., Disp: 90 tablet, Rfl: 0    Multiple Vitamins-Minerals (MULTI ADULT GUMMIES PO), , Disp: , Rfl:     ondansetron (ZOFRAN) 8 MG tablet, Take 0.5 tablets by mouth Every 8 (Eight) Hours As Needed for Nausea or Vomiting., Disp: 90 tablet, Rfl: 0    pantoprazole (PROTONIX) 40 MG EC tablet, Take 1 tablet by mouth 2 (Two) Times a Day., Disp: 180 tablet, Rfl: 3    promethazine (PHENERGAN) 25 MG tablet, Take 1 tablet by mouth Every 6 (Six) Hours As Needed for Nausea or Vomiting., Disp: 60 tablet, Rfl: 1    promethazine-dextromethorphan (PROMETHAZINE-DM) 6.25-15 MG/5ML syrup, Take 5 mL by mouth 4 (Four) Times a Day As Needed for Cough., Disp: 180 mL, Rfl: 0    Ubiquinol 100 MG capsule, , Disp: , Rfl:     Ustekinumab (STELARA) 90 MG/ML solution prefilled syringe Injection, Inject 90 mg (1 syringe) under the skin into the appropriate area as directed Every 8 (Eight) Weeks., Disp: 1 mL, Rfl: 8  No current facility-administered medications for this visit.  Medicines reviewed by Tanya Ashley, PharmD on 2/27/2024 at  3:56 PM  Medicines reviewed by Tanya Ashley, PharmD on 2/27/2024 at  4:01 PM    Drug Interactions  none     Adverse Drug Reactions  Adverse Reactions Experienced: none  Plan for ADR Management: N/A     Hospitalizations and Urgent Care Since Last Assessment  Hospitalizations or Admissions: none  ED Visits: none  Urgent Office Visits:  patient diagnosed with covid earlier this month, had secondary sinus/ear infection and bronchitis  and will finish antibiotics in a few days. She has already  completed short steroid taper for bronchitis and is feeling much better. GI symptoms from covid and antibiotics are greatly improved.      Adherence and Self-Administration  Approximate Number of Doses Missed Since Last Assessment: none  Ongoing or New Barriers to Patient Adherence and/or Self-Administration: none   Methods for Supporting Patient Adherence and/or Self-Administration: N/A     Recently Close Medication Therapy Problems  No medication therapy recommendations to display    Goals of Therapy  Goals related to the patient's specialty therapy were discussed with the patient. The Patient Goals segment of this outreach has been reviewed and updated.   Goals Addressed Today        Specialty Pharmacy General Goal      At least 50% symptom reduction and mucosal healing                 Quality of Life Assessment   Quality of Life related to the patient's specialty therapy was discussed with the patient. The QOL segment of this outreach has been reviewed and updated.   Quality of Life Assessment  Quality of Life Improvement Scale: 7-Somewhat better  Comments on Quality of Life: pt just over covid, had a lot of GI symptoms (nausea/diarrhea) but now greatly improved. Will complete abx for infxn in 2-3 days.    Reassessment Plan & Follow-Up  Medication Therapy Changes:  completing antibiotics in a few days; completed short prednisone taper.  Additional Plans, Therapy Recommendations, or Therapy Problems to Be Addressed: none   Pharmacist to perform regular reassessments no more than (6) months from the previous assessment.  Care Coordinator to set up future refill outreaches, coordinate prescription delivery, and escalate clinical questions to pharmacist.     Attestation  Therapeutic appropriateness: Appropriate   I attest the patient was actively involved in and has agreed to the above plan of care. I attest that the specialty medication(s) addressed above are appropriate for the patient based on my reassessment. If  the prescribed therapy is at any point deemed not appropriate based on the current or future assessments, a consultation will be initiated with the patient's specialty care provider to determine the best course of action. The revised plan of therapy will be documented along with any required assessments and/or additional patient education provided.     Tanya Ashley, PharmD, BCACP, BCPS, BCCCP  Clinical Specialty Pharmacist, Gastroenterology  02/27/24 16:02 EST

## 2024-03-18 ENCOUNTER — SPECIALTY PHARMACY (OUTPATIENT)
Dept: NEUROLOGY | Facility: CLINIC | Age: 54
End: 2024-03-18
Payer: COMMERCIAL

## 2024-03-18 ENCOUNTER — OFFICE VISIT (OUTPATIENT)
Dept: FAMILY MEDICINE CLINIC | Facility: CLINIC | Age: 54
End: 2024-03-18
Payer: COMMERCIAL

## 2024-03-18 VITALS
HEIGHT: 65 IN | SYSTOLIC BLOOD PRESSURE: 135 MMHG | OXYGEN SATURATION: 94 % | DIASTOLIC BLOOD PRESSURE: 90 MMHG | BODY MASS INDEX: 41.12 KG/M2 | WEIGHT: 246.8 LBS | HEART RATE: 100 BPM

## 2024-03-18 DIAGNOSIS — R53.83 FATIGUE, UNSPECIFIED TYPE: ICD-10-CM

## 2024-03-18 DIAGNOSIS — F43.22 ADJUSTMENT DISORDER WITH ANXIOUS MOOD: ICD-10-CM

## 2024-03-18 DIAGNOSIS — E03.8 HYPOTHYROIDISM DUE TO HASHIMOTO'S THYROIDITIS: ICD-10-CM

## 2024-03-18 DIAGNOSIS — E78.2 MIXED HYPERLIPIDEMIA: ICD-10-CM

## 2024-03-18 DIAGNOSIS — J30.1 SEASONAL ALLERGIC RHINITIS DUE TO POLLEN: ICD-10-CM

## 2024-03-18 DIAGNOSIS — G43.109 MIGRAINE WITH AURA AND WITHOUT STATUS MIGRAINOSUS, NOT INTRACTABLE: ICD-10-CM

## 2024-03-18 DIAGNOSIS — E06.3 HYPOTHYROIDISM DUE TO HASHIMOTO'S THYROIDITIS: ICD-10-CM

## 2024-03-18 DIAGNOSIS — I10 ESSENTIAL HYPERTENSION: Primary | ICD-10-CM

## 2024-03-18 DIAGNOSIS — Z79.899 ENCOUNTER FOR LONG-TERM (CURRENT) USE OF MEDICATIONS: ICD-10-CM

## 2024-03-18 PROCEDURE — 99214 OFFICE O/P EST MOD 30 MIN: CPT | Performed by: FAMILY MEDICINE

## 2024-03-18 NOTE — PROGRESS NOTES
Specialty Pharmacy Refill Coordination Note     Rosa is a 53 y.o. female contacted today regarding refills of  Emgality specialty medication(s).    Reviewed and verified with patient:       Specialty medication(s) and dose(s) confirmed: yes    Refill Questions      Flowsheet Row Most Recent Value   Changes to allergies? No   Changes to medications? No   New conditions or infections since last clinic visit No   If yes, please describe  N/A   Unplanned office visit, urgent care, ED, or hospital admission in the last 4 weeks  No   How does patient/caregiver feel medication is working? Very good   Financial problems or insurance changes  No   Does this patient require a clinical escalation to a pharmacist? No            Delivery Questions      Flowsheet Row Most Recent Value   Delivery method [employer]eline   Delivery address verified with patient/caregiver? Yes   Delivery address Home   Number of medications in delivery 1   Medication(s) being filled and delivered Galcanezumab-Gnlm   Doses left of specialty medications 0   Copay verified? Yes   Copay amount $0   Copay form of payment No copayment ($0)                   Follow-up: 21 day(s)     Shelbie Foster, Pharmacy Technician  Specialty Pharmacy Technician

## 2024-03-18 NOTE — PROGRESS NOTES
"Chief Complaint  Hyperlipidemia and Hypothyroidism    Subjective        Rosa Melgoza presents to [facility]  History of Present Illness  Patient is here to follow-up on her chronic health conditions:    Migraine with aura.  Patient is currently seeing neurology.  She has started Botox injections.  She is also taking Emgality and Tudhesa nasal spray.  She feels that since the Botox injections she has had some improvement of her migraines.  She takes Fioricet as needed.     GERD.  The patient takes pantoprazole 40 mg daily.  She denies any symptoms of reflux currently.  She denies any side effects of medication.    Hypothyroidism.  The patient currently takes levothyroxine 88 mcg daily.  Patient feels her thyroid is in range.  No side effects of medication.    Hypertension.  The patient takes lisinopril/ hydrochlorothiazide 10/12.5 for blood pressure.  She denies any side effects and states that her blood pressures have been good.    Anxiety and depression.  The patient takes Lexapro 10 mg daily.  Doing well.     Hyperlipidemia.  The patient started atorvastatin 10 mg daily after her last visit.  She has been taking it regularly for the last 5 months.  No side effects.     Patient has been having some fatigue over the last couple weeks after having COVID.      Objective   Vital Signs:  /90   Pulse 100   Ht 165.1 cm (65\")   Wt 112 kg (246 lb 12.8 oz)   SpO2 94%   BMI 41.07 kg/m²   Estimated body mass index is 41.07 kg/m² as calculated from the following:    Height as of this encounter: 165.1 cm (65\").    Weight as of this encounter: 112 kg (246 lb 12.8 oz).             Physical Exam  Vitals and nursing note reviewed.   Constitutional:       Appearance: She is well-developed.   HENT:      Head: Normocephalic and atraumatic.      Right Ear: External ear normal.      Left Ear: External ear normal.      Nose: Nose normal.   Eyes:      General: No scleral icterus.     " Conjunctiva/sclera: Conjunctivae normal.   Cardiovascular:      Rate and Rhythm: Normal rate and regular rhythm.      Heart sounds: Normal heart sounds.   Pulmonary:      Effort: Pulmonary effort is normal.      Breath sounds: Normal breath sounds.   Musculoskeletal:      Cervical back: Normal range of motion and neck supple.   Lymphadenopathy:      Cervical: No cervical adenopathy.   Skin:     General: Skin is warm and dry.      Findings: No rash.   Neurological:      Mental Status: She is alert and oriented to person, place, and time.   Psychiatric:         Mood and Affect: Mood normal.         Behavior: Behavior normal.         Thought Content: Thought content normal.         Judgment: Judgment normal.        Result Review :    The following data was reviewed by: Kathie Romeo DO on 03/18/2024:  Common labs          11/17/2023    10:34 12/21/2023    10:30 1/30/2024    11:34   Common Labs   Glucose 102   94    BUN 11   11    Creatinine 0.81  1.00  0.79    Sodium 140   141    Potassium 3.6   3.7    Chloride 100   103    Calcium 9.1   8.4    Total Protein   6.4    Albumin 4.1   3.8    Total Bilirubin <0.2   <0.2    Alkaline Phosphatase 88   87    AST (SGOT) 12   13    ALT (SGPT) 19   22    WBC 10.11   7.53    Hemoglobin 12.8   12.1    Hematocrit 38.0   36.3    Platelets 212   196      TSH          6/13/2023    15:56   TSH   TSH 0.558                   Assessment and Plan     Diagnoses and all orders for this visit:    1. Essential hypertension (Primary)    2. Hypothyroidism due to Hashimoto's thyroiditis  -     TSH    3. Seasonal allergic rhinitis due to pollen    4. Migraine with aura and without status migrainosus, not intractable    5. Adjustment disorder with anxious mood    6. Mixed hyperlipidemia  -     Lipid Panel    7. Encounter for long-term (current) use of medications  -     TSH  -     Lipid Panel    8. Fatigue, unspecified type  -     Vitamin B12      Patient is here today to follow up on chronic  stable hypertension, anxiety, and Hyperlipidemia.   Surveillance labs were obtained today and any medication changes will be made based on lab results and will be called to the patient later this week.     Fatigue.  Will check B12 level.  If normal it is likely due to recent COVID infection.       Follow Up     Return in about 6 months (around 9/18/2024) for Annual physical, HTN, Thyroid.  Patient was given instructions and counseling regarding her condition or for health maintenance advice. Please see specific information pulled into the AVS if appropriate.         Answers submitted by the patient for this visit:  Other (Submitted on 3/17/2024)  Please describe your symptoms.: Follow up  Have you had these symptoms before?: Yes  How long have you been having these symptoms?: Greater than 2 weeks  Please list any medications you are currently taking for this condition.: levothyroxine,  atorvastatin,  lisinopril-hctz  Please describe any probable cause for these symptoms. : htn, hypothyroidism,  high cholesterol  Primary Reason for Visit (Submitted on 3/17/2024)  What is the primary reason for your visit?: Other

## 2024-03-19 ENCOUNTER — TELEPHONE (OUTPATIENT)
Dept: GASTROENTEROLOGY | Facility: CLINIC | Age: 54
End: 2024-03-19
Payer: COMMERCIAL

## 2024-03-19 DIAGNOSIS — E06.3 HYPOTHYROIDISM DUE TO HASHIMOTO'S THYROIDITIS: ICD-10-CM

## 2024-03-19 DIAGNOSIS — E03.8 HYPOTHYROIDISM DUE TO HASHIMOTO'S THYROIDITIS: ICD-10-CM

## 2024-03-19 LAB
CHOLEST SERPL-MCNC: 198 MG/DL (ref 0–200)
HDLC SERPL-MCNC: 37 MG/DL (ref 40–60)
LDLC SERPL CALC-MCNC: 104 MG/DL (ref 0–100)
TRIGL SERPL-MCNC: 333 MG/DL (ref 0–150)
TSH SERPL DL<=0.005 MIU/L-ACNC: 4.89 UIU/ML (ref 0.27–4.2)
VIT B12 SERPL-MCNC: >2000 PG/ML (ref 211–946)
VLDLC SERPL CALC-MCNC: 57 MG/DL (ref 5–40)

## 2024-03-19 RX ORDER — LEVOTHYROXINE SODIUM 0.1 MG/1
100 TABLET ORAL DAILY
Qty: 90 TABLET | Refills: 1 | Status: SHIPPED | OUTPATIENT
Start: 2024-03-19

## 2024-03-19 NOTE — TELEPHONE ENCOUNTER
----- Message from Manda Butler PA-C sent at 3/19/2024 12:55 PM EDT -----  Regarding: FW: crohns  Contact: [phone]  Can 1 of you formulate a letter for me to sign that says something along the lines of below.  Will need to be emailed or faxed to the healthcare office at [email or web address] or faxed to [phone].     To whom it may concern:    I have discussed Wegovy with our mutual patient, Rosa Melgoza.  We have discussed potential GI side effects and benefits in relation to her Crohn's disease as well as weight loss.  I am agreeable to her starting a GLP-1 agonist under your care.  I will continue to monitor her Crohn's disease as well as other GI symptoms.  If she were to have undesirable GI symptoms such as nausea, vomiting, or abdominal pain, I would recommend that she stop this medication.    Thank you,  Manda Butler PA-C  ----- Message -----  From: Judy Ruiz RN  Sent: 3/19/2024   9:40 AM EDT  To: Manda Butler PA-C  Subject: FW: crohns                                         ----- Message -----  From: Rosa Melgoza  Sent: 3/19/2024   9:39 AM EDT  To: [facility]  Subject: crohns                                           Thank you so much for all the information!  That was so amazing of you to research it for me.  I really appreciate it!!  I did my virtual visit with them this morning to get started but they would like a letter from you stating that I did talk to you and told you about going on wegovy and that   you will continue to manage my crohns, and that you are ok with/on agreement with me starting a glp1 with them.  Can you do that for me so they can send it to the pharmacy so I can get started.  I am hoping this will help me lose weight and maybe be able to get off of some of my bp, cholesterol meds and get healthier.   It can me emailed to [email or web address] or faxed to [phone].  Thanks so much!  Rosa

## 2024-03-19 NOTE — LETTER
March 19, 2024     Rosa Melgoza    Patient: Rosa Melgoza   YOB: 1970   Date of Visit: 3/19/2024           To whom it may concern:     I have discussed Wegovy with our mutual patient, Rosa Melgoza.  We have discussed potential GI side effects and benefits in relation to her Crohn's disease as well as weight loss.  I am agreeable to her starting a GLP-1 agonist under your care.  I will continue to monitor her Crohn's disease as well as other GI symptoms.  If she were to have undesirable GI symptoms such as nausea, vomiting, or abdominal pain, I would recommend that she stop this medication.         Thank you,  Manda Butler PA-C

## 2024-04-17 ENCOUNTER — SPECIALTY PHARMACY (OUTPATIENT)
Dept: GASTROENTEROLOGY | Facility: CLINIC | Age: 54
End: 2024-04-17
Payer: COMMERCIAL

## 2024-04-17 ENCOUNTER — SPECIALTY PHARMACY (OUTPATIENT)
Dept: NEUROLOGY | Facility: CLINIC | Age: 54
End: 2024-04-17
Payer: COMMERCIAL

## 2024-04-17 NOTE — PROGRESS NOTES
Specialty Pharmacy Refill Coordination Note     Rosa is a 53 y.o. female contacted today regarding refills of  1 specialty medication(s).    Reviewed and verified with patient: Stelino    Specialty medication(s) and dose(s) confirmed: yes    Refill Questions      Flowsheet Row Most Recent Value   Changes to allergies? No   Changes to medications? No   New conditions or infections since last clinic visit No   If yes, please describe  n/a   Unplanned office visit, urgent care, ED, or hospital admission in the last 4 weeks  Yes  [uc on 4/3 cellulitis of hand]   How does patient/caregiver feel medication is working? Very good   Financial problems or insurance changes  No   Since the previous refill, were any specialty medication doses or scheduled injections missed or delayed?  No   Does this patient require a clinical escalation to a pharmacist? No            Delivery Questions      Flowsheet Row Most Recent Value   Delivery method [employer]   Delivery address verified with patient/caregiver? Yes   Delivery address Home   Number of medications in delivery 2   Medication(s) being filled and delivered Ustekinumab (Antipsoriatics), Semaglutide-Weight Management   Doses left of specialty medications 1 week   Copay verified? Yes   Copay amount 5   Copay form of payment Credit/debit on file                   Follow-up: 49 day(s)     Mari Huntley  Specialty Pharmacy Technician

## 2024-04-17 NOTE — PROGRESS NOTES
Specialty Pharmacy Refill Coordination Note     Rosa is a 53 y.o. female contacted today regarding refills of  Emgality specialty medication(s).    Reviewed and verified with patient:       Specialty medication(s) and dose(s) confirmed: yes    Refill Questions      Flowsheet Row Most Recent Value   Changes to allergies? No   Changes to medications? No   New conditions or infections since last clinic visit No   If yes, please describe  N/A   Unplanned office visit, urgent care, ED, or hospital admission in the last 4 weeks  No   How does patient/caregiver feel medication is working? Very good   Financial problems or insurance changes  No   Since the previous refill, were any specialty medication doses or scheduled injections missed or delayed?  No   Does this patient require a clinical escalation to a pharmacist? No            Delivery Questions      Flowsheet Row Most Recent Value   Delivery method [facility]eline   Delivery address verified with patient/caregiver? Yes   Delivery address Home   Number of medications in delivery 1   Medication(s) being filled and delivered Galcanezumab-[facility]   Doses left of specialty medications 0   Copay verified? Yes   Copay amount $0   Copay form of payment No copayment ($0)                   Follow-up: 21 day(s)     Shelbie Foster, Pharmacy Technician  Specialty Pharmacy Technician

## 2024-04-22 ENCOUNTER — SPECIALTY PHARMACY (OUTPATIENT)
Dept: GASTROENTEROLOGY | Facility: CLINIC | Age: 54
End: 2024-04-22
Payer: COMMERCIAL

## 2024-04-22 NOTE — PROGRESS NOTES
D/W patient and if she tolerates dose increases without issue, she should be able to get 3 month supply of Wegovy by the end of July. Next fill available on insurance 5/8/24, so she will have provider send next RX of 0.5ml.  Tanya Ashley, PharmD, BCACP, BCPS, BCCCP

## 2024-04-22 NOTE — PROGRESS NOTES
Specialty Pharmacy     Patient had questions about Wegovy moving forward, helped her Friday find it in stock at [facility] pharmacy [place], dose changes and what to do after formulary changes. Cost or other options?     Mari Huntley  Specialty Pharmacy Technician

## 2024-04-26 ENCOUNTER — SPECIALTY PHARMACY (OUTPATIENT)
Dept: GASTROENTEROLOGY | Facility: CLINIC | Age: 54
End: 2024-04-26
Payer: COMMERCIAL

## 2024-04-26 NOTE — PROGRESS NOTES
Specialty Pharmacy     Spoke to Rsoa on 4/25, she let me know her provider is sending over her next Wegovy dose and she wanted to make sure I could find it in stock and coordinate the delivery for her.   It is due 5/8 and I have made myself an outreach to coordinate it.      Mari Huntley  Specialty Pharmacy Technician

## 2024-05-08 ENCOUNTER — SPECIALTY PHARMACY (OUTPATIENT)
Dept: GASTROENTEROLOGY | Facility: CLINIC | Age: 54
End: 2024-05-08
Payer: COMMERCIAL

## 2024-05-09 DIAGNOSIS — F43.22 ADJUSTMENT DISORDER WITH ANXIOUS MOOD: ICD-10-CM

## 2024-05-09 DIAGNOSIS — G43.109 MIGRAINE WITH AURA AND WITHOUT STATUS MIGRAINOSUS, NOT INTRACTABLE: ICD-10-CM

## 2024-05-09 DIAGNOSIS — I10 ESSENTIAL HYPERTENSION: ICD-10-CM

## 2024-05-10 RX ORDER — BUTALBITAL, ACETAMINOPHEN AND CAFFEINE 50; 325; 40 MG/1; MG/1; MG/1
1 TABLET ORAL EVERY 6 HOURS PRN
Qty: 30 TABLET | Refills: 0 | Status: SHIPPED | OUTPATIENT
Start: 2024-05-10

## 2024-05-10 RX ORDER — HYDROXYZINE HYDROCHLORIDE 25 MG/1
25 TABLET, FILM COATED ORAL EVERY 8 HOURS PRN
Qty: 90 TABLET | Refills: 0 | Status: SHIPPED | OUTPATIENT
Start: 2024-05-10

## 2024-05-10 RX ORDER — ONDANSETRON HYDROCHLORIDE 8 MG/1
4 TABLET, FILM COATED ORAL EVERY 8 HOURS PRN
Qty: 90 TABLET | Refills: 0 | Status: SHIPPED | OUTPATIENT
Start: 2024-05-10

## 2024-05-10 RX ORDER — LISINOPRIL AND HYDROCHLOROTHIAZIDE 12.5; 1 MG/1; MG/1
1 TABLET ORAL DAILY
Qty: 90 TABLET | Refills: 0 | Status: SHIPPED | OUTPATIENT
Start: 2024-05-10

## 2024-05-13 ENCOUNTER — SPECIALTY PHARMACY (OUTPATIENT)
Dept: NEUROLOGY | Facility: CLINIC | Age: 54
End: 2024-05-13
Payer: COMMERCIAL

## 2024-05-13 NOTE — PROGRESS NOTES
Specialty Pharmacy Refill Coordination Note     Rosa is a 53 y.o. female contacted today regarding refills of  Emgality specialty medication(s).    Reviewed and verified with patient:       Specialty medication(s) and dose(s) confirmed: yes    Refill Questions      Flowsheet Row Most Recent Value   Changes to allergies? No   Changes to medications? No   New conditions or infections since last clinic visit No   If yes, please describe  N/A   Unplanned office visit, urgent care, ED, or hospital admission in the last 4 weeks  No   How does patient/caregiver feel medication is working? Very good   Financial problems or insurance changes  No   Since the previous refill, were any specialty medication doses or scheduled injections missed or delayed?  No   Does this patient require a clinical escalation to a pharmacist? No            Delivery Questions      Flowsheet Row Most Recent Value   Delivery method [employer]Ex   Delivery address verified with patient/caregiver? Yes   Delivery address Home   Number of medications in delivery 1   Medication(s) being filled and delivered Galcanezumab-Gnlm   Doses left of specialty medications 0   Copay verified? Yes   Copay amount $0   Copay form of payment No copayment ($0)                   Follow-up: 21 day(s)     Shelbie Foster, Pharmacy Technician  Specialty Pharmacy Technician

## 2024-05-16 ENCOUNTER — PROCEDURE VISIT (OUTPATIENT)
Dept: NEUROLOGY | Facility: CLINIC | Age: 54
End: 2024-05-16
Payer: COMMERCIAL

## 2024-05-16 DIAGNOSIS — G43.719 INTRACTABLE CHRONIC MIGRAINE WITHOUT AURA AND WITHOUT STATUS MIGRAINOSUS: Primary | ICD-10-CM

## 2024-06-05 ENCOUNTER — SPECIALTY PHARMACY (OUTPATIENT)
Dept: GASTROENTEROLOGY | Facility: CLINIC | Age: 54
End: 2024-06-05
Payer: COMMERCIAL

## 2024-06-05 NOTE — PROGRESS NOTES
Specialty Pharmacy Refill Coordination Note     Rosa is a 53 y.o. female contacted today regarding refills of  1 specialty medication(s).    Reviewed and verified with patient: Wegovy    Specialty medication(s) and dose(s) confirmed: yes        Delivery Questions      Flowsheet Row Most Recent Value   Delivery method [employer]   Delivery address verified with patient/caregiver? Yes   Delivery address Home   Number of medications in delivery 1   Medication(s) being filled and delivered Semaglutide-Weight Management   Doses left of specialty medications 3 days   Copay verified? Yes   Copay amount 0   Copay form of payment No copayment ($0)                   Follow-up: 21 day(s)     Mari Huntley  Specialty Pharmacy Technician

## 2024-06-11 ENCOUNTER — SPECIALTY PHARMACY (OUTPATIENT)
Dept: NEUROLOGY | Facility: CLINIC | Age: 54
End: 2024-06-11
Payer: COMMERCIAL

## 2024-06-11 NOTE — PROGRESS NOTES
Specialty Pharmacy Refill Coordination Note     Rosa is a 53 y.o. female contacted today regarding refills of  Emgality specialty medication(s).    Reviewed and verified with patient:       Specialty medication(s) and dose(s) confirmed: yes    Refill Questions      Flowsheet Row Most Recent Value   Changes to allergies? No   Changes to medications? No   New conditions or infections since last clinic visit No   If yes, please describe  N/A   Unplanned office visit, urgent care, ED, or hospital admission in the last 4 weeks  No   How does patient/caregiver feel medication is working? Very good   Financial problems or insurance changes  No   Since the previous refill, were any specialty medication doses or scheduled injections missed or delayed?  No   Does this patient require a clinical escalation to a pharmacist? No            Delivery Questions      Flowsheet Row Most Recent Value   Delivery method [employer]Ex   Delivery address verified with patient/caregiver? Yes   Delivery address Home   Number of medications in delivery 1   Medication(s) being filled and delivered Galcanezumab-Gnlm   Doses left of specialty medications 0   Copay verified? Yes   Copay amount 0   Copay form of payment No copayment ($0)                   Follow-up: 21 day(s)     Shelbie Foster, Pharmacy Technician  Specialty Pharmacy Technician

## 2024-06-17 ENCOUNTER — SPECIALTY PHARMACY (OUTPATIENT)
Dept: GASTROENTEROLOGY | Facility: CLINIC | Age: 54
End: 2024-06-17
Payer: COMMERCIAL

## 2024-06-17 NOTE — PROGRESS NOTES
Specialty Pharmacy Refill Coordination Note     Rosa is a 53 y.o. female contacted today regarding refills of  1 specialty medication(s).    Reviewed and verified with patient: Stelara    Specialty medication(s) and dose(s) confirmed: yes    Refill Questions      Flowsheet Row Most Recent Value   Changes to allergies? No   Changes to medications? No   New conditions or infections since last clinic visit No   If yes, please describe  n/a   Unplanned office visit, urgent care, ED, or hospital admission in the last 4 weeks  No   How does patient/caregiver feel medication is working? Very good   Financial problems or insurance changes  No   Since the previous refill, were any specialty medication doses or scheduled injections missed or delayed?  No   Does this patient require a clinical escalation to a pharmacist? No            Delivery Questions      Flowsheet Row Most Recent Value   Delivery method [employer]Ex   Delivery address verified with patient/caregiver? Yes   Delivery address Home   Number of medications in delivery 1   Medication(s) being filled and delivered Ustekinumab (Antipsoriatics)   Doses left of specialty medications 1 week   Copay verified? Yes   Copay amount 5   Copay form of payment Credit/debit on file                   Follow-up: 45 day(s)     Mari Huntley  Specialty Pharmacy Technician

## 2024-06-24 ENCOUNTER — SPECIALTY PHARMACY (OUTPATIENT)
Dept: GASTROENTEROLOGY | Facility: CLINIC | Age: 54
End: 2024-06-24
Payer: COMMERCIAL

## 2024-06-24 NOTE — PROGRESS NOTES
Specialty Pharmacy    Dose escalation q 6 weeks alysa Huntley  Specialty Pharmacy Technician

## 2024-06-24 NOTE — PROGRESS NOTES
Specialty Pharmacy    Stelara q 6 weeks  Key: [id number] - PA Case ID: [id number]  Coverage Ends on: 7/28/2024 12:00 AM.     Next dose 8/2    Mari Huntley  Specialty Pharmacy Technician

## 2024-06-25 ENCOUNTER — SPECIALTY PHARMACY (OUTPATIENT)
Dept: GASTROENTEROLOGY | Facility: CLINIC | Age: 54
End: 2024-06-25
Payer: COMMERCIAL

## 2024-06-25 NOTE — PROGRESS NOTES
Specialty Pharmacy     Prior authorization approved for Stelara q 42 days dosing  PA Case: [id number], Status: Approved, Coverage Starts on: 6/24/2024 12:00 AM, Coverage Ends on: 7/28/2024 12:00 AM. Questions? Contact [phone].     Mari Huntley  Specialty Pharmacy Technician

## 2024-07-01 DIAGNOSIS — K50.919 CROHN'S DISEASE WITH COMPLICATION, UNSPECIFIED GASTROINTESTINAL TRACT LOCATION: ICD-10-CM

## 2024-07-02 ENCOUNTER — SPECIALTY PHARMACY (OUTPATIENT)
Dept: GASTROENTEROLOGY | Facility: CLINIC | Age: 54
End: 2024-07-02
Payer: COMMERCIAL

## 2024-07-02 NOTE — PROGRESS NOTES
Specialty Pharmacy     Stelara q 42 days RTS 7/17/24     Mari Huntley  Specialty Pharmacy Technician

## 2024-07-03 ENCOUNTER — SPECIALTY PHARMACY (OUTPATIENT)
Dept: GASTROENTEROLOGY | Facility: CLINIC | Age: 54
End: 2024-07-03
Payer: COMMERCIAL

## 2024-07-03 NOTE — PROGRESS NOTES
Specialty Pharmacy Patient Management Program  Refill Outreach      Rosa is a 53 y.o. female contacted today regarding refills of her medication(s).    Specialty medication(s) and dose(s) confirmed: Wegovy  Other medications being refilled: n/a    Refill Questions      Flowsheet Row Most Recent Value   Changes to allergies? No   Changes to medications? No   New conditions or infections since last clinic visit No   Unplanned office visit, urgent care, ED, or hospital admission in the last 4 weeks  No   How does patient/caregiver feel medication is working? Very good   Financial problems or insurance changes  No   Since the previous refill, were any specialty medication doses or scheduled injections missed or delayed?  No   Does this patient require a clinical escalation to a pharmacist? No            Delivery Questions      Flowsheet Row Most Recent Value   Delivery method  at Pharmacy   Medication(s) being filled and delivered Semaglutide-Weight Management   Copay verified? Yes   Copay amount $0   Copay form of payment No copayment ($0)                 Follow-up: 23 day(s)    Electronically signed by Tanya Ashley PharmD, 07/03/24, 10:23 AM EDT.

## 2024-07-11 ENCOUNTER — HOSPITAL ENCOUNTER (OUTPATIENT)
Dept: CT IMAGING | Facility: HOSPITAL | Age: 54
Discharge: HOME | End: 2024-07-11
Admitting: INTERNAL MEDICINE
Payer: COMMERCIAL

## 2024-07-11 DIAGNOSIS — K50.919 CROHN'S DISEASE WITH COMPLICATION, UNSPECIFIED GASTROINTESTINAL TRACT LOCATION: ICD-10-CM

## 2024-07-11 PROCEDURE — 82565 ASSAY OF CREATININE: CPT

## 2024-07-11 PROCEDURE — 25510000001 IOPAMIDOL 61 % SOLUTION: Performed by: INTERNAL MEDICINE

## 2024-07-11 PROCEDURE — 74177 CT ABD & PELVIS W/CONTRAST: CPT

## 2024-07-11 RX ADMIN — IOPAMIDOL 85 ML: 612 INJECTION, SOLUTION INTRAVENOUS at 11:28

## 2024-07-11 RX ADMIN — Medication 237 ML: at 10:50

## 2024-07-11 RX ADMIN — Medication 237 ML: at 10:30

## 2024-07-11 RX ADMIN — Medication 237 ML: at 10:10

## 2024-07-12 LAB — CREAT BLDA-MCNC: 0.9 MG/DL (ref 0.6–1.3)

## 2024-07-15 ENCOUNTER — SPECIALTY PHARMACY (OUTPATIENT)
Dept: NEUROLOGY | Facility: CLINIC | Age: 54
End: 2024-07-15
Payer: COMMERCIAL

## 2024-07-15 NOTE — PROGRESS NOTES
Specialty Pharmacy Refill Coordination Note     Rosa is a 53 y.o. female contacted today regarding refills of  Emgality specialty medication(s).    Reviewed and verified with patient:       Specialty medication(s) and dose(s) confirmed: yes    Refill Questions      Flowsheet Row Most Recent Value   Changes to allergies? No   Changes to medications? No   New conditions or infections since last clinic visit No   If yes, please describe  N/A   Unplanned office visit, urgent care, ED, or hospital admission in the last 4 weeks  No   How does patient/caregiver feel medication is working? Very good   Financial problems or insurance changes  No   Since the previous refill, were any specialty medication doses or scheduled injections missed or delayed?  No   Does this patient require a clinical escalation to a pharmacist? No            Delivery Questions      Flowsheet Row Most Recent Value   Delivery method [employer]   Delivery address verified with patient/caregiver? Yes   Delivery address Home   Number of medications in delivery 1   Medication(s) being filled and delivered Galcanezumab-Gnlm   Doses left of specialty medications 0   Copay verified? Yes   Copay amount $0   Copay form of payment No copayment ($0)   Ship Date 7/16   Delivery Date 7/17                   Follow-up: 21 day(s)     Shelbie Foster, Pharmacy Technician  Specialty Pharmacy Technician

## 2024-07-17 ENCOUNTER — SPECIALTY PHARMACY (OUTPATIENT)
Dept: GASTROENTEROLOGY | Facility: CLINIC | Age: 54
End: 2024-07-17
Payer: COMMERCIAL

## 2024-07-17 NOTE — PROGRESS NOTES
Specialty Pharmacy Patient Management Program  Refill Outreach      Rosa is a 53 y.o. female contacted today regarding refills of her medication(s).    Specialty medication(s) and dose(s) confirmed: Stelara  Other medications being refilled: Wegovy    Refill Questions      Flowsheet Row Most Recent Value   Changes to allergies? No   Changes to medications? No   New conditions or infections since last clinic visit No   Unplanned office visit, urgent care, ED, or hospital admission in the last 4 weeks  No   How does patient/caregiver feel medication is working? Very good   Financial problems or insurance changes  No   Since the previous refill, were any specialty medication doses or scheduled injections missed or delayed?  No   Does this patient require a clinical escalation to a pharmacist? No            Delivery Questions      Flowsheet Row Most Recent Value   Delivery method [employer]Ex   Delivery address verified with patient/caregiver? Yes   Delivery address Home   Number of medications in delivery 1   Medication(s) being filled and delivered Ustekinumab (Antipsoriatics)   Doses left of specialty medications 0   Copay verified? Yes   Copay amount $5   Copay form of payment Credit/debit on file   Ship Date 7/18   Delivery Date 7/19   Signature Required No                 Follow-up: 37 day(s)    Electronically signed by Tanya Ashley, [place], 07/17/24, 10:37 AM EDT.

## 2024-07-23 ENCOUNTER — SPECIALTY PHARMACY (OUTPATIENT)
Dept: NEUROLOGY | Facility: CLINIC | Age: 54
End: 2024-07-23
Payer: COMMERCIAL

## 2024-07-30 ENCOUNTER — SPECIALTY PHARMACY (OUTPATIENT)
Dept: GASTROENTEROLOGY | Facility: CLINIC | Age: 54
End: 2024-07-30
Payer: COMMERCIAL

## 2024-07-30 NOTE — PROGRESS NOTES
Specialty Pharmacy     Stelara PA approved  Key: [id number] - PA Case ID: [id number]  PA Case: [id number], Status: Approved, Coverage Starts on: 7/29/2024 12:00 AM, Coverage Ends on: 7/29/2025 12:00 AM. Questions? Contact [phone].    Mari Huntley  Specialty Pharmacy Technician

## 2024-08-07 ENCOUNTER — HOSPITAL ENCOUNTER (INPATIENT)
Facility: HOSPITAL | Age: 54
LOS: 3 days | Discharge: HOME | DRG: 387 | End: 2024-08-12
Attending: EMERGENCY MEDICINE | Admitting: INTERNAL MEDICINE
Payer: COMMERCIAL

## 2024-08-07 ENCOUNTER — APPOINTMENT (OUTPATIENT)
Dept: CT IMAGING | Facility: HOSPITAL | Age: 54
DRG: 387 | End: 2024-08-07
Payer: COMMERCIAL

## 2024-08-07 DIAGNOSIS — E87.6 HYPOKALEMIA: ICD-10-CM

## 2024-08-07 DIAGNOSIS — K50.919 EXACERBATION OF CROHN'S DISEASE WITH COMPLICATION: Primary | ICD-10-CM

## 2024-08-07 DIAGNOSIS — K50.918: ICD-10-CM

## 2024-08-07 PROBLEM — K50.90 EXACERBATION OF CROHN'S DISEASE: Status: ACTIVE | Noted: 2024-08-07

## 2024-08-07 LAB
ALBUMIN SERPL-MCNC: 3.9 G/DL (ref 3.5–5.2)
ALBUMIN/GLOB SERPL: 1.2 G/DL
ALP SERPL-CCNC: 100 U/L (ref 39–117)
ALT SERPL W P-5'-P-CCNC: 20 U/L (ref 1–33)
ANION GAP SERPL CALCULATED.3IONS-SCNC: 12 MMOL/L (ref 5–15)
AST SERPL-CCNC: 16 U/L (ref 1–32)
BACTERIA UR QL AUTO: ABNORMAL /HPF
BASOPHILS # BLD AUTO: 0.06 10*3/MM3 (ref 0–0.2)
BASOPHILS NFR BLD AUTO: 0.3 % (ref 0–1.5)
BILIRUB SERPL-MCNC: 0.4 MG/DL (ref 0–1.2)
BILIRUB UR QL STRIP: NEGATIVE
BUN SERPL-MCNC: 7 MG/DL (ref 6–20)
BUN/CREAT SERPL: 8.1 (ref 7–25)
CALCIUM SPEC-SCNC: 9.4 MG/DL (ref 8.6–10.5)
CHLORIDE SERPL-SCNC: 102 MMOL/L (ref 98–107)
CLARITY UR: ABNORMAL
CO2 SERPL-SCNC: 26 MMOL/L (ref 22–29)
COLOR UR: ABNORMAL
CREAT SERPL-MCNC: 0.86 MG/DL (ref 0.57–1)
D-LACTATE SERPL-SCNC: 1 MMOL/L (ref 0.5–2)
DEPRECATED RDW RBC AUTO: 41 FL (ref 37–54)
EGFRCR SERPLBLD CKD-EPI 2021: 80.9 ML/MIN/1.73
EOSINOPHIL # BLD AUTO: 0.3 10*3/MM3 (ref 0–0.4)
EOSINOPHIL NFR BLD AUTO: 1.5 % (ref 0.3–6.2)
ERYTHROCYTE [DISTWIDTH] IN BLOOD BY AUTOMATED COUNT: 13 % (ref 12.3–15.4)
GLOBULIN UR ELPH-MCNC: 3.2 GM/DL
GLUCOSE SERPL-MCNC: 142 MG/DL (ref 65–99)
GLUCOSE UR STRIP-MCNC: NEGATIVE MG/DL
HCG SERPL QL: NEGATIVE
HCT VFR BLD AUTO: 45.8 % (ref 34–46.6)
HGB BLD-MCNC: 15.3 G/DL (ref 12–15.9)
HGB UR QL STRIP.AUTO: NEGATIVE
HYALINE CASTS UR QL AUTO: ABNORMAL /LPF
IMM GRANULOCYTES # BLD AUTO: 0.09 10*3/MM3 (ref 0–0.05)
IMM GRANULOCYTES NFR BLD AUTO: 0.4 % (ref 0–0.5)
KETONES UR QL STRIP: ABNORMAL
LEUKOCYTE ESTERASE UR QL STRIP.AUTO: ABNORMAL
LIPASE SERPL-CCNC: 33 U/L (ref 13–60)
LYMPHOCYTES # BLD AUTO: 2.07 10*3/MM3 (ref 0.7–3.1)
LYMPHOCYTES NFR BLD AUTO: 10.2 % (ref 19.6–45.3)
MAGNESIUM SERPL-MCNC: 2 MG/DL (ref 1.6–2.6)
MCH RBC QN AUTO: 29.1 PG (ref 26.6–33)
MCHC RBC AUTO-ENTMCNC: 33.4 G/DL (ref 31.5–35.7)
MCV RBC AUTO: 87.2 FL (ref 79–97)
MONOCYTES # BLD AUTO: 0.56 10*3/MM3 (ref 0.1–0.9)
MONOCYTES NFR BLD AUTO: 2.8 % (ref 5–12)
NEUTROPHILS NFR BLD AUTO: 17.16 10*3/MM3 (ref 1.7–7)
NEUTROPHILS NFR BLD AUTO: 84.8 % (ref 42.7–76)
NITRITE UR QL STRIP: NEGATIVE
NRBC BLD AUTO-RTO: 0 /100 WBC (ref 0–0.2)
PH UR STRIP.AUTO: 7 [PH] (ref 5–8)
PLATELET # BLD AUTO: 275 10*3/MM3 (ref 140–450)
PMV BLD AUTO: 10.4 FL (ref 6–12)
POTASSIUM SERPL-SCNC: 3 MMOL/L (ref 3.5–5.2)
PROT SERPL-MCNC: 7.1 G/DL (ref 6–8.5)
PROT UR QL STRIP: ABNORMAL
RBC # BLD AUTO: 5.25 10*6/MM3 (ref 3.77–5.28)
RBC # UR STRIP: ABNORMAL /HPF
REF LAB TEST METHOD: ABNORMAL
SODIUM SERPL-SCNC: 140 MMOL/L (ref 136–145)
SP GR UR STRIP: 1.02 (ref 1–1.03)
SQUAMOUS #/AREA URNS HPF: ABNORMAL /HPF
UROBILINOGEN UR QL STRIP: ABNORMAL
WBC # UR STRIP: ABNORMAL /HPF
WBC NRBC COR # BLD AUTO: 20.24 10*3/MM3 (ref 3.4–10.8)

## 2024-08-07 PROCEDURE — 25810000003 SODIUM CHLORIDE 0.9 % SOLUTION: Performed by: EMERGENCY MEDICINE

## 2024-08-07 PROCEDURE — 83605 ASSAY OF LACTIC ACID: CPT | Performed by: PHYSICIAN ASSISTANT

## 2024-08-07 PROCEDURE — 25010000002 PIPERACILLIN SOD-TAZOBACTAM PER 1 G: Performed by: PHYSICIAN ASSISTANT

## 2024-08-07 PROCEDURE — 25510000001 IOPAMIDOL 61 % SOLUTION: Performed by: EMERGENCY MEDICINE

## 2024-08-07 PROCEDURE — 25010000002 ONDANSETRON PER 1 MG: Performed by: EMERGENCY MEDICINE

## 2024-08-07 PROCEDURE — 25010000002 METHYLPREDNISOLONE PER 40 MG: Performed by: PHYSICIAN ASSISTANT

## 2024-08-07 PROCEDURE — 81001 URINALYSIS AUTO W/SCOPE: CPT | Performed by: PHYSICIAN ASSISTANT

## 2024-08-07 PROCEDURE — 83735 ASSAY OF MAGNESIUM: CPT | Performed by: PHYSICIAN ASSISTANT

## 2024-08-07 PROCEDURE — 25010000002 MORPHINE PER 10 MG: Performed by: EMERGENCY MEDICINE

## 2024-08-07 PROCEDURE — 36415 COLL VENOUS BLD VENIPUNCTURE: CPT

## 2024-08-07 PROCEDURE — 99285 EMERGENCY DEPT VISIT HI MDM: CPT

## 2024-08-07 PROCEDURE — 85025 COMPLETE CBC W/AUTO DIFF WBC: CPT | Performed by: PHYSICIAN ASSISTANT

## 2024-08-07 PROCEDURE — 83690 ASSAY OF LIPASE: CPT | Performed by: PHYSICIAN ASSISTANT

## 2024-08-07 PROCEDURE — 74177 CT ABD & PELVIS W/CONTRAST: CPT

## 2024-08-07 PROCEDURE — 87040 BLOOD CULTURE FOR BACTERIA: CPT | Performed by: PHYSICIAN ASSISTANT

## 2024-08-07 PROCEDURE — 80053 COMPREHEN METABOLIC PANEL: CPT | Performed by: PHYSICIAN ASSISTANT

## 2024-08-07 PROCEDURE — 84703 CHORIONIC GONADOTROPIN ASSAY: CPT | Performed by: PHYSICIAN ASSISTANT

## 2024-08-07 RX ORDER — ONDANSETRON 2 MG/ML
4 INJECTION INTRAMUSCULAR; INTRAVENOUS ONCE
Status: COMPLETED | OUTPATIENT
Start: 2024-08-07 | End: 2024-08-07

## 2024-08-07 RX ORDER — METHYLPREDNISOLONE SODIUM SUCCINATE 40 MG/ML
40 INJECTION, POWDER, LYOPHILIZED, FOR SOLUTION INTRAMUSCULAR; INTRAVENOUS ONCE
Status: COMPLETED | OUTPATIENT
Start: 2024-08-07 | End: 2024-08-07

## 2024-08-07 RX ORDER — MORPHINE SULFATE 2 MG/ML
4 INJECTION, SOLUTION INTRAMUSCULAR; INTRAVENOUS ONCE
Status: COMPLETED | OUTPATIENT
Start: 2024-08-07 | End: 2024-08-07

## 2024-08-07 RX ORDER — POTASSIUM CHLORIDE 750 MG/1
40 TABLET, FILM COATED, EXTENDED RELEASE ORAL ONCE
Status: COMPLETED | OUTPATIENT
Start: 2024-08-07 | End: 2024-08-07

## 2024-08-07 RX ADMIN — ONDANSETRON 4 MG: 2 INJECTION INTRAMUSCULAR; INTRAVENOUS at 20:32

## 2024-08-07 RX ADMIN — IOPAMIDOL 85 ML: 612 INJECTION, SOLUTION INTRAVENOUS at 21:35

## 2024-08-07 RX ADMIN — SODIUM CHLORIDE 1000 ML: 9 INJECTION, SOLUTION INTRAVENOUS at 20:29

## 2024-08-07 RX ADMIN — MORPHINE SULFATE 4 MG: 2 INJECTION, SOLUTION INTRAMUSCULAR; INTRAVENOUS at 20:33

## 2024-08-07 RX ADMIN — METHYLPREDNISOLONE SODIUM SUCCINATE 40 MG: 40 INJECTION, POWDER, FOR SOLUTION INTRAMUSCULAR; INTRAVENOUS at 23:02

## 2024-08-07 RX ADMIN — PIPERACILLIN AND TAZOBACTAM 3.38 G: 3; .375 INJECTION, POWDER, FOR SOLUTION INTRAVENOUS at 22:30

## 2024-08-07 RX ADMIN — POTASSIUM CHLORIDE 40 MEQ: 750 TABLET, EXTENDED RELEASE ORAL at 21:52

## 2024-08-08 PROBLEM — E87.6 HYPOKALEMIA: Status: ACTIVE | Noted: 2024-08-08

## 2024-08-08 LAB
ANION GAP SERPL CALCULATED.3IONS-SCNC: 11.5 MMOL/L (ref 5–15)
BUN SERPL-MCNC: 7 MG/DL (ref 6–20)
BUN/CREAT SERPL: 9 (ref 7–25)
CALCIUM SPEC-SCNC: 8.6 MG/DL (ref 8.6–10.5)
CHLORIDE SERPL-SCNC: 104 MMOL/L (ref 98–107)
CO2 SERPL-SCNC: 24.5 MMOL/L (ref 22–29)
CREAT SERPL-MCNC: 0.78 MG/DL (ref 0.57–1)
DEPRECATED RDW RBC AUTO: 39.8 FL (ref 37–54)
EGFRCR SERPLBLD CKD-EPI 2021: 91 ML/MIN/1.73
ERYTHROCYTE [DISTWIDTH] IN BLOOD BY AUTOMATED COUNT: 12.7 % (ref 12.3–15.4)
GLUCOSE SERPL-MCNC: 163 MG/DL (ref 65–99)
HCT VFR BLD AUTO: 38.6 % (ref 34–46.6)
HGB BLD-MCNC: 12.9 G/DL (ref 12–15.9)
MCH RBC QN AUTO: 29.1 PG (ref 26.6–33)
MCHC RBC AUTO-ENTMCNC: 33.4 G/DL (ref 31.5–35.7)
MCV RBC AUTO: 86.9 FL (ref 79–97)
PLATELET # BLD AUTO: 242 10*3/MM3 (ref 140–450)
PMV BLD AUTO: 10.8 FL (ref 6–12)
POTASSIUM SERPL-SCNC: 3.5 MMOL/L (ref 3.5–5.2)
RBC # BLD AUTO: 4.44 10*6/MM3 (ref 3.77–5.28)
SODIUM SERPL-SCNC: 140 MMOL/L (ref 136–145)
WBC NRBC COR # BLD AUTO: 12.32 10*3/MM3 (ref 3.4–10.8)

## 2024-08-08 PROCEDURE — 80048 BASIC METABOLIC PNL TOTAL CA: CPT | Performed by: NURSE PRACTITIONER

## 2024-08-08 PROCEDURE — G0378 HOSPITAL OBSERVATION PER HR: HCPCS

## 2024-08-08 PROCEDURE — 25810000003 SODIUM CHLORIDE 0.9 % SOLUTION: Performed by: NURSE PRACTITIONER

## 2024-08-08 PROCEDURE — 99204 OFFICE O/P NEW MOD 45 MIN: CPT | Performed by: SURGERY

## 2024-08-08 PROCEDURE — 25010000002 METHYLPREDNISOLONE PER 40 MG

## 2024-08-08 PROCEDURE — 99214 OFFICE O/P EST MOD 30 MIN: CPT

## 2024-08-08 PROCEDURE — 36415 COLL VENOUS BLD VENIPUNCTURE: CPT | Performed by: NURSE PRACTITIONER

## 2024-08-08 PROCEDURE — 85027 COMPLETE CBC AUTOMATED: CPT | Performed by: NURSE PRACTITIONER

## 2024-08-08 PROCEDURE — 63710000001 ONDANSETRON ODT 4 MG TABLET DISPERSIBLE: Performed by: NURSE PRACTITIONER

## 2024-08-08 PROCEDURE — 25010000002 PIPERACILLIN SOD-TAZOBACTAM PER 1 G: Performed by: NURSE PRACTITIONER

## 2024-08-08 PROCEDURE — 25010000002 MORPHINE PER 10 MG: Performed by: NURSE PRACTITIONER

## 2024-08-08 PROCEDURE — 25010000002 METHYLPREDNISOLONE PER 125 MG: Performed by: INTERNAL MEDICINE

## 2024-08-08 RX ORDER — ACETAMINOPHEN 160 MG/5ML
650 SOLUTION ORAL EVERY 4 HOURS PRN
Status: DISCONTINUED | OUTPATIENT
Start: 2024-08-08 | End: 2024-08-12 | Stop reason: HOSPADM

## 2024-08-08 RX ORDER — MORPHINE SULFATE 2 MG/ML
2 INJECTION, SOLUTION INTRAMUSCULAR; INTRAVENOUS
Status: DISCONTINUED | OUTPATIENT
Start: 2024-08-08 | End: 2024-08-12 | Stop reason: HOSPADM

## 2024-08-08 RX ORDER — FLUTICASONE PROPIONATE 50 MCG
2 SPRAY, SUSPENSION (ML) NASAL DAILY
Status: DISCONTINUED | OUTPATIENT
Start: 2024-08-08 | End: 2024-08-12 | Stop reason: HOSPADM

## 2024-08-08 RX ORDER — AMOXICILLIN 250 MG
2 CAPSULE ORAL 2 TIMES DAILY PRN
Status: DISCONTINUED | OUTPATIENT
Start: 2024-08-08 | End: 2024-08-09

## 2024-08-08 RX ORDER — BISACODYL 10 MG
10 SUPPOSITORY, RECTAL RECTAL DAILY PRN
Status: DISCONTINUED | OUTPATIENT
Start: 2024-08-08 | End: 2024-08-09

## 2024-08-08 RX ORDER — POLYETHYLENE GLYCOL 3350 17 G/17G
17 POWDER, FOR SOLUTION ORAL DAILY PRN
Status: DISCONTINUED | OUTPATIENT
Start: 2024-08-08 | End: 2024-08-09

## 2024-08-08 RX ORDER — LISINOPRIL 10 MG/1
10 TABLET ORAL
Status: DISCONTINUED | OUTPATIENT
Start: 2024-08-08 | End: 2024-08-12 | Stop reason: HOSPADM

## 2024-08-08 RX ORDER — ACETAMINOPHEN 325 MG/1
650 TABLET ORAL EVERY 4 HOURS PRN
Status: DISCONTINUED | OUTPATIENT
Start: 2024-08-08 | End: 2024-08-12 | Stop reason: HOSPADM

## 2024-08-08 RX ORDER — ATORVASTATIN CALCIUM 20 MG/1
10 TABLET, FILM COATED ORAL DAILY
Status: DISCONTINUED | OUTPATIENT
Start: 2024-08-08 | End: 2024-08-12 | Stop reason: HOSPADM

## 2024-08-08 RX ORDER — PANTOPRAZOLE SODIUM 40 MG/10ML
40 INJECTION, POWDER, LYOPHILIZED, FOR SOLUTION INTRAVENOUS EVERY 12 HOURS SCHEDULED
Status: DISCONTINUED | OUTPATIENT
Start: 2024-08-08 | End: 2024-08-12 | Stop reason: HOSPADM

## 2024-08-08 RX ORDER — SODIUM CHLORIDE 0.9 % (FLUSH) 0.9 %
10 SYRINGE (ML) INJECTION AS NEEDED
Status: DISCONTINUED | OUTPATIENT
Start: 2024-08-08 | End: 2024-08-12 | Stop reason: HOSPADM

## 2024-08-08 RX ORDER — HYDROCHLOROTHIAZIDE 12.5 MG/1
12.5 TABLET ORAL
Status: DISCONTINUED | OUTPATIENT
Start: 2024-08-08 | End: 2024-08-12 | Stop reason: HOSPADM

## 2024-08-08 RX ORDER — PANTOPRAZOLE SODIUM 40 MG/1
40 TABLET, DELAYED RELEASE ORAL 2 TIMES DAILY
Status: DISCONTINUED | OUTPATIENT
Start: 2024-08-08 | End: 2024-08-08

## 2024-08-08 RX ORDER — ACETAMINOPHEN 650 MG/1
650 SUPPOSITORY RECTAL EVERY 4 HOURS PRN
Status: DISCONTINUED | OUTPATIENT
Start: 2024-08-08 | End: 2024-08-12 | Stop reason: HOSPADM

## 2024-08-08 RX ORDER — METHYLPREDNISOLONE SODIUM SUCCINATE 125 MG/2ML
60 INJECTION, POWDER, LYOPHILIZED, FOR SOLUTION INTRAMUSCULAR; INTRAVENOUS EVERY 6 HOURS
Status: DISCONTINUED | OUTPATIENT
Start: 2024-08-08 | End: 2024-08-08

## 2024-08-08 RX ORDER — SODIUM CHLORIDE 9 MG/ML
40 INJECTION, SOLUTION INTRAVENOUS AS NEEDED
Status: DISCONTINUED | OUTPATIENT
Start: 2024-08-08 | End: 2024-08-12 | Stop reason: HOSPADM

## 2024-08-08 RX ORDER — ONDANSETRON 2 MG/ML
4 INJECTION INTRAMUSCULAR; INTRAVENOUS EVERY 6 HOURS PRN
Status: DISCONTINUED | OUTPATIENT
Start: 2024-08-08 | End: 2024-08-12 | Stop reason: HOSPADM

## 2024-08-08 RX ORDER — METHYLPREDNISOLONE SODIUM SUCCINATE 40 MG/ML
20 INJECTION, POWDER, LYOPHILIZED, FOR SOLUTION INTRAMUSCULAR; INTRAVENOUS EVERY 8 HOURS
Status: DISCONTINUED | OUTPATIENT
Start: 2024-08-08 | End: 2024-08-12 | Stop reason: HOSPADM

## 2024-08-08 RX ORDER — SODIUM CHLORIDE 9 MG/ML
100 INJECTION, SOLUTION INTRAVENOUS CONTINUOUS
Status: DISCONTINUED | OUTPATIENT
Start: 2024-08-08 | End: 2024-08-11

## 2024-08-08 RX ORDER — CETIRIZINE HYDROCHLORIDE 10 MG/1
10 TABLET ORAL DAILY
Status: DISCONTINUED | OUTPATIENT
Start: 2024-08-08 | End: 2024-08-12 | Stop reason: HOSPADM

## 2024-08-08 RX ORDER — LEVOTHYROXINE SODIUM 0.1 MG/1
100 TABLET ORAL
Status: DISCONTINUED | OUTPATIENT
Start: 2024-08-08 | End: 2024-08-12 | Stop reason: HOSPADM

## 2024-08-08 RX ORDER — PROMETHAZINE HYDROCHLORIDE 25 MG/1
25 TABLET ORAL EVERY 6 HOURS PRN
Status: DISCONTINUED | OUTPATIENT
Start: 2024-08-08 | End: 2024-08-12 | Stop reason: HOSPADM

## 2024-08-08 RX ORDER — ONDANSETRON 4 MG/1
4 TABLET, ORALLY DISINTEGRATING ORAL EVERY 6 HOURS PRN
Status: DISCONTINUED | OUTPATIENT
Start: 2024-08-08 | End: 2024-08-12 | Stop reason: HOSPADM

## 2024-08-08 RX ORDER — SODIUM CHLORIDE 0.9 % (FLUSH) 0.9 %
10 SYRINGE (ML) INJECTION EVERY 12 HOURS SCHEDULED
Status: DISCONTINUED | OUTPATIENT
Start: 2024-08-08 | End: 2024-08-12 | Stop reason: HOSPADM

## 2024-08-08 RX ORDER — ESCITALOPRAM OXALATE 10 MG/1
10 TABLET ORAL DAILY
Status: DISCONTINUED | OUTPATIENT
Start: 2024-08-08 | End: 2024-08-12 | Stop reason: HOSPADM

## 2024-08-08 RX ORDER — BISACODYL 5 MG/1
5 TABLET, DELAYED RELEASE ORAL DAILY PRN
Status: DISCONTINUED | OUTPATIENT
Start: 2024-08-08 | End: 2024-08-09

## 2024-08-08 RX ORDER — HYDROXYZINE HYDROCHLORIDE 25 MG/1
25 TABLET, FILM COATED ORAL EVERY 8 HOURS PRN
Status: DISCONTINUED | OUTPATIENT
Start: 2024-08-08 | End: 2024-08-12 | Stop reason: HOSPADM

## 2024-08-08 RX ORDER — BUTALBITAL, ACETAMINOPHEN AND CAFFEINE 50; 325; 40 MG/1; MG/1; MG/1
1 TABLET ORAL EVERY 6 HOURS PRN
Status: DISCONTINUED | OUTPATIENT
Start: 2024-08-08 | End: 2024-08-12 | Stop reason: HOSPADM

## 2024-08-08 RX ORDER — CALCIUM CARBONATE 500 MG/1
2 TABLET, CHEWABLE ORAL 2 TIMES DAILY PRN
Status: DISCONTINUED | OUTPATIENT
Start: 2024-08-08 | End: 2024-08-12 | Stop reason: HOSPADM

## 2024-08-08 RX ADMIN — PIPERACILLIN AND TAZOBACTAM 3.38 G: 3; .375 INJECTION, POWDER, FOR SOLUTION INTRAVENOUS at 14:27

## 2024-08-08 RX ADMIN — Medication 10 ML: at 08:42

## 2024-08-08 RX ADMIN — PANTOPRAZOLE SODIUM 40 MG: 40 INJECTION, POWDER, FOR SOLUTION INTRAVENOUS at 20:21

## 2024-08-08 RX ADMIN — Medication 10 ML: at 00:58

## 2024-08-08 RX ADMIN — PIPERACILLIN AND TAZOBACTAM 3.38 G: 3; .375 INJECTION, POWDER, FOR SOLUTION INTRAVENOUS at 05:13

## 2024-08-08 RX ADMIN — LEVOTHYROXINE SODIUM 100 MCG: 100 TABLET ORAL at 05:14

## 2024-08-08 RX ADMIN — METHYLPREDNISOLONE SODIUM SUCCINATE 60 MG: 125 INJECTION INTRAMUSCULAR; INTRAVENOUS at 12:59

## 2024-08-08 RX ADMIN — SODIUM CHLORIDE 100 ML/HR: 9 INJECTION, SOLUTION INTRAVENOUS at 21:45

## 2024-08-08 RX ADMIN — LISINOPRIL 10 MG: 10 TABLET ORAL at 08:42

## 2024-08-08 RX ADMIN — SODIUM CHLORIDE 100 ML/HR: 9 INJECTION, SOLUTION INTRAVENOUS at 00:58

## 2024-08-08 RX ADMIN — PANTOPRAZOLE SODIUM 40 MG: 40 INJECTION, POWDER, FOR SOLUTION INTRAVENOUS at 08:42

## 2024-08-08 RX ADMIN — ESCITALOPRAM OXALATE 10 MG: 10 TABLET, FILM COATED ORAL at 08:42

## 2024-08-08 RX ADMIN — SODIUM CHLORIDE 100 ML/HR: 9 INJECTION, SOLUTION INTRAVENOUS at 11:30

## 2024-08-08 RX ADMIN — HYDROCHLOROTHIAZIDE 12.5 MG: 12.5 TABLET ORAL at 08:42

## 2024-08-08 RX ADMIN — CETIRIZINE HYDROCHLORIDE 10 MG: 10 TABLET ORAL at 08:42

## 2024-08-08 RX ADMIN — MORPHINE SULFATE 2 MG: 2 INJECTION, SOLUTION INTRAMUSCULAR; INTRAVENOUS at 22:28

## 2024-08-08 RX ADMIN — PIPERACILLIN AND TAZOBACTAM 3.38 G: 3; .375 INJECTION, POWDER, FOR SOLUTION INTRAVENOUS at 21:45

## 2024-08-08 RX ADMIN — MORPHINE SULFATE 2 MG: 2 INJECTION, SOLUTION INTRAMUSCULAR; INTRAVENOUS at 05:45

## 2024-08-08 RX ADMIN — Medication 10 ML: at 20:22

## 2024-08-08 RX ADMIN — MORPHINE SULFATE 2 MG: 2 INJECTION, SOLUTION INTRAMUSCULAR; INTRAVENOUS at 16:26

## 2024-08-08 RX ADMIN — ONDANSETRON 4 MG: 4 TABLET, ORALLY DISINTEGRATING ORAL at 05:45

## 2024-08-08 RX ADMIN — METHYLPREDNISOLONE SODIUM SUCCINATE 20 MG: 40 INJECTION, POWDER, FOR SOLUTION INTRAMUSCULAR; INTRAVENOUS at 18:37

## 2024-08-08 RX ADMIN — ATORVASTATIN CALCIUM 10 MG: 20 TABLET, FILM COATED ORAL at 08:42

## 2024-08-08 NOTE — CONSULTS
[facility]  Initial Inpatient Consult Note    Referring Provider:     Lise Perdomo APRN       Reason for Consultation:     Crohn's flare       Subjective     History of present illness:    53 y.o. female with history of Crohn's disease, hypertension, migraines, hypothyroidism, and GERD who presented to the ED yesterday with complaints of abdominal pain which started suddenly the afternoon of 8/7.  Pain described as constant, severe, and sharp in nature.    She is a patient Dr. Garza and follows with him and Manda Butler PA-C in our outpatient office.  She has been on Stelara for about 1 year now.  She has previously failed Entyvio due to joint pain.     She recently contact the office saying that she felt like she could feel her symptoms worsening leading up to her Stelara dose.  Frequency was changed from every 8 weeks to every 6 weeks.  She recently received Stelara the last week of July.  She says over the last day she had sudden onset abdominal pain which she described as constant and sharp.  She reports that she does not have an appetite and says that she feels like food makes the pain worse at this time.  She did have associated nausea and vomiting-she denies any coffee-ground emesis or hematemesis.  She reports she is feeling a little bit better since arriving to the hospital.  She has received a dose of steroids.  She denies diarrhea.    Labs on arrival showed WBC 20.24, hemoglobin 15.3.    CT abdomen pelvis showed thick-walled and edematous loops of small bowel within left side of abdomen concerning for active Crohn's.  Mildly distended stomach with some degree of gastritis or associated ileus not excluded, some distended loops of small bowel which do not appear thick-walled and are noted proximally to focally narrowed segment of small bowel-stricture not excluded    EGD 9/24/2020 showed medium hiatal hernia, LA grade a esophagitis, normal stomach, normal duodenum.   No H. pylori.    Colonoscopy 2020 showed normal examined portion of ileum, diverticulosis in descending colon.  Colon biopsies normal    Past Medical History:  Past Medical History:   Diagnosis Date    Allergic     Anxiety     Cholelithiasis     Crohn disease     Depression     Disease of thyroid gland     Diverticulitis of colon     Diverticulosis     Elevated blood pressure reading without diagnosis of hypertension     Encounter for long-term (current) use of medications 2020    Fatty liver     GERD (gastroesophageal reflux disease)     Headache, migraine     Hernia     History of medical problems     not sure of date, but very painful joints and muscles.  Poss r/t crohns    History of sore throat     Hyperlipidemia     Hypertension     Hypothyroidism     Migraine     Need for DTaP and Hib vaccine     History of     Obesity     Urinary tract infection     Vaginal yeast infection      Past Surgical History:  Past Surgical History:   Procedure Laterality Date    BREAST BIOPSY       SECTION      CHOLECYSTECTOMY      COLONOSCOPY  approx     Tavon M.D.  benign polyps per patient    COLONOSCOPY N/A 2020    Procedure: COLONOSCOPY  into cecum and TI with biopsies;  Surgeon: Maicol Garza MD;  Location: [facility] ENDOSCOPY;  Service: Gastroenterology;  Laterality: N/A;  Pre: abn CT scan  post: diverticulosis    ENDOSCOPY N/A 2020    Procedure: ESOPHAGOGASTRODUODENOSCOPY WITH BIOPSY;  Surgeon: Maicol Graza MD;  Location: [facility] ENDOSCOPY;  Service: Gastroenterology;  Laterality: N/A;  Pre: abn CT scan  post: hiatal hernia, esophagitis    FRACTURE SURGERY      broken hand, 3 screws in place    TONSILLECTOMY      UPPER GASTROINTESTINAL ENDOSCOPY  approx     Tavon M.D.  normal per patient      Social History:   Social History     Tobacco Use    Smoking status: Former     Current packs/day: 0.00     Average packs/day: 1 pack/day for 10.0 years (10.0 ttl pk-yrs)      Types: Cigarettes     Start date: 3/1/2001     Quit date: 3/1/2011     Years since quittin.4    Smokeless tobacco: Never    Tobacco comments:     smoked off and on   Substance Use Topics    Alcohol use: Not Currently     Comment: once a year or so I have a drink w dinner      Family History:  Family History   Problem Relation Age of Onset    Breast cancer Mother     Arthritis Mother     Cancer Mother         breast ca    Miscarriages / Stillbirths Mother     Hypertension Father     Heart disease Father     Alcohol abuse Father     Hyperlipidemia Father     Breast cancer Maternal Grandmother     Anxiety disorder Maternal Grandmother     Arthritis Maternal Grandmother     Cancer Maternal Grandmother         breast and brain    Depression Maternal Grandmother     Diabetes Maternal Grandmother     Heart disease Maternal Grandmother     Kidney disease Maternal Grandmother     Thyroid disease Maternal Grandmother     Mental illness Maternal Grandmother     Alcohol abuse Other     Depression Other     Anxiety disorder Other     Hypertension Other     Other Other         Pure hypercholesterolemia and esophageal reflux    Lung cancer Other     Inflammatory bowel disease Maternal Grandfather     Irritable bowel syndrome Maternal Grandfather         Honestly not sure which he had    Arthritis Maternal Grandfather     Cancer Maternal Grandfather         lung    Stroke Maternal Grandfather        Home Meds:  Medications Prior to Admission   Medication Sig Dispense Refill Last Dose    atorvastatin (LIPITOR) 10 MG tablet Take 1 tablet by mouth Daily. 90 tablet 3     B Complex-C-Folic Acid (STRESS B COMPLEX PO) Take 1 tablet by mouth Daily.       butalbital-acetaminophen-caffeine (FIORICET, ESGIC) -40 MG per tablet Take 1 tablet by mouth Every 6 (Six) Hours As Needed for Headache. 30 tablet 0     Cholecalciferol (VITAMIN D3 GUMMIES ADULT PO) Take 5,000 Units by mouth Daily.       Dihydroergotamine Mesylate HFA  (Trudhesa) 0.725 MG/ACT aerosol solution 0.725 mg into the nostril(s) as directed by provider As Needed (Moderate to severe headache). One spray into each nostril, maybe repeated after 1 hour if needed, NO more than 2 doses in 24-hour period or 3 doses in 7-day period. 8 mL 2     escitalopram (LEXAPRO) 10 MG tablet Take 1 tablet by mouth Daily. 90 tablet 3     fluticasone (FLONASE) 50 MCG/ACT nasal spray Use 2 sprays into each nostril as directed by provider Daily. 16 g 6     galcanezumab-gnlm (EMGALITY) 120 MG/ML auto-injector pen Inject 1 mL under the skin into the appropriate area as directed Every 30 (Thirty) Days. (Patient taking differently: Inject 1 mL under the skin into the appropriate area as directed Every 30 (Thirty) Days. Due on August 27, 2024) 1 mL 11     hydrOXYzine (ATARAX) 25 MG tablet Take 1 tablet by mouth Every 8 (Eight) Hours As Needed for Anxiety. 90 tablet 0     Hyoscyamine Sulfate SL (Levsin/SL) 0.125 MG sublingual tablet Place 1 tablet under the tongue Every 6 (Six) Hours As Needed (abdominal pain/cramping). 60 each 3     levocetirizine (XYZAL) 5 MG tablet Take 1 tablet by mouth Every Evening. 90 tablet 3     levothyroxine (SYNTHROID, LEVOTHROID) 100 MCG tablet Take 1 tablet by mouth Daily. 90 tablet 1     lisinopril-hydrochlorothiazide (PRINZIDE,ZESTORETIC) 10-12.5 MG per tablet Take 1 tablet by mouth Daily. 90 tablet 0     Multiple Vitamins-Minerals (MULTI ADULT GUMMIES PO)        ondansetron (ZOFRAN) 8 MG tablet Take 0.5 tablets by mouth Every 8 (Eight) Hours As Needed for Nausea or Vomiting. 90 tablet 0     pantoprazole (PROTONIX) 40 MG EC tablet Take 1 tablet by mouth 2 (Two) Times a Day. 180 tablet 3     promethazine (PHENERGAN) 25 MG tablet Take 1 tablet by mouth Every 6 (Six) Hours As Needed for Nausea or Vomiting. 60 tablet 1     Semaglutide-Weight Management (Wegovy) 1.7 MG/0.75ML solution auto-injector Inject 0.75 mL under the skin into the appropriate area as directed 1 (One)  Time Per Week. (Patient taking differently: Inject 0.75 mL under the skin into the appropriate area as directed 1 (One) Time Per Week. Every Thursday) 3 mL 0     Ubiquinol 100 MG capsule Take 100 mg by mouth Daily.       Ustekinumab (STELARA) 90 MG/ML solution prefilled syringe Injection Inject 90 mg under the skin into the appropriate area as directed Every 6 (Six) Weeks. (Patient taking differently: Inject 90 mg under the skin into the appropriate area as directed Every 8 (Eight) Weeks. Last taken July 27, 2024) 1 mL 8      Current Meds:   atorvastatin, 10 mg, Oral, Daily  cetirizine, 10 mg, Oral, Daily  escitalopram, 10 mg, Oral, Daily  fluticasone, 2 spray, Nasal, Daily  lisinopril, 10 mg, Oral, Q24H   And  hydroCHLOROthiazide, 12.5 mg, Oral, Q24H  levothyroxine, 100 mcg, Oral, Q AM  pantoprazole, 40 mg, Intravenous, Q12H  piperacillin-tazobactam, 3.375 g, Intravenous, Q8H  sodium chloride, 10 mL, Intravenous, Q12H      Allergies:  No Known Allergies    Objective     Vital Signs  Temp:  [96.4 °F (35.8 °C)-98.6 °F (37 °C)] 96.4 °F (35.8 °C)  Heart Rate:  [] 78  Resp:  [14-18] 14  BP: (101-134)/(68-87) 119/74    Physical Exam:   General: patient awake, alert and cooperative   Eyes: Normal lids and lashes, no scleral icterus   Cardiovascular: regular rhythm and rate   Pulm: clear to auscultation bilaterally, regular and unlabored   Abdomen: soft, nontender, nondistended; normal bowel sounds    Results Review:   I reviewed the patient's new clinical results.    Results from last 7 days   Lab Units 08/08/24  0545 08/07/24 2028   WBC 10*3/mm3 12.32* 20.24*   HEMOGLOBIN g/dL 12.9 15.3   HEMATOCRIT % 38.6 45.8   PLATELETS 10*3/mm3 242 275     Results from last 7 days   Lab Units 08/08/24  0545 08/07/24 2028   SODIUM mmol/L 140 140   POTASSIUM mmol/L 3.5 3.0*   CHLORIDE mmol/L 104 102   CO2 mmol/L 24.5 26.0   BUN mg/dL 7 7   CREATININE mg/dL 0.78 0.86   CALCIUM mg/dL 8.6 9.4   BILIRUBIN mg/dL  --  0.4   ALK PHOS  U/L  --  100   ALT (SGPT) U/L  --  20   AST (SGOT) U/L  --  16   GLUCOSE mg/dL 163* 142*         Lab Results   Lab Value Date/Time    LIPASE 33 08/07/2024 2028    LIPASE 20 03/22/2022 1620    LIPASE 22 03/08/2021 1513    LIPASE 34 07/27/2020 1523    LIPASE 20 06/19/2020 1728       Radiology:  CT Abdomen Pelvis With Contrast   Final Result       1. Thick-walled and edematous loops of small bowel which are noted   within the left side of the abdomen, concerning for active Crohn's   disease.   2. The patient's small hiatal hernia is mildly distended with fluid, as   is the stomach. The patient had similar findings on the exam from July 11, 2024. Some degree of gastritis, and associated ileus is not   excluded.   3. There are some distended loops of small bowel, which don't appear   particularly thick walled, which are noted proximal to a focally   narrowed segment of small bowel. Stricture is not excluded.           Radiation dose reduction techniques were utilized, including automated   exposure control and exposure modulation based on body size.           This report was finalized on 8/7/2024 10:12 PM by Dr. Sparkle Singh M.D on Workstation: [id number]              Assessment & Plan   Active Hospital Problems    Diagnosis     **Exacerbation of Crohn's disease     Hypokalemia     Hypothyroidism     GERD (gastroesophageal reflux disease)     Essential hypertension        Assessment:  Crohn's disease-on Stelara recently switched to every 6 weeks from every 8 weeks dosing  Abdominal pain  Nausea/vomiting    Plan:  She has already received a dose of steroids-recommend continuing methylprednisolone 60 mg daily  Would recommend n.p.o. today due to continued abdominal pain-if she is feeling better tomorrow can trial clear liquid diet  Stricturing noted on CT scan-will consult surgery for their input.  Not sure if this is due to current inflammation or is there consistently  Continue supportive care    Patient and  plan of care discussed with attending, Dr. Jason Shelley PA-C

## 2024-08-08 NOTE — H&P
Patient Name:  Rosa Melgoza  YOB: 1970  MRN:  [id number]  Admit Date:  8/7/2024  Patient Care Team:  Kathie Romeo DO as PCP - General  Kathie Romeo DO as PCP - Family Medicine  Maicol Garza MD as Consulting Physician (Gastroenterology)  Manda Butler PA-C as Physician Assistant (Gastroenterology)      Subjective   History Present Illness     Chief Complaint   Patient presents with    Abdominal Pain       Ms. Melgoza is a 53 y.o. non-smoker with a history of Crohn's disease, hypertension, migraines, hypothyroidism, and GERD that presents to [facility] complaining of abdominal pain. She reports periumbilical abdominal pain that began yesterday afternoon. She describes the pain as constant, severe, and sharp in nature. She states the pain was worse than what she usually experiences with a Crohn's flare, so she presented to the ED. She reports chills, nausea, and non-bloody emesis. She reports chronic diarrhea but denies any stools yesterday. She denies fever, chest pain, and shortness of breath. Work up in the ED revealed K+ 3.0 and WBC 20.24. a urinalysis was positive for 3+ bacteria, WBCs, 2+ protein, and 1+ leukocytes. A CT of the abdomen showed thick-walled and edematous loops of small bowel within the left side of the abdomen, concerning for active Crohn's disease. There are also findings that may indicate gastritis and an associated ileus. There are some distended loops of small bowel, which appear particularly thick walled which are proximal to a focally narrowed segment of small bowel and stricture is not excluded. She has been admitted for GI evaluation.      History of Present Illness  Review of Systems   Constitutional:  Positive for chills. Negative for fever.   HENT:  Negative for congestion and sore throat.    Eyes:  Negative for photophobia and visual disturbance.   Respiratory:  Negative for cough, shortness of breath and wheezing.     Cardiovascular:  Negative for chest pain, palpitations and leg swelling.   Gastrointestinal:  Positive for abdominal pain, nausea and vomiting. Negative for diarrhea.   Musculoskeletal:  Positive for myalgias. Negative for arthralgias.   Skin:  Negative for color change and pallor.   Neurological:  Negative for dizziness, weakness, light-headedness, numbness and headaches.        Personal History     Past Medical History:   Diagnosis Date    Allergic     Anxiety     Cholelithiasis     Crohn disease     Depression     Disease of thyroid gland     Diverticulitis of colon     Diverticulosis     Elevated blood pressure reading without diagnosis of hypertension     Encounter for long-term (current) use of medications 2020    Fatty liver     GERD (gastroesophageal reflux disease)     Headache, migraine     Hernia     History of medical problems     not sure of date, but very painful joints and muscles.  Poss r/t crohns    History of sore throat     Hyperlipidemia     Hypertension     Hypothyroidism     Migraine     Need for DTaP and Hib vaccine     History of     Obesity     Urinary tract infection     Vaginal yeast infection      Past Surgical History:   Procedure Laterality Date    BREAST BIOPSY       SECTION      CHOLECYSTECTOMY      COLONOSCOPY  approx     Montes De Oca M.D.  benign polyps per patient    COLONOSCOPY N/A 2020    Procedure: COLONOSCOPY  into cecum and TI with biopsies;  Surgeon: Maicol Garza MD;  Location: [facility] ENDOSCOPY;  Service: Gastroenterology;  Laterality: N/A;  Pre: abn CT scan  post: diverticulosis    ENDOSCOPY N/A 2020    Procedure: ESOPHAGOGASTRODUODENOSCOPY WITH BIOPSY;  Surgeon: Maicol Garza MD;  Location: [facility] ENDOSCOPY;  Service: Gastroenterology;  Laterality: N/A;  Pre: abn CT scan  post: hiatal hernia, esophagitis    FRACTURE SURGERY      broken hand, 3 screws in place    TONSILLECTOMY      UPPER GASTROINTESTINAL ENDOSCOPY  approx  2010    Tavon LIU  normal per patient     Family History   Problem Relation Age of Onset    Breast cancer Mother     Arthritis Mother     Cancer Mother         breast ca    Miscarriages / Stillbirths Mother     Hypertension Father     Heart disease Father     Alcohol abuse Father     Hyperlipidemia Father     Breast cancer Maternal Grandmother     Anxiety disorder Maternal Grandmother     Arthritis Maternal Grandmother     Cancer Maternal Grandmother         breast and brain    Depression Maternal Grandmother     Diabetes Maternal Grandmother     Heart disease Maternal Grandmother     Kidney disease Maternal Grandmother     Thyroid disease Maternal Grandmother     Mental illness Maternal Grandmother     Alcohol abuse Other     Depression Other     Anxiety disorder Other     Hypertension Other     Other Other         Pure hypercholesterolemia and esophageal reflux    Lung cancer Other     Inflammatory bowel disease Maternal Grandfather     Irritable bowel syndrome Maternal Grandfather         Honestly not sure which he had    Arthritis Maternal Grandfather     Cancer Maternal Grandfather         lung    Stroke Maternal Grandfather      Social History     Tobacco Use    Smoking status: Former     Current packs/day: 0.00     Average packs/day: 1 pack/day for 10.0 years (10.0 ttl pk-yrs)     Types: Cigarettes     Start date: 3/1/2001     Quit date: 3/1/2011     Years since quittin.4    Smokeless tobacco: Never    Tobacco comments:     smoked off and on   Vaping Use    Vaping status: Never Used   Substance Use Topics    Alcohol use: Not Currently     Comment: once a year or so I have a drink w dinner    Drug use: Never     Medications Prior to Admission   Medication Sig Dispense Refill Last Dose    atorvastatin (LIPITOR) 10 MG tablet Take 1 tablet by mouth Daily. 90 tablet 3     B Complex-C-Folic Acid (STRESS B COMPLEX PO) Take 1 tablet by mouth Daily.       butalbital-acetaminophen-caffeine (FIORICET, ESGIC)  -40 MG per tablet Take 1 tablet by mouth Every 6 (Six) Hours As Needed for Headache. 30 tablet 0     Cholecalciferol (VITAMIN D3 GUMMIES ADULT PO) Take 5,000 Units by mouth Daily.       Dihydroergotamine Mesylate HFA (Trudhesa) 0.725 MG/ACT aerosol solution 0.725 mg into the nostril(s) as directed by provider As Needed (Moderate to severe headache). One spray into each nostril, maybe repeated after 1 hour if needed, NO more than 2 doses in 24-hour period or 3 doses in 7-day period. 8 mL 2     escitalopram (LEXAPRO) 10 MG tablet Take 1 tablet by mouth Daily. 90 tablet 3     fluticasone (FLONASE) 50 MCG/ACT nasal spray Use 2 sprays into each nostril as directed by provider Daily. 16 g 6     galcanezumab-gnlm (EMGALITY) 120 MG/ML auto-injector pen Inject 1 mL under the skin into the appropriate area as directed Every 30 (Thirty) Days. (Patient taking differently: Inject 1 mL under the skin into the appropriate area as directed Every 30 (Thirty) Days. Due on August 27, 2024) 1 mL 11     hydrOXYzine (ATARAX) 25 MG tablet Take 1 tablet by mouth Every 8 (Eight) Hours As Needed for Anxiety. 90 tablet 0     Hyoscyamine Sulfate SL (Levsin/SL) 0.125 MG sublingual tablet Place 1 tablet under the tongue Every 6 (Six) Hours As Needed (abdominal pain/cramping). 60 each 3     levocetirizine (XYZAL) 5 MG tablet Take 1 tablet by mouth Every Evening. 90 tablet 3     levothyroxine (SYNTHROID, LEVOTHROID) 100 MCG tablet Take 1 tablet by mouth Daily. 90 tablet 1     lisinopril-hydrochlorothiazide (PRINZIDE,ZESTORETIC) 10-12.5 MG per tablet Take 1 tablet by mouth Daily. 90 tablet 0     Multiple Vitamins-Minerals (MULTI ADULT GUMMIES PO)        ondansetron (ZOFRAN) 8 MG tablet Take 0.5 tablets by mouth Every 8 (Eight) Hours As Needed for Nausea or Vomiting. 90 tablet 0     pantoprazole (PROTONIX) 40 MG EC tablet Take 1 tablet by mouth 2 (Two) Times a Day. 180 tablet 3     promethazine (PHENERGAN) 25 MG tablet Take 1 tablet by mouth  Every 6 (Six) Hours As Needed for Nausea or Vomiting. 60 tablet 1     Semaglutide-Weight Management (Wegovy) 1.7 MG/0.75ML solution auto-injector Inject 0.75 mL under the skin into the appropriate area as directed 1 (One) Time Per Week. (Patient taking differently: Inject 0.75 mL under the skin into the appropriate area as directed 1 (One) Time Per Week. Every Thursday) 3 mL 0     Ubiquinol 100 MG capsule Take 100 mg by mouth Daily.       Ustekinumab (STELARA) 90 MG/ML solution prefilled syringe Injection Inject 90 mg under the skin into the appropriate area as directed Every 6 (Six) Weeks. (Patient taking differently: Inject 90 mg under the skin into the appropriate area as directed Every 8 (Eight) Weeks. Last taken July 27, 2024) 1 mL 8      Allergies:  No Known Allergies    Objective    Objective     Vital Signs  Temp:  [96.4 °F (35.8 °C)-98.6 °F (37 °C)] 96.4 °F (35.8 °C)  Heart Rate:  [] 78  Resp:  [14-18] 14  BP: (101-134)/(68-87) 119/74  SpO2:  [91 %-100 %] 94 %  on   ;   Device (Oxygen Therapy): room air  Body mass index is 38.2 kg/m².    Physical Exam  Vitals and nursing note reviewed.   Constitutional:       Appearance: Normal appearance.   HENT:      Head: Normocephalic and atraumatic.      Nose: Nose normal.      Mouth/Throat:      Mouth: Mucous membranes are moist.      Pharynx: Oropharynx is clear.   Eyes:      Extraocular Movements: Extraocular movements intact.      Conjunctiva/sclera: Conjunctivae normal.   Cardiovascular:      Rate and Rhythm: Normal rate and regular rhythm.      Pulses: Normal pulses.      Heart sounds: Normal heart sounds.   Pulmonary:      Effort: Pulmonary effort is normal.      Breath sounds: Normal breath sounds.   Abdominal:      General: Bowel sounds are normal. There is no distension.      Palpations: Abdomen is soft. There is no mass.      Tenderness: There is abdominal tenderness. There is no guarding or rebound.      Hernia: No hernia is present.    Musculoskeletal:         General: No swelling. Normal range of motion.      Cervical back: Normal range of motion and neck supple.   Skin:     General: Skin is warm and dry.   Neurological:      General: No focal deficit present.      Mental Status: She is alert and oriented to person, place, and time.   Psychiatric:         Mood and Affect: Mood normal.         Behavior: Behavior normal.         Results Review:  I reviewed the patient's new clinical results.  I reviewed the patient's new imaging results and agree with the interpretation.  I reviewed the patient's other test results and agree with the interpretation  I personally viewed and interpreted the patient's EKG/Telemetry data  Discussed with ED provider.    Lab Results (last 24 hours)       Procedure Component Value Units Date/Time    CBC & Differential [[id number]]  (Abnormal) Collected: 08/07/24 2028    Specimen: Blood Updated: 08/07/24 2040    Narrative:      The following orders were created for panel order CBC & Differential.  Procedure                               Abnormality         Status                     ---------                               -----------         ------                     CBC Auto Differential[[id number]]        Abnormal            Final result                 Please view results for these tests on the individual orders.    Lipase [[id number]]  (Normal) Collected: 08/07/24 2028    Specimen: Blood Updated: 08/07/24 2059     Lipase 33 U/L     hCG, Serum, Qualitative [[id number]]  (Normal) Collected: 08/07/24 2028    Specimen: Blood Updated: 08/07/24 2053     HCG Qualitative Negative    Comprehensive Metabolic Panel [[id number]]  (Abnormal) Collected: 08/07/24 2028    Specimen: Blood Updated: 08/07/24 2059     Glucose 142 mg/dL      BUN 7 mg/dL      Creatinine 0.86 mg/dL      Sodium 140 mmol/L      Potassium 3.0 mmol/L      Chloride 102 mmol/L      CO2 26.0 mmol/L      Calcium 9.4 mg/dL      Total Protein 7.1 g/dL      Albumin 3.9  g/dL      ALT (SGPT) 20 U/L      AST (SGOT) 16 U/L      Alkaline Phosphatase 100 U/L      Total Bilirubin 0.4 mg/dL      Globulin 3.2 gm/dL      A/G Ratio 1.2 g/dL      BUN/Creatinine Ratio 8.1     Anion Gap 12.0 mmol/L      eGFR 80.9 mL/min/1.73     Narrative:      GFR Normal >60  Chronic Kidney Disease <60  Kidney Failure <15      CBC Auto Differential [[id number]]  (Abnormal) Collected: 08/07/24 2028    Specimen: Blood Updated: 08/07/24 2040     WBC 20.24 10*3/mm3      RBC 5.25 10*6/mm3      Hemoglobin 15.3 g/dL      Hematocrit 45.8 %      MCV 87.2 fL      MCH 29.1 pg      MCHC 33.4 g/dL      RDW 13.0 %      RDW-SD 41.0 fl      MPV 10.4 fL      Platelets 275 10*3/mm3      Neutrophil % 84.8 %      Lymphocyte % 10.2 %      Monocyte % 2.8 %      Eosinophil % 1.5 %      Basophil % 0.3 %      Immature Grans % 0.4 %      Neutrophils, Absolute 17.16 10*3/mm3      Lymphocytes, Absolute 2.07 10*3/mm3      Monocytes, Absolute 0.56 10*3/mm3      Eosinophils, Absolute 0.30 10*3/mm3      Basophils, Absolute 0.06 10*3/mm3      Immature Grans, Absolute 0.09 10*3/mm3      nRBC 0.0 /100 WBC     Magnesium [[id number]]  (Normal) Collected: 08/07/24 2028    Specimen: Blood Updated: 08/07/24 2059     Magnesium 2.0 mg/dL     Urinalysis With Microscopic If Indicated (No Culture) - Urine, Clean Catch [[id number]]  (Abnormal) Collected: 08/07/24 2112    Specimen: Urine, Clean Catch Updated: 08/07/24 2225     Color, UA Dark Yellow     Appearance, UA Cloudy     pH, UA 7.0     Specific Gravity, UA 1.025     Glucose, UA Negative     Ketones, UA 15 mg/dL (1+)     Bilirubin, UA Negative     Blood, UA Negative     Protein,  mg/dL (2+)     Leuk Esterase, UA Small (1+)     Nitrite, UA Negative     Urobilinogen, UA 1.0 E.U./dL    Urinalysis, Microscopic Only - Urine, Clean Catch [[id number]]  (Abnormal) Collected: 08/07/24 2112    Specimen: Urine, Clean Catch Updated: 08/07/24 2255     RBC, UA 0-2 /HPF      WBC, UA 6-10 /HPF      Bacteria, UA 3+  /HPF      Squamous Epithelial Cells, UA 21-30 /HPF      Hyaline Casts, UA 3-6 /LPF      Methodology Manual Light Microscopy    Lactic Acid, Plasma [[id number]]  (Normal) Collected: 08/07/24 2151    Specimen: Blood Updated: 08/07/24 2215     Lactate 1.0 mmol/L     Blood Culture - Blood, Arm, Right [[id number]] Collected: 08/07/24 2221    Specimen: Blood from Arm, Right Updated: 08/07/24 2237    Blood Culture - Blood, Arm, Left [[id number]] Collected: 08/07/24 2221    Specimen: Blood from Arm, Left Updated: 08/07/24 2237            Imaging Results (Last 24 Hours)       Procedure Component Value Units Date/Time    CT Abdomen Pelvis With Contrast [[id number]] Collected: 08/07/24 2159     Updated: 08/07/24 2215    Narrative:      CT OF THE ABDOMEN AND PELVIS WITH CONTRAST     HISTORY: Abdominal pain. HISTORY of Crohn's disease.     COMPARISON: July 11, 2024     TECHNIQUE: Axial CT imaging was obtained through the abdomen and pelvis.  IV contrast was administered.     FINDINGS:  Images through the lung bases demonstrate a benign centrally calcified  nodule within the left lower lobe. The gallbladder is absent. There is a  small hiatal hernia. It is distended with fluid, as is the stomach. This  was actually also present on prior study. The spleen, adrenal glands,  and pancreas are normal. Duodenum appears unremarkable. No  hydronephrosis is seen on either side. The kidneys enhance  symmetrically. There are mild aortoiliac calcifications. No distal  ureteral or bladder stones are seen. Uterus appears normal, as are the  ovaries. There is colonic diverticulosis. The appendix is normal. The  patient has thick walled and edematous segments of small bowel which are  noted within the abdomen, with adjacent mesenteric edema and trace  fluid. Appearance is concerning for active Crohn's disease. Distal to  the inflamed segments, there is also a mildly dilated loop of small  bowel, which is located proximal to an area of narrowing  within the  small bowel, which may reflect a stricture. This is best seen on coronal  series 3 image 59. The distal ileum is decompressed. There is fatty  infiltration of the wall of the distal small bowel, in keeping with  chronic inflammatory bowel disease. The appendix is normal. There is a  small amount of free fluid which is noted within the abdomen and pelvis,  likely reactive. No acute osseous abnormalities are seen. There is  stable sclerosis of the SI joints bilaterally.       Impression:         1. Thick-walled and edematous loops of small bowel which are noted  within the left side of the abdomen, concerning for active Crohn's  disease.  2. The patient's small hiatal hernia is mildly distended with fluid, as  is the stomach. The patient had similar findings on the exam from July 11, 2024. Some degree of gastritis, and associated ileus is not  excluded.  3. There are some distended loops of small bowel, which don't appear  particularly thick walled, which are noted proximal to a focally  narrowed segment of small bowel. Stricture is not excluded.        Radiation dose reduction techniques were utilized, including automated  exposure control and exposure modulation based on body size.        This report was finalized on 8/7/2024 10:12 PM by Dr. Sparkle Singh M.D on Workstation: [id number]                   No orders to display        Assessment/Plan     Active Hospital Problems    Diagnosis  POA    **Exacerbation of Crohn's disease [K50.90]  Yes    Hypokalemia [E87.6]  Unknown    Hypothyroidism [E03.9]  Yes    GERD (gastroesophageal reflux disease) [K21.9]  Yes    Essential hypertension [I10]  Yes     Exacerbation of Crohn's Disease  -Admit for monitoring  -GI consult  -She has leukocytosis but has been afebrile and lactate is ok  -Continue Zosyn   -Blood cultures are pending  -Will defer any further steroid dosing to GI group  -PRN analgesia and antiemetics  -Keep NPO for now  -UA shows 3+ bacteria  and 1+ leukocytes, but the sample appears contaminated. She denies urinary symptoms. Zosyn should cover for UTI    Hypokalemia  -Replace potassium per protocol  -Repeat labs in AM    Hypertension  -Her blood pressures have been stable. Continue lisinopril-HCTZ  -Monitor    Hypothyroidism  -Continue Levothyroxine    GERD  -Continue home dose of Protonix    Obesity  -She is on Wegovy outpatient    -I discussed the patients findings and my recommendations with patient.    VTE Prophylaxis - SCDs.  Code Status - Full code.       NADEGE Ngo  [facility]  08/08/24  05:23 EDT

## 2024-08-08 NOTE — ED PROVIDER NOTES
EMERGENCY DEPARTMENT ENCOUNTER    Room Number:  33/33  Date of encounter:  8/7/2024  PCP: Kathie Romeo DO  Historian: Patient, family  Chronic or social conditions impacting care (social determinants of health): None    HPI:  Chief Complaint: Abdominal pain  A complete HPI/ROS/PMH/PSH/SH/FH are unobtainable due to: Nothing    Context: Rosa Melgoza is a 53 y.o. female with a history of Crohn's disease, migraines hypertension, who presents to the ED c/o acute onset of abdominal pain several hours ago.  Patient states she had felt pretty well until approximately 2 hours ago when she began to have severe epigastric abdominal pain.  The pain seems to have migrated to her umbilical area at this point.  She has had nausea and vomiting.  She typically has chronic diarrhea however no bowel movements today.  Denies any fevers, chills.  She does have a history of Crohn's and follows with Dr Garza.  She has been compliant with her medications.    Review of prior external notes (non-ED):   Reviewed GI office visit from 1/30/2024.  Patient being treated for Crohn's disease.    Review of prior external test results outside of this encounter:  I reviewed a negative CT enterography of the abdomen from 7/11/2024.    PAST MEDICAL HISTORY  Active Ambulatory Problems     Diagnosis Date Noted    Hypothyroidism 01/13/2020    GERD (gastroesophageal reflux disease) 01/13/2020    Headache, migraine 01/13/2020    Adjustment disorder with anxious mood 01/13/2020    Encounter for long-term (current) use of medications 01/13/2020    Essential hypertension 01/13/2020    Epigastric pain 09/09/2020    Crohn's disease with complication 06/22/2023     Resolved Ambulatory Problems     Diagnosis Date Noted    No Resolved Ambulatory Problems     Past Medical History:   Diagnosis Date    Allergic     Anxiety     Cholelithiasis     Crohn disease     Depression     Disease of thyroid gland     Diverticulitis of colon     Diverticulosis      Elevated blood pressure reading without diagnosis of hypertension     Fatty liver     Hernia     History of medical problems     History of sore throat     Hyperlipidemia     Hypertension     Migraine     Need for DTaP and Hib vaccine     Obesity     Urinary tract infection     Vaginal yeast infection          PAST SURGICAL HISTORY  Past Surgical History:   Procedure Laterality Date    BREAST BIOPSY       SECTION      CHOLECYSTECTOMY      COLONOSCOPY  approx     Tavon HOBBS.DAna  benign polyps per patient    COLONOSCOPY N/A 2020    Procedure: COLONOSCOPY  into cecum and TI with biopsies;  Surgeon: Maicol Garza MD;  Location: [facility] ENDOSCOPY;  Service: Gastroenterology;  Laterality: N/A;  Pre: abn CT scan  post: diverticulosis    ENDOSCOPY N/A 2020    Procedure: ESOPHAGOGASTRODUODENOSCOPY WITH BIOPSY;  Surgeon: Maicol Garza MD;  Location: [facility] ENDOSCOPY;  Service: Gastroenterology;  Laterality: N/A;  Pre: abn CT scan  post: hiatal hernia, esophagitis    FRACTURE SURGERY      broken hand, 3 screws in place    TONSILLECTOMY      UPPER GASTROINTESTINAL ENDOSCOPY  approx     Tavon HOBBS.DAna  normal per patient         FAMILY HISTORY  Family History   Problem Relation Age of Onset    Breast cancer Mother     Arthritis Mother     Cancer Mother         breast ca    Miscarriages / Stillbirths Mother     Hypertension Father     Heart disease Father     Alcohol abuse Father     Hyperlipidemia Father     Breast cancer Maternal Grandmother     Anxiety disorder Maternal Grandmother     Arthritis Maternal Grandmother     Cancer Maternal Grandmother         breast and brain    Depression Maternal Grandmother     Diabetes Maternal Grandmother     Heart disease Maternal Grandmother     Kidney disease Maternal Grandmother     Thyroid disease Maternal Grandmother     Mental illness Maternal Grandmother     Alcohol abuse Other     Depression Other     Anxiety disorder Other      Hypertension Other     Other Other         Pure hypercholesterolemia and esophageal reflux    Lung cancer Other     Inflammatory bowel disease Maternal Grandfather     Irritable bowel syndrome Maternal Grandfather         Honestly not sure which he had    Arthritis Maternal Grandfather     Cancer Maternal Grandfather         lung    Stroke Maternal Grandfather          SOCIAL HISTORY  Social History     Socioeconomic History    Marital status:    Tobacco Use    Smoking status: Former     Current packs/day: 0.00     Average packs/day: 1 pack/day for 10.0 years (10.0 ttl pk-yrs)     Types: Cigarettes     Start date: 3/1/2001     Quit date: 3/1/2011     Years since quittin.4    Smokeless tobacco: Never    Tobacco comments:     smoked off and on   Vaping Use    Vaping status: Never Used   Substance and Sexual Activity    Alcohol use: Not Currently     Comment: once a year or so I have a drink w dinner    Drug use: Never    Sexual activity: Yes     Partners: Male     Birth control/protection: Surgical     Comment:  had vasectomy         ALLERGIES  Patient has no known allergies.        REVIEW OF SYSTEMS  All systems reviewed and negative except for those discussed in HPI.       PHYSICAL EXAM    I have reviewed the triage vital signs and nursing notes.    ED Triage Vitals   Temp Heart Rate Resp BP SpO2   24   97.9 °F (36.6 °C) (!) 124 18 128/79 100 %      Temp src Heart Rate Source Patient Position BP Location FiO2 (%)   24 -- -- --   Tympanic Monitor          Physical Exam  GENERAL: Alert, oriented, not distressed  HENT: head atraumatic, no nuchal rigidity  EYES: no scleral icterus, EOMI  CV: regular rhythm, tachycardic rate, no murmur  RESPIRATORY: normal effort, CTA  ABDOMEN: soft, moderate diffuse generalized abdominal tenderness without guarding or rebound  MUSCULOSKELETAL: no deformity, FROM, no calf  swelling or tenderness  NEURO: alert, moves all extremities, follows commands  SKIN: warm, dry        LAB RESULTS  Recent Results (from the past 24 hour(s))   Lipase    Collection Time: 08/07/24  8:28 PM    Specimen: Blood   Result Value Ref Range    Lipase 33 13 - 60 U/L   hCG, Serum, Qualitative    Collection Time: 08/07/24  8:28 PM    Specimen: Blood   Result Value Ref Range    HCG Qualitative Negative Negative   Comprehensive Metabolic Panel    Collection Time: 08/07/24  8:28 PM    Specimen: Blood   Result Value Ref Range    Glucose 142 (H) 65 - 99 mg/dL    BUN 7 6 - 20 mg/dL    Creatinine 0.86 0.57 - 1.00 mg/dL    Sodium 140 136 - 145 mmol/L    Potassium 3.0 (L) 3.5 - 5.2 mmol/L    Chloride 102 98 - 107 mmol/L    CO2 26.0 22.0 - 29.0 mmol/L    Calcium 9.4 8.6 - 10.5 mg/dL    Total Protein 7.1 6.0 - 8.5 g/dL    Albumin 3.9 3.5 - 5.2 g/dL    ALT (SGPT) 20 1 - 33 U/L    AST (SGOT) 16 1 - 32 U/L    Alkaline Phosphatase 100 39 - 117 U/L    Total Bilirubin 0.4 0.0 - 1.2 mg/dL    Globulin 3.2 gm/dL    A/G Ratio 1.2 g/dL    BUN/Creatinine Ratio 8.1 7.0 - 25.0    Anion Gap 12.0 5.0 - 15.0 mmol/L    eGFR 80.9 >60.0 mL/min/1.73   CBC Auto Differential    Collection Time: 08/07/24  8:28 PM    Specimen: Blood   Result Value Ref Range    WBC 20.24 (H) 3.40 - 10.80 10*3/mm3    RBC 5.25 3.77 - 5.28 10*6/mm3    Hemoglobin 15.3 12.0 - 15.9 g/dL    Hematocrit 45.8 34.0 - 46.6 %    MCV 87.2 79.0 - 97.0 fL    MCH 29.1 26.6 - 33.0 pg    MCHC 33.4 31.5 - 35.7 g/dL    RDW 13.0 12.3 - 15.4 %    RDW-SD 41.0 37.0 - 54.0 fl    MPV 10.4 6.0 - 12.0 fL    Platelets 275 140 - 450 10*3/mm3    Neutrophil % 84.8 (H) 42.7 - 76.0 %    Lymphocyte % 10.2 (L) 19.6 - 45.3 %    Monocyte % 2.8 (L) 5.0 - 12.0 %    Eosinophil % 1.5 0.3 - 6.2 %    Basophil % 0.3 0.0 - 1.5 %    Immature Grans % 0.4 0.0 - 0.5 %    Neutrophils, Absolute 17.16 (H) 1.70 - 7.00 10*3/mm3    Lymphocytes, Absolute 2.07 0.70 - 3.10 10*3/mm3    Monocytes, Absolute 0.56 0.10 - 0.90  10*3/mm3    Eosinophils, Absolute 0.30 0.00 - 0.40 10*3/mm3    Basophils, Absolute 0.06 0.00 - 0.20 10*3/mm3    Immature Grans, Absolute 0.09 (H) 0.00 - 0.05 10*3/mm3    nRBC 0.0 0.0 - 0.2 /100 WBC   Magnesium    Collection Time: 08/07/24  8:28 PM    Specimen: Blood   Result Value Ref Range    Magnesium 2.0 1.6 - 2.6 mg/dL   Urinalysis With Microscopic If Indicated (No Culture) - Urine, Clean Catch    Collection Time: 08/07/24  9:12 PM    Specimen: Urine, Clean Catch   Result Value Ref Range    Color, UA Dark Yellow (A) Yellow, Straw    Appearance, UA Cloudy (A) Clear    pH, UA 7.0 5.0 - 8.0    Specific Gravity, UA 1.025 1.005 - 1.030    Glucose, UA Negative Negative    Ketones, UA 15 mg/dL (1+) (A) Negative    Bilirubin, UA Negative Negative    Blood, UA Negative Negative    Protein,  mg/dL (2+) (A) Negative    Leuk Esterase, UA Small (1+) (A) Negative    Nitrite, UA Negative Negative    Urobilinogen, UA 1.0 E.U./dL 0.2 - 1.0 E.U./dL   Urinalysis, Microscopic Only - Urine, Clean Catch    Collection Time: 08/07/24  9:12 PM    Specimen: Urine, Clean Catch   Result Value Ref Range    RBC, UA 0-2 None Seen, 0-2 /HPF    WBC, UA 6-10 (A) None Seen, 0-2 /HPF    Bacteria, UA 3+ (A) None Seen /HPF    Squamous Epithelial Cells, UA 21-30 (A) None Seen, 0-2 /HPF    Hyaline Casts, UA 3-6 None Seen /LPF    Methodology Manual Light Microscopy    Lactic Acid, Plasma    Collection Time: 08/07/24  9:51 PM    Specimen: Blood   Result Value Ref Range    Lactate 1.0 0.5 - 2.0 mmol/L       Ordered the above labs and independently reviewed the results.        RADIOLOGY  CT Abdomen Pelvis With Contrast    Result Date: 8/7/2024  CT OF THE ABDOMEN AND PELVIS WITH CONTRAST  HISTORY: Abdominal pain. HISTORY of Crohn's disease.  COMPARISON: July 11, 2024  TECHNIQUE: Axial CT imaging was obtained through the abdomen and pelvis. IV contrast was administered.  FINDINGS: Images through the lung bases demonstrate a benign centrally calcified  nodule within the left lower lobe. The gallbladder is absent. There is a small hiatal hernia. It is distended with fluid, as is the stomach. This was actually also present on prior study. The spleen, adrenal glands, and pancreas are normal. Duodenum appears unremarkable. No hydronephrosis is seen on either side. The kidneys enhance symmetrically. There are mild aortoiliac calcifications. No distal ureteral or bladder stones are seen. Uterus appears normal, as are the ovaries. There is colonic diverticulosis. The appendix is normal. The patient has thick walled and edematous segments of small bowel which are noted within the abdomen, with adjacent mesenteric edema and trace fluid. Appearance is concerning for active Crohn's disease. Distal to the inflamed segments, there is also a mildly dilated loop of small bowel, which is located proximal to an area of narrowing within the small bowel, which may reflect a stricture. This is best seen on coronal series 3 image 59. The distal ileum is decompressed. There is fatty infiltration of the wall of the distal small bowel, in keeping with chronic inflammatory bowel disease. The appendix is normal. There is a small amount of free fluid which is noted within the abdomen and pelvis, likely reactive. No acute osseous abnormalities are seen. There is stable sclerosis of the SI joints bilaterally.       1. Thick-walled and edematous loops of small bowel which are noted within the left side of the abdomen, concerning for active Crohn's disease. 2. The patient's small hiatal hernia is mildly distended with fluid, as is the stomach. The patient had similar findings on the exam from July 11, 2024. Some degree of gastritis, and associated ileus is not excluded. 3. There are some distended loops of small bowel, which don't appear particularly thick walled, which are noted proximal to a focally narrowed segment of small bowel. Stricture is not excluded.   Radiation dose reduction  techniques were utilized, including automated exposure control and exposure modulation based on body size.   This report was finalized on 8/7/2024 10:12 PM by Dr. Sparkle Singh M.D on Workstation: [id number]       I ordered the above noted radiological studies. Reviewed by me and discussed with radiologist.  See dictation for official radiology interpretation.      MEDICATIONS GIVEN IN ER    Medications   morphine injection 4 mg (4 mg Intravenous Given 8/7/24 2033)   ondansetron (ZOFRAN) injection 4 mg (4 mg Intravenous Given 8/7/24 2032)   sodium chloride 0.9 % bolus 1,000 mL (0 mL Intravenous Stopped 8/7/24 2235)   iopamidol (ISOVUE-300) 61 % injection 100 mL (85 mL Intravenous Given by Other 8/7/24 2135)   potassium chloride (K-DUR,KLOR-CON) ER tablet 40 mEq (40 mEq Oral Given 8/7/24 2152)   piperacillin-tazobactam (ZOSYN) 3.375 g IVPB in 100 mL NS MBP (CD) (3.375 g Intravenous New Bag 8/7/24 2230)   methylPREDNISolone sodium succinate (SOLU-Medrol) injection 40 mg (40 mg Intravenous Given 8/7/24 2302)         ADDITIONAL ORDERS CONSIDERED BUT NOT ORDERED:  None      PROGRESS, DATA ANALYSIS, CONSULTS, AND MEDICAL DECISION MAKING    All labs have been independently interpreted by myself.  All radiology studies have been independently interpreted by myself and discussed with radiologist dictating the report.   EKG's independently interpreted by myself.  Discussion below represents my analysis of pertinent findings related to patient's condition, differential diagnosis, treatment plan and final disposition.    I have discussed case with Dr. Hathaway, emergency room physician.  He has performed his own bedside examination and agrees with treatment plan.    ED Course as of 08/07/24 2350   Wed Aug 07, 2024   2017 Patient presents with several hour history of diffuse abdominal pain, nausea, vomiting.  Patient has a history of Crohn's.  Plan for pain control, labs, CT imaging. [EE]   2050 WBC(!): 20.24 [EE]   2050  Hemoglobin: 15.3 [EE]   2136 Lipase: 33 [EE]   2136 Creatinine: 0.86 [EE]   2136 Potassium(!): 3.0 [EE]   2225 CT imaging of the abdomen independently interpreted myself shows acute inflammation of the small bowel consistent with Crohn's disease. [EE]   2305 I discussed the case with Dr. Saunders, as per neurology.  He would like me to give the patient a Medrol 40 mg.  He agrees with treatment plan and will consult. [EE]   2331 I discussed case with Manda Barth, nurse practitioner with [facility].  She agrees to admit to Dr. Mills. [EE]      ED Course User Index  [EE] Piter Malik PA       AS OF 23:50 EDT VITALS:    BP - 101/70  HR - 85  TEMP - 97.9 °F (36.6 °C) (Tympanic)  O2 SATS - 99%        DIAGNOSIS  Final diagnoses:   Exacerbation of Crohn's disease with complication   Hypokalemia         DISPOSITION  Admitted        Dictated utilizing Dragon dictation     Piter Malik PA  08/07/24 1777

## 2024-08-08 NOTE — PLAN OF CARE
Goal Outcome Evaluation:  Plan of Care Reviewed With: patient           Outcome Evaluation: VSS, on room air, IV abx as ordered, up ad vincenzo, SCDs while in bed, NPO maintained, morphine for pain x1 w/ relief, patient updated on plan of care. Awaiting general sugery consult.

## 2024-08-08 NOTE — ED PROVIDER NOTES
MD ATTESTATION NOTE    SHARED VISIT: This visit was performed by BOTH a physician and an APC. The substantive portion of the medical decision making was performed by this attesting physician who made or approved the management plan and takes responsibility for patient management. All studies documented in the APC note (if performed) were independently interpreted by me.    The [facility] and I have discussed this patient's history, physical exam, MDM, and treatment plan.  I have reviewed the documentation and personally had a face to face interaction with the patient. The attached note describes my personal findings.      Rosa Melgoza is a 53 y.o. female who presents to the ED c/o acute abdominal pain that began several hours ago today.  Pain is in epigastric region and radiates down the middle of her abdomen.  She has a history of Crohn's and this feels like her prior Crohn's flare.  No fever.  She has a history of prior cholecystectomy and .    On exam:  GENERAL: not distressed  HENT: nares patent  EYES: no scleral icterus  CV: regular rhythm, regular rate  RESPIRATORY: normal effort  ABDOMEN: soft, midline abdominal tenderness without rebound or guarding  MUSCULOSKELETAL: no deformity  NEURO: alert, moves all extremities, follows commands  SKIN: warm, dry    Labs  Recent Results (from the past 24 hour(s))   Lipase    Collection Time: 24  8:28 PM    Specimen: Blood   Result Value Ref Range    Lipase 33 13 - 60 U/L   hCG, Serum, Qualitative    Collection Time: 24  8:28 PM    Specimen: Blood   Result Value Ref Range    HCG Qualitative Negative Negative   Comprehensive Metabolic Panel    Collection Time: 24  8:28 PM    Specimen: Blood   Result Value Ref Range    Glucose 142 (H) 65 - 99 mg/dL    BUN 7 6 - 20 mg/dL    Creatinine 0.86 0.57 - 1.00 mg/dL    Sodium 140 136 - 145 mmol/L    Potassium 3.0 (L) 3.5 - 5.2 mmol/L    Chloride 102 98 - 107 mmol/L    CO2 26.0 22.0 - 29.0 mmol/L    Calcium 9.4  8.6 - 10.5 mg/dL    Total Protein 7.1 6.0 - 8.5 g/dL    Albumin 3.9 3.5 - 5.2 g/dL    ALT (SGPT) 20 1 - 33 U/L    AST (SGOT) 16 1 - 32 U/L    Alkaline Phosphatase 100 39 - 117 U/L    Total Bilirubin 0.4 0.0 - 1.2 mg/dL    Globulin 3.2 gm/dL    A/G Ratio 1.2 g/dL    BUN/Creatinine Ratio 8.1 7.0 - 25.0    Anion Gap 12.0 5.0 - 15.0 mmol/L    eGFR 80.9 >60.0 mL/min/1.73   CBC Auto Differential    Collection Time: 08/07/24  8:28 PM    Specimen: Blood   Result Value Ref Range    WBC 20.24 (H) 3.40 - 10.80 10*3/mm3    RBC 5.25 3.77 - 5.28 10*6/mm3    Hemoglobin 15.3 12.0 - 15.9 g/dL    Hematocrit 45.8 34.0 - 46.6 %    MCV 87.2 79.0 - 97.0 fL    MCH 29.1 26.6 - 33.0 pg    MCHC 33.4 31.5 - 35.7 g/dL    RDW 13.0 12.3 - 15.4 %    RDW-SD 41.0 37.0 - 54.0 fl    MPV 10.4 6.0 - 12.0 fL    Platelets 275 140 - 450 10*3/mm3    Neutrophil % 84.8 (H) 42.7 - 76.0 %    Lymphocyte % 10.2 (L) 19.6 - 45.3 %    Monocyte % 2.8 (L) 5.0 - 12.0 %    Eosinophil % 1.5 0.3 - 6.2 %    Basophil % 0.3 0.0 - 1.5 %    Immature Grans % 0.4 0.0 - 0.5 %    Neutrophils, Absolute 17.16 (H) 1.70 - 7.00 10*3/mm3    Lymphocytes, Absolute 2.07 0.70 - 3.10 10*3/mm3    Monocytes, Absolute 0.56 0.10 - 0.90 10*3/mm3    Eosinophils, Absolute 0.30 0.00 - 0.40 10*3/mm3    Basophils, Absolute 0.06 0.00 - 0.20 10*3/mm3    Immature Grans, Absolute 0.09 (H) 0.00 - 0.05 10*3/mm3    nRBC 0.0 0.0 - 0.2 /100 WBC   Magnesium    Collection Time: 08/07/24  8:28 PM    Specimen: Blood   Result Value Ref Range    Magnesium 2.0 1.6 - 2.6 mg/dL       Radiology  No Radiology Exams Resulted Within Past 24 Hours    Medications given in the ED:  Medications   morphine injection 4 mg (4 mg Intravenous Given 8/7/24 2033)   ondansetron (ZOFRAN) injection 4 mg (4 mg Intravenous Given 8/7/24 2032)   sodium chloride 0.9 % bolus 1,000 mL (1,000 mL Intravenous New Bag 8/7/24 2029)       Orders placed during this visit:  Orders Placed This Encounter   Procedures    CT Abdomen Pelvis With Contrast     Lipase    hCG, Serum, Qualitative    Urinalysis With Microscopic If Indicated (No Culture) - Urine, Clean Catch    Comprehensive Metabolic Panel    CBC Auto Differential    Magnesium    CBC & Differential       Medical Decision Making:  ED Course as of 08/07/24 2344   Wed Aug 07, 2024   2017 Patient presents with several hour history of diffuse abdominal pain, nausea, vomiting.  Patient has a history of Crohn's.  Plan for pain control, labs, CT imaging. [EE]   2050 WBC(!): 20.24 [EE]   2050 Hemoglobin: 15.3 [EE]   2136 Lipase: 33 [EE]   2136 Creatinine: 0.86 [EE]   2136 Potassium(!): 3.0 [EE]   2225 CT imaging of the abdomen independently interpreted myself shows acute inflammation of the small bowel consistent with Crohn's disease. [EE]   2305 I discussed the case with Dr. Saunders, gastroenterology.  He would like me to give the patient a Medrol 40 mg.  He agrees with treatment plan and will consult. [EE]   2331 I discussed case with Manda Barth, nurse practitioner with [facility]  She agrees to admit to Dr. Mills. [EE]      ED Course User Index  [EE] Piter Malik, PA           Diagnosis  Final diagnoses:   Exacerbation of Crohn's disease with complication   Hypokalemia          Sam Hathaway II, MD  08/07/24 1496

## 2024-08-08 NOTE — PLAN OF CARE
Goal Outcome Evaluation:  Plan of Care Reviewed With: patient           Outcome Evaluation: New admission from ED with generalized abdominal pain with intermittent nausea. Alert and oriented. On IV Zosyn. IV Fluids on flow. On regular diet, taken and tolerated.  For Gastroenterology consult today. Rested well. Spouse at bedside.

## 2024-08-08 NOTE — ED NOTES
"Nursing report ED to floor  Rosa Melgoza  53 y.o.  female    HPI :  HPI (Adult)  Stated Reason for Visit: Pt presents to ED with generalized abdominal pain and vomiting Xs 2 hours. Hx of crohns disease and cholesytectomy. Pt states she had some diarrhea yesterday as well.  History Obtained From: patient  Precipitating Event(s): unknown  Duration (Hours): 2    Chief Complaint  Chief Complaint   Patient presents with    Abdominal Pain       Admitting doctor:   Marlon Mills MD    Admitting diagnosis:   The primary encounter diagnosis was Exacerbation of Crohn's disease with complication. A diagnosis of Hypokalemia was also pertinent to this visit.    Code status:   Current Code Status       Date Active Code Status Order ID Comments User Context       Not on file            Allergies:   Patient has no known allergies.    Isolation:   No active isolations    Intake and Output    Intake/Output Summary (Last 24 hours) at 8/7/2024 2355  Last data filed at 8/7/2024 2235  Gross per 24 hour   Intake 1000 ml   Output --   Net 1000 ml       Weight:       08/07/24 2010   Weight: 99.3 kg (219 lb)       Most recent vitals:   Vitals:    08/07/24 2010 08/07/24 2016 08/07/24 2021 08/07/24 2131   BP:  128/79 134/87 101/70   BP Location:    Left arm   Patient Position:    Lying   Pulse: (!) 124  100 85   Resp: 18   16   Temp: 97.9 °F (36.6 °C)      TempSrc: Tympanic      SpO2: 100%  99%    Weight: 99.3 kg (219 lb)      Height: 162.6 cm (64\")          Active LDAs/IV Access:   Lines, Drains & Airways       Active LDAs       Name Placement date Placement time Site Days    Peripheral IV 08/07/24 2027 Right Antecubital 08/07/24 2027  Antecubital  less than 1                    Labs (abnormal labs have a star):   Labs Reviewed   URINALYSIS W/ MICROSCOPIC IF INDICATED (NO CULTURE) - Abnormal; Notable for the following components:       Result Value    Color, UA Dark Yellow (*)     Appearance, UA Cloudy (*)     Ketones, UA 15 mg/dL " (1+) (*)     Protein,  mg/dL (2+) (*)     Leuk Esterase, UA Small (1+) (*)     All other components within normal limits   COMPREHENSIVE METABOLIC PANEL - Abnormal; Notable for the following components:    Glucose 142 (*)     Potassium 3.0 (*)     All other components within normal limits    Narrative:     GFR Normal >60  Chronic Kidney Disease <60  Kidney Failure <15     CBC WITH AUTO DIFFERENTIAL - Abnormal; Notable for the following components:    WBC 20.24 (*)     Neutrophil % 84.8 (*)     Lymphocyte % 10.2 (*)     Monocyte % 2.8 (*)     Neutrophils, Absolute 17.16 (*)     Immature Grans, Absolute 0.09 (*)     All other components within normal limits   URINALYSIS, MICROSCOPIC ONLY - Abnormal; Notable for the following components:    WBC, UA 6-10 (*)     Bacteria, UA 3+ (*)     Squamous Epithelial Cells, UA 21-30 (*)     All other components within normal limits   LIPASE - Normal   HCG, SERUM, QUALITATIVE - Normal   MAGNESIUM - Normal   LACTIC ACID, PLASMA - Normal   BLOOD CULTURE   BLOOD CULTURE   CBC AND DIFFERENTIAL    Narrative:     The following orders were created for panel order CBC & Differential.  Procedure                               Abnormality         Status                     ---------                               -----------         ------                     CBC Auto Differential[[id number]]        Abnormal            Final result                 Please view results for these tests on the individual orders.       EKG:   No orders to display       Meds given in ED:   Medications   morphine injection 4 mg (4 mg Intravenous Given 8/7/24 2033)   ondansetron (ZOFRAN) injection 4 mg (4 mg Intravenous Given 8/7/24 2032)   sodium chloride 0.9 % bolus 1,000 mL (0 mL Intravenous Stopped 8/7/24 2235)   iopamidol (ISOVUE-300) 61 % injection 100 mL (85 mL Intravenous Given by Other 8/7/24 2135)   potassium chloride (K-DUR,KLOR-CON) ER tablet 40 mEq (40 mEq Oral Given 8/7/24 2152)    piperacillin-tazobactam (ZOSYN) 3.375 g IVPB in 100 mL NS MBP (CD) (3.375 g Intravenous New Bag 24)   methylPREDNISolone sodium succinate (SOLU-Medrol) injection 40 mg (40 mg Intravenous Given 242)       Imaging results:  CT Abdomen Pelvis With Contrast    Result Date: 2024   1. Thick-walled and edematous loops of small bowel which are noted within the left side of the abdomen, concerning for active Crohn's disease. 2. The patient's small hiatal hernia is mildly distended with fluid, as is the stomach. The patient had similar findings on the exam from 2024. Some degree of gastritis, and associated ileus is not excluded. 3. There are some distended loops of small bowel, which don't appear particularly thick walled, which are noted proximal to a focally narrowed segment of small bowel. Stricture is not excluded.   Radiation dose reduction techniques were utilized, including automated exposure control and exposure modulation based on body size.   This report was finalized on 2024 10:12 PM by Dr. Sparkle Singh M.D on Workstation: [id number]       Ambulatory status:   - bedrest    Social issues:   Social History     Socioeconomic History    Marital status:    Tobacco Use    Smoking status: Former     Current packs/day: 0.00     Average packs/day: 1 pack/day for 10.0 years (10.0 ttl pk-yrs)     Types: Cigarettes     Start date: 3/1/2001     Quit date: 3/1/2011     Years since quittin.4    Smokeless tobacco: Never    Tobacco comments:     smoked off and on   Vaping Use    Vaping status: Never Used   Substance and Sexual Activity    Alcohol use: Not Currently     Comment: once a year or so I have a drink w dinner    Drug use: Never    Sexual activity: Yes     Partners: Male     Birth control/protection: Surgical     Comment:  had vasectomy       Peripheral Neurovascular  Peripheral Neurovascular (Adult)  Peripheral Neurovascular WDL: WDL    Neuro Cognitive  Neuro  Cognitive (Adult)  Cognitive/Neuro/Behavioral WDL: WDL    Learning  Learning Assessment (Adult)  Learning Readiness and Ability: no barriers identified    Respiratory  Respiratory (Adult)  Airway WDL: WDL  Respiratory WDL  Respiratory WDL: WDL    Abdominal Pain       Pain Assessments  Pain (Adult)  (0-10) Pain Rating: Rest: 8  Patient requested Medication Prescribed for Lower Pain Scale: No  Pain Location: abdomen  Pain Side/Orientation: generalized  Response to Pain Interventions: interventions effective per patient    NIH Stroke Scale       Ray Martinez RN  08/07/24 23:55 EDT

## 2024-08-08 NOTE — PROGRESS NOTES
[facility] Clinical Pharmacy Services: Piperacillin-Tazobactam Consult    Pt Name: Rosa Melgoza   : 1970    Indication: Intra-Abdominal Infection    Relevant clinical data and objective history reviewed:    Past Medical History:   Diagnosis Date    Allergic     Anxiety     Cholelithiasis     Crohn disease     Depression     Disease of thyroid gland     Diverticulitis of colon     Diverticulosis     Elevated blood pressure reading without diagnosis of hypertension     Encounter for long-term (current) use of medications 2020    Fatty liver     GERD (gastroesophageal reflux disease)     Headache, migraine     Hernia     History of medical problems     not sure of date, but very painful joints and muscles.  Poss r/t crohns    History of sore throat     Hyperlipidemia     Hypertension     Hypothyroidism     Migraine     Need for DTaP and Hib vaccine     History of     Obesity     Urinary tract infection     Vaginal yeast infection      Creatinine   Date Value Ref Range Status   2024 0.86 0.57 - 1.00 mg/dL Final   2024 0.90 0.60 - 1.30 mg/dL Final     Comment:     Serial Number: [id number]:  [id number]   2024 0.79 0.57 - 1.00 mg/dL Final   2023 1.00 0.60 - 1.30 mg/dL Final     Comment:     Serial Number: [id number]:  [id number]   2023 0.81 0.57 - 1.00 mg/dL Final   2023 0.97 0.57 - 1.00 mg/dL Final   2023 0.92 0.57 - 1.00 mg/dL Final   2022 1.00 0.57 - 1.00 mg/dL Final   2021 1.00 0.60 - 1.30 mg/dL Final     Comment:     Serial Number: [id number]:  [id number]     BUN   Date Value Ref Range Status   2024 7 6 - 20 mg/dL Final     Estimated Creatinine Clearance: 87.4 mL/min (by C-G formula based on SCr of 0.86 mg/dL).    Lab Results   Component Value Date    WBC 20.24 (H) 2024     Temp Readings from Last 3 Encounters:   24 98.6 °F (37 °C) (Oral)   24 98.5 °F (36.9 °C) (Oral)   02/19/24 98.2 °F (36.8 °C)       Assessment/Plan  Estimated CrCl >20 mL/min at this time; BMI 38.2 kg/m2  Will start piperacillin-tazobactam 3.375 g IV every 8 hours     Pharmacy will continue to follow daily while on piperacillin-tazobactam and adjust as needed. Thank you for this consult.    Shaq Dawn [facility]  Clinical Pharmacist er care.

## 2024-08-09 LAB
ANION GAP SERPL CALCULATED.3IONS-SCNC: 9 MMOL/L (ref 5–15)
BASOPHILS # BLD AUTO: 0.02 10*3/MM3 (ref 0–0.2)
BASOPHILS NFR BLD AUTO: 0.1 % (ref 0–1.5)
BUN SERPL-MCNC: 8 MG/DL (ref 6–20)
BUN/CREAT SERPL: 9 (ref 7–25)
CALCIUM SPEC-SCNC: 8.3 MG/DL (ref 8.6–10.5)
CHLORIDE SERPL-SCNC: 108 MMOL/L (ref 98–107)
CO2 SERPL-SCNC: 24 MMOL/L (ref 22–29)
CREAT SERPL-MCNC: 0.89 MG/DL (ref 0.57–1)
DEPRECATED RDW RBC AUTO: 41.2 FL (ref 37–54)
EGFRCR SERPLBLD CKD-EPI 2021: 77.6 ML/MIN/1.73
EOSINOPHIL # BLD AUTO: 0 10*3/MM3 (ref 0–0.4)
EOSINOPHIL NFR BLD AUTO: 0 % (ref 0.3–6.2)
ERYTHROCYTE [DISTWIDTH] IN BLOOD BY AUTOMATED COUNT: 12.9 % (ref 12.3–15.4)
GLUCOSE SERPL-MCNC: 122 MG/DL (ref 65–99)
HCT VFR BLD AUTO: 35 % (ref 34–46.6)
HGB BLD-MCNC: 11.5 G/DL (ref 12–15.9)
IMM GRANULOCYTES # BLD AUTO: 0.11 10*3/MM3 (ref 0–0.05)
IMM GRANULOCYTES NFR BLD AUTO: 0.8 % (ref 0–0.5)
LYMPHOCYTES # BLD AUTO: 1.09 10*3/MM3 (ref 0.7–3.1)
LYMPHOCYTES NFR BLD AUTO: 7.9 % (ref 19.6–45.3)
MCH RBC QN AUTO: 28.8 PG (ref 26.6–33)
MCHC RBC AUTO-ENTMCNC: 32.9 G/DL (ref 31.5–35.7)
MCV RBC AUTO: 87.7 FL (ref 79–97)
MONOCYTES # BLD AUTO: 0.21 10*3/MM3 (ref 0.1–0.9)
MONOCYTES NFR BLD AUTO: 1.5 % (ref 5–12)
NEUTROPHILS NFR BLD AUTO: 12.43 10*3/MM3 (ref 1.7–7)
NEUTROPHILS NFR BLD AUTO: 89.7 % (ref 42.7–76)
NRBC BLD AUTO-RTO: 0 /100 WBC (ref 0–0.2)
PLATELET # BLD AUTO: 244 10*3/MM3 (ref 140–450)
PMV BLD AUTO: 10.9 FL (ref 6–12)
POTASSIUM SERPL-SCNC: 3.4 MMOL/L (ref 3.5–5.2)
RBC # BLD AUTO: 3.99 10*6/MM3 (ref 3.77–5.28)
SODIUM SERPL-SCNC: 141 MMOL/L (ref 136–145)
WBC NRBC COR # BLD AUTO: 13.86 10*3/MM3 (ref 3.4–10.8)

## 2024-08-09 PROCEDURE — 25010000002 PIPERACILLIN SOD-TAZOBACTAM PER 1 G: Performed by: NURSE PRACTITIONER

## 2024-08-09 PROCEDURE — 99232 SBSQ HOSP IP/OBS MODERATE 35: CPT

## 2024-08-09 PROCEDURE — 99232 SBSQ HOSP IP/OBS MODERATE 35: CPT | Performed by: SURGERY

## 2024-08-09 PROCEDURE — 80048 BASIC METABOLIC PNL TOTAL CA: CPT | Performed by: INTERNAL MEDICINE

## 2024-08-09 PROCEDURE — 25010000002 METHYLPREDNISOLONE PER 40 MG

## 2024-08-09 PROCEDURE — 25810000003 SODIUM CHLORIDE 0.9 % SOLUTION: Performed by: NURSE PRACTITIONER

## 2024-08-09 PROCEDURE — 25010000002 ENOXAPARIN PER 10 MG: Performed by: STUDENT IN AN ORGANIZED HEALTH CARE EDUCATION/TRAINING PROGRAM

## 2024-08-09 PROCEDURE — 85025 COMPLETE CBC W/AUTO DIFF WBC: CPT | Performed by: INTERNAL MEDICINE

## 2024-08-09 RX ORDER — FLUCONAZOLE 150 MG/1
150 TABLET ORAL EVERY 24 HOURS
Status: COMPLETED | OUTPATIENT
Start: 2024-08-09 | End: 2024-08-09

## 2024-08-09 RX ORDER — HYDROCODONE BITARTRATE AND ACETAMINOPHEN 5; 325 MG/1; MG/1
1 TABLET ORAL EVERY 6 HOURS PRN
Status: DISCONTINUED | OUTPATIENT
Start: 2024-08-09 | End: 2024-08-12 | Stop reason: HOSPADM

## 2024-08-09 RX ORDER — BISACODYL 10 MG
10 SUPPOSITORY, RECTAL RECTAL DAILY PRN
Status: DISCONTINUED | OUTPATIENT
Start: 2024-08-09 | End: 2024-08-12 | Stop reason: HOSPADM

## 2024-08-09 RX ORDER — ENOXAPARIN SODIUM 100 MG/ML
40 INJECTION SUBCUTANEOUS EVERY 24 HOURS
Status: DISCONTINUED | OUTPATIENT
Start: 2024-08-09 | End: 2024-08-12 | Stop reason: HOSPADM

## 2024-08-09 RX ADMIN — HYDROCODONE BITARTRATE AND ACETAMINOPHEN 1 TABLET: 5; 325 TABLET ORAL at 16:18

## 2024-08-09 RX ADMIN — PIPERACILLIN AND TAZOBACTAM 3.38 G: 3; .375 INJECTION, POWDER, FOR SOLUTION INTRAVENOUS at 21:51

## 2024-08-09 RX ADMIN — METHYLPREDNISOLONE SODIUM SUCCINATE 20 MG: 40 INJECTION, POWDER, FOR SOLUTION INTRAMUSCULAR; INTRAVENOUS at 10:50

## 2024-08-09 RX ADMIN — CETIRIZINE HYDROCHLORIDE 10 MG: 10 TABLET ORAL at 08:57

## 2024-08-09 RX ADMIN — PIPERACILLIN AND TAZOBACTAM 3.38 G: 3; .375 INJECTION, POWDER, FOR SOLUTION INTRAVENOUS at 14:51

## 2024-08-09 RX ADMIN — Medication 10 ML: at 21:51

## 2024-08-09 RX ADMIN — HYDROCODONE BITARTRATE AND ACETAMINOPHEN 1 TABLET: 5; 325 TABLET ORAL at 23:08

## 2024-08-09 RX ADMIN — METHYLPREDNISOLONE SODIUM SUCCINATE 20 MG: 40 INJECTION, POWDER, FOR SOLUTION INTRAMUSCULAR; INTRAVENOUS at 18:50

## 2024-08-09 RX ADMIN — Medication 10 ML: at 08:57

## 2024-08-09 RX ADMIN — LEVOTHYROXINE SODIUM 100 MCG: 100 TABLET ORAL at 05:04

## 2024-08-09 RX ADMIN — PIPERACILLIN AND TAZOBACTAM 3.38 G: 3; .375 INJECTION, POWDER, FOR SOLUTION INTRAVENOUS at 05:04

## 2024-08-09 RX ADMIN — SODIUM CHLORIDE 100 ML/HR: 9 INJECTION, SOLUTION INTRAVENOUS at 08:56

## 2024-08-09 RX ADMIN — ATORVASTATIN CALCIUM 10 MG: 20 TABLET, FILM COATED ORAL at 08:55

## 2024-08-09 RX ADMIN — MICONAZOLE NITRATE 200 MG: 200 SUPPOSITORY VAGINAL at 00:03

## 2024-08-09 RX ADMIN — PANTOPRAZOLE SODIUM 40 MG: 40 INJECTION, POWDER, FOR SOLUTION INTRAVENOUS at 21:51

## 2024-08-09 RX ADMIN — PANTOPRAZOLE SODIUM 40 MG: 40 INJECTION, POWDER, FOR SOLUTION INTRAVENOUS at 08:55

## 2024-08-09 RX ADMIN — HYDROCHLOROTHIAZIDE 12.5 MG: 12.5 TABLET ORAL at 08:56

## 2024-08-09 RX ADMIN — LISINOPRIL 10 MG: 10 TABLET ORAL at 08:56

## 2024-08-09 RX ADMIN — ENOXAPARIN SODIUM 40 MG: 100 INJECTION SUBCUTANEOUS at 08:55

## 2024-08-09 RX ADMIN — METHYLPREDNISOLONE SODIUM SUCCINATE 20 MG: 40 INJECTION, POWDER, FOR SOLUTION INTRAMUSCULAR; INTRAVENOUS at 01:32

## 2024-08-09 RX ADMIN — ESCITALOPRAM OXALATE 10 MG: 10 TABLET, FILM COATED ORAL at 08:57

## 2024-08-09 RX ADMIN — FLUCONAZOLE 150 MG: 150 TABLET ORAL at 10:50

## 2024-08-09 NOTE — PROGRESS NOTES
Name: Rosa Melgoza ADMIT: 2024   : 1970  PCP: Kathie Romeo DO    MRN: [id number] LOS: 0 days   AGE/SEX: 53 y.o. female  ROOM: [facility]     Subjective   Subjective   Patient is abdominal pain nausea improved.  Patient has not had a bowel movement since Tuesday.  Usually takes bisacodyl oral as needed at home when she is constipated, which is pretty rare with her Crohn's disease.  Eager to try clear liquid diet.  Having some pressure when she urinates, no dysuria.  Feels like when she has been constipated before, feels like she has a vaginal yeast infection.  States she gets them every time she has antibiotics.    Review of Systems  As above     Objective   Objective   Vital Signs  Temp:  [97.2 °F (36.2 °C)-98.3 °F (36.8 °C)] 97.2 °F (36.2 °C)  Heart Rate:  [62-84] 79  Resp:  [16] 16  BP: (108-128)/(64-73) 120/66  SpO2:  [92 %-95 %] 93 %  on   ;   Device (Oxygen Therapy): room air  Body mass index is 38.2 kg/m².  Physical Exam  Constitutional:       General: She is not in acute distress.     Appearance: She is not ill-appearing.   Cardiovascular:      Rate and Rhythm: Normal rate and regular rhythm.   Pulmonary:      Effort: Pulmonary effort is normal. No respiratory distress.   Abdominal:      General: Abdomen is flat. There is distension.      Tenderness: There is abdominal tenderness.   Musculoskeletal:         General: No swelling or deformity. Normal range of motion.   Skin:     General: Skin is warm and dry.   Neurological:      General: No focal deficit present.      Mental Status: She is alert. Mental status is at baseline.         Results Review     I reviewed the patient's new clinical results.  Results from last 7 days   Lab Units 24  0524  0545 24   WBC 10*3/mm3 13.86* 12.32* 20.24*   HEMOGLOBIN g/dL 11.5* 12.9 15.3   PLATELETS 10*3/mm3 244 242 275     Results from last 7 days   Lab Units 24  0522 24  0545 24   SODIUM mmol/L 141 140  "140   POTASSIUM mmol/L 3.4* 3.5 3.0*   CHLORIDE mmol/L 108* 104 102   CO2 mmol/L 24.0 24.5 26.0   BUN mg/dL 8 7 7   CREATININE mg/dL 0.89 0.78 0.86   GLUCOSE mg/dL 122* 163* 142*   Estimated Creatinine Clearance: 84.5 mL/min (by C-G formula based on SCr of 0.89 mg/dL).  Results from last 7 days   Lab Units 08/07/24 2028   ALBUMIN g/dL 3.9   BILIRUBIN mg/dL 0.4   ALK PHOS U/L 100   AST (SGOT) U/L 16   ALT (SGPT) U/L 20     Results from last 7 days   Lab Units 08/09/24  0522 08/08/24  0545 08/07/24 2028   CALCIUM mg/dL 8.3* 8.6 9.4   ALBUMIN g/dL  --   --  3.9   MAGNESIUM mg/dL  --   --  2.0     Results from last 7 days   Lab Units 08/07/24  2151   LACTATE mmol/L 1.0     COVID19   Date Value Ref Range Status   03/22/2022 Not Detected Not Detected - Ref. Range Final     SARS-CoV-2 PCR   Date Value Ref Range Status   09/22/2020 Not Detected Not Detected Final     Comment:     Nucleic acid specific to SARS-CoV-2 (COVID-19) virus was not detected inthis sample by the TaqPath (TM) COVID-19 Combo Kit.SARS-CoV-2 (COVID-19) nucleic acid testing performed using [employer] Aptima (R) SARS-CoV-2 Assay or [employer] TaqPath   (TM)COVID-19 Combo Kit as indicated above under Emergency Use Authorization (EUA) from the FDA. Aptima (R) and TaqPath (TM) test performancecharacteristics verified by [facility] in accordance with the FDAs Guidance Document (Policy for Diagnostic   Tests for Coronavirus Disease-2019during the Public Health Emergency) issued on March 16, 2020. The laboratory is regulated under CLIA as qualified to perform high-complexity testingUnless otherwise noted, all testing was performed at [facility],   CLIA No. [id number], [place] State Licensee No. [id number]     No results found for: \"HGBA1C\", \"POCGLU\"    CT Abdomen Pelvis With Contrast  Narrative: CT OF THE ABDOMEN AND PELVIS WITH CONTRAST     HISTORY: Abdominal pain. HISTORY of Crohn's disease.     COMPARISON: July 11, 2024     TECHNIQUE: Axial CT " imaging was obtained through the abdomen and pelvis.  IV contrast was administered.     FINDINGS:  Images through the lung bases demonstrate a benign centrally calcified  nodule within the left lower lobe. The gallbladder is absent. There is a  small hiatal hernia. It is distended with fluid, as is the stomach. This  was actually also present on prior study. The spleen, adrenal glands,  and pancreas are normal. Duodenum appears unremarkable. No  hydronephrosis is seen on either side. The kidneys enhance  symmetrically. There are mild aortoiliac calcifications. No distal  ureteral or bladder stones are seen. Uterus appears normal, as are the  ovaries. There is colonic diverticulosis. The appendix is normal. The  patient has thick walled and edematous segments of small bowel which are  noted within the abdomen, with adjacent mesenteric edema and trace  fluid. Appearance is concerning for active Crohn's disease. Distal to  the inflamed segments, there is also a mildly dilated loop of small  bowel, which is located proximal to an area of narrowing within the  small bowel, which may reflect a stricture. This is best seen on coronal  series 3 image 59. The distal ileum is decompressed. There is fatty  infiltration of the wall of the distal small bowel, in keeping with  chronic inflammatory bowel disease. The appendix is normal. There is a  small amount of free fluid which is noted within the abdomen and pelvis,  likely reactive. No acute osseous abnormalities are seen. There is  stable sclerosis of the SI joints bilaterally.     Impression:    1. Thick-walled and edematous loops of small bowel which are noted  within the left side of the abdomen, concerning for active Crohn's  disease.  2. The patient's small hiatal hernia is mildly distended with fluid, as  is the stomach. The patient had similar findings on the exam from July 11, 2024. Some degree of gastritis, and associated ileus is not  excluded.  3. There are some  distended loops of small bowel, which don't appear  particularly thick walled, which are noted proximal to a focally  narrowed segment of small bowel. Stricture is not excluded.        Radiation dose reduction techniques were utilized, including automated  exposure control and exposure modulation based on body size.        This report was finalized on 8/7/2024 10:12 PM by Dr. Sparkle Singh M.D on Workstation: [id number]       I reviewed the patient's daily medications.  Scheduled Medications  atorvastatin, 10 mg, Oral, Daily  cetirizine, 10 mg, Oral, Daily  escitalopram, 10 mg, Oral, Daily  fluconazole, 150 mg, Oral, Q24H  fluticasone, 2 spray, Nasal, Daily  lisinopril, 10 mg, Oral, Q24H   And  hydroCHLOROthiazide, 12.5 mg, Oral, Q24H  levothyroxine, 100 mcg, Oral, Q AM  methylPREDNISolone sodium succinate, 20 mg, Intravenous, Q8H  pantoprazole, 40 mg, Intravenous, Q12H  piperacillin-tazobactam, 3.375 g, Intravenous, Q8H  sodium chloride, 10 mL, Intravenous, Q12H    Infusions  sodium chloride, 100 mL/hr, Last Rate: 100 mL/hr (08/08/24 2148)    Diet  Diet: Liquid; Clear Liquid; Fluid Consistency: Thin (IDDSI 0)         I have personally reviewed:  []  Laboratory   []  Microbiology   []  Radiology   []  EKG/Telemetry   []  Cardiology/Vascular   []  Pathology   []  Records     Assessment/Plan     Active Hospital Problems    Diagnosis  POA    **Exacerbation of Crohn's disease [K50.90]  Yes    Hypokalemia [E87.6]  Unknown    Hypothyroidism [E03.9]  Yes    GERD (gastroesophageal reflux disease) [K21.9]  Yes    Essential hypertension [I10]  Yes      Resolved Hospital Problems   No resolved problems to display.       53 y.o. female admitted with Exacerbation of Crohn's disease.    Crohn's disease exacerbation  Leukocytosis  Constipation  -Continue IV steroids, IV Zosyn  -GI following  -Discussed with surgery, plan for clear liquid diet.  No bowel regimen by mouth with his CT findings, bisacodyl rectal as needed  -Add  Norco as needed, continue morphine as needed  -IVF    Vaginal candidiasis  -Diflucan x 1  -Routinely gets vaginal yeast infections with antibiotics    Hypokalemia  -Replete as needed    Hypertension-continue home lisinopril/hydrochlorothiazide    Hypothyroidism-continue home levothyroxine    GERD-continue home Protonix    Works for [employer]    Lovenox 40 mg SC daily for DVT prophylaxis.  Full code.  Discussed with patient, nursing staff, and consulting provider.  Anticipate discharge home in 2-3 days.    Expected Discharge Date: 8/11/2024; Expected Discharge Time:       Davie Carreon MD  [facility]  08/09/24  08:20 EDT

## 2024-08-09 NOTE — PROGRESS NOTES
[facility]  Inpatient Progress Note    Reason for Follow Up:  Crohn's Flare     Subjective     Interval History:   States she is feeling much better today.  She has not had a bowel movement but is passing gas.  Reports her abdominal pain is much better.    Current Facility-Administered Medications:     acetaminophen (TYLENOL) tablet 650 mg, 650 mg, Oral, Q4H PRN **OR** acetaminophen (TYLENOL) 160 MG/5ML oral solution 650 mg, 650 mg, Oral, Q4H PRN **OR** acetaminophen (TYLENOL) suppository 650 mg, 650 mg, Rectal, Q4H PRN, Lise Perdomo APRN    atorvastatin (LIPITOR) tablet 10 mg, 10 mg, Oral, Daily, Lise Perdomo APRN, 10 mg at 08/08/24 0842    butalbital-acetaminophen-caffeine (FIORICET, ESGIC) -40 MG per tablet 1 tablet, 1 tablet, Oral, Q6H PRN, Lise Perdomo APRN    calcium carbonate (TUMS) chewable tablet 500 mg (200 mg elemental), 2 tablet, Oral, BID PRN, Lise Perdomo APRN    cetirizine (zyrTEC) tablet 10 mg, 10 mg, Oral, Daily, Lise Perdomo APRN, 10 mg at 08/08/24 0842    escitalopram (LEXAPRO) tablet 10 mg, 10 mg, Oral, Daily, Lise Perdomo APRN, 10 mg at 08/08/24 0842    fluconazole (DIFLUCAN) tablet 150 mg, 150 mg, Oral, Q24H, Davie Carreon MD    fluticasone (FLONASE) 50 MCG/ACT nasal spray 2 spray, 2 spray, Nasal, Daily, Lise Perdomo APRN    lisinopril (PRINIVIL,ZESTRIL) tablet 10 mg, 10 mg, Oral, Q24H, 10 mg at 08/08/24 0842 **AND** hydroCHLOROthiazide tablet 12.5 mg, 12.5 mg, Oral, Q24H, Lise Perdomo APRN, 12.5 mg at 08/08/24 0842    hydrOXYzine (ATARAX) tablet 25 mg, 25 mg, Oral, Q8H PRN, Lise Perdomo APRN    levothyroxine (SYNTHROID, LEVOTHROID) tablet 100 mcg, 100 mcg, Oral, Q AM, Lise Perdomo APRN, 100 mcg at 08/09/24 0504    methylPREDNISolone sodium succinate (SOLU-Medrol) injection 20 mg, 20 mg, Intravenous, Q8H, Rhianna Shelley PA-C, 20 mg at 08/09/24 0132    morphine injection  2 mg, 2 mg, Intravenous, Q3H PRN, Lise Perdomo, APRN, 2 mg at 08/08/24 2228    ondansetron ODT (ZOFRAN-ODT) disintegrating tablet 4 mg, 4 mg, Oral, Q6H PRN, 4 mg at 08/08/24 0545 **OR** ondansetron (ZOFRAN) injection 4 mg, 4 mg, Intravenous, Q6H PRN, Lise Perdomo APRN    pantoprazole (PROTONIX) injection 40 mg, 40 mg, Intravenous, Q12H, Lise Perdomo, APRN, 40 mg at 08/08/24 2021    piperacillin-tazobactam (ZOSYN) 3.375 g IVPB in 100 mL NS MBP (CD), 3.375 g, Intravenous, Q8H, Lise Perdomo APRN, 3.375 g at 08/09/24 0504    promethazine (PHENERGAN) tablet 25 mg, 25 mg, Oral, Q6H PRN, Lise Perdomo APRN    sodium chloride 0.9 % flush 10 mL, 10 mL, Intravenous, Q12H, Lise Perdomo, APRN, 10 mL at 08/08/24 2022    sodium chloride 0.9 % flush 10 mL, 10 mL, Intravenous, PRN, Lise Perdomo, APRN    sodium chloride 0.9 % infusion 40 mL, 40 mL, Intravenous, PRN, Lise Perdomo, APRN    sodium chloride 0.9 % infusion, 100 mL/hr, Intravenous, Continuous, Lise Perdomo APRN, Last Rate: 100 mL/hr at 08/08/24 2145, 100 mL/hr at 08/08/24 2145      Objective     Vital Signs  Temp:  [97.2 °F (36.2 °C)-98.3 °F (36.8 °C)] 97.2 °F (36.2 °C)  Heart Rate:  [62-84] 79  Resp:  [16] 16  BP: (108-128)/(64-73) 120/66  Body mass index is 38.2 kg/m².    Intake/Output Summary (Last 24 hours) at 8/9/2024 4769  Last data filed at 8/8/2024 2228  Gross per 24 hour   Intake 2053.33 ml   Output 950 ml   Net 1103.33 ml     No intake/output data recorded.     Physical Exam:   General: patient awake, alert and cooperative   Cardiovascular: regular rhythm and rate   Pulm: regular and unlabored   Abdomen: soft, mild generalized tenderness to palpation-worse in right and left lower quadrants, nondistended; normal bowel sounds     Results Review:     I reviewed the patient's new clinical results.    Results from last 7 days   Lab Units 08/09/24  0522 08/08/24  0545 08/07/24 2028   WBC  10*3/mm3 13.86* 12.32* 20.24*   HEMOGLOBIN g/dL 11.5* 12.9 15.3   HEMATOCRIT % 35.0 38.6 45.8   PLATELETS 10*3/mm3 244 242 275     Results from last 7 days   Lab Units 08/09/24  0522 08/08/24  0545 08/07/24 2028   SODIUM mmol/L 141 140 140   POTASSIUM mmol/L 3.4* 3.5 3.0*   CHLORIDE mmol/L 108* 104 102   CO2 mmol/L 24.0 24.5 26.0   BUN mg/dL 8 7 7   CREATININE mg/dL 0.89 0.78 0.86   CALCIUM mg/dL 8.3* 8.6 9.4   BILIRUBIN mg/dL  --   --  0.4   ALK PHOS U/L  --   --  100   ALT (SGPT) U/L  --   --  20   AST (SGOT) U/L  --   --  16   GLUCOSE mg/dL 122* 163* 142*         Lab Results   Lab Value Date/Time    LIPASE 33 08/07/2024 2028    LIPASE 20 03/22/2022 1620    LIPASE 22 03/08/2021 1513    LIPASE 34 07/27/2020 1523    LIPASE 20 06/19/2020 1728       Radiology:  CT Abdomen Pelvis With Contrast   Final Result       1. Thick-walled and edematous loops of small bowel which are noted   within the left side of the abdomen, concerning for active Crohn's   disease.   2. The patient's small hiatal hernia is mildly distended with fluid, as   is the stomach. The patient had similar findings on the exam from July 11, 2024. Some degree of gastritis, and associated ileus is not   excluded.   3. There are some distended loops of small bowel, which don't appear   particularly thick walled, which are noted proximal to a focally   narrowed segment of small bowel. Stricture is not excluded.           Radiation dose reduction techniques were utilized, including automated   exposure control and exposure modulation based on body size.           This report was finalized on 8/7/2024 10:12 PM by Dr. Sparkle Singh M.D on Workstation: [id number]              Assessment & Plan     Active Hospital Problems    Diagnosis     **Exacerbation of Crohn's disease     Hypokalemia     Hypothyroidism     GERD (gastroesophageal reflux disease)     Essential hypertension        Assessment:  Crohn's disease-on Stelara recently switched to every 6  weeks from every 8 weeks dosing  Abdominal pain  Nausea/vomiting      Plan:  Continue IV steroids-methylprednisolone 60 mg daily  On clear liquid diet-continue this today  Surgery consulted due to stricturing seen on CT scan-appears to be acute with no surgical intervention warranted at this time.  Recommend DVT prophylaxis  Will continue to monitor-will ultimately need close follow-up in outpatient office to discuss possibly switching off of Stelara versus trialing increased frequency of Stelara    Further plans pending clinical course and per attending, Dr. Jason Shelley PA-C

## 2024-08-09 NOTE — PLAN OF CARE
Goal Outcome Evaluation:  Plan of Care Reviewed With: patient        Progress: improving  Outcome Evaluation: VSS. IV Morphine given for pain, no c/o nausea. Up ad vincenzo. Voided freely. Ambulated in the cruz. NPO with sips of water. IV Fluids on flow. Miconazole Vag Suppository started. IV Fluids on flow, due IV Zosyn given.  Slept well.

## 2024-08-09 NOTE — PROGRESS NOTES
Colorectal & General Surgery  Progress Note    Patient: Rosa Megloza  YOB: 1970  MRN: [id number]      Assessment  Rosa Melgoza is a 53 y.o. female with Crohn's disease who presents with acute Crohn's enteritis within her mid small bowel.  Exam is improved this morning.  She is passing flatus and her pain is improved.  I have initiated a clear liquid diet.  Would recommend staying on clears today.  Agree with steroids and antibiotics.  I will continue to follow.  Please call with questions anytime.    Subjective  Feels better today.  Pain is improving but still present.  She is passing flatus.  Very thirsty.    Objective    Vitals:    08/09/24 0503   BP: 120/66   Pulse: 79   Resp: 16   Temp: 97.2 °F (36.2 °C)   SpO2: 93%       Physical Exam  Constitutional: Well-developed well-nourished, no acute distress  Neck: Supple, trachea midline  Respiratory: No increased work of breathing, Symmetric excursion  Cardiovascular: Well pefursed, no jugular venous distention evident   Abdominal: Soft, obese, mildly tender to palpation, mildly distended  Skin: Warm, dry, no rash on visualized skin surfaces  Psychiatric: Alert and oriented ×3, normal affect     Laboratory Results  I have personally reviewed CBC with WBC 13, hemoglobin 11, platelets 244.  BMP with creatinine 0.9, potassium 3.4, bicarb 24.    Radiology  No new imaging to review         Bryan Borden MD  Colorectal & General Surgery  [facility]    [address], [address]  [place], [address]  P: [phone]  F: [phone]

## 2024-08-09 NOTE — PAYOR COMM NOTE
"Rosa Melgoza (53 y.o. Female)          INPATIENT REQUEST FOR [id number]    ADMITTED AS OBS 8/7 AND CONVERTED TO INPATIENT 8/9.    CONTACT JESUS SANTIZO  P# [phone]  F# [phone]         Date of Birth   1970    Social Security Number       Address   [address] [place] [address]    Home Phone       MRN   [id number]       [facility]    Marital Status                               Admission Date   8/7/24    Admission Type   Emergency    Admitting Provider   Marlon Mills MD    Attending Provider   Davie Carreon MD    Department, Room/Bed   [address], 86/1       Discharge Date       Discharge Disposition       Discharge Destination                                 Attending Provider: Davie Carreon MD    Allergies: No Known Allergies    Isolation: None   Infection: None   Code Status: CPR    Ht: 162.6 cm (64.02\")   Wt: 101 kg (222 lb 10.6 oz)    Admission Cmt: None   Principal Problem: Exacerbation of Crohn's disease [K50.90]                   Active Insurance as of 8/7/2024       Primary Coverage       Payor Plan Insurance Group Employer/Plan Group    [facility] [employer] [id number]       Payor Plan Address Payor Plan Phone Number Payor Plan Fax Number Effective Dates    [address] [phone]  1/1/2016 - None Entered    [place] [id number]         Subscriber Name Subscriber Birth Date Member ID       ROSA MELGOZA 1970 [id number]                     Emergency Contacts        (Rel.) Home Phone Work Phone Mobile Phone    AbadJim tyson (Spouse) [phone] -- [phone]    MINNIE MELGOZA (Daughter) -- -- [phone]                 History & Physical        Lise Perdomo APRN at 08/08/24 0141       Attestation signed by Marlon Mills MD at 08/08/24 1253    Please note nurse practitioner's history and physical and assessment and plan has been reviewed and I do concur.  Patient is a " 53-year-old female with known history of Crohn's, hypertension, obesity, hypothyroidism, GERD and multiple other medical problems was admitted to the hospital for abdominal pain mainly localized to the upper abdomen radiating down through the midline described as a sharp/cramping in nature that started approximately at 3 PM on 8/7/2024.  Denies any fevers but admits to chills.  Does admit to nausea and vomiting and does have loose stools but not unusual for her.  States she has been on Stelara and symptoms were reasonably well-controlled up until this episode.  Underwent CT of the abdomen pelvis which was suggestive of thickened wall and edematous loops of small bowel which are noted on the left side of the abdomen.  Patient is being initiated on IV steroids as well as Zosyn and GI consultation will also be obtained.  HEENT: PERRLA, extract movements intact, Scleras no icterus  Neck: Supple, no JVD  Cardiovascular: Regular rate and rhythm with normal S1 and S2  Respiratory: Fairly clear to auscultation bilaterally with no wheezes  GI: Soft, tender in the upper abdomen, bowel sounds are present  Extremities: No edema, palpable pedal pulses  Neurologic: Grossly nonfocal, no facial asymmetry                        Patient Name:  Rosa Melgoza  YOB: 1970  MRN:  [id number]  Admit Date:  8/7/2024  Patient Care Team:  Kathie Romeo DO as PCP - General  Kathie Romeo DO as PCP - Family Medicine  Maicol Garza MD as Consulting Physician (Gastroenterology)  Manda Butler PA-C as Physician Assistant (Gastroenterology)      Subjective  History Present Illness     Chief Complaint   Patient presents with    Abdominal Pain       Ms. Melgoza is a 53 y.o. non-smoker with a history of Crohn's disease, hypertension, migraines, hypothyroidism, and GERD that presents to [facility] complaining of abdominal pain. She reports periumbilical abdominal pain that began yesterday afternoon.  She describes the pain as constant, severe, and sharp in nature. She states the pain was worse than what she usually experiences with a Crohn's flare, so she presented to the ED. She reports chills, nausea, and non-bloody emesis. She reports chronic diarrhea but denies any stools yesterday. She denies fever, chest pain, and shortness of breath. Work up in the ED revealed K+ 3.0 and WBC 20.24. a urinalysis was positive for 3+ bacteria, WBCs, 2+ protein, and 1+ leukocytes. A CT of the abdomen showed thick-walled and edematous loops of small bowel within the left side of the abdomen, concerning for active Crohn's disease. There are also findings that may indicate gastritis and an associated ileus. There are some distended loops of small bowel, which appear particularly thick walled which are proximal to a focally narrowed segment of small bowel and stricture is not excluded. She has been admitted for GI evaluation.      History of Present Illness  Review of Systems   Constitutional:  Positive for chills. Negative for fever.   HENT:  Negative for congestion and sore throat.    Eyes:  Negative for photophobia and visual disturbance.   Respiratory:  Negative for cough, shortness of breath and wheezing.    Cardiovascular:  Negative for chest pain, palpitations and leg swelling.   Gastrointestinal:  Positive for abdominal pain, nausea and vomiting. Negative for diarrhea.   Musculoskeletal:  Positive for myalgias. Negative for arthralgias.   Skin:  Negative for color change and pallor.   Neurological:  Negative for dizziness, weakness, light-headedness, numbness and headaches.        Personal History     Past Medical History:   Diagnosis Date    Allergic     Anxiety     Cholelithiasis     Crohn disease     Depression     Disease of thyroid gland     Diverticulitis of colon     Diverticulosis     Elevated blood pressure reading without diagnosis of hypertension     Encounter for long-term (current) use of medications  2020    Fatty liver     GERD (gastroesophageal reflux disease)     Headache, migraine     Hernia     History of medical problems     not sure of date, but very painful joints and muscles.  Poss r/t crohns    History of sore throat     Hyperlipidemia     Hypertension     Hypothyroidism     Migraine     Need for DTaP and Hib vaccine     History of     Obesity     Urinary tract infection     Vaginal yeast infection      Past Surgical History:   Procedure Laterality Date    BREAST BIOPSY       SECTION      CHOLECYSTECTOMY      COLONOSCOPY  approx     Tavon M.D.  benign polyps per patient    COLONOSCOPY N/A 2020    Procedure: COLONOSCOPY  into cecum and TI with biopsies;  Surgeon: Maicol Garza MD;  Location: [facility];  Service: Gastroenterology;  Laterality: N/A;  Pre: abn CT scan  post: diverticulosis    ENDOSCOPY N/A 2020    Procedure: ESOPHAGOGASTRODUODENOSCOPY WITH BIOPSY;  Surgeon: Maicol Garza MD;  Location: [facility] ENDOSCOPY;  Service: Gastroenterology;  Laterality: N/A;  Pre: abn CT scan  post: hiatal hernia, esophagitis    FRACTURE SURGERY      broken hand, 3 screws in place    TONSILLECTOMY      UPPER GASTROINTESTINAL ENDOSCOPY  approx     Tavon M.D.  normal per patient     Family History   Problem Relation Age of Onset    Breast cancer Mother     Arthritis Mother     Cancer Mother         breast ca    Miscarriages / Stillbirths Mother     Hypertension Father     Heart disease Father     Alcohol abuse Father     Hyperlipidemia Father     Breast cancer Maternal Grandmother     Anxiety disorder Maternal Grandmother     Arthritis Maternal Grandmother     Cancer Maternal Grandmother         breast and brain    Depression Maternal Grandmother     Diabetes Maternal Grandmother     Heart disease Maternal Grandmother     Kidney disease Maternal Grandmother     Thyroid disease Maternal Grandmother     Mental illness Maternal Grandmother     Alcohol  abuse Other     Depression Other     Anxiety disorder Other     Hypertension Other     Other Other         Pure hypercholesterolemia and esophageal reflux    Lung cancer Other     Inflammatory bowel disease Maternal Grandfather     Irritable bowel syndrome Maternal Grandfather         Honestly not sure which he had    Arthritis Maternal Grandfather     Cancer Maternal Grandfather         lung    Stroke Maternal Grandfather      Social History     Tobacco Use    Smoking status: Former     Current packs/day: 0.00     Average packs/day: 1 pack/day for 10.0 years (10.0 ttl pk-yrs)     Types: Cigarettes     Start date: 3/1/2001     Quit date: 3/1/2011     Years since quittin.4    Smokeless tobacco: Never    Tobacco comments:     smoked off and on   Vaping Use    Vaping status: Never Used   Substance Use Topics    Alcohol use: Not Currently     Comment: once a year or so I have a drink w dinner    Drug use: Never     Medications Prior to Admission   Medication Sig Dispense Refill Last Dose    atorvastatin (LIPITOR) 10 MG tablet Take 1 tablet by mouth Daily. 90 tablet 3     B Complex-C-Folic Acid (STRESS B COMPLEX PO) Take 1 tablet by mouth Daily.       butalbital-acetaminophen-caffeine (FIORICET, ESGIC) -40 MG per tablet Take 1 tablet by mouth Every 6 (Six) Hours As Needed for Headache. 30 tablet 0     Cholecalciferol (VITAMIN D3 GUMMIES ADULT PO) Take 5,000 Units by mouth Daily.       Dihydroergotamine Mesylate HFA (Trudhesa) 0.725 MG/ACT aerosol solution 0.725 mg into the nostril(s) as directed by provider As Needed (Moderate to severe headache). One spray into each nostril, maybe repeated after 1 hour if needed, NO more than 2 doses in 24-hour period or 3 doses in 7-day period. 8 mL 2     escitalopram (LEXAPRO) 10 MG tablet Take 1 tablet by mouth Daily. 90 tablet 3     fluticasone (FLONASE) 50 MCG/ACT nasal spray Use 2 sprays into each nostril as directed by provider Daily. 16 g 6     galcanezumab-[facility]  (EMGALITY) 120 MG/ML auto-injector pen Inject 1 mL under the skin into the appropriate area as directed Every 30 (Thirty) Days. (Patient taking differently: Inject 1 mL under the skin into the appropriate area as directed Every 30 (Thirty) Days. Due on August 27, 2024) 1 mL 11     hydrOXYzine (ATARAX) 25 MG tablet Take 1 tablet by mouth Every 8 (Eight) Hours As Needed for Anxiety. 90 tablet 0     Hyoscyamine Sulfate SL (Levsin/SL) 0.125 MG sublingual tablet Place 1 tablet under the tongue Every 6 (Six) Hours As Needed (abdominal pain/cramping). 60 each 3     levocetirizine (XYZAL) 5 MG tablet Take 1 tablet by mouth Every Evening. 90 tablet 3     levothyroxine (SYNTHROID, LEVOTHROID) 100 MCG tablet Take 1 tablet by mouth Daily. 90 tablet 1     lisinopril-hydrochlorothiazide (PRINZIDE,ZESTORETIC) 10-12.5 MG per tablet Take 1 tablet by mouth Daily. 90 tablet 0     Multiple Vitamins-Minerals (MULTI ADULT GUMMIES PO)        ondansetron (ZOFRAN) 8 MG tablet Take 0.5 tablets by mouth Every 8 (Eight) Hours As Needed for Nausea or Vomiting. 90 tablet 0     pantoprazole (PROTONIX) 40 MG EC tablet Take 1 tablet by mouth 2 (Two) Times a Day. 180 tablet 3     promethazine (PHENERGAN) 25 MG tablet Take 1 tablet by mouth Every 6 (Six) Hours As Needed for Nausea or Vomiting. 60 tablet 1     Semaglutide-Weight Management (Wegovy) 1.7 MG/0.75ML solution auto-injector Inject 0.75 mL under the skin into the appropriate area as directed 1 (One) Time Per Week. (Patient taking differently: Inject 0.75 mL under the skin into the appropriate area as directed 1 (One) Time Per Week. Every Thursday) 3 mL 0     Ubiquinol 100 MG capsule Take 100 mg by mouth Daily.       Ustekinumab (STELARA) 90 MG/ML solution prefilled syringe Injection Inject 90 mg under the skin into the appropriate area as directed Every 6 (Six) Weeks. (Patient taking differently: Inject 90 mg under the skin into the appropriate area as directed Every 8 (Eight) Weeks. Last  taken July 27, 2024) 1 mL 8      Allergies:  No Known Allergies    Objective   Objective     Vital Signs  Temp:  [96.4 °F (35.8 °C)-98.6 °F (37 °C)] 96.4 °F (35.8 °C)  Heart Rate:  [] 78  Resp:  [14-18] 14  BP: (101-134)/(68-87) 119/74  SpO2:  [91 %-100 %] 94 %  on   ;   Device (Oxygen Therapy): room air  Body mass index is 38.2 kg/m².    Physical Exam  Vitals and nursing note reviewed.   Constitutional:       Appearance: Normal appearance.   HENT:      Head: Normocephalic and atraumatic.      Nose: Nose normal.      Mouth/Throat:      Mouth: Mucous membranes are moist.      Pharynx: Oropharynx is clear.   Eyes:      Extraocular Movements: Extraocular movements intact.      Conjunctiva/sclera: Conjunctivae normal.   Cardiovascular:      Rate and Rhythm: Normal rate and regular rhythm.      Pulses: Normal pulses.      Heart sounds: Normal heart sounds.   Pulmonary:      Effort: Pulmonary effort is normal.      Breath sounds: Normal breath sounds.   Abdominal:      General: Bowel sounds are normal. There is no distension.      Palpations: Abdomen is soft. There is no mass.      Tenderness: There is abdominal tenderness. There is no guarding or rebound.      Hernia: No hernia is present.   Musculoskeletal:         General: No swelling. Normal range of motion.      Cervical back: Normal range of motion and neck supple.   Skin:     General: Skin is warm and dry.   Neurological:      General: No focal deficit present.      Mental Status: She is alert and oriented to person, place, and time.   Psychiatric:         Mood and Affect: Mood normal.         Behavior: Behavior normal.         Results Review:  I reviewed the patient's new clinical results.  I reviewed the patient's new imaging results and agree with the interpretation.  I reviewed the patient's other test results and agree with the interpretation  I personally viewed and interpreted the patient's EKG/Telemetry data  Discussed with ED provider.    Lab Results  (last 24 hours)       Procedure Component Value Units Date/Time    CBC & Differential [[id number]]  (Abnormal) Collected: 08/07/24 2028    Specimen: Blood Updated: 08/07/24 2040    Narrative:      The following orders were created for panel order CBC & Differential.  Procedure                               Abnormality         Status                     ---------                               -----------         ------                     CBC Auto Differential[[id number]]        Abnormal            Final result                 Please view results for these tests on the individual orders.    Lipase [[id number]]  (Normal) Collected: 08/07/24 2028    Specimen: Blood Updated: 08/07/24 2059     Lipase 33 U/L     hCG, Serum, Qualitative [[id number]]  (Normal) Collected: 08/07/24 2028    Specimen: Blood Updated: 08/07/24 2053     HCG Qualitative Negative    Comprehensive Metabolic Panel [[id number]]  (Abnormal) Collected: 08/07/24 2028    Specimen: Blood Updated: 08/07/24 2059     Glucose 142 mg/dL      BUN 7 mg/dL      Creatinine 0.86 mg/dL      Sodium 140 mmol/L      Potassium 3.0 mmol/L      Chloride 102 mmol/L      CO2 26.0 mmol/L      Calcium 9.4 mg/dL      Total Protein 7.1 g/dL      Albumin 3.9 g/dL      ALT (SGPT) 20 U/L      AST (SGOT) 16 U/L      Alkaline Phosphatase 100 U/L      Total Bilirubin 0.4 mg/dL      Globulin 3.2 gm/dL      A/G Ratio 1.2 g/dL      BUN/Creatinine Ratio 8.1     Anion Gap 12.0 mmol/L      eGFR 80.9 mL/min/1.73     Narrative:      GFR Normal >60  Chronic Kidney Disease <60  Kidney Failure <15      CBC Auto Differential [[id number]]  (Abnormal) Collected: 08/07/24 2028    Specimen: Blood Updated: 08/07/24 2040     WBC 20.24 10*3/mm3      RBC 5.25 10*6/mm3      Hemoglobin 15.3 g/dL      Hematocrit 45.8 %      MCV 87.2 fL      MCH 29.1 pg      MCHC 33.4 g/dL      RDW 13.0 %      RDW-SD 41.0 fl      MPV 10.4 fL      Platelets 275 10*3/mm3      Neutrophil % 84.8 %      Lymphocyte % 10.2 %      Monocyte  % 2.8 %      Eosinophil % 1.5 %      Basophil % 0.3 %      Immature Grans % 0.4 %      Neutrophils, Absolute 17.16 10*3/mm3      Lymphocytes, Absolute 2.07 10*3/mm3      Monocytes, Absolute 0.56 10*3/mm3      Eosinophils, Absolute 0.30 10*3/mm3      Basophils, Absolute 0.06 10*3/mm3      Immature Grans, Absolute 0.09 10*3/mm3      nRBC 0.0 /100 WBC     Magnesium [[id number]]  (Normal) Collected: 08/07/24 2028    Specimen: Blood Updated: 08/07/24 2059     Magnesium 2.0 mg/dL     Urinalysis With Microscopic If Indicated (No Culture) - Urine, Clean Catch [[id number]]  (Abnormal) Collected: 08/07/24 2112    Specimen: Urine, Clean Catch Updated: 08/07/24 2225     Color, UA Dark Yellow     Appearance, UA Cloudy     pH, UA 7.0     Specific Gravity, UA 1.025     Glucose, UA Negative     Ketones, UA 15 mg/dL (1+)     Bilirubin, UA Negative     Blood, UA Negative     Protein,  mg/dL (2+)     Leuk Esterase, UA Small (1+)     Nitrite, UA Negative     Urobilinogen, UA 1.0 E.U./dL    Urinalysis, Microscopic Only - Urine, Clean Catch [[id number]]  (Abnormal) Collected: 08/07/24 2112    Specimen: Urine, Clean Catch Updated: 08/07/24 2255     RBC, UA 0-2 /HPF      WBC, UA 6-10 /HPF      Bacteria, UA 3+ /HPF      Squamous Epithelial Cells, UA 21-30 /HPF      Hyaline Casts, UA 3-6 /LPF      Methodology Manual Light Microscopy    Lactic Acid, Plasma [[id number]]  (Normal) Collected: 08/07/24 2151    Specimen: Blood Updated: 08/07/24 2215     Lactate 1.0 mmol/L     Blood Culture - Blood, Arm, Right [[id number]] Collected: 08/07/24 2221    Specimen: Blood from Arm, Right Updated: 08/07/24 2237    Blood Culture - Blood, Arm, Left [[id number]] Collected: 08/07/24 2221    Specimen: Blood from Arm, Left Updated: 08/07/24 2237            Imaging Results (Last 24 Hours)       Procedure Component Value Units Date/Time    CT Abdomen Pelvis With Contrast [[id number]] Collected: 08/07/24 2159     Updated: 08/07/24 2215    Narrative:      CT OF THE  ABDOMEN AND PELVIS WITH CONTRAST     HISTORY: Abdominal pain. HISTORY of Crohn's disease.     COMPARISON: July 11, 2024     TECHNIQUE: Axial CT imaging was obtained through the abdomen and pelvis.  IV contrast was administered.     FINDINGS:  Images through the lung bases demonstrate a benign centrally calcified  nodule within the left lower lobe. The gallbladder is absent. There is a  small hiatal hernia. It is distended with fluid, as is the stomach. This  was actually also present on prior study. The spleen, adrenal glands,  and pancreas are normal. Duodenum appears unremarkable. No  hydronephrosis is seen on either side. The kidneys enhance  symmetrically. There are mild aortoiliac calcifications. No distal  ureteral or bladder stones are seen. Uterus appears normal, as are the  ovaries. There is colonic diverticulosis. The appendix is normal. The  patient has thick walled and edematous segments of small bowel which are  noted within the abdomen, with adjacent mesenteric edema and trace  fluid. Appearance is concerning for active Crohn's disease. Distal to  the inflamed segments, there is also a mildly dilated loop of small  bowel, which is located proximal to an area of narrowing within the  small bowel, which may reflect a stricture. This is best seen on coronal  series 3 image 59. The distal ileum is decompressed. There is fatty  infiltration of the wall of the distal small bowel, in keeping with  chronic inflammatory bowel disease. The appendix is normal. There is a  small amount of free fluid which is noted within the abdomen and pelvis,  likely reactive. No acute osseous abnormalities are seen. There is  stable sclerosis of the SI joints bilaterally.       Impression:         1. Thick-walled and edematous loops of small bowel which are noted  within the left side of the abdomen, concerning for active Crohn's  disease.  2. The patient's small hiatal hernia is mildly distended with fluid, as  is the stomach.  The patient had similar findings on the exam from July 11, 2024. Some degree of gastritis, and associated ileus is not  excluded.  3. There are some distended loops of small bowel, which don't appear  particularly thick walled, which are noted proximal to a focally  narrowed segment of small bowel. Stricture is not excluded.        Radiation dose reduction techniques were utilized, including automated  exposure control and exposure modulation based on body size.        This report was finalized on 8/7/2024 10:12 PM by Dr. Sparkle Singh M.D on Workstation: [id number]                   No orders to display        Assessment/Plan     Active Hospital Problems    Diagnosis  POA    **Exacerbation of Crohn's disease [K50.90]  Yes    Hypokalemia [E87.6]  Unknown    Hypothyroidism [E03.9]  Yes    GERD (gastroesophageal reflux disease) [K21.9]  Yes    Essential hypertension [I10]  Yes     Exacerbation of Crohn's Disease  -Admit for monitoring  -GI consult  -She has leukocytosis but has been afebrile and lactate is ok  -Continue Zosyn   -Blood cultures are pending  -Will defer any further steroid dosing to GI group  -PRN analgesia and antiemetics  -Keep NPO for now  -UA shows 3+ bacteria and 1+ leukocytes, but the sample appears contaminated. She denies urinary symptoms. Zosyn should cover for UTI    Hypokalemia  -Replace potassium per protocol  -Repeat labs in AM    Hypertension  -Her blood pressures have been stable. Continue lisinopril-HCTZ  -Monitor    Hypothyroidism  -Continue Levothyroxine    GERD  -Continue home dose of Protonix    Obesity  -She is on Wegovy outpatient    -I discussed the patients findings and my recommendations with patient.    VTE Prophylaxis - SCDs.  Code Status - Full code.       NADEGE Ngo  [facility]  08/08/24  05:23 EDT      Electronically signed by Marlon Mills MD at 08/08/24 1253          Emergency Department Notes        Juan  "Ray, RN at 08/07/24 2355          Nursing report ED to floor  Rosa Melgoza  53 y.o.  female    HPI :  HPI (Adult)  Stated Reason for Visit: Pt presents to ED with generalized abdominal pain and vomiting Xs 2 hours. Hx of crohns disease and cholesytectomy. Pt states she had some diarrhea yesterday as well.  History Obtained From: patient  Precipitating Event(s): unknown  Duration (Hours): 2    Chief Complaint  Chief Complaint   Patient presents with    Abdominal Pain       Admitting doctor:   Marlon Mills MD    Admitting diagnosis:   The primary encounter diagnosis was Exacerbation of Crohn's disease with complication. A diagnosis of Hypokalemia was also pertinent to this visit.    Code status:   Current Code Status       Date Active Code Status Order ID Comments User Context       Not on file            Allergies:   Patient has no known allergies.    Isolation:   No active isolations    Intake and Output    Intake/Output Summary (Last 24 hours) at 8/7/2024 2355  Last data filed at 8/7/2024 2235  Gross per 24 hour   Intake 1000 ml   Output --   Net 1000 ml       Weight:       08/07/24 2010   Weight: 99.3 kg (219 lb)       Most recent vitals:   Vitals:    08/07/24 2010 08/07/24 2016 08/07/24 2021 08/07/24 2131   BP:  128/79 134/87 101/70   BP Location:    Left arm   Patient Position:    Lying   Pulse: (!) 124  100 85   Resp: 18   16   Temp: 97.9 °F (36.6 °C)      TempSrc: Tympanic      SpO2: 100%  99%    Weight: 99.3 kg (219 lb)      Height: 162.6 cm (64\")          Active LDAs/IV Access:   Lines, Drains & Airways       Active LDAs       Name Placement date Placement time Site Days    Peripheral IV 08/07/24 2027 Right Antecubital 08/07/24 2027  Antecubital  less than 1                    Labs (abnormal labs have a star):   Labs Reviewed   URINALYSIS W/ MICROSCOPIC IF INDICATED (NO CULTURE) - Abnormal; Notable for the following components:       Result Value    Color, UA Dark Yellow (*)     Appearance, UA " Cloudy (*)     Ketones, UA 15 mg/dL (1+) (*)     Protein,  mg/dL (2+) (*)     Leuk Esterase, UA Small (1+) (*)     All other components within normal limits   COMPREHENSIVE METABOLIC PANEL - Abnormal; Notable for the following components:    Glucose 142 (*)     Potassium 3.0 (*)     All other components within normal limits    Narrative:     GFR Normal >60  Chronic Kidney Disease <60  Kidney Failure <15     CBC WITH AUTO DIFFERENTIAL - Abnormal; Notable for the following components:    WBC 20.24 (*)     Neutrophil % 84.8 (*)     Lymphocyte % 10.2 (*)     Monocyte % 2.8 (*)     Neutrophils, Absolute 17.16 (*)     Immature Grans, Absolute 0.09 (*)     All other components within normal limits   URINALYSIS, MICROSCOPIC ONLY - Abnormal; Notable for the following components:    WBC, UA 6-10 (*)     Bacteria, UA 3+ (*)     Squamous Epithelial Cells, UA 21-30 (*)     All other components within normal limits   LIPASE - Normal   HCG, SERUM, QUALITATIVE - Normal   MAGNESIUM - Normal   LACTIC ACID, PLASMA - Normal   BLOOD CULTURE   BLOOD CULTURE   CBC AND DIFFERENTIAL    Narrative:     The following orders were created for panel order CBC & Differential.  Procedure                               Abnormality         Status                     ---------                               -----------         ------                     CBC Auto Differential[[id number]]        Abnormal            Final result                 Please view results for these tests on the individual orders.       EKG:   No orders to display       Meds given in ED:   Medications   morphine injection 4 mg (4 mg Intravenous Given 8/7/24 2033)   ondansetron (ZOFRAN) injection 4 mg (4 mg Intravenous Given 8/7/24 2032)   sodium chloride 0.9 % bolus 1,000 mL (0 mL Intravenous Stopped 8/7/24 2235)   iopamidol (ISOVUE-300) 61 % injection 100 mL (85 mL Intravenous Given by Other 8/7/24 2135)   potassium chloride (K-DUR,KLOR-CON) ER tablet 40 mEq (40 mEq Oral  Given 24)   piperacillin-tazobactam (ZOSYN) 3.375 g IVPB in 100 mL NS MBP (CD) (3.375 g Intravenous New Bag 24)   methylPREDNISolone sodium succinate (SOLU-Medrol) injection 40 mg (40 mg Intravenous Given 24)       Imaging results:  CT Abdomen Pelvis With Contrast    Result Date: 2024   1. Thick-walled and edematous loops of small bowel which are noted within the left side of the abdomen, concerning for active Crohn's disease. 2. The patient's small hiatal hernia is mildly distended with fluid, as is the stomach. The patient had similar findings on the exam from 2024. Some degree of gastritis, and associated ileus is not excluded. 3. There are some distended loops of small bowel, which don't appear particularly thick walled, which are noted proximal to a focally narrowed segment of small bowel. Stricture is not excluded.   Radiation dose reduction techniques were utilized, including automated exposure control and exposure modulation based on body size.   This report was finalized on 2024 10:12 PM by Dr. Sparkle Singh M.D on Workstation: [id number]       Ambulatory status:   - bedrest    Social issues:   Social History     Socioeconomic History    Marital status:    Tobacco Use    Smoking status: Former     Current packs/day: 0.00     Average packs/day: 1 pack/day for 10.0 years (10.0 ttl pk-yrs)     Types: Cigarettes     Start date: 3/1/2001     Quit date: 3/1/2011     Years since quittin.4    Smokeless tobacco: Never    Tobacco comments:     smoked off and on   Vaping Use    Vaping status: Never Used   Substance and Sexual Activity    Alcohol use: Not Currently     Comment: once a year or so I have a drink w dinner    Drug use: Never    Sexual activity: Yes     Partners: Male     Birth control/protection: Surgical     Comment:  had vasectomy       Peripheral Neurovascular  Peripheral Neurovascular (Adult)  Peripheral Neurovascular WDL:  WDL    Neuro Cognitive  Neuro Cognitive (Adult)  Cognitive/Neuro/Behavioral WDL: WDL    Learning  Learning Assessment (Adult)  Learning Readiness and Ability: no barriers identified    Respiratory  Respiratory (Adult)  Airway WDL: WDL  Respiratory WDL  Respiratory WDL: WDL    Abdominal Pain       Pain Assessments  Pain (Adult)  (0-10) Pain Rating: Rest: 8  Patient requested Medication Prescribed for Lower Pain Scale: No  Pain Location: abdomen  Pain Side/Orientation: generalized  Response to Pain Interventions: interventions effective per patient    NIH Stroke Scale       Ray Martinez RN  24 23:55 EDT      Electronically signed by Ray Martinez RN at 24 0639       Sam Hathaway II, MD at 24 3929          MD ATTESTATION NOTE    SHARED VISIT: This visit was performed by BOTH a physician and an APC. The substantive portion of the medical decision making was performed by this attesting physician who made or approved the management plan and takes responsibility for patient management. All studies documented in the APC note (if performed) were independently interpreted by me.    The KA[facility] and I have discussed this patient's history, physical exam, MDM, and treatment plan.  I have reviewed the documentation and personally had a face to face interaction with the patient. The attached note describes my personal findings.      Rosa Melgoza is a 53 y.o. female who presents to the ED c/o acute abdominal pain that began several hours ago today.  Pain is in epigastric region and radiates down the middle of her abdomen.  She has a history of Crohn's and this feels like her prior Crohn's flare.  No fever.  She has a history of prior cholecystectomy and .    On exam:  GENERAL: not distressed  HENT: nares patent  EYES: no scleral icterus  CV: regular rhythm, regular rate  RESPIRATORY: normal effort  ABDOMEN: soft, midline abdominal tenderness without rebound or guarding  MUSCULOSKELETAL:  no deformity  NEURO: alert, moves all extremities, follows commands  SKIN: warm, dry    Labs  Recent Results (from the past 24 hour(s))   Lipase    Collection Time: 08/07/24  8:28 PM    Specimen: Blood   Result Value Ref Range    Lipase 33 13 - 60 U/L   hCG, Serum, Qualitative    Collection Time: 08/07/24  8:28 PM    Specimen: Blood   Result Value Ref Range    HCG Qualitative Negative Negative   Comprehensive Metabolic Panel    Collection Time: 08/07/24  8:28 PM    Specimen: Blood   Result Value Ref Range    Glucose 142 (H) 65 - 99 mg/dL    BUN 7 6 - 20 mg/dL    Creatinine 0.86 0.57 - 1.00 mg/dL    Sodium 140 136 - 145 mmol/L    Potassium 3.0 (L) 3.5 - 5.2 mmol/L    Chloride 102 98 - 107 mmol/L    CO2 26.0 22.0 - 29.0 mmol/L    Calcium 9.4 8.6 - 10.5 mg/dL    Total Protein 7.1 6.0 - 8.5 g/dL    Albumin 3.9 3.5 - 5.2 g/dL    ALT (SGPT) 20 1 - 33 U/L    AST (SGOT) 16 1 - 32 U/L    Alkaline Phosphatase 100 39 - 117 U/L    Total Bilirubin 0.4 0.0 - 1.2 mg/dL    Globulin 3.2 gm/dL    A/G Ratio 1.2 g/dL    BUN/Creatinine Ratio 8.1 7.0 - 25.0    Anion Gap 12.0 5.0 - 15.0 mmol/L    eGFR 80.9 >60.0 mL/min/1.73   CBC Auto Differential    Collection Time: 08/07/24  8:28 PM    Specimen: Blood   Result Value Ref Range    WBC 20.24 (H) 3.40 - 10.80 10*3/mm3    RBC 5.25 3.77 - 5.28 10*6/mm3    Hemoglobin 15.3 12.0 - 15.9 g/dL    Hematocrit 45.8 34.0 - 46.6 %    MCV 87.2 79.0 - 97.0 fL    MCH 29.1 26.6 - 33.0 pg    MCHC 33.4 31.5 - 35.7 g/dL    RDW 13.0 12.3 - 15.4 %    RDW-SD 41.0 37.0 - 54.0 fl    MPV 10.4 6.0 - 12.0 fL    Platelets 275 140 - 450 10*3/mm3    Neutrophil % 84.8 (H) 42.7 - 76.0 %    Lymphocyte % 10.2 (L) 19.6 - 45.3 %    Monocyte % 2.8 (L) 5.0 - 12.0 %    Eosinophil % 1.5 0.3 - 6.2 %    Basophil % 0.3 0.0 - 1.5 %    Immature Grans % 0.4 0.0 - 0.5 %    Neutrophils, Absolute 17.16 (H) 1.70 - 7.00 10*3/mm3    Lymphocytes, Absolute 2.07 0.70 - 3.10 10*3/mm3    Monocytes, Absolute 0.56 0.10 - 0.90 10*3/mm3     Eosinophils, Absolute 0.30 0.00 - 0.40 10*3/mm3    Basophils, Absolute 0.06 0.00 - 0.20 10*3/mm3    Immature Grans, Absolute 0.09 (H) 0.00 - 0.05 10*3/mm3    nRBC 0.0 0.0 - 0.2 /100 WBC   Magnesium    Collection Time: 08/07/24  8:28 PM    Specimen: Blood   Result Value Ref Range    Magnesium 2.0 1.6 - 2.6 mg/dL       Radiology  No Radiology Exams Resulted Within Past 24 Hours    Medications given in the ED:  Medications   morphine injection 4 mg (4 mg Intravenous Given 8/7/24 2033)   ondansetron (ZOFRAN) injection 4 mg (4 mg Intravenous Given 8/7/24 2032)   sodium chloride 0.9 % bolus 1,000 mL (1,000 mL Intravenous New Bag 8/7/24 2029)       Orders placed during this visit:  Orders Placed This Encounter   Procedures    CT Abdomen Pelvis With Contrast    Lipase    hCG, Serum, Qualitative    Urinalysis With Microscopic If Indicated (No Culture) - Urine, Clean Catch    Comprehensive Metabolic Panel    CBC Auto Differential    Magnesium    CBC & Differential       Medical Decision Making:  ED Course as of 08/07/24 2344   Wed Aug 07, 2024   2017 Patient presents with several hour history of diffuse abdominal pain, nausea, vomiting.  Patient has a history of Crohn's.  Plan for pain control, labs, CT imaging. [EE]   2050 WBC(!): 20.24 [EE]   2050 Hemoglobin: 15.3 [EE]   2136 Lipase: 33 [EE]   2136 Creatinine: 0.86 [EE]   2136 Potassium(!): 3.0 [EE]   2225 CT imaging of the abdomen independently interpreted myself shows acute inflammation of the small bowel consistent with Crohn's disease. [EE]   2305 I discussed the case with Dr. Saunders, gastroenterology.  He would like me to give the patient a Medrol 40 mg.  He agrees with treatment plan and will consult. [EE]   2331 I discussed case with Manda Barth, nurse practitioner with [facility].  She agrees to admit to Dr. Mills. [EE]      ED Course User Index  [EE] Piter Malik, PA           Diagnosis  Final diagnoses:   Exacerbation of Crohn's disease with complication    Hypokalemia          Sam Hathaway II, MD  08/07/24 2344      Electronically signed by Sam Hathaway II, MD at 08/07/24 2344       Piter Malik PA at 08/07/24 2025       Attestation signed by Sam Hathaway II, MD at 08/08/24 2108        SHARED APC FACE TO FACE: I performed a substantive part of the MDM during the patient's E/M visit. I personally evaluated and examined the patient. I personally made or approved the documented management plan and acknowledge its risk of complications.   Sam Hathaway II, MD 8/8/2024 21:08 EDT                         EMERGENCY DEPARTMENT ENCOUNTER    Room Number:  33/33  Date of encounter:  8/7/2024  PCP: Kathie Romeo DO  Historian: Patient, family  Chronic or social conditions impacting care (social determinants of health): None    HPI:  Chief Complaint: Abdominal pain  A complete HPI/ROS/PMH/PSH/SH/FH are unobtainable due to: Nothing    Context: Rosa Melgoza is a 53 y.o. female with a history of Crohn's disease, migraines hypertension, who presents to the ED c/o acute onset of abdominal pain several hours ago.  Patient states she had felt pretty well until approximately 2 hours ago when she began to have severe epigastric abdominal pain.  The pain seems to have migrated to her umbilical area at this point.  She has had nausea and vomiting.  She typically has chronic diarrhea however no bowel movements today.  Denies any fevers, chills.  She does have a history of Crohn's and follows with Dr Garza.  She has been compliant with her medications.    Review of prior external notes (non-ED):   Reviewed GI office visit from 1/30/2024.  Patient being treated for Crohn's disease.    Review of prior external test results outside of this encounter:  I reviewed a negative CT enterography of the abdomen from 7/11/2024.    PAST MEDICAL HISTORY  Active Ambulatory Problems     Diagnosis Date Noted    Hypothyroidism 01/13/2020    GERD (gastroesophageal reflux  disease) 2020    Headache, migraine 2020    Adjustment disorder with anxious mood 2020    Encounter for long-term (current) use of medications 2020    Essential hypertension 2020    Epigastric pain 2020    Crohn's disease with complication 2023     Resolved Ambulatory Problems     Diagnosis Date Noted    No Resolved Ambulatory Problems     Past Medical History:   Diagnosis Date    Allergic     Anxiety     Cholelithiasis     Crohn disease     Depression     Disease of thyroid gland     Diverticulitis of colon     Diverticulosis     Elevated blood pressure reading without diagnosis of hypertension     Fatty liver     Hernia     History of medical problems     History of sore throat     Hyperlipidemia     Hypertension     Migraine     Need for DTaP and Hib vaccine     Obesity     Urinary tract infection     Vaginal yeast infection          PAST SURGICAL HISTORY  Past Surgical History:   Procedure Laterality Date    BREAST BIOPSY       SECTION      CHOLECYSTECTOMY      COLONOSCOPY  approx     Tavon M.D.  benign polyps per patient    COLONOSCOPY N/A 2020    Procedure: COLONOSCOPY  into cecum and TI with biopsies;  Surgeon: Maicol Garza MD;  Location: [facility] ENDOSCOPY;  Service: Gastroenterology;  Laterality: N/A;  Pre: abn CT scan  post: diverticulosis    ENDOSCOPY N/A 2020    Procedure: ESOPHAGOGASTRODUODENOSCOPY WITH BIOPSY;  Surgeon: Maicol Garza MD;  Location: [facility] ENDOSCOPY;  Service: Gastroenterology;  Laterality: N/A;  Pre: abn CT scan  post: hiatal hernia, esophagitis    FRACTURE SURGERY      broken hand, 3 screws in place    TONSILLECTOMY      UPPER GASTROINTESTINAL ENDOSCOPY  approx     Tavon M.D.  normal per patient         FAMILY HISTORY  Family History   Problem Relation Age of Onset    Breast cancer Mother     Arthritis Mother     Cancer Mother         breast ca    Miscarriages / Stillbirths Mother      Hypertension Father     Heart disease Father     Alcohol abuse Father     Hyperlipidemia Father     Breast cancer Maternal Grandmother     Anxiety disorder Maternal Grandmother     Arthritis Maternal Grandmother     Cancer Maternal Grandmother         breast and brain    Depression Maternal Grandmother     Diabetes Maternal Grandmother     Heart disease Maternal Grandmother     Kidney disease Maternal Grandmother     Thyroid disease Maternal Grandmother     Mental illness Maternal Grandmother     Alcohol abuse Other     Depression Other     Anxiety disorder Other     Hypertension Other     Other Other         Pure hypercholesterolemia and esophageal reflux    Lung cancer Other     Inflammatory bowel disease Maternal Grandfather     Irritable bowel syndrome Maternal Grandfather         Honestly not sure which he had    Arthritis Maternal Grandfather     Cancer Maternal Grandfather         lung    Stroke Maternal Grandfather          SOCIAL HISTORY  Social History     Socioeconomic History    Marital status:    Tobacco Use    Smoking status: Former     Current packs/day: 0.00     Average packs/day: 1 pack/day for 10.0 years (10.0 ttl pk-yrs)     Types: Cigarettes     Start date: 3/1/2001     Quit date: 3/1/2011     Years since quittin.4    Smokeless tobacco: Never    Tobacco comments:     smoked off and on   Vaping Use    Vaping status: Never Used   Substance and Sexual Activity    Alcohol use: Not Currently     Comment: once a year or so I have a drink w dinner    Drug use: Never    Sexual activity: Yes     Partners: Male     Birth control/protection: Surgical     Comment:  had vasectomy         ALLERGIES  Patient has no known allergies.        REVIEW OF SYSTEMS  All systems reviewed and negative except for those discussed in HPI.       PHYSICAL EXAM    I have reviewed the triage vital signs and nursing notes.    ED Triage Vitals   Temp Heart Rate Resp BP SpO2   24  2010 08/07/24 2016 08/07/24 2010   97.9 °F (36.6 °C) (!) 124 18 128/79 100 %      Temp src Heart Rate Source Patient Position BP Location FiO2 (%)   08/07/24 2010 08/07/24 2010 -- -- --   Tympanic Monitor          Physical Exam  GENERAL: Alert, oriented, not distressed  HENT: head atraumatic, no nuchal rigidity  EYES: no scleral icterus, EOMI  CV: regular rhythm, tachycardic rate, no murmur  RESPIRATORY: normal effort, CTA  ABDOMEN: soft, moderate diffuse generalized abdominal tenderness without guarding or rebound  MUSCULOSKELETAL: no deformity, FROM, no calf swelling or tenderness  NEURO: alert, moves all extremities, follows commands  SKIN: warm, dry        LAB RESULTS  Recent Results (from the past 24 hour(s))   Lipase    Collection Time: 08/07/24  8:28 PM    Specimen: Blood   Result Value Ref Range    Lipase 33 13 - 60 U/L   hCG, Serum, Qualitative    Collection Time: 08/07/24  8:28 PM    Specimen: Blood   Result Value Ref Range    HCG Qualitative Negative Negative   Comprehensive Metabolic Panel    Collection Time: 08/07/24  8:28 PM    Specimen: Blood   Result Value Ref Range    Glucose 142 (H) 65 - 99 mg/dL    BUN 7 6 - 20 mg/dL    Creatinine 0.86 0.57 - 1.00 mg/dL    Sodium 140 136 - 145 mmol/L    Potassium 3.0 (L) 3.5 - 5.2 mmol/L    Chloride 102 98 - 107 mmol/L    CO2 26.0 22.0 - 29.0 mmol/L    Calcium 9.4 8.6 - 10.5 mg/dL    Total Protein 7.1 6.0 - 8.5 g/dL    Albumin 3.9 3.5 - 5.2 g/dL    ALT (SGPT) 20 1 - 33 U/L    AST (SGOT) 16 1 - 32 U/L    Alkaline Phosphatase 100 39 - 117 U/L    Total Bilirubin 0.4 0.0 - 1.2 mg/dL    Globulin 3.2 gm/dL    A/G Ratio 1.2 g/dL    BUN/Creatinine Ratio 8.1 7.0 - 25.0    Anion Gap 12.0 5.0 - 15.0 mmol/L    eGFR 80.9 >60.0 mL/min/1.73   CBC Auto Differential    Collection Time: 08/07/24  8:28 PM    Specimen: Blood   Result Value Ref Range    WBC 20.24 (H) 3.40 - 10.80 10*3/mm3    RBC 5.25 3.77 - 5.28 10*6/mm3    Hemoglobin 15.3 12.0 - 15.9 g/dL    Hematocrit 45.8 34.0 -  46.6 %    MCV 87.2 79.0 - 97.0 fL    MCH 29.1 26.6 - 33.0 pg    MCHC 33.4 31.5 - 35.7 g/dL    RDW 13.0 12.3 - 15.4 %    RDW-SD 41.0 37.0 - 54.0 fl    MPV 10.4 6.0 - 12.0 fL    Platelets 275 140 - 450 10*3/mm3    Neutrophil % 84.8 (H) 42.7 - 76.0 %    Lymphocyte % 10.2 (L) 19.6 - 45.3 %    Monocyte % 2.8 (L) 5.0 - 12.0 %    Eosinophil % 1.5 0.3 - 6.2 %    Basophil % 0.3 0.0 - 1.5 %    Immature Grans % 0.4 0.0 - 0.5 %    Neutrophils, Absolute 17.16 (H) 1.70 - 7.00 10*3/mm3    Lymphocytes, Absolute 2.07 0.70 - 3.10 10*3/mm3    Monocytes, Absolute 0.56 0.10 - 0.90 10*3/mm3    Eosinophils, Absolute 0.30 0.00 - 0.40 10*3/mm3    Basophils, Absolute 0.06 0.00 - 0.20 10*3/mm3    Immature Grans, Absolute 0.09 (H) 0.00 - 0.05 10*3/mm3    nRBC 0.0 0.0 - 0.2 /100 WBC   Magnesium    Collection Time: 08/07/24  8:28 PM    Specimen: Blood   Result Value Ref Range    Magnesium 2.0 1.6 - 2.6 mg/dL   Urinalysis With Microscopic If Indicated (No Culture) - Urine, Clean Catch    Collection Time: 08/07/24  9:12 PM    Specimen: Urine, Clean Catch   Result Value Ref Range    Color, UA Dark Yellow (A) Yellow, Straw    Appearance, UA Cloudy (A) Clear    pH, UA 7.0 5.0 - 8.0    Specific Gravity, UA 1.025 1.005 - 1.030    Glucose, UA Negative Negative    Ketones, UA 15 mg/dL (1+) (A) Negative    Bilirubin, UA Negative Negative    Blood, UA Negative Negative    Protein,  mg/dL (2+) (A) Negative    Leuk Esterase, UA Small (1+) (A) Negative    Nitrite, UA Negative Negative    Urobilinogen, UA 1.0 E.U./dL 0.2 - 1.0 E.U./dL   Urinalysis, Microscopic Only - Urine, Clean Catch    Collection Time: 08/07/24  9:12 PM    Specimen: Urine, Clean Catch   Result Value Ref Range    RBC, UA 0-2 None Seen, 0-2 /HPF    WBC, UA 6-10 (A) None Seen, 0-2 /HPF    Bacteria, UA 3+ (A) None Seen /HPF    Squamous Epithelial Cells, UA 21-30 (A) None Seen, 0-2 /HPF    Hyaline Casts, UA 3-6 None Seen /LPF    Methodology Manual Light Microscopy    Lactic Acid, Plasma     Collection Time: 08/07/24  9:51 PM    Specimen: Blood   Result Value Ref Range    Lactate 1.0 0.5 - 2.0 mmol/L       Ordered the above labs and independently reviewed the results.        RADIOLOGY  CT Abdomen Pelvis With Contrast    Result Date: 8/7/2024  CT OF THE ABDOMEN AND PELVIS WITH CONTRAST  HISTORY: Abdominal pain. HISTORY of Crohn's disease.  COMPARISON: July 11, 2024  TECHNIQUE: Axial CT imaging was obtained through the abdomen and pelvis. IV contrast was administered.  FINDINGS: Images through the lung bases demonstrate a benign centrally calcified nodule within the left lower lobe. The gallbladder is absent. There is a small hiatal hernia. It is distended with fluid, as is the stomach. This was actually also present on prior study. The spleen, adrenal glands, and pancreas are normal. Duodenum appears unremarkable. No hydronephrosis is seen on either side. The kidneys enhance symmetrically. There are mild aortoiliac calcifications. No distal ureteral or bladder stones are seen. Uterus appears normal, as are the ovaries. There is colonic diverticulosis. The appendix is normal. The patient has thick walled and edematous segments of small bowel which are noted within the abdomen, with adjacent mesenteric edema and trace fluid. Appearance is concerning for active Crohn's disease. Distal to the inflamed segments, there is also a mildly dilated loop of small bowel, which is located proximal to an area of narrowing within the small bowel, which may reflect a stricture. This is best seen on coronal series 3 image 59. The distal ileum is decompressed. There is fatty infiltration of the wall of the distal small bowel, in keeping with chronic inflammatory bowel disease. The appendix is normal. There is a small amount of free fluid which is noted within the abdomen and pelvis, likely reactive. No acute osseous abnormalities are seen. There is stable sclerosis of the SI joints bilaterally.       1. Thick-walled and  edematous loops of small bowel which are noted within the left side of the abdomen, concerning for active Crohn's disease. 2. The patient's small hiatal hernia is mildly distended with fluid, as is the stomach. The patient had similar findings on the exam from July 11, 2024. Some degree of gastritis, and associated ileus is not excluded. 3. There are some distended loops of small bowel, which don't appear particularly thick walled, which are noted proximal to a focally narrowed segment of small bowel. Stricture is not excluded.   Radiation dose reduction techniques were utilized, including automated exposure control and exposure modulation based on body size.   This report was finalized on 8/7/2024 10:12 PM by Dr. Sparkle Singh M.D on Workstation: [id number]       I ordered the above noted radiological studies. Reviewed by me and discussed with radiologist.  See dictation for official radiology interpretation.      MEDICATIONS GIVEN IN ER    Medications   morphine injection 4 mg (4 mg Intravenous Given 8/7/24 2033)   ondansetron (ZOFRAN) injection 4 mg (4 mg Intravenous Given 8/7/24 2032)   sodium chloride 0.9 % bolus 1,000 mL (0 mL Intravenous Stopped 8/7/24 2235)   iopamidol (ISOVUE-300) 61 % injection 100 mL (85 mL Intravenous Given by Other 8/7/24 2135)   potassium chloride (K-DUR,KLOR-CON) ER tablet 40 mEq (40 mEq Oral Given 8/7/24 2152)   piperacillin-tazobactam (ZOSYN) 3.375 g IVPB in 100 mL NS MBP (CD) (3.375 g Intravenous New Bag 8/7/24 2230)   methylPREDNISolone sodium succinate (SOLU-Medrol) injection 40 mg (40 mg Intravenous Given 8/7/24 2302)         ADDITIONAL ORDERS CONSIDERED BUT NOT ORDERED:  None      PROGRESS, DATA ANALYSIS, CONSULTS, AND MEDICAL DECISION MAKING    All labs have been independently interpreted by myself.  All radiology studies have been independently interpreted by myself and discussed with radiologist dictating the report.   EKG's independently interpreted by myself.   Discussion below represents my analysis of pertinent findings related to patient's condition, differential diagnosis, treatment plan and final disposition.    I have discussed case with Dr. Hathaway, emergency room physician.  He has performed his own bedside examination and agrees with treatment plan.    ED Course as of 08/07/24 2350   Wed Aug 07, 2024   2017 Patient presents with several hour history of diffuse abdominal pain, nausea, vomiting.  Patient has a history of Crohn's.  Plan for pain control, labs, CT imaging. [EE]    WBC(!): 20.24 [EE]    Hemoglobin: 15.3 [EE]    Lipase: 33 [EE]    Creatinine: 0.86 [EE]    Potassium(!): 3.0 [EE]    CT imaging of the abdomen independently interpreted myself shows acute inflammation of the small bowel consistent with Crohn's disease. [EE]   2305 I discussed the case with Dr. Saunders, as per neurology.  He would like me to give the patient a Medrol 40 mg.  He agrees with treatment plan and will consult. [EE]   2331 I discussed case with Manda Barth, nurse practitioner with [facility].  She agrees to admit to Dr. Mills. [EE]      ED Course User Index  [EE] Piter Malik PA       AS OF 23:50 EDT VITALS:    BP - 101/70  HR - 85  TEMP - 97.9 °F (36.6 °C) (Tympanic)  O2 SATS - 99%        DIAGNOSIS  Final diagnoses:   Exacerbation of Crohn's disease with complication   Hypokalemia         DISPOSITION  Admitted        Dictated utilizing Dragon dictation     Piter Malik PA  24      Electronically signed by Sam Hathaway II, MD at 24          Physician Progress Notes (last 72 hours)        Davie Carreon MD at 24 0820              Name: Rosa Melgoza ADMIT: 2024   : 1970  PCP: Kathie Romeo DO    MRN: [id number] LOS: 0 days   AGE/SEX: 53 y.o. female  ROOM: [facility]     Subjective   Subjective   Patient is abdominal pain nausea improved.  Patient has not had a bowel movement since Tuesday.  Usually takes  bisacodyl oral as needed at home when she is constipated, which is pretty rare with her Crohn's disease.  Eager to try clear liquid diet.  Having some pressure when she urinates, no dysuria.  Feels like when she has been constipated before, feels like she has a vaginal yeast infection.  States she gets them every time she has antibiotics.    Review of Systems  As above    Objective   Objective   Vital Signs  Temp:  [97.2 °F (36.2 °C)-98.3 °F (36.8 °C)] 97.2 °F (36.2 °C)  Heart Rate:  [62-84] 79  Resp:  [16] 16  BP: (108-128)/(64-73) 120/66  SpO2:  [92 %-95 %] 93 %  on   ;   Device (Oxygen Therapy): room air  Body mass index is 38.2 kg/m².  Physical Exam  Constitutional:       General: She is not in acute distress.     Appearance: She is not ill-appearing.   Cardiovascular:      Rate and Rhythm: Normal rate and regular rhythm.   Pulmonary:      Effort: Pulmonary effort is normal. No respiratory distress.   Abdominal:      General: Abdomen is flat. There is distension.      Tenderness: There is abdominal tenderness.   Musculoskeletal:         General: No swelling or deformity. Normal range of motion.   Skin:     General: Skin is warm and dry.   Neurological:      General: No focal deficit present.      Mental Status: She is alert. Mental status is at baseline.         Results Review     I reviewed the patient's new clinical results.  Results from last 7 days   Lab Units 08/09/24  0522 08/08/24  0545 08/07/24 2028   WBC 10*3/mm3 13.86* 12.32* 20.24*   HEMOGLOBIN g/dL 11.5* 12.9 15.3   PLATELETS 10*3/mm3 244 242 275     Results from last 7 days   Lab Units 08/09/24  0522 08/08/24  0545 08/07/24 2028   SODIUM mmol/L 141 140 140   POTASSIUM mmol/L 3.4* 3.5 3.0*   CHLORIDE mmol/L 108* 104 102   CO2 mmol/L 24.0 24.5 26.0   BUN mg/dL 8 7 7   CREATININE mg/dL 0.89 0.78 0.86   GLUCOSE mg/dL 122* 163* 142*   Estimated Creatinine Clearance: 84.5 mL/min (by C-G formula based on SCr of 0.89 mg/dL).  Results from last 7 days  "  Lab Units 08/07/24 2028   ALBUMIN g/dL 3.9   BILIRUBIN mg/dL 0.4   ALK PHOS U/L 100   AST (SGOT) U/L 16   ALT (SGPT) U/L 20     Results from last 7 days   Lab Units 08/09/24  0522 08/08/24  0545 08/07/24 2028   CALCIUM mg/dL 8.3* 8.6 9.4   ALBUMIN g/dL  --   --  3.9   MAGNESIUM mg/dL  --   --  2.0     Results from last 7 days   Lab Units 08/07/24  2151   LACTATE mmol/L 1.0     COVID19   Date Value Ref Range Status   03/22/2022 Not Detected Not Detected - Ref. Range Final     SARS-CoV-2 PCR   Date Value Ref Range Status   09/22/2020 Not Detected Not Detected Final     Comment:     Nucleic acid specific to SARS-CoV-2 (COVID-19) virus was not detected inthis sample by the TaqPath (TM) COVID-19 Combo Kit.SARS-CoV-2 (COVID-19) nucleic acid testing performed using [employer] Aptima (R) SARS-CoV-2 Assay or Scribe Software TaqPath   (TM)COVID-19 Combo Kit as indicated above under Emergency Use Authorization (EUA) from the FDA. Aptima (R) and TaqPath (TM) test performancecharacteristics verified by [facility] in accordance with the FDAs Guidance Document (Policy for Diagnostic   Tests for Coronavirus Disease-2019during the Public Health Emergency) issued on March 16, 2020. The laboratory is regulated under CLIA as qualified to perform high-complexity testingUnless otherwise noted, all testing was performed at [facility],   CLIA No. [id number], [place] State Licensee No. [id number]     No results found for: \"HGBA1C\", \"POCGLU\"    CT Abdomen Pelvis With Contrast  Narrative: CT OF THE ABDOMEN AND PELVIS WITH CONTRAST     HISTORY: Abdominal pain. HISTORY of Crohn's disease.     COMPARISON: July 11, 2024     TECHNIQUE: Axial CT imaging was obtained through the abdomen and pelvis.  IV contrast was administered.     FINDINGS:  Images through the lung bases demonstrate a benign centrally calcified  nodule within the left lower lobe. The gallbladder is absent. There is a  small hiatal hernia. It is distended with fluid, as is " the stomach. This  was actually also present on prior study. The spleen, adrenal glands,  and pancreas are normal. Duodenum appears unremarkable. No  hydronephrosis is seen on either side. The kidneys enhance  symmetrically. There are mild aortoiliac calcifications. No distal  ureteral or bladder stones are seen. Uterus appears normal, as are the  ovaries. There is colonic diverticulosis. The appendix is normal. The  patient has thick walled and edematous segments of small bowel which are  noted within the abdomen, with adjacent mesenteric edema and trace  fluid. Appearance is concerning for active Crohn's disease. Distal to  the inflamed segments, there is also a mildly dilated loop of small  bowel, which is located proximal to an area of narrowing within the  small bowel, which may reflect a stricture. This is best seen on coronal  series 3 image 59. The distal ileum is decompressed. There is fatty  infiltration of the wall of the distal small bowel, in keeping with  chronic inflammatory bowel disease. The appendix is normal. There is a  small amount of free fluid which is noted within the abdomen and pelvis,  likely reactive. No acute osseous abnormalities are seen. There is  stable sclerosis of the SI joints bilaterally.     Impression:    1. Thick-walled and edematous loops of small bowel which are noted  within the left side of the abdomen, concerning for active Crohn's  disease.  2. The patient's small hiatal hernia is mildly distended with fluid, as  is the stomach. The patient had similar findings on the exam from July 11, 2024. Some degree of gastritis, and associated ileus is not  excluded.  3. There are some distended loops of small bowel, which don't appear  particularly thick walled, which are noted proximal to a focally  narrowed segment of small bowel. Stricture is not excluded.        Radiation dose reduction techniques were utilized, including automated  exposure control and exposure modulation  based on body size.        This report was finalized on 8/7/2024 10:12 PM by Dr. Sparkle Singh M.D on Workstation: [id number]       I reviewed the patient's daily medications.  Scheduled Medications  atorvastatin, 10 mg, Oral, Daily  cetirizine, 10 mg, Oral, Daily  escitalopram, 10 mg, Oral, Daily  fluconazole, 150 mg, Oral, Q24H  fluticasone, 2 spray, Nasal, Daily  lisinopril, 10 mg, Oral, Q24H   And  hydroCHLOROthiazide, 12.5 mg, Oral, Q24H  levothyroxine, 100 mcg, Oral, Q AM  methylPREDNISolone sodium succinate, 20 mg, Intravenous, Q8H  pantoprazole, 40 mg, Intravenous, Q12H  piperacillin-tazobactam, 3.375 g, Intravenous, Q8H  sodium chloride, 10 mL, Intravenous, Q12H    Infusions  sodium chloride, 100 mL/hr, Last Rate: 100 mL/hr (08/08/24 2145)    Diet  Diet: Liquid; Clear Liquid; Fluid Consistency: Thin (IDDSI 0)        I have personally reviewed:  []  Laboratory   []  Microbiology   []  Radiology   []  EKG/Telemetry   []  Cardiology/Vascular   []  Pathology   []  Records     Assessment/Plan     Active Hospital Problems    Diagnosis  POA    **Exacerbation of Crohn's disease [K50.90]  Yes    Hypokalemia [E87.6]  Unknown    Hypothyroidism [E03.9]  Yes    GERD (gastroesophageal reflux disease) [K21.9]  Yes    Essential hypertension [I10]  Yes      Resolved Hospital Problems   No resolved problems to display.       53 y.o. female admitted with Exacerbation of Crohn's disease.    Crohn's disease exacerbation  Leukocytosis  Constipation  -Continue IV steroids, IV Zosyn  -GI following  -Discussed with surgery, plan for clear liquid diet.  No bowel regimen by mouth with his CT findings, bisacodyl rectal as needed  -Add Norco as needed, continue morphine as needed  -IVF    Vaginal candidiasis  -Diflucan x 1  -Routinely gets vaginal yeast infections with antibiotics    Hypokalemia  -Replete as needed    Hypertension-continue home lisinopril/hydrochlorothiazide    Hypothyroidism-continue home  levothyroxine    GERD-continue home Protonix    Works for [employer]    Lovenox 40 mg SC daily for DVT prophylaxis.  Full code.  Discussed with patient, nursing staff, and consulting provider.  Anticipate discharge home in 2-3 days.    Expected Discharge Date: 8/11/2024; Expected Discharge Time:       Davie Carreon MD  [facility]  08/09/24  08:20 EDT            Electronically signed by Davie Carreon MD at 08/09/24 0824       Rhianna Shelley PA-C at 08/09/24 0759          [facility]  Inpatient Progress Note    Reason for Follow Up:  Crohn's Flare     Subjective     Interval History:   States she is feeling much better today.  She has not had a bowel movement but is passing gas.  Reports her abdominal pain is much better.    Current Facility-Administered Medications:     acetaminophen (TYLENOL) tablet 650 mg, 650 mg, Oral, Q4H PRN **OR** acetaminophen (TYLENOL) 160 MG/5ML oral solution 650 mg, 650 mg, Oral, Q4H PRN **OR** acetaminophen (TYLENOL) suppository 650 mg, 650 mg, Rectal, Q4H PRN, Lise Perdomo APRN    atorvastatin (LIPITOR) tablet 10 mg, 10 mg, Oral, Daily, Lise Perdomo APRN, 10 mg at 08/08/24 0842    butalbital-acetaminophen-caffeine (FIORICET, ESGIC) -40 MG per tablet 1 tablet, 1 tablet, Oral, Q6H PRN, Lise Perdomo APRN    calcium carbonate (TUMS) chewable tablet 500 mg (200 mg elemental), 2 tablet, Oral, BID PRN, Lise Perdomo APRN    cetirizine (zyrTEC) tablet 10 mg, 10 mg, Oral, Daily, Lise Perdomo APRN, 10 mg at 08/08/24 0842    escitalopram (LEXAPRO) tablet 10 mg, 10 mg, Oral, Daily, Lise Perdomo APRN, 10 mg at 08/08/24 0842    fluconazole (DIFLUCAN) tablet 150 mg, 150 mg, Oral, Q24H, Davie Carreon MD    fluticasone (FLONASE) 50 MCG/ACT nasal spray 2 spray, 2 spray, Nasal, Daily, Lise Perdomo APRN    lisinopril (PRINIVIL,ZESTRIL) tablet 10 mg, 10 mg, Oral, Q24H, 10 mg at  08/08/24 0842 **AND** hydroCHLOROthiazide tablet 12.5 mg, 12.5 mg, Oral, Q24H, Lise Perdomo APRN, 12.5 mg at 08/08/24 0842    hydrOXYzine (ATARAX) tablet 25 mg, 25 mg, Oral, Q8H PRN, Lise Perdomo APRN    levothyroxine (SYNTHROID, LEVOTHROID) tablet 100 mcg, 100 mcg, Oral, Q AM, Lise Perdomo APRN, 100 mcg at 08/09/24 0504    methylPREDNISolone sodium succinate (SOLU-Medrol) injection 20 mg, 20 mg, Intravenous, Q8H, Rhianna Shelley PA-C, 20 mg at 08/09/24 0132    morphine injection 2 mg, 2 mg, Intravenous, Q3H PRN, Lise Perdomo APRN, 2 mg at 08/08/24 2228    ondansetron ODT (ZOFRAN-ODT) disintegrating tablet 4 mg, 4 mg, Oral, Q6H PRN, 4 mg at 08/08/24 0545 **OR** ondansetron (ZOFRAN) injection 4 mg, 4 mg, Intravenous, Q6H PRN, Lise Perdomo APRN    pantoprazole (PROTONIX) injection 40 mg, 40 mg, Intravenous, Q12H, Lise Perdomo APRN, 40 mg at 08/08/24 2021    piperacillin-tazobactam (ZOSYN) 3.375 g IVPB in 100 mL NS MBP (CD), 3.375 g, Intravenous, Q8H, Lise Perdomo APRN, 3.375 g at 08/09/24 0504    promethazine (PHENERGAN) tablet 25 mg, 25 mg, Oral, Q6H PRN, Lise Perdomo APRN    sodium chloride 0.9 % flush 10 mL, 10 mL, Intravenous, Q12H, Lise Perdomo APRN, 10 mL at 08/08/24 2022    sodium chloride 0.9 % flush 10 mL, 10 mL, Intravenous, PRN, Lise Perdomo APRN    sodium chloride 0.9 % infusion 40 mL, 40 mL, Intravenous, PRN, Lise Perdomo APRN    sodium chloride 0.9 % infusion, 100 mL/hr, Intravenous, Continuous, Lise Perdomo APRN, Last Rate: 100 mL/hr at 08/08/24 2145, 100 mL/hr at 08/08/24 2145      Objective     Vital Signs  Temp:  [97.2 °F (36.2 °C)-98.3 °F (36.8 °C)] 97.2 °F (36.2 °C)  Heart Rate:  [62-84] 79  Resp:  [16] 16  BP: (108-128)/(64-73) 120/66  Body mass index is 38.2 kg/m².    Intake/Output Summary (Last 24 hours) at 8/9/2024 9449  Last data filed at 8/8/2024 2228  Gross per 24 hour   Intake  2053.33 ml   Output 950 ml   Net 1103.33 ml     No intake/output data recorded.     Physical Exam:   General: patient awake, alert and cooperative   Cardiovascular: regular rhythm and rate   Pulm: regular and unlabored   Abdomen: soft, mild generalized tenderness to palpation-worse in right and left lower quadrants, nondistended; normal bowel sounds     Results Review:     I reviewed the patient's new clinical results.    Results from last 7 days   Lab Units 08/09/24 0522 08/08/24  0545 08/07/24 2028   WBC 10*3/mm3 13.86* 12.32* 20.24*   HEMOGLOBIN g/dL 11.5* 12.9 15.3   HEMATOCRIT % 35.0 38.6 45.8   PLATELETS 10*3/mm3 244 242 275     Results from last 7 days   Lab Units 08/09/24 0522 08/08/24  0545 08/07/24 2028   SODIUM mmol/L 141 140 140   POTASSIUM mmol/L 3.4* 3.5 3.0*   CHLORIDE mmol/L 108* 104 102   CO2 mmol/L 24.0 24.5 26.0   BUN mg/dL 8 7 7   CREATININE mg/dL 0.89 0.78 0.86   CALCIUM mg/dL 8.3* 8.6 9.4   BILIRUBIN mg/dL  --   --  0.4   ALK PHOS U/L  --   --  100   ALT (SGPT) U/L  --   --  20   AST (SGOT) U/L  --   --  16   GLUCOSE mg/dL 122* 163* 142*         Lab Results   Lab Value Date/Time    LIPASE 33 08/07/2024 2028    LIPASE 20 03/22/2022 1620    LIPASE 22 03/08/2021 1513    LIPASE 34 07/27/2020 1523    LIPASE 20 06/19/2020 1728       Radiology:  CT Abdomen Pelvis With Contrast   Final Result       1. Thick-walled and edematous loops of small bowel which are noted   within the left side of the abdomen, concerning for active Crohn's   disease.   2. The patient's small hiatal hernia is mildly distended with fluid, as   is the stomach. The patient had similar findings on the exam from July 11, 2024. Some degree of gastritis, and associated ileus is not   excluded.   3. There are some distended loops of small bowel, which don't appear   particularly thick walled, which are noted proximal to a focally   narrowed segment of small bowel. Stricture is not excluded.           Radiation dose reduction  techniques were utilized, including automated   exposure control and exposure modulation based on body size.           This report was finalized on 8/7/2024 10:12 PM by Dr. Sparkle Singh M.D on Workstation: [id number]              Assessment & Plan     Active Hospital Problems    Diagnosis     **Exacerbation of Crohn's disease     Hypokalemia     Hypothyroidism     GERD (gastroesophageal reflux disease)     Essential hypertension        Assessment:  Crohn's disease-on Stelara recently switched to every 6 weeks from every 8 weeks dosing  Abdominal pain  Nausea/vomiting      Plan:  Continue IV steroids-methylprednisolone 60 mg daily  On clear liquid diet-continue this today  Surgery consulted due to stricturing seen on CT scan-appears to be acute with no surgical intervention warranted at this time.  Recommend DVT prophylaxis  Will continue to monitor-will ultimately need close follow-up in outpatient office to discuss possibly switching off of Stelara versus trialing increased frequency of Stelara    Further plans pending clinical course and per attending, Dr. Jason Shelley PA-C      Electronically signed by Rhianna Shelley PA-C at 08/09/24 1230       Miles Borden MD at 08/09/24 0649          Colorectal & General Surgery  Progress Note    Patient: Rosa Melgoza  YOB: 1970  MRN: [id number]      Assessment  Rosa Melgoza is a 53 y.o. female with Crohn's disease who presents with acute Crohn's enteritis within her mid small bowel.  Exam is improved this morning.  She is passing flatus and her pain is improved.  I have initiated a clear liquid diet.  Would recommend staying on clears today.  Agree with steroids and antibiotics.  I will continue to follow.  Please call with questions anytime.    Subjective  Feels better today.  Pain is improving but still present.  She is passing flatus.  Very thirsty.    Objective    Vitals:    08/09/24 0503   BP: 120/66   Pulse:  79   Resp: 16   Temp: 97.2 °F (36.2 °C)   SpO2: 93%       Physical Exam  Constitutional: Well-developed well-nourished, no acute distress  Neck: Supple, trachea midline  Respiratory: No increased work of breathing, Symmetric excursion  Cardiovascular: Well pefursed, no jugular venous distention evident   Abdominal: Soft, obese, mildly tender to palpation, mildly distended  Skin: Warm, dry, no rash on visualized skin surfaces  Psychiatric: Alert and oriented ×3, normal affect     Laboratory Results  I have personally reviewed CBC with WBC 13, hemoglobin 11, platelets 244.  BMP with creatinine 0.9, potassium 3.4, bicarb 24.    Radiology  No new imaging to review         Bryan Borden MD  Colorectal & General Surgery  [facility]    [address], [address]  [place], [place], [address]  P: [phone]  F: [phone]       Electronically signed by Miles Borden MD at 08/09/24 0650          Consult Notes (last 72 hours)        Miles Borden MD at 08/08/24 2011        Consult Orders    1. Inpatient General Surgery Consult [[id number]] ordered by Rhianna Shelley PA-C at 08/08/24 1618                 Colorectal & General Surgery  Consultation    Patient: Rosa Melgoza  YOB: 1970  MRN: [id number]      Assessment  Rosa Melgoza is a 53 y.o. female with Crohn's disease who presents with acute Crohn's disease isolated to the mid small bowel.  She does not demonstrate any obstructive symptoms or radiographic evidence of obstruction, but she certainly has significant inflammation of her mid small bowel on CT scan, which does appear to be acute.  There is no surgical intervention warranted at this time.  Agree with steroids, antibiotics, bowel rest, and IV fluid hydration.    While I anticipate that the steroids will quell this flare, there certainly remains a risk that she could develop chronic disease within the segment of small bowel.  I would be in favor of changing her  biologic to a new therapy as this represents failure of Stelara in my eyes.    I will continue to follow her both here in the hospital and as an outpatient.  Please feel free to contact me anytime with any questions or concerns.    History of Present Illness   Rosa Melgoza is a 53 y.o. female with chronic disease who presents to the hospital with acute abdominal pain.  She was found to have significant formation of her mid small bowel consistent with Crohn's flare.  She says her last dose of Stelara was last month and that recently she has been experiencing symptoms when he gets to the end of the cycle of Stelara.  She denies having any bowel function for the past 2 days and says that she has had some nausea but no vomiting.  Abdominal pain has improved since arrival to the hospital.    Most recent colonoscopy: , diverticulosis otherwise normal    Past Medical History   Past Medical History:   Diagnosis Date    Allergic     Anxiety     Cholelithiasis     Crohn disease     Depression     Disease of thyroid gland     Diverticulitis of colon     Diverticulosis     Elevated blood pressure reading without diagnosis of hypertension     Encounter for long-term (current) use of medications 2020    Fatty liver     GERD (gastroesophageal reflux disease)     Headache, migraine     Hernia     History of medical problems     not sure of date, but very painful joints and muscles.  Poss r/t crohns    History of sore throat     Hyperlipidemia     Hypertension     Hypothyroidism     Migraine     Need for DTaP and Hib vaccine     History of     Obesity     Urinary tract infection     Vaginal yeast infection         Past Surgical History   Past Surgical History:   Procedure Laterality Date    BREAST BIOPSY       SECTION      CHOLECYSTECTOMY      COLONOSCOPY  approx     Tavon LIU  benign polyps per patient    COLONOSCOPY N/A 2020    Procedure: COLONOSCOPY  into cecum and TI with biopsies;  Surgeon:  Maicol aGrza MD;  Location:  [facility];  Service: Gastroenterology;  Laterality: N/A;  Pre: abn CT scan  post: diverticulosis    ENDOSCOPY N/A 2020    Procedure: ESOPHAGOGASTRODUODENOSCOPY WITH BIOPSY;  Surgeon: Maicol Garza MD;  Location:  [facility];  Service: Gastroenterology;  Laterality: N/A;  Pre: abn CT scan  post: hiatal hernia, esophagitis    FRACTURE SURGERY      broken hand, 3 screws in place    TONSILLECTOMY      UPPER GASTROINTESTINAL ENDOSCOPY  approx     Montes De Oca M.D.  normal per patient       Social History  Social History     Socioeconomic History    Marital status:    Tobacco Use    Smoking status: Former     Current packs/day: 0.00     Average packs/day: 1 pack/day for 10.0 years (10.0 ttl pk-yrs)     Types: Cigarettes     Start date: 3/1/2001     Quit date: 3/1/2011     Years since quittin.4    Smokeless tobacco: Never    Tobacco comments:     smoked off and on   Vaping Use    Vaping status: Never Used   Substance and Sexual Activity    Alcohol use: Not Currently     Comment: once a year or so I have a drink w dinner    Drug use: Never    Sexual activity: Yes     Partners: Male     Birth control/protection: Surgical     Comment:  had vasectomy       Family History  Family History   Problem Relation Age of Onset    Breast cancer Mother     Arthritis Mother     Cancer Mother         breast ca    Miscarriages / Stillbirths Mother     Hypertension Father     Heart disease Father     Alcohol abuse Father     Hyperlipidemia Father     Breast cancer Maternal Grandmother     Anxiety disorder Maternal Grandmother     Arthritis Maternal Grandmother     Cancer Maternal Grandmother         breast and brain    Depression Maternal Grandmother     Diabetes Maternal Grandmother     Heart disease Maternal Grandmother     Kidney disease Maternal Grandmother     Thyroid disease Maternal Grandmother     Mental illness Maternal Grandmother     Alcohol  abuse Other     Depression Other     Anxiety disorder Other     Hypertension Other     Other Other         Pure hypercholesterolemia and esophageal reflux    Lung cancer Other     Inflammatory bowel disease Maternal Grandfather     Irritable bowel syndrome Maternal Grandfather         Honestly not sure which he had    Arthritis Maternal Grandfather     Cancer Maternal Grandfather         lung    Stroke Maternal Grandfather        Colorectal cancer family history: None    Review of Systems  Negative except as documented in the HPI.     Allergies  No Known Allergies    Medications    Current Facility-Administered Medications:     acetaminophen (TYLENOL) tablet 650 mg, 650 mg, Oral, Q4H PRN **OR** acetaminophen (TYLENOL) 160 MG/5ML oral solution 650 mg, 650 mg, Oral, Q4H PRN **OR** acetaminophen (TYLENOL) suppository 650 mg, 650 mg, Rectal, Q4H PRN, Lise Perdomo APRN    atorvastatin (LIPITOR) tablet 10 mg, 10 mg, Oral, Daily, Lise Perodmo APRN, 10 mg at 08/08/24 0842    sennosides-docusate (PERICOLACE) 8.6-50 MG per tablet 2 tablet, 2 tablet, Oral, BID PRN **AND** polyethylene glycol (MIRALAX) packet 17 g, 17 g, Oral, Daily PRN **AND** bisacodyl (DULCOLAX) EC tablet 5 mg, 5 mg, Oral, Daily PRN **AND** bisacodyl (DULCOLAX) suppository 10 mg, 10 mg, Rectal, Daily PRN, Lise Perdomo APRN    butalbital-acetaminophen-caffeine (FIORICET, ESGIC) -40 MG per tablet 1 tablet, 1 tablet, Oral, Q6H PRN, Lise Perdomo APRN    calcium carbonate (TUMS) chewable tablet 500 mg (200 mg elemental), 2 tablet, Oral, BID PRN, Lise Perdomo APRN    cetirizine (zyrTEC) tablet 10 mg, 10 mg, Oral, Daily, Lise Perdomo APRN, 10 mg at 08/08/24 0842    escitalopram (LEXAPRO) tablet 10 mg, 10 mg, Oral, Daily, Lise Perdomo APRN, 10 mg at 08/08/24 0842    fluticasone (FLONASE) 50 MCG/ACT nasal spray 2 spray, 2 spray, Nasal, Daily, Lise Perdomo APRN    lisinopril  (PRINIVIL,ZESTRIL) tablet 10 mg, 10 mg, Oral, Q24H, 10 mg at 08/08/24 0842 **AND** hydroCHLOROthiazide tablet 12.5 mg, 12.5 mg, Oral, Q24H, Lise Perdomo APRN, 12.5 mg at 08/08/24 0842    hydrOXYzine (ATARAX) tablet 25 mg, 25 mg, Oral, Q8H PRN, Lise Perdomo APRN    levothyroxine (SYNTHROID, LEVOTHROID) tablet 100 mcg, 100 mcg, Oral, Q AM, Lise Perdomo APRN, 100 mcg at 08/08/24 0514    methylPREDNISolone sodium succinate (SOLU-Medrol) injection 20 mg, 20 mg, Intravenous, Q8H, Rhianna Shelley PA-C, 20 mg at 08/08/24 1837    morphine injection 2 mg, 2 mg, Intravenous, Q3H PRN, Lise Perdomo APRN, 2 mg at 08/08/24 1626    ondansetron ODT (ZOFRAN-ODT) disintegrating tablet 4 mg, 4 mg, Oral, Q6H PRN, 4 mg at 08/08/24 0545 **OR** ondansetron (ZOFRAN) injection 4 mg, 4 mg, Intravenous, Q6H PRN, Lise Perdomo APRN    pantoprazole (PROTONIX) injection 40 mg, 40 mg, Intravenous, Q12H, Lise Perdomo APRN, 40 mg at 08/08/24 0842    piperacillin-tazobactam (ZOSYN) 3.375 g IVPB in 100 mL NS MBP (CD), 3.375 g, Intravenous, Q8H, Lise Perdomo APRN, 3.375 g at 08/08/24 1427    promethazine (PHENERGAN) tablet 25 mg, 25 mg, Oral, Q6H PRN, Lise Perdomo APRN    sodium chloride 0.9 % flush 10 mL, 10 mL, Intravenous, Q12H, Lise Perdomo APRN, 10 mL at 08/08/24 0842    sodium chloride 0.9 % flush 10 mL, 10 mL, Intravenous, PRN, Lise Perdomo APRN    sodium chloride 0.9 % infusion 40 mL, 40 mL, Intravenous, PRN, Lise Perdomo APRN    sodium chloride 0.9 % infusion, 100 mL/hr, Intravenous, Continuous, Lise Perdomo APRN, Last Rate: 100 mL/hr at 08/08/24 1837, 100 mL/hr at 08/08/24 1837    Vital Signs  Vitals:    08/08/24 1736   BP: 128/73   Pulse: 84   Resp: 16   Temp: 98.3 °F (36.8 °C)   SpO2: 92%          Physical Exam  Constitutional: Resting comfortably, no acute distress  Neck: Supple, trachea midline  Respiratory: No increased work of  breathing, Symmetric excursion  Cardiovascular: Well pefursed, no jugular venous distention evident   Abdominal: Soft, mildly tender, mildly distended  Lymphatics: No cervical or suprascapular adenopathy  Skin: Warm, dry, no rash on visualized skin surfaces  Musculoskeletal: Symmetric strength, no obvious gross abnormalities  Psychiatric: Alert and oriented ×3, normal affect     Laboratory Results  I have personally reviewed CBC with WBC 20, hemoglobin 15, platelets 275.  Lipase 33.  Lactate 1.0.  CMP with creatinine 0.6, albumin 3.9.    Radiology  I have personally reviewed CT scan of the abdomen pelvis demonstrates relatively lengthy segment of small bowel with acute inflammation circumferentially.  No signs of  upstream dilation indicate obstruction.  No other evidence of active Crohn's disease radiographically.    Endoscopy  I have personally reviewed colonoscopy from 2020 demonstrates normal colon with sigmoid diverticulosis.         Bryan Borden MD  Colorectal & General Surgery  [facility]    [address], [address]  [place], [place], [address]  P: [phone]  F: [phone]       Electronically signed by Miles Borden MD at 08/08/24 2015       Rhianna Shelley PA-C at 08/08/24 0846        Consult Orders    1. Inpatient Gastroenterology Consult [[id number]] ordered by Lise Perdomo APRN at 08/08/24 0053              Attestation signed by Mat Gomez MD at 08/08/24 1630    I have reviewed this documentation and agree.                  [facility]  Initial Inpatient Consult Note    Referring Provider:     Lise Perdomo APRN       Reason for Consultation:     Crohn's flare       Subjective     History of present illness:    53 y.o. female with history of Crohn's disease, hypertension, migraines, hypothyroidism, and GERD who presented to the ED yesterday with complaints of abdominal pain which started suddenly the afternoon of 8/7.   Pain described as constant, severe, and sharp in nature.    She is a patient Dr. Garza and follows with him and Manda Butler PA-C in our outpatient office.  She has been on Stelara for about 1 year now.  She has previously failed Entyvio due to joint pain.     She recently contact the office saying that she felt like she could feel her symptoms worsening leading up to her Stelara dose.  Frequency was changed from every 8 weeks to every 6 weeks.  She recently received Stelara the last week of July.  She says over the last day she had sudden onset abdominal pain which she described as constant and sharp.  She reports that she does not have an appetite and says that she feels like food makes the pain worse at this time.  She did have associated nausea and vomiting-she denies any coffee-ground emesis or hematemesis.  She reports she is feeling a little bit better since arriving to the hospital.  She has received a dose of steroids.  She denies diarrhea.    Labs on arrival showed WBC 20.24, hemoglobin 15.3.    CT abdomen pelvis showed thick-walled and edematous loops of small bowel within left side of abdomen concerning for active Crohn's.  Mildly distended stomach with some degree of gastritis or associated ileus not excluded, some distended loops of small bowel which do not appear thick-walled and are noted proximally to focally narrowed segment of small bowel-stricture not excluded    EGD 9/24/2020 showed medium hiatal hernia, LA grade a esophagitis, normal stomach, normal duodenum.  No H. pylori.    Colonoscopy 9/24/2020 showed normal examined portion of ileum, diverticulosis in descending colon.  Colon biopsies normal    Past Medical History:  Past Medical History:   Diagnosis Date    Allergic     Anxiety     Cholelithiasis     Crohn disease     Depression     Disease of thyroid gland     Diverticulitis of colon     Diverticulosis     Elevated blood pressure reading without diagnosis of hypertension      Encounter for long-term (current) use of medications 2020    Fatty liver     GERD (gastroesophageal reflux disease)     Headache, migraine     Hernia     History of medical problems     not sure of date, but very painful joints and muscles.  Poss r/t crohns    History of sore throat     Hyperlipidemia     Hypertension     Hypothyroidism     Migraine     Need for DTaP and Hib vaccine     History of     Obesity     Urinary tract infection     Vaginal yeast infection      Past Surgical History:  Past Surgical History:   Procedure Laterality Date    BREAST BIOPSY       SECTION      CHOLECYSTECTOMY      COLONOSCOPY  approx     Tavon M.D.  benign polyps per patient    COLONOSCOPY N/A 2020    Procedure: COLONOSCOPY  into cecum and TI with biopsies;  Surgeon: Maicol Garza MD;  Location: [facility] ENDOSCOPY;  Service: Gastroenterology;  Laterality: N/A;  Pre: abn CT scan  post: diverticulosis    ENDOSCOPY N/A 2020    Procedure: ESOPHAGOGASTRODUODENOSCOPY WITH BIOPSY;  Surgeon: Maicol Garza MD;  Location: [facility] ENDOSCOPY;  Service: Gastroenterology;  Laterality: N/A;  Pre: abn CT scan  post: hiatal hernia, esophagitis    FRACTURE SURGERY      broken hand, 3 screws in place    TONSILLECTOMY      UPPER GASTROINTESTINAL ENDOSCOPY  approx     Tavon M.DAna  normal per patient      Social History:   Social History     Tobacco Use    Smoking status: Former     Current packs/day: 0.00     Average packs/day: 1 pack/day for 10.0 years (10.0 ttl pk-yrs)     Types: Cigarettes     Start date: 3/1/2001     Quit date: 3/1/2011     Years since quittin.4    Smokeless tobacco: Never    Tobacco comments:     smoked off and on   Substance Use Topics    Alcohol use: Not Currently     Comment: once a year or so I have a drink w dinner      Family History:  Family History   Problem Relation Age of Onset    Breast cancer Mother     Arthritis Mother     Cancer Mother         breast ca     Miscarriages / Stillbirths Mother     Hypertension Father     Heart disease Father     Alcohol abuse Father     Hyperlipidemia Father     Breast cancer Maternal Grandmother     Anxiety disorder Maternal Grandmother     Arthritis Maternal Grandmother     Cancer Maternal Grandmother         breast and brain    Depression Maternal Grandmother     Diabetes Maternal Grandmother     Heart disease Maternal Grandmother     Kidney disease Maternal Grandmother     Thyroid disease Maternal Grandmother     Mental illness Maternal Grandmother     Alcohol abuse Other     Depression Other     Anxiety disorder Other     Hypertension Other     Other Other         Pure hypercholesterolemia and esophageal reflux    Lung cancer Other     Inflammatory bowel disease Maternal Grandfather     Irritable bowel syndrome Maternal Grandfather         Honestly not sure which he had    Arthritis Maternal Grandfather     Cancer Maternal Grandfather         lung    Stroke Maternal Grandfather        Home Meds:  Medications Prior to Admission   Medication Sig Dispense Refill Last Dose    atorvastatin (LIPITOR) 10 MG tablet Take 1 tablet by mouth Daily. 90 tablet 3     B Complex-C-Folic Acid (STRESS B COMPLEX PO) Take 1 tablet by mouth Daily.       butalbital-acetaminophen-caffeine (FIORICET, ESGIC) -40 MG per tablet Take 1 tablet by mouth Every 6 (Six) Hours As Needed for Headache. 30 tablet 0     Cholecalciferol (VITAMIN D3 GUMMIES ADULT PO) Take 5,000 Units by mouth Daily.       Dihydroergotamine Mesylate HFA (Trudhesa) 0.725 MG/ACT aerosol solution 0.725 mg into the nostril(s) as directed by provider As Needed (Moderate to severe headache). One spray into each nostril, maybe repeated after 1 hour if needed, NO more than 2 doses in 24-hour period or 3 doses in 7-day period. 8 mL 2     escitalopram (LEXAPRO) 10 MG tablet Take 1 tablet by mouth Daily. 90 tablet 3     fluticasone (FLONASE) 50 MCG/ACT nasal spray Use 2 sprays into each  nostril as directed by provider Daily. 16 g 6     galcanezumab-gnlm (EMGALITY) 120 MG/ML auto-injector pen Inject 1 mL under the skin into the appropriate area as directed Every 30 (Thirty) Days. (Patient taking differently: Inject 1 mL under the skin into the appropriate area as directed Every 30 (Thirty) Days. Due on August 27, 2024) 1 mL 11     hydrOXYzine (ATARAX) 25 MG tablet Take 1 tablet by mouth Every 8 (Eight) Hours As Needed for Anxiety. 90 tablet 0     Hyoscyamine Sulfate SL (Levsin/SL) 0.125 MG sublingual tablet Place 1 tablet under the tongue Every 6 (Six) Hours As Needed (abdominal pain/cramping). 60 each 3     levocetirizine (XYZAL) 5 MG tablet Take 1 tablet by mouth Every Evening. 90 tablet 3     levothyroxine (SYNTHROID, LEVOTHROID) 100 MCG tablet Take 1 tablet by mouth Daily. 90 tablet 1     lisinopril-hydrochlorothiazide (PRINZIDE,ZESTORETIC) 10-12.5 MG per tablet Take 1 tablet by mouth Daily. 90 tablet 0     Multiple Vitamins-Minerals (MULTI ADULT GUMMIES PO)        ondansetron (ZOFRAN) 8 MG tablet Take 0.5 tablets by mouth Every 8 (Eight) Hours As Needed for Nausea or Vomiting. 90 tablet 0     pantoprazole (PROTONIX) 40 MG EC tablet Take 1 tablet by mouth 2 (Two) Times a Day. 180 tablet 3     promethazine (PHENERGAN) 25 MG tablet Take 1 tablet by mouth Every 6 (Six) Hours As Needed for Nausea or Vomiting. 60 tablet 1     Semaglutide-Weight Management (Wegovy) 1.7 MG/0.75ML solution auto-injector Inject 0.75 mL under the skin into the appropriate area as directed 1 (One) Time Per Week. (Patient taking differently: Inject 0.75 mL under the skin into the appropriate area as directed 1 (One) Time Per Week. Every Thursday) 3 mL 0     Ubiquinol 100 MG capsule Take 100 mg by mouth Daily.       Ustekinumab (STELARA) 90 MG/ML solution prefilled syringe Injection Inject 90 mg under the skin into the appropriate area as directed Every 6 (Six) Weeks. (Patient taking differently: Inject 90 mg under the skin  into the appropriate area as directed Every 8 (Eight) Weeks. Last taken July 27, 2024) 1 mL 8      Current Meds:   atorvastatin, 10 mg, Oral, Daily  cetirizine, 10 mg, Oral, Daily  escitalopram, 10 mg, Oral, Daily  fluticasone, 2 spray, Nasal, Daily  lisinopril, 10 mg, Oral, Q24H   And  hydroCHLOROthiazide, 12.5 mg, Oral, Q24H  levothyroxine, 100 mcg, Oral, Q AM  pantoprazole, 40 mg, Intravenous, Q12H  piperacillin-tazobactam, 3.375 g, Intravenous, Q8H  sodium chloride, 10 mL, Intravenous, Q12H      Allergies:  No Known Allergies    Objective     Vital Signs  Temp:  [96.4 °F (35.8 °C)-98.6 °F (37 °C)] 96.4 °F (35.8 °C)  Heart Rate:  [] 78  Resp:  [14-18] 14  BP: (101-134)/(68-87) 119/74    Physical Exam:   General: patient awake, alert and cooperative   Eyes: Normal lids and lashes, no scleral icterus   Cardiovascular: regular rhythm and rate   Pulm: clear to auscultation bilaterally, regular and unlabored   Abdomen: soft, nontender, nondistended; normal bowel sounds    Results Review:   I reviewed the patient's new clinical results.    Results from last 7 days   Lab Units 08/08/24  0545 08/07/24 2028   WBC 10*3/mm3 12.32* 20.24*   HEMOGLOBIN g/dL 12.9 15.3   HEMATOCRIT % 38.6 45.8   PLATELETS 10*3/mm3 242 275     Results from last 7 days   Lab Units 08/08/24  0545 08/07/24 2028   SODIUM mmol/L 140 140   POTASSIUM mmol/L 3.5 3.0*   CHLORIDE mmol/L 104 102   CO2 mmol/L 24.5 26.0   BUN mg/dL 7 7   CREATININE mg/dL 0.78 0.86   CALCIUM mg/dL 8.6 9.4   BILIRUBIN mg/dL  --  0.4   ALK PHOS U/L  --  100   ALT (SGPT) U/L  --  20   AST (SGOT) U/L  --  16   GLUCOSE mg/dL 163* 142*         Lab Results   Lab Value Date/Time    LIPASE 33 08/07/2024 2028    LIPASE 20 03/22/2022 1620    LIPASE 22 03/08/2021 1513    LIPASE 34 07/27/2020 1523    LIPASE 20 06/19/2020 1728       Radiology:  CT Abdomen Pelvis With Contrast   Final Result       1. Thick-walled and edematous loops of small bowel which are noted   within the left  side of the abdomen, concerning for active Crohn's   disease.   2. The patient's small hiatal hernia is mildly distended with fluid, as   is the stomach. The patient had similar findings on the exam from July 11, 2024. Some degree of gastritis, and associated ileus is not   excluded.   3. There are some distended loops of small bowel, which don't appear   particularly thick walled, which are noted proximal to a focally   narrowed segment of small bowel. Stricture is not excluded.           Radiation dose reduction techniques were utilized, including automated   exposure control and exposure modulation based on body size.           This report was finalized on 8/7/2024 10:12 PM by Dr. Sparkle Singh M.D on Workstation: [id number]              Assessment & Plan   Active Hospital Problems    Diagnosis     **Exacerbation of Crohn's disease     Hypokalemia     Hypothyroidism     GERD (gastroesophageal reflux disease)     Essential hypertension        Assessment:  Crohn's disease-on Stelara recently switched to every 6 weeks from every 8 weeks dosing  Abdominal pain  Nausea/vomiting    Plan:  She has already received a dose of steroids-recommend continuing methylprednisolone 60 mg daily  Would recommend n.p.o. today due to continued abdominal pain-if she is feeling better tomorrow can trial clear liquid diet  Stricturing noted on CT scan-will consult surgery for their input.  Not sure if this is due to current inflammation or is there consistently  Continue supportive care    Patient and plan of care discussed with attending, Dr. Jason Shelley PA-C        Electronically signed by Mat Gomez MD at 08/08/24 1630            All medication doses during the admission are shown, including meds that are no longer on order.  Scheduled Meds Sorted by Name  for Rosa Melgoza as of 8/7/24 through 8/9/24    1 Day 3 Days 7 Days 10 Days < Today >   Legend:       Medications 08/07/24 08/08/24  08/09/24   atorvastatin (LIPITOR) tablet 10 mg  Dose: 10 mg  Freq: Daily Route: PO  Start: 08/08/24 0900   Admin Instructions:        0842        0855          cetirizine (zyrTEC) tablet 10 mg  Dose: 10 mg  Freq: Daily Route: PO  Start: 08/08/24 0900     0842        0857          Enoxaparin Sodium (LOVENOX) syringe 40 mg  Dose: 40 mg  Freq: Every 24 Hours Route: SC  Indications of Use: PROPHYLAXIS OF VENOUS THROMBOEMBOLISM  Start: 08/09/24 0900   Admin Instructions:         0855          escitalopram (LEXAPRO) tablet 10 mg  Dose: 10 mg  Freq: Daily Route: PO  Start: 08/08/24 0900   Admin Instructions:        0842        0857          fluconazole (DIFLUCAN) tablet 150 mg  Dose: 150 mg  Freq: Every 24 Hours Route: PO  Indications of Use: VULVOVAGINAL CANDIDIASIS  Start: 08/09/24 0900 End: 08/09/24 1050   Admin Instructions:         1050          fluticasone (FLONASE) 50 MCG/ACT nasal spray 2 spray  Dose: 2 spray  Freq: Daily Route: NA  Start: 08/08/24 0900     (0841)        (0857)           lisinopril (PRINIVIL,ZESTRIL) tablet 10 mg  Dose: 10 mg  Freq: Every 24 Hours Scheduled Route: PO  Start: 08/08/24 0900     0842        0856          And   hydroCHLOROthiazide tablet 12.5 mg  Dose: 12.5 mg  Freq: Every 24 Hours Scheduled Route: PO  Start: 08/08/24 0900   Admin Instructions:        0842        0856          iopamidol (ISOVUE-300) 61 % injection 100 mL  Dose: 100 mL  Freq: Once in Imaging Route: IV  Start: 08/07/24 2151 End: 08/07/24 2135    2135            levothyroxine (SYNTHROID, LEVOTHROID) tablet 100 mcg  Dose: 100 mcg  Freq: Every Early Morning Route: PO  Start: 08/08/24 0600   Admin Instructions:        0514        0504          methylPREDNISolone sodium succinate (SOLU-Medrol) injection 20 mg  Dose: 20 mg  Freq: Every 8 Hours Route: IV  Start: 08/08/24 1715   Admin Instructions:        1837        0132   1050   1715        methylPREDNISolone sodium succinate (SOLU-Medrol) injection 40 mg  Dose: 40 mg  Freq:  Once Route: IV  Start: 08/07/24 2307 End: 08/07/24 2302   Admin Instructions:       2302            methylPREDNISolone sodium succinate (SOLU-Medrol) injection 60 mg  Dose: 60 mg  Freq: Every 6 Hours Route: IV  Start: 08/08/24 1230 End: 08/08/24 1617   Admin Instructions:        1259   1617-D/C'd        miconazole (MICOTIN) vaginal suppository 200 mg  Dose: 200 mg  Freq: Nightly Route: VA  Start: 08/08/24 2330 End: 08/09/24 0714      0003   0714-D/C'd       morphine injection 4 mg  Dose: 4 mg  Freq: Once Route: IV  Start: 08/07/24 2037 End: 08/07/24 2033   Admin Instructions:       2033            ondansetron (ZOFRAN) injection 4 mg  Dose: 4 mg  Freq: Once Route: IV  Start: 08/07/24 2037 End: 08/07/24 2032   Admin Instructions:       2032            pantoprazole (PROTONIX) EC tablet 40 mg  Dose: 40 mg  Freq: 2 Times Daily Route: PO  Start: 08/08/24 0900 End: 08/08/24 0523   Admin Instructions:        0523-D/C'd         pantoprazole (PROTONIX) injection 40 mg  Dose: 40 mg  Freq: Every 12 Hours Scheduled Route: IV  Indications of Use: GASTRIC HYPERSECRETORY CONDITIONS  Start: 08/08/24 0900   Admin Instructions:        0842   2021       0855   2100         piperacillin-tazobactam (ZOSYN) 3.375 g IVPB in 100 mL NS MBP (CD)  Dose: 3.375 g  Freq: Every 8 Hours Route: IV  Indications of Use: INTRA-ABDOMINAL INFECTION  Start: 08/08/24 0600 End: 08/13/24 0559     0513 [C]   0852   1427     2145        0504   1451   2200        piperacillin-tazobactam (ZOSYN) 3.375 g IVPB in 100 mL NS MBP (CD)  Dose: 3.375 g  Freq: Once Route: IV  Indications of Use: INTRA-ABDOMINAL INFECTION  Last Dose: Stopped (08/08/24 0003)  Start: 08/07/24 2204 End: 08/08/24 0003 2230 0003           potassium chloride (K-DUR,KLOR-CON) ER tablet 40 mEq  Dose: 40 mEq  Freq: Once Route: PO  Start: 08/07/24 2152 End: 08/07/24 2152   Admin Instructions:       2152            sodium chloride 0.9 % bolus 1,000 mL  Dose: 1,000 mL  Freq: Once Route:  IV  Last Dose: Stopped (08/07/24 2235)  Start: 08/07/24 2037 End: 08/07/24 2235 2029 2235           sodium chloride 0.9 % flush 10 mL  Dose: 10 mL  Freq: Every 12 Hours Scheduled Route: IV  Start: 08/08/24 0109 0058   0842   2022      0857   2100                     Continuous Meds Sorted by Name  for Rosa Melgoza as of 8/7/24 through 8/9/24  Legend:       Medications 08/07/24 08/08/24 08/09/24   Pharmacy to Dose Zosyn  Freq: Continuous PRN Route: XX  PRN Reason: Consult  Indications of Use: INTRA-ABDOMINAL INFECTION  Start: 08/08/24 0052 End: 08/08/24 0814 0814-D/C'd         sodium chloride 0.9 % infusion  Rate: 100 mL/hr Dose: 100 mL/hr  Freq: Continuous Route: IV  Last Dose: 100 mL/hr (08/09/24 1216)  Start: 08/08/24 0109 0058   0841   1130     1422   1626   1837     2144   2145       0856   1216                     PRN Meds Sorted by Name  for Rosa Melgoza as of 8/7/24 through 8/9/24  Legend:       Medications 08/07/24 08/08/24 08/09/24    acetaminophen (TYLENOL) tablet 650 mg  Dose: 650 mg  Freq: Every 4 Hours PRN Route: PO  PRN Reason: Mild Pain  Start: 08/08/24 0048   Admin Instructions:        0548           Or   acetaminophen (TYLENOL) 160 MG/5ML oral solution 650 mg  Dose: 650 mg  Freq: Every 4 Hours PRN Route: PO  PRN Reason: Mild Pain  Start: 08/08/24 0048   Admin Instructions:                   Or   acetaminophen (TYLENOL) suppository 650 mg  Dose: 650 mg  Freq: Every 4 Hours PRN Route: RE  PRN Reason: Mild Pain  Start: 08/08/24 0048   Admin Instructions:                    sennosides-docusate (PERICOLACE) 8.6-50 MG per tablet 2 tablet  Dose: 2 tablet  Freq: 2 Times Daily PRN Route: PO  PRN Reason: Constipation  Start: 08/08/24 0048 End: 08/09/24 0531   Admin Instructions:         0531-D/C'd        And   polyethylene glycol (MIRALAX) packet 17 g  Dose: 17 g  Freq: Daily PRN Route: PO  PRN Reason: Constipation  PRN Comment: Use if senna-docusate is ineffective  Start: 08/08/24 0048  End: 08/09/24 0531   Admin Instructions:         0531-D/C'd        And   bisacodyl (DULCOLAX) EC tablet 5 mg  Dose: 5 mg  Freq: Daily PRN Route: PO  PRN Reason: Constipation  PRN Comment: Use if polyethylene glycol is ineffective  Start: 08/08/24 0048 End: 08/09/24 0531   Admin Instructions:         0531-D/C'd        And   bisacodyl (DULCOLAX) suppository 10 mg  Dose: 10 mg  Freq: Daily PRN Route: RE  PRN Reason: Constipation  PRN Comment: Use if bisacodyl oral is ineffective  Start: 08/08/24 0048 End: 08/09/24 0531   Admin Instructions:         0531-D/C'd        bisacodyl (DULCOLAX) suppository 10 mg  Dose: 10 mg  Freq: Daily PRN Route: RE  PRN Reason: Constipation  Start: 08/09/24 0801   Admin Instructions:            butalbital-acetaminophen-caffeine (FIORICET, ESGIC) -40 MG per tablet 1 tablet  Dose: 1 tablet  Freq: Every 6 Hours PRN Route: PO  PRN Reason: Headache  Start: 08/08/24 0314   Admin Instructions:            calcium carbonate (TUMS) chewable tablet 500 mg (200 mg elemental)  Dose: 2 tablet  Freq: 2 Times Daily PRN Route: PO  PRN Reason: Heartburn  Start: 08/08/24 0048   Admin Instructions:            HYDROcodone-acetaminophen (NORCO) 5-325 MG per tablet 1 tablet  Dose: 1 tablet  Freq: Every 6 Hours PRN Route: PO  PRN Reason: Moderate Pain  Start: 08/09/24 0800 End: 08/14/24 0759   Admin Instructions:            hydrOXYzine (ATARAX) tablet 25 mg  Dose: 25 mg  Freq: Every 8 Hours PRN Route: PO  PRN Reason: Anxiety  Start: 08/08/24 0315   Admin Instructions:            morphine injection 2 mg  Dose: 2 mg  Freq: Every 3 Hours PRN Route: IV  PRN Reasons: Severe Pain,Moderate Pain  Start: 08/08/24 0052 End: 08/13/24 0051   Admin Instructions:        0545   1626   2228          ondansetron ODT (ZOFRAN-ODT) disintegrating tablet 4 mg  Dose: 4 mg  Freq: Every 6 Hours PRN Route: PO  PRN Reasons: Nausea,Vomiting  Start: 08/08/24 0048   Admin Instructions:        0545           Or   ondansetron (ZOFRAN)  injection 4 mg  Dose: 4 mg  Freq: Every 6 Hours PRN Route: IV  PRN Reasons: Nausea,Vomiting  Start: 08/08/24 0048   Admin Instructions:                   Pharmacy to Dose Zosyn  Freq: Continuous PRN Route: XX  PRN Reason: Consult  Indications of Use: INTRA-ABDOMINAL INFECTION  Start: 08/08/24 0052 End: 08/08/24 0814     0814-D/C'd         promethazine (PHENERGAN) tablet 25 mg  Dose: 25 mg  Freq: Every 6 Hours PRN Route: PO  PRN Reasons: Nausea,Vomiting  Start: 08/08/24 0315   Admin Instructions:            sodium chloride 0.9 % flush 10 mL  Dose: 10 mL  Freq: As Needed Route: IV  PRN Reason: Line Care  Start: 08/08/24 0002         sodium chloride 0.9 % infusion 40 mL  Dose: 40 mL  Freq: As Needed Route: IV  PRN Reason: Line Care  Start: 08/08/24 0002   Admin Instructions:

## 2024-08-09 NOTE — PLAN OF CARE
Goal Outcome Evaluation:  Plan of Care Reviewed With: patient        Progress: improving  Outcome Evaluation: VSS, on room air, voiding without issue, no BM, no nasusea, norco for pain w/ relief, ambulated in halls, toelrating clear liquids, IVFs and IV abx as ordered, patient updated on plan of care.

## 2024-08-09 NOTE — CONSULTS
Colorectal & General Surgery  Consultation    Patient: Rosa Melgoza  YOB: 1970  MRN: [id number]      Assessment  Rosa Melgoza is a 53 y.o. female with Crohn's disease who presents with acute Crohn's disease isolated to the mid small bowel.  She does not demonstrate any obstructive symptoms or radiographic evidence of obstruction, but she certainly has significant inflammation of her mid small bowel on CT scan, which does appear to be acute.  There is no surgical intervention warranted at this time.  Agree with steroids, antibiotics, bowel rest, and IV fluid hydration.    While I anticipate that the steroids will quell this flare, there certainly remains a risk that she could develop chronic disease within the segment of small bowel.  I would be in favor of changing her biologic to a new therapy as this represents failure of Stelara in my eyes.    I will continue to follow her both here in the hospital and as an outpatient.  Please feel free to contact me anytime with any questions or concerns.    History of Present Illness   Rosa Melgoza is a 53 y.o. female with chronic disease who presents to the hospital with acute abdominal pain.  She was found to have significant formation of her mid small bowel consistent with Crohn's flare.  She says her last dose of Stelara was last month and that recently she has been experiencing symptoms when he gets to the end of the cycle of Stelara.  She denies having any bowel function for the past 2 days and says that she has had some nausea but no vomiting.  Abdominal pain has improved since arrival to the hospital.    Most recent colonoscopy: 2020, diverticulosis otherwise normal    Past Medical History   Past Medical History:   Diagnosis Date    Allergic     Anxiety     Cholelithiasis     Crohn disease     Depression     Disease of thyroid gland     Diverticulitis of colon     Diverticulosis     Elevated blood pressure reading without diagnosis of hypertension      Encounter for long-term (current) use of medications 2020    Fatty liver     GERD (gastroesophageal reflux disease)     Headache, migraine     Hernia     History of medical problems     not sure of date, but very painful joints and muscles.  Poss r/t crohns    History of sore throat     Hyperlipidemia     Hypertension     Hypothyroidism     Migraine     Need for DTaP and Hib vaccine     History of     Obesity     Urinary tract infection     Vaginal yeast infection         Past Surgical History   Past Surgical History:   Procedure Laterality Date    BREAST BIOPSY       SECTION      CHOLECYSTECTOMY      COLONOSCOPY  approx     Tavon M.D.  benign polyps per patient    COLONOSCOPY N/A 2020    Procedure: COLONOSCOPY  into cecum and TI with biopsies;  Surgeon: Maicol Garza MD;  Location: [facility] ENDOSCOPY;  Service: Gastroenterology;  Laterality: N/A;  Pre: abn CT scan  post: diverticulosis    ENDOSCOPY N/A 2020    Procedure: ESOPHAGOGASTRODUODENOSCOPY WITH BIOPSY;  Surgeon: Maicol Garza MD;  Location: [facility] ENDOSCOPY;  Service: Gastroenterology;  Laterality: N/A;  Pre: abn CT scan  post: hiatal hernia, esophagitis    FRACTURE SURGERY      broken hand, 3 screws in place    TONSILLECTOMY      UPPER GASTROINTESTINAL ENDOSCOPY  approx     Tavon M.DAna  normal per patient       Social History  Social History     Socioeconomic History    Marital status:    Tobacco Use    Smoking status: Former     Current packs/day: 0.00     Average packs/day: 1 pack/day for 10.0 years (10.0 ttl pk-yrs)     Types: Cigarettes     Start date: 3/1/2001     Quit date: 3/1/2011     Years since quittin.4    Smokeless tobacco: Never    Tobacco comments:     smoked off and on   Vaping Use    Vaping status: Never Used   Substance and Sexual Activity    Alcohol use: Not Currently     Comment: once a year or so I have a drink w dinner    Drug use: Never    Sexual activity: Yes      Partners: Male     Birth control/protection: Surgical     Comment:  had vasectomy       Family History  Family History   Problem Relation Age of Onset    Breast cancer Mother     Arthritis Mother     Cancer Mother         breast ca    Miscarriages / Stillbirths Mother     Hypertension Father     Heart disease Father     Alcohol abuse Father     Hyperlipidemia Father     Breast cancer Maternal Grandmother     Anxiety disorder Maternal Grandmother     Arthritis Maternal Grandmother     Cancer Maternal Grandmother         breast and brain    Depression Maternal Grandmother     Diabetes Maternal Grandmother     Heart disease Maternal Grandmother     Kidney disease Maternal Grandmother     Thyroid disease Maternal Grandmother     Mental illness Maternal Grandmother     Alcohol abuse Other     Depression Other     Anxiety disorder Other     Hypertension Other     Other Other         Pure hypercholesterolemia and esophageal reflux    Lung cancer Other     Inflammatory bowel disease Maternal Grandfather     Irritable bowel syndrome Maternal Grandfather         Honestly not sure which he had    Arthritis Maternal Grandfather     Cancer Maternal Grandfather         lung    Stroke Maternal Grandfather        Colorectal cancer family history: None    Review of Systems  Negative except as documented in the HPI.     Allergies  No Known Allergies    Medications    Current Facility-Administered Medications:     acetaminophen (TYLENOL) tablet 650 mg, 650 mg, Oral, Q4H PRN **OR** acetaminophen (TYLENOL) 160 MG/5ML oral solution 650 mg, 650 mg, Oral, Q4H PRN **OR** acetaminophen (TYLENOL) suppository 650 mg, 650 mg, Rectal, Q4H PRN, Lise Perdomo APRN    atorvastatin (LIPITOR) tablet 10 mg, 10 mg, Oral, Daily, Lise Perdomo APRN, 10 mg at 08/08/24 0842    sennosides-docusate (PERICOLACE) 8.6-50 MG per tablet 2 tablet, 2 tablet, Oral, BID PRN **AND** polyethylene glycol (MIRALAX) packet 17 g, 17 g, Oral,  Daily PRN **AND** bisacodyl (DULCOLAX) EC tablet 5 mg, 5 mg, Oral, Daily PRN **AND** bisacodyl (DULCOLAX) suppository 10 mg, 10 mg, Rectal, Daily PRN, Lise Perdomo APRN    butalbital-acetaminophen-caffeine (FIORICET, ESGIC) -40 MG per tablet 1 tablet, 1 tablet, Oral, Q6H PRN, Lise Perdomo APRN    calcium carbonate (TUMS) chewable tablet 500 mg (200 mg elemental), 2 tablet, Oral, BID PRN, Lise Perdomo APRN    cetirizine (zyrTEC) tablet 10 mg, 10 mg, Oral, Daily, Lise Perdomo APRN, 10 mg at 08/08/24 0842    escitalopram (LEXAPRO) tablet 10 mg, 10 mg, Oral, Daily, Lise Perdomo APRN, 10 mg at 08/08/24 0842    fluticasone (FLONASE) 50 MCG/ACT nasal spray 2 spray, 2 spray, Nasal, Daily, Lise Perdomo APRN    lisinopril (PRINIVIL,ZESTRIL) tablet 10 mg, 10 mg, Oral, Q24H, 10 mg at 08/08/24 0842 **AND** hydroCHLOROthiazide tablet 12.5 mg, 12.5 mg, Oral, Q24H, Lise Perdomo APRN, 12.5 mg at 08/08/24 0842    hydrOXYzine (ATARAX) tablet 25 mg, 25 mg, Oral, Q8H PRN, Lise Perdomo APRN    levothyroxine (SYNTHROID, LEVOTHROID) tablet 100 mcg, 100 mcg, Oral, Q AM, Lise Perdomo APRN, 100 mcg at 08/08/24 0514    methylPREDNISolone sodium succinate (SOLU-Medrol) injection 20 mg, 20 mg, Intravenous, Q8H, Rhianna Shelley PA-C, 20 mg at 08/08/24 1837    morphine injection 2 mg, 2 mg, Intravenous, Q3H PRN, Lise Perdomo APRN, 2 mg at 08/08/24 1626    ondansetron ODT (ZOFRAN-ODT) disintegrating tablet 4 mg, 4 mg, Oral, Q6H PRN, 4 mg at 08/08/24 0545 **OR** ondansetron (ZOFRAN) injection 4 mg, 4 mg, Intravenous, Q6H PRN, Lise Perdomo APRN    pantoprazole (PROTONIX) injection 40 mg, 40 mg, Intravenous, Q12H, Lise Perdomo APRN, 40 mg at 08/08/24 0842    piperacillin-tazobactam (ZOSYN) 3.375 g IVPB in 100 mL NS MBP (CD), 3.375 g, Intravenous, Q8H, Lise Perdomo APRN, 3.375 g at 08/08/24 1427    promethazine (PHENERGAN)  tablet 25 mg, 25 mg, Oral, Q6H PRN, Lise Perdomo, APRN    sodium chloride 0.9 % flush 10 mL, 10 mL, Intravenous, Q12H, Lise Perdomo, APRN, 10 mL at 08/08/24 0842    sodium chloride 0.9 % flush 10 mL, 10 mL, Intravenous, PRN, Lise Perdomo, APRN    sodium chloride 0.9 % infusion 40 mL, 40 mL, Intravenous, PRN, Lise Perdomo, APRN    sodium chloride 0.9 % infusion, 100 mL/hr, Intravenous, Continuous, Lise Perdomo, APRN, Last Rate: 100 mL/hr at 08/08/24 1837, 100 mL/hr at 08/08/24 1837    Vital Signs  Vitals:    08/08/24 1736   BP: 128/73   Pulse: 84   Resp: 16   Temp: 98.3 °F (36.8 °C)   SpO2: 92%          Physical Exam  Constitutional: Resting comfortably, no acute distress  Neck: Supple, trachea midline  Respiratory: No increased work of breathing, Symmetric excursion  Cardiovascular: Well pefursed, no jugular venous distention evident   Abdominal: Soft, mildly tender, mildly distended  Lymphatics: No cervical or suprascapular adenopathy  Skin: Warm, dry, no rash on visualized skin surfaces  Musculoskeletal: Symmetric strength, no obvious gross abnormalities  Psychiatric: Alert and oriented ×3, normal affect     Laboratory Results  I have personally reviewed CBC with WBC 20, hemoglobin 15, platelets 275.  Lipase 33.  Lactate 1.0.  CMP with creatinine 0.6, albumin 3.9.    Radiology  I have personally reviewed CT scan of the abdomen pelvis demonstrates relatively lengthy segment of small bowel with acute inflammation circumferentially.  No signs of  upstream dilation indicate obstruction.  No other evidence of active Crohn's disease radiographically.    Endoscopy  I have personally reviewed colonoscopy from 2020 demonstrates normal colon with sigmoid diverticulosis.         Bryan Borden MD  Colorectal & General Surgery  [address], [address]  [place], [place], [address]  P: [phone]  F: [phone]

## 2024-08-10 LAB
ANION GAP SERPL CALCULATED.3IONS-SCNC: 9.9 MMOL/L (ref 5–15)
BASOPHILS # BLD AUTO: 0.01 10*3/MM3 (ref 0–0.2)
BASOPHILS NFR BLD AUTO: 0.1 % (ref 0–1.5)
BUN SERPL-MCNC: 9 MG/DL (ref 6–20)
BUN/CREAT SERPL: 10.5 (ref 7–25)
CALCIUM SPEC-SCNC: 8.4 MG/DL (ref 8.6–10.5)
CHLORIDE SERPL-SCNC: 107 MMOL/L (ref 98–107)
CO2 SERPL-SCNC: 25.1 MMOL/L (ref 22–29)
CREAT SERPL-MCNC: 0.86 MG/DL (ref 0.57–1)
DEPRECATED RDW RBC AUTO: 41.1 FL (ref 37–54)
EGFRCR SERPLBLD CKD-EPI 2021: 80.9 ML/MIN/1.73
EOSINOPHIL # BLD AUTO: 0 10*3/MM3 (ref 0–0.4)
EOSINOPHIL NFR BLD AUTO: 0 % (ref 0.3–6.2)
ERYTHROCYTE [DISTWIDTH] IN BLOOD BY AUTOMATED COUNT: 12.9 % (ref 12.3–15.4)
GLUCOSE SERPL-MCNC: 128 MG/DL (ref 65–99)
HCT VFR BLD AUTO: 34 % (ref 34–46.6)
HGB BLD-MCNC: 11.3 G/DL (ref 12–15.9)
IMM GRANULOCYTES # BLD AUTO: 0.12 10*3/MM3 (ref 0–0.05)
IMM GRANULOCYTES NFR BLD AUTO: 1.1 % (ref 0–0.5)
LYMPHOCYTES # BLD AUTO: 0.87 10*3/MM3 (ref 0.7–3.1)
LYMPHOCYTES NFR BLD AUTO: 8.3 % (ref 19.6–45.3)
MCH RBC QN AUTO: 29.4 PG (ref 26.6–33)
MCHC RBC AUTO-ENTMCNC: 33.2 G/DL (ref 31.5–35.7)
MCV RBC AUTO: 88.5 FL (ref 79–97)
MONOCYTES # BLD AUTO: 0.24 10*3/MM3 (ref 0.1–0.9)
MONOCYTES NFR BLD AUTO: 2.3 % (ref 5–12)
NEUTROPHILS NFR BLD AUTO: 88.2 % (ref 42.7–76)
NEUTROPHILS NFR BLD AUTO: 9.25 10*3/MM3 (ref 1.7–7)
NRBC BLD AUTO-RTO: 0 /100 WBC (ref 0–0.2)
PLATELET # BLD AUTO: 241 10*3/MM3 (ref 140–450)
PMV BLD AUTO: 11.1 FL (ref 6–12)
POTASSIUM SERPL-SCNC: 3.7 MMOL/L (ref 3.5–5.2)
RBC # BLD AUTO: 3.84 10*6/MM3 (ref 3.77–5.28)
SODIUM SERPL-SCNC: 142 MMOL/L (ref 136–145)
WBC NRBC COR # BLD AUTO: 10.49 10*3/MM3 (ref 3.4–10.8)

## 2024-08-10 PROCEDURE — 25010000002 METHYLPREDNISOLONE PER 40 MG

## 2024-08-10 PROCEDURE — 80048 BASIC METABOLIC PNL TOTAL CA: CPT | Performed by: INTERNAL MEDICINE

## 2024-08-10 PROCEDURE — 99232 SBSQ HOSP IP/OBS MODERATE 35: CPT | Performed by: NURSE PRACTITIONER

## 2024-08-10 PROCEDURE — 99232 SBSQ HOSP IP/OBS MODERATE 35: CPT | Performed by: SURGERY

## 2024-08-10 PROCEDURE — 25010000002 PIPERACILLIN SOD-TAZOBACTAM PER 1 G: Performed by: NURSE PRACTITIONER

## 2024-08-10 PROCEDURE — 25010000002 ENOXAPARIN PER 10 MG: Performed by: STUDENT IN AN ORGANIZED HEALTH CARE EDUCATION/TRAINING PROGRAM

## 2024-08-10 PROCEDURE — 85025 COMPLETE CBC W/AUTO DIFF WBC: CPT | Performed by: INTERNAL MEDICINE

## 2024-08-10 PROCEDURE — 25010000002 ONDANSETRON PER 1 MG: Performed by: NURSE PRACTITIONER

## 2024-08-10 PROCEDURE — 25810000003 SODIUM CHLORIDE 0.9 % SOLUTION: Performed by: NURSE PRACTITIONER

## 2024-08-10 RX ADMIN — PIPERACILLIN AND TAZOBACTAM 3.38 G: 3; .375 INJECTION, POWDER, FOR SOLUTION INTRAVENOUS at 06:11

## 2024-08-10 RX ADMIN — ESCITALOPRAM OXALATE 10 MG: 10 TABLET, FILM COATED ORAL at 08:12

## 2024-08-10 RX ADMIN — Medication 10 ML: at 21:36

## 2024-08-10 RX ADMIN — PANTOPRAZOLE SODIUM 40 MG: 40 INJECTION, POWDER, FOR SOLUTION INTRAVENOUS at 21:36

## 2024-08-10 RX ADMIN — HYDROCODONE BITARTRATE AND ACETAMINOPHEN 1 TABLET: 5; 325 TABLET ORAL at 23:29

## 2024-08-10 RX ADMIN — SODIUM CHLORIDE 100 ML/HR: 9 INJECTION, SOLUTION INTRAVENOUS at 16:37

## 2024-08-10 RX ADMIN — CETIRIZINE HYDROCHLORIDE 10 MG: 10 TABLET ORAL at 08:12

## 2024-08-10 RX ADMIN — ONDANSETRON 4 MG: 2 INJECTION INTRAMUSCULAR; INTRAVENOUS at 10:40

## 2024-08-10 RX ADMIN — PIPERACILLIN AND TAZOBACTAM 3.38 G: 3; .375 INJECTION, POWDER, FOR SOLUTION INTRAVENOUS at 21:36

## 2024-08-10 RX ADMIN — Medication 10 ML: at 08:21

## 2024-08-10 RX ADMIN — HYDROCHLOROTHIAZIDE 12.5 MG: 12.5 TABLET ORAL at 08:12

## 2024-08-10 RX ADMIN — ATORVASTATIN CALCIUM 10 MG: 20 TABLET, FILM COATED ORAL at 08:12

## 2024-08-10 RX ADMIN — METHYLPREDNISOLONE SODIUM SUCCINATE 20 MG: 40 INJECTION, POWDER, FOR SOLUTION INTRAMUSCULAR; INTRAVENOUS at 02:36

## 2024-08-10 RX ADMIN — HYDROCODONE BITARTRATE AND ACETAMINOPHEN 1 TABLET: 5; 325 TABLET ORAL at 10:19

## 2024-08-10 RX ADMIN — LEVOTHYROXINE SODIUM 100 MCG: 100 TABLET ORAL at 06:11

## 2024-08-10 RX ADMIN — PIPERACILLIN AND TAZOBACTAM 3.38 G: 3; .375 INJECTION, POWDER, FOR SOLUTION INTRAVENOUS at 14:36

## 2024-08-10 RX ADMIN — METHYLPREDNISOLONE SODIUM SUCCINATE 20 MG: 40 INJECTION, POWDER, FOR SOLUTION INTRAMUSCULAR; INTRAVENOUS at 18:00

## 2024-08-10 RX ADMIN — PANTOPRAZOLE SODIUM 40 MG: 40 INJECTION, POWDER, FOR SOLUTION INTRAVENOUS at 08:13

## 2024-08-10 RX ADMIN — METHYLPREDNISOLONE SODIUM SUCCINATE 20 MG: 40 INJECTION, POWDER, FOR SOLUTION INTRAMUSCULAR; INTRAVENOUS at 08:20

## 2024-08-10 RX ADMIN — LISINOPRIL 10 MG: 10 TABLET ORAL at 08:12

## 2024-08-10 RX ADMIN — ENOXAPARIN SODIUM 40 MG: 100 INJECTION SUBCUTANEOUS at 08:13

## 2024-08-10 RX ADMIN — BISACODYL 10 MG: 10 SUPPOSITORY RECTAL at 08:30

## 2024-08-10 RX ADMIN — HYDROCODONE BITARTRATE AND ACETAMINOPHEN 1 TABLET: 5; 325 TABLET ORAL at 16:40

## 2024-08-10 NOTE — PLAN OF CARE
Goal Outcome Evaluation:  Plan of Care Reviewed With: patient           Outcome Evaluation: VSS, up ad vincenzo, voiding, no reported BM this shift, walked unit, norco for pain with relief, no nausea, tolerating diet, IVF infusing, IV abx given, spouse at bedside, continuing with plan of care.

## 2024-08-10 NOTE — PROGRESS NOTES
Gastroenterology   Inpatient Progress Note    Reason for Follow Up: Crohn's flare    Subjective     Interval History:   Family at bedside.  Patient reports abdominal pain is improved today.  Rates pain as a 2/10 with tenderness being more prominent on the left side.  Reports having 2 loose BMs this morning after suppository administration.  Tolerating full liquids.    Current Facility-Administered Medications:     acetaminophen (TYLENOL) tablet 650 mg, 650 mg, Oral, Q4H PRN **OR** acetaminophen (TYLENOL) 160 MG/5ML oral solution 650 mg, 650 mg, Oral, Q4H PRN **OR** acetaminophen (TYLENOL) suppository 650 mg, 650 mg, Rectal, Q4H PRN, Lise Perdomo APRN    atorvastatin (LIPITOR) tablet 10 mg, 10 mg, Oral, Daily, Lise Perdomo APRN, 10 mg at 08/10/24 0812    bisacodyl (DULCOLAX) suppository 10 mg, 10 mg, Rectal, Daily PRN, Davie Carreon MD, 10 mg at 08/10/24 0830    butalbital-acetaminophen-caffeine (FIORICET, ESGIC) -40 MG per tablet 1 tablet, 1 tablet, Oral, Q6H PRN, Lise Perdomo APRN    calcium carbonate (TUMS) chewable tablet 500 mg (200 mg elemental), 2 tablet, Oral, BID PRN, Lise Perdomo APRN    cetirizine (zyrTEC) tablet 10 mg, 10 mg, Oral, Daily, Lise Perdomo APRN, 10 mg at 08/10/24 0812    Enoxaparin Sodium (LOVENOX) syringe 40 mg, 40 mg, Subcutaneous, Q24H, Davie Carreon MD, 40 mg at 08/10/24 0813    escitalopram (LEXAPRO) tablet 10 mg, 10 mg, Oral, Daily, Lise Perdomo APRN, 10 mg at 08/10/24 0812    fluticasone (FLONASE) 50 MCG/ACT nasal spray 2 spray, 2 spray, Nasal, Daily, Lise Perdomo APRN    lisinopril (PRINIVIL,ZESTRIL) tablet 10 mg, 10 mg, Oral, Q24H, 10 mg at 08/10/24 0812 **AND** hydroCHLOROthiazide tablet 12.5 mg, 12.5 mg, Oral, Q24H, Lise Perdomo APRN, 12.5 mg at 08/10/24 0812    HYDROcodone-acetaminophen (NORCO) 5-325 MG per tablet 1 tablet, 1 tablet, Oral, Q6H PRN, Davie Carreon MD, 1 tablet at 08/10/24 1019     hydrOXYzine (ATARAX) tablet 25 mg, 25 mg, Oral, Q8H PRN, Lise Perdomo, APRN    levothyroxine (SYNTHROID, LEVOTHROID) tablet 100 mcg, 100 mcg, Oral, Q AM, Lise Perdomo APRN, 100 mcg at 08/10/24 0611    methylPREDNISolone sodium succinate (SOLU-Medrol) injection 20 mg, 20 mg, Intravenous, Q8H, Rhianna Shelley PA-C, 20 mg at 08/10/24 0820    morphine injection 2 mg, 2 mg, Intravenous, Q3H PRN, Lise Perdomo APRN, 2 mg at 08/08/24 2228    ondansetron ODT (ZOFRAN-ODT) disintegrating tablet 4 mg, 4 mg, Oral, Q6H PRN, 4 mg at 08/08/24 0545 **OR** ondansetron (ZOFRAN) injection 4 mg, 4 mg, Intravenous, Q6H PRN, Lise Perdomo APRN, 4 mg at 08/10/24 1040    pantoprazole (PROTONIX) injection 40 mg, 40 mg, Intravenous, Q12H, Lise Perdomo APRN, 40 mg at 08/10/24 0813    piperacillin-tazobactam (ZOSYN) 3.375 g IVPB in 100 mL NS MBP (CD), 3.375 g, Intravenous, Q8H, Lise Perdomo APRN, 3.375 g at 08/10/24 1436    promethazine (PHENERGAN) tablet 25 mg, 25 mg, Oral, Q6H PRN, Lise Perdomo APRN    sodium chloride 0.9 % flush 10 mL, 10 mL, Intravenous, Q12H, Lise Perdomo APRN, 10 mL at 08/10/24 0821    sodium chloride 0.9 % flush 10 mL, 10 mL, Intravenous, PRN, Lise Perdomo APRN    sodium chloride 0.9 % infusion 40 mL, 40 mL, Intravenous, PRN, Lise Perdomo APRN    sodium chloride 0.9 % infusion, 100 mL/hr, Intravenous, Continuous, Lise Perdomo APRN, Last Rate: 100 mL/hr at 08/09/24 1216, 100 mL/hr at 08/09/24 1216  Review of Systems:    All systems were reviewed and negative except for:  Gastrointestinal: positive for  diarrhea and pain    Objective     Vital Signs  Temp:  [97 °F (36.1 °C)-98.9 °F (37.2 °C)] 98.9 °F (37.2 °C)  Heart Rate:  [63-71] 63  Resp:  [16] 16  BP: (118-152)/(73-82) 152/82  Body mass index is 38.2 kg/m².    Intake/Output Summary (Last 24 hours) at 8/10/2024 1501  Last data filed at 8/10/2024 1438  Gross per 24 hour    Intake 320 ml   Output 4000 ml   Net -3680 ml     I/O this shift:  In: 100 [IV Piggyback:100]  Out: 2100 [Urine:2100]     Physical Exam:   General: patient awake, alert and cooperative   Eyes: no scleral icterus   Skin: warm and dry, not jaundiced   Abdomen: soft, + left sided abdominal tenderness, nondistended; normal bowel sounds, no masses palpated, no periumbical lymphadenopathy   Psychiatric: Appropriate affect and behavior     Results Review:     I reviewed the patient's new clinical results.  I reviewed the patient's new imaging results and agree with the interpretation.  I reviewed the patient's other test results and agree with the interpretation    Results from last 7 days   Lab Units 08/10/24  0605 08/09/24  0522 08/08/24  0545   WBC 10*3/mm3 10.49 13.86* 12.32*   HEMOGLOBIN g/dL 11.3* 11.5* 12.9   HEMATOCRIT % 34.0 35.0 38.6   PLATELETS 10*3/mm3 241 244 242     Results from last 7 days   Lab Units 08/10/24  0605 08/09/24  0522 08/08/24  0545 08/07/24 2028   SODIUM mmol/L 142 141 140 140   POTASSIUM mmol/L 3.7 3.4* 3.5 3.0*   CHLORIDE mmol/L 107 108* 104 102   CO2 mmol/L 25.1 24.0 24.5 26.0   BUN mg/dL 9 8 7 7   CREATININE mg/dL 0.86 0.89 0.78 0.86   CALCIUM mg/dL 8.4* 8.3* 8.6 9.4   BILIRUBIN mg/dL  --   --   --  0.4   ALK PHOS U/L  --   --   --  100   ALT (SGPT) U/L  --   --   --  20   AST (SGOT) U/L  --   --   --  16   GLUCOSE mg/dL 128* 122* 163* 142*         Lab Results   Lab Value Date/Time    LIPASE 33 08/07/2024 2028    LIPASE 20 03/22/2022 1620    LIPASE 22 03/08/2021 1513    LIPASE 34 07/27/2020 1523    LIPASE 20 06/19/2020 1728       Radiology:  CT Abdomen Pelvis With Contrast   Final Result       1. Thick-walled and edematous loops of small bowel which are noted   within the left side of the abdomen, concerning for active Crohn's   disease.   2. The patient's small hiatal hernia is mildly distended with fluid, as   is the stomach. The patient had similar findings on the exam from July 11,  2024. Some degree of gastritis, and associated ileus is not   excluded.   3. There are some distended loops of small bowel, which don't appear   particularly thick walled, which are noted proximal to a focally   narrowed segment of small bowel. Stricture is not excluded.           Radiation dose reduction techniques were utilized, including automated   exposure control and exposure modulation based on body size.           This report was finalized on 8/7/2024 10:12 PM by Dr. Sparkle Singh M.D on Workstation: [id number]              Assessment & Plan     Active Hospital Problems    Diagnosis     **Exacerbation of Crohn's disease     Hypokalemia     Hypothyroidism     GERD (gastroesophageal reflux disease)     Essential hypertension        Assessment:  Crohn's disease-on Stelara which was recently switched to every 6 week dosing  Abdominal pain  Nausea and vomiting  GERD    These problems are new to me.  Plan:  Continue methylprednisolone 60 mg once daily  General surgery was consulted due to findings of stricturing on CT scan   Continue full liquid diet, would not recommend advancing at this time per general surgery recommendations  Continue antibiotic therapy  Continue to monitor lab work  Crohn's disease was diagnosed via cross-sectional [employer] IBD panel.  Plan of care moving forward will likely include switching biologic therapy, which can be determined at patient's next office follow-up visit.  Patient is a current patient of Dr. Garza and Manda Butler PA-C.  We will continue to follow    I discussed the patients findings and my recommendations with patient and family.    NADEGE Lui APRN  [facility] [address] [address]  Office: [phone]

## 2024-08-10 NOTE — PROGRESS NOTES
Name: Rosa Melgoza ADMIT: 2024   : 1970  PCP: Kathie Romeo DO    MRN: [id number] LOS: 1 days   AGE/SEX: 53 y.o. female  ROOM: [facility]     Subjective   Subjective   Abdominal pain improving.  No fevers or chills.  No nausea or vomiting.  Tolerated clear liquid diet yesterday without any problems.  Still has not had a bowel movement.  Urinary symptoms have resolved with fluconazole.  Pain being well-controlled with Norco  Review of Systems  As above     Objective   Objective   Vital Signs  Temp:  [97 °F (36.1 °C)-97.4 °F (36.3 °C)] 97.2 °F (36.2 °C)  Heart Rate:  [65-71] 71  Resp:  [16] 16  BP: (118-138)/(73-75) 138/73  SpO2:  [91 %-93 %] 91 %  on   ;   Device (Oxygen Therapy): room air  Body mass index is 38.2 kg/m².  Physical Exam  Constitutional:       General: She is not in acute distress.     Appearance: She is not ill-appearing.   Cardiovascular:      Rate and Rhythm: Normal rate and regular rhythm.   Pulmonary:      Effort: Pulmonary effort is normal. No respiratory distress.   Abdominal:      General: Abdomen is flat. There is distension.      Tenderness: There is abdominal tenderness (Mild in bilateral lower quadrants).   Musculoskeletal:         General: No swelling or deformity. Normal range of motion.   Skin:     General: Skin is warm and dry.   Neurological:      General: No focal deficit present.      Mental Status: She is alert. Mental status is at baseline.         Results Review     I reviewed the patient's new clinical results.  Results from last 7 days   Lab Units 08/10/24  0624  0524  0545 24   WBC 10*3/mm3 10.49 13.86* 12.32* 20.24*   HEMOGLOBIN g/dL 11.3* 11.5* 12.9 15.3   PLATELETS 10*3/mm3 241 244 242 275     Results from last 7 days   Lab Units 08/10/24  0605 24  0524  0545 24   SODIUM mmol/L 142 141 140 140   POTASSIUM mmol/L 3.7 3.4* 3.5 3.0*   CHLORIDE mmol/L 107 108* 104 102   CO2 mmol/L 25.1 24.0 24.5 26.0  "  BUN mg/dL 9 8 7 7   CREATININE mg/dL 0.86 0.89 0.78 0.86   GLUCOSE mg/dL 128* 122* 163* 142*   Estimated Creatinine Clearance: 87.4 mL/min (by C-G formula based on SCr of 0.86 mg/dL).  Results from last 7 days   Lab Units 08/07/24 2028   ALBUMIN g/dL 3.9   BILIRUBIN mg/dL 0.4   ALK PHOS U/L 100   AST (SGOT) U/L 16   ALT (SGPT) U/L 20     Results from last 7 days   Lab Units 08/10/24  0605 08/09/24  0522 08/08/24  0545 08/07/24 2028   CALCIUM mg/dL 8.4* 8.3* 8.6 9.4   ALBUMIN g/dL  --   --   --  3.9   MAGNESIUM mg/dL  --   --   --  2.0     Results from last 7 days   Lab Units 08/07/24  2151   LACTATE mmol/L 1.0     COVID19   Date Value Ref Range Status   03/22/2022 Not Detected Not Detected - Ref. Range Final     SARS-CoV-2 PCR   Date Value Ref Range Status   09/22/2020 Not Detected Not Detected Final     Comment:     Nucleic acid specific to SARS-CoV-2 (COVID-19) virus was not detected inthis sample by the TaqPath (TM) COVID-19 Combo Kit.SARS-CoV-2 (COVID-19) nucleic acid testing performed using "[employer]" Aptima (R) SARS-CoV-2 Assay or [employer] TaqPath   (TM)COVID-19 Combo Kit as indicated above under Emergency Use Authorization (EUA) from the FDA. Aptima (R) and TaqPath (TM) test performancecharacteristics verified by [facility] in accordance with the FDAs Guidance Document (Policy for Diagnostic   Tests for Coronavirus Disease-2019during the Public Health Emergency) issued on March 16, 2020. The laboratory is regulated under CLIA as qualified to perform high-complexity testingUnless otherwise noted, all testing was performed at [facility],   CLIA No. [id number], [place] State Licensee No. [id number]     No results found for: \"HGBA1C\", \"POCGLU\"    CT Abdomen Pelvis With Contrast  Narrative: CT OF THE ABDOMEN AND PELVIS WITH CONTRAST     HISTORY: Abdominal pain. HISTORY of Crohn's disease.     COMPARISON: July 11, 2024     TECHNIQUE: Axial CT imaging was obtained through the abdomen and pelvis.  IV " contrast was administered.     FINDINGS:  Images through the lung bases demonstrate a benign centrally calcified  nodule within the left lower lobe. The gallbladder is absent. There is a  small hiatal hernia. It is distended with fluid, as is the stomach. This  was actually also present on prior study. The spleen, adrenal glands,  and pancreas are normal. Duodenum appears unremarkable. No  hydronephrosis is seen on either side. The kidneys enhance  symmetrically. There are mild aortoiliac calcifications. No distal  ureteral or bladder stones are seen. Uterus appears normal, as are the  ovaries. There is colonic diverticulosis. The appendix is normal. The  patient has thick walled and edematous segments of small bowel which are  noted within the abdomen, with adjacent mesenteric edema and trace  fluid. Appearance is concerning for active Crohn's disease. Distal to  the inflamed segments, there is also a mildly dilated loop of small  bowel, which is located proximal to an area of narrowing within the  small bowel, which may reflect a stricture. This is best seen on coronal  series 3 image 59. The distal ileum is decompressed. There is fatty  infiltration of the wall of the distal small bowel, in keeping with  chronic inflammatory bowel disease. The appendix is normal. There is a  small amount of free fluid which is noted within the abdomen and pelvis,  likely reactive. No acute osseous abnormalities are seen. There is  stable sclerosis of the SI joints bilaterally.     Impression:    1. Thick-walled and edematous loops of small bowel which are noted  within the left side of the abdomen, concerning for active Crohn's  disease.  2. The patient's small hiatal hernia is mildly distended with fluid, as  is the stomach. The patient had similar findings on the exam from July 11, 2024. Some degree of gastritis, and associated ileus is not  excluded.  3. There are some distended loops of small bowel, which don't  appear  particularly thick walled, which are noted proximal to a focally  narrowed segment of small bowel. Stricture is not excluded.        Radiation dose reduction techniques were utilized, including automated  exposure control and exposure modulation based on body size.        This report was finalized on 8/7/2024 10:12 PM by Dr. Sparkle Singh M.D on Workstation: [id number]       I reviewed the patient's daily medications.  Scheduled Medications  atorvastatin, 10 mg, Oral, Daily  cetirizine, 10 mg, Oral, Daily  enoxaparin, 40 mg, Subcutaneous, Q24H  escitalopram, 10 mg, Oral, Daily  fluticasone, 2 spray, Nasal, Daily  lisinopril, 10 mg, Oral, Q24H   And  hydroCHLOROthiazide, 12.5 mg, Oral, Q24H  levothyroxine, 100 mcg, Oral, Q AM  methylPREDNISolone sodium succinate, 20 mg, Intravenous, Q8H  pantoprazole, 40 mg, Intravenous, Q12H  piperacillin-tazobactam, 3.375 g, Intravenous, Q8H  sodium chloride, 10 mL, Intravenous, Q12H    Infusions  sodium chloride, 100 mL/hr, Last Rate: 100 mL/hr (08/09/24 1216)    Diet  Diet: Liquid; Full Liquid; Fluid Consistency: Thin (IDDSI 0)         I have personally reviewed:  [x]  Laboratory   []  Microbiology   [x]  Radiology   []  EKG/Telemetry   []  Cardiology/Vascular   []  Pathology   [x]  Records     Assessment/Plan     Active Hospital Problems    Diagnosis  POA    **Exacerbation of Crohn's disease [K50.90]  Yes    Hypokalemia [E87.6]  Unknown    Hypothyroidism [E03.9]  Yes    GERD (gastroesophageal reflux disease) [K21.9]  Yes    Essential hypertension [I10]  Yes      Resolved Hospital Problems   No resolved problems to display.       53 y.o. female admitted with Exacerbation of Crohn's disease.    Crohn's disease exacerbation  Leukocytosis  Constipation  -Continue IV Zosyn  -GI following, IV steroids per GI  -Surgery following.  Advance diet to full liquid.  No bowel regimen by mouth with his CT findings, bisacodyl rectal as needed  -Continue Norco as needed, continue  morphine as needed  -IVF    Vaginal candidiasis, resolved  -Diflucan x 1  -Routinely gets vaginal yeast infections with antibiotics    Hypokalemia, resolved  -Replete as needed    Hypertension-continue home lisinopril/hydrochlorothiazide    Hypothyroidism-continue home levothyroxine    GERD-continue home Protonix    Works for [employer]    Lovenox 40 mg SC daily for DVT prophylaxis.  Full code.  Discussed with patient, nursing staff, and consulting provider.  Anticipate discharge home in 2-3 days.      Expected Discharge Date: 8/11/2024; Expected Discharge Time:       Davie Carreon MD  [facility]  08/10/24  09:13 EDT

## 2024-08-10 NOTE — PROGRESS NOTES
Colorectal & General Surgery  Progress Note    Patient: Rosa Melgoza  YOB: 1970  MRN: [id number]      Assessment  Rosa Melgoza is a 53 y.o. female with Crohn's disease who presents with acute Crohn's enteritis within her small mid bowel.  Overall, continues to slowly improved.  She does have some nausea today.  I would not advance her past full liquids.  Continue steroids and antibiotics.  I will continue to follow.  Please call with questions anytime.    Subjective  Passing flatus and had some diarrhea with an enema.  Nauseated after some liquids.  Denies any significant abdominal pain.    Objective    Vitals:    08/10/24 0952   BP: 152/82   Pulse: 63   Resp: 16   Temp: 98.9 °F (37.2 °C)   SpO2: 95%       Physical Exam  Constitutional: Well-developed well-nourished, no acute distress  Neck: Supple, trachea midline  Respiratory: No increased work of breathing, Symmetric excursion  Cardiovascular: Well pefursed, no jugular venous distention evident   Abdominal: Soft, mildly distended, nontender  Skin: Warm, dry, no rash on visualized skin surfaces  Psychiatric: Alert and oriented ×3, normal affect     Laboratory Results  I have personally reviewed CBC with WBC 10, hemoglobin 11, platelets 241.  BMP with creatinine 0.86, bicarb 25.    Radiology  No new imaging to review       Bryan Borden MD  Colorectal & General Surgery  [facility]    [address], [address]  [place], [place], [address]  P: [phone]  F: [phone]

## 2024-08-10 NOTE — PLAN OF CARE
Goal Outcome Evaluation:           Progress: improving  Outcome Evaluation: up ad vincenzo; ambulating frequently in cruz.  had loose BM today after dulcolax suppository.  norco x2 for abd discomfort.  diet advanced to full liquids this am.  IVF, IV zosyn, and IV solumedrol continue.

## 2024-08-11 LAB
ANION GAP SERPL CALCULATED.3IONS-SCNC: 10 MMOL/L (ref 5–15)
BASOPHILS # BLD AUTO: 0.01 10*3/MM3 (ref 0–0.2)
BASOPHILS NFR BLD AUTO: 0.1 % (ref 0–1.5)
BUN SERPL-MCNC: 8 MG/DL (ref 6–20)
BUN/CREAT SERPL: 9.8 (ref 7–25)
CALCIUM SPEC-SCNC: 8.1 MG/DL (ref 8.6–10.5)
CHLORIDE SERPL-SCNC: 106 MMOL/L (ref 98–107)
CO2 SERPL-SCNC: 26 MMOL/L (ref 22–29)
CREAT SERPL-MCNC: 0.82 MG/DL (ref 0.57–1)
DEPRECATED RDW RBC AUTO: 42.1 FL (ref 37–54)
EGFRCR SERPLBLD CKD-EPI 2021: 85.7 ML/MIN/1.73
EOSINOPHIL # BLD AUTO: 0 10*3/MM3 (ref 0–0.4)
EOSINOPHIL NFR BLD AUTO: 0 % (ref 0.3–6.2)
ERYTHROCYTE [DISTWIDTH] IN BLOOD BY AUTOMATED COUNT: 12.8 % (ref 12.3–15.4)
GLUCOSE SERPL-MCNC: 122 MG/DL (ref 65–99)
HCT VFR BLD AUTO: 34.9 % (ref 34–46.6)
HGB BLD-MCNC: 11.4 G/DL (ref 12–15.9)
IMM GRANULOCYTES # BLD AUTO: 0.13 10*3/MM3 (ref 0–0.05)
IMM GRANULOCYTES NFR BLD AUTO: 1.3 % (ref 0–0.5)
LYMPHOCYTES # BLD AUTO: 1.13 10*3/MM3 (ref 0.7–3.1)
LYMPHOCYTES NFR BLD AUTO: 11.2 % (ref 19.6–45.3)
MCH RBC QN AUTO: 29 PG (ref 26.6–33)
MCHC RBC AUTO-ENTMCNC: 32.7 G/DL (ref 31.5–35.7)
MCV RBC AUTO: 88.8 FL (ref 79–97)
MONOCYTES # BLD AUTO: 0.46 10*3/MM3 (ref 0.1–0.9)
MONOCYTES NFR BLD AUTO: 4.6 % (ref 5–12)
NEUTROPHILS NFR BLD AUTO: 8.35 10*3/MM3 (ref 1.7–7)
NEUTROPHILS NFR BLD AUTO: 82.8 % (ref 42.7–76)
NRBC BLD AUTO-RTO: 0 /100 WBC (ref 0–0.2)
PLATELET # BLD AUTO: 280 10*3/MM3 (ref 140–450)
PMV BLD AUTO: 11.2 FL (ref 6–12)
POTASSIUM SERPL-SCNC: 3.3 MMOL/L (ref 3.5–5.2)
RBC # BLD AUTO: 3.93 10*6/MM3 (ref 3.77–5.28)
SODIUM SERPL-SCNC: 142 MMOL/L (ref 136–145)
WBC NRBC COR # BLD AUTO: 10.08 10*3/MM3 (ref 3.4–10.8)

## 2024-08-11 PROCEDURE — 25010000002 PIPERACILLIN SOD-TAZOBACTAM PER 1 G: Performed by: NURSE PRACTITIONER

## 2024-08-11 PROCEDURE — 80048 BASIC METABOLIC PNL TOTAL CA: CPT | Performed by: INTERNAL MEDICINE

## 2024-08-11 PROCEDURE — 85025 COMPLETE CBC W/AUTO DIFF WBC: CPT | Performed by: INTERNAL MEDICINE

## 2024-08-11 PROCEDURE — 25010000002 METHYLPREDNISOLONE PER 40 MG

## 2024-08-11 PROCEDURE — 99232 SBSQ HOSP IP/OBS MODERATE 35: CPT | Performed by: NURSE PRACTITIONER

## 2024-08-11 PROCEDURE — 99232 SBSQ HOSP IP/OBS MODERATE 35: CPT | Performed by: SURGERY

## 2024-08-11 PROCEDURE — 25810000003 SODIUM CHLORIDE 0.9 % SOLUTION: Performed by: NURSE PRACTITIONER

## 2024-08-11 PROCEDURE — 25010000002 ENOXAPARIN PER 10 MG: Performed by: STUDENT IN AN ORGANIZED HEALTH CARE EDUCATION/TRAINING PROGRAM

## 2024-08-11 RX ORDER — POLYETHYLENE GLYCOL 3350 17 G/17G
17 POWDER, FOR SOLUTION ORAL DAILY
Status: DISCONTINUED | OUTPATIENT
Start: 2024-08-11 | End: 2024-08-12 | Stop reason: HOSPADM

## 2024-08-11 RX ADMIN — ENOXAPARIN SODIUM 40 MG: 100 INJECTION SUBCUTANEOUS at 08:59

## 2024-08-11 RX ADMIN — PIPERACILLIN AND TAZOBACTAM 3.38 G: 3; .375 INJECTION, POWDER, FOR SOLUTION INTRAVENOUS at 06:11

## 2024-08-11 RX ADMIN — Medication 10 ML: at 20:01

## 2024-08-11 RX ADMIN — ATORVASTATIN CALCIUM 10 MG: 20 TABLET, FILM COATED ORAL at 08:58

## 2024-08-11 RX ADMIN — METHYLPREDNISOLONE SODIUM SUCCINATE 20 MG: 40 INJECTION, POWDER, FOR SOLUTION INTRAMUSCULAR; INTRAVENOUS at 08:59

## 2024-08-11 RX ADMIN — METHYLPREDNISOLONE SODIUM SUCCINATE 20 MG: 40 INJECTION, POWDER, FOR SOLUTION INTRAMUSCULAR; INTRAVENOUS at 16:59

## 2024-08-11 RX ADMIN — ESCITALOPRAM OXALATE 10 MG: 10 TABLET, FILM COATED ORAL at 08:58

## 2024-08-11 RX ADMIN — HYDROCODONE BITARTRATE AND ACETAMINOPHEN 1 TABLET: 5; 325 TABLET ORAL at 12:26

## 2024-08-11 RX ADMIN — PANTOPRAZOLE SODIUM 40 MG: 40 INJECTION, POWDER, FOR SOLUTION INTRAVENOUS at 08:59

## 2024-08-11 RX ADMIN — SODIUM CHLORIDE 100 ML/HR: 9 INJECTION, SOLUTION INTRAVENOUS at 02:27

## 2024-08-11 RX ADMIN — POLYETHYLENE GLYCOL 3350 17 G: 17 POWDER, FOR SOLUTION ORAL at 12:26

## 2024-08-11 RX ADMIN — LISINOPRIL 10 MG: 10 TABLET ORAL at 08:58

## 2024-08-11 RX ADMIN — PIPERACILLIN AND TAZOBACTAM 3.38 G: 3; .375 INJECTION, POWDER, FOR SOLUTION INTRAVENOUS at 21:32

## 2024-08-11 RX ADMIN — PANTOPRAZOLE SODIUM 40 MG: 40 INJECTION, POWDER, FOR SOLUTION INTRAVENOUS at 20:01

## 2024-08-11 RX ADMIN — LEVOTHYROXINE SODIUM 100 MCG: 100 TABLET ORAL at 06:11

## 2024-08-11 RX ADMIN — CETIRIZINE HYDROCHLORIDE 10 MG: 10 TABLET ORAL at 08:58

## 2024-08-11 RX ADMIN — PIPERACILLIN AND TAZOBACTAM 3.38 G: 3; .375 INJECTION, POWDER, FOR SOLUTION INTRAVENOUS at 14:32

## 2024-08-11 RX ADMIN — Medication 10 ML: at 08:59

## 2024-08-11 RX ADMIN — METHYLPREDNISOLONE SODIUM SUCCINATE 20 MG: 40 INJECTION, POWDER, FOR SOLUTION INTRAMUSCULAR; INTRAVENOUS at 02:27

## 2024-08-11 RX ADMIN — HYDROCHLOROTHIAZIDE 12.5 MG: 12.5 TABLET ORAL at 08:58

## 2024-08-11 NOTE — PLAN OF CARE
Goal Outcome Evaluation:  Plan of Care Reviewed With: patient        Progress: improving  Outcome Evaluation: up ad vincenzo.  ambulating often in cruz.  norco for c/o abd discomfort.  IVF's stopped today.  IV antibiotic and solumedrol continues.  started on miralax. does not care for options on full liquid diet but tolerating PO intake.

## 2024-08-11 NOTE — PLAN OF CARE
Goal Outcome Evaluation:  Plan of Care Reviewed With: patient           Outcome Evaluation: VSS, up ad vincenzo, IVF infusing, IV abx and steroid given, Norco x1 for pain, walked unit x2, voiding freely, no nausea, tolerating diet, will continue to monitor.

## 2024-08-11 NOTE — PROGRESS NOTES
Name: Rosa Melgoza ADMIT: 2024   : 1970  PCP: Kathie Romeo DO    MRN: [id number] LOS: 2 days   AGE/SEX: 53 y.o. female  ROOM: [facility]     Subjective   Subjective   Abdominal pain continues to improve.  No fevers or chills.  Did have some nausea yesterday evening after eating soup, but no vomiting.  Has not had breakfast yet.  Had a bowel movement yesterday morning after suppository.      Review of Systems  As above     Objective   Objective   Vital Signs  Temp:  [97.6 °F (36.4 °C)-98.5 °F (36.9 °C)] 97.8 °F (36.6 °C)  Heart Rate:  [53-60] 53  Resp:  [14-18] 18  BP: (129-161)/(72-79) 161/79  SpO2:  [92 %-94 %] 93 %  on   ;   Device (Oxygen Therapy): room air  Body mass index is 38.2 kg/m².  Physical Exam  Constitutional:       General: She is not in acute distress.     Appearance: She is not ill-appearing.   Cardiovascular:      Rate and Rhythm: Normal rate and regular rhythm.   Pulmonary:      Effort: Pulmonary effort is normal. No respiratory distress.   Abdominal:      General: Abdomen is flat. There is distension.      Tenderness: There is abdominal tenderness (Mild in bilateral lower quadrants).   Musculoskeletal:         General: No swelling or deformity. Normal range of motion.   Skin:     General: Skin is warm and dry.   Neurological:      General: No focal deficit present.      Mental Status: She is alert. Mental status is at baseline.         Results Review     I reviewed the patient's new clinical results.  Results from last 7 days   Lab Units 24  0501 08/10/24  0605 24  0522 24  0545   WBC 10*3/mm3 10.08 10.49 13.86* 12.32*   HEMOGLOBIN g/dL 11.4* 11.3* 11.5* 12.9   PLATELETS 10*3/mm3 280 241 244 242     Results from last 7 days   Lab Units 24  0501 08/10/24  0605 24  0522 24  0545   SODIUM mmol/L 142 142 141 140   POTASSIUM mmol/L 3.3* 3.7 3.4* 3.5   CHLORIDE mmol/L 106 107 108* 104   CO2 mmol/L 26.0 25.1 24.0 24.5   BUN mg/dL 8 9 8 7   CREATININE  "mg/dL 0.82 0.86 0.89 0.78   GLUCOSE mg/dL 122* 128* 122* 163*   Estimated Creatinine Clearance: 91.7 mL/min (by C-G formula based on SCr of 0.82 mg/dL).  Results from last 7 days   Lab Units 08/07/24  2028   ALBUMIN g/dL 3.9   BILIRUBIN mg/dL 0.4   ALK PHOS U/L 100   AST (SGOT) U/L 16   ALT (SGPT) U/L 20     Results from last 7 days   Lab Units 08/11/24  0501 08/10/24  0605 08/09/24  0522 08/08/24  0545 08/07/24 2028   CALCIUM mg/dL 8.1* 8.4* 8.3* 8.6 9.4   ALBUMIN g/dL  --   --   --   --  3.9   MAGNESIUM mg/dL  --   --   --   --  2.0     Results from last 7 days   Lab Units 08/07/24  2151   LACTATE mmol/L 1.0     COVID19   Date Value Ref Range Status   03/22/2022 Not Detected Not Detected - Ref. Range Final     SARS-CoV-2 PCR   Date Value Ref Range Status   09/22/2020 Not Detected Not Detected Final     Comment:     Nucleic acid specific to SARS-CoV-2 (COVID-19) virus was not detected inthis sample by the TaqPath (TM) COVID-19 Combo Kit.SARS-CoV-2 (COVID-19) nucleic acid testing performed using [employer] Aptima (R) SARS-CoV-2 Assay or [employer] TaqPath   (TM)COVID-19 Combo Kit as indicated above under Emergency Use Authorization (EUA) from the FDA. Aptima (R) and TaqPath (TM) test performancecharacteristics verified by [facility] in accordance with the FDAs Guidance Document (Policy for Diagnostic   Tests for Coronavirus Disease-2019during the Public Health Emergency) issued on March 16, 2020. The laboratory is regulated under CLIA as qualified to perform high-complexity testingUnless otherwise noted, all testing was performed at [facility]   CLIA No. [id number], [place] State Licensee No. [id number]     No results found for: \"HGBA1C\", \"POCGLU\"    CT Abdomen Pelvis With Contrast  Narrative: CT OF THE ABDOMEN AND PELVIS WITH CONTRAST     HISTORY: Abdominal pain. HISTORY of Crohn's disease.     COMPARISON: July 11, 2024     TECHNIQUE: Axial CT imaging was obtained through the abdomen and pelvis.  IV " contrast was administered.     FINDINGS:  Images through the lung bases demonstrate a benign centrally calcified  nodule within the left lower lobe. The gallbladder is absent. There is a  small hiatal hernia. It is distended with fluid, as is the stomach. This  was actually also present on prior study. The spleen, adrenal glands,  and pancreas are normal. Duodenum appears unremarkable. No  hydronephrosis is seen on either side. The kidneys enhance  symmetrically. There are mild aortoiliac calcifications. No distal  ureteral or bladder stones are seen. Uterus appears normal, as are the  ovaries. There is colonic diverticulosis. The appendix is normal. The  patient has thick walled and edematous segments of small bowel which are  noted within the abdomen, with adjacent mesenteric edema and trace  fluid. Appearance is concerning for active Crohn's disease. Distal to  the inflamed segments, there is also a mildly dilated loop of small  bowel, which is located proximal to an area of narrowing within the  small bowel, which may reflect a stricture. This is best seen on coronal  series 3 image 59. The distal ileum is decompressed. There is fatty  infiltration of the wall of the distal small bowel, in keeping with  chronic inflammatory bowel disease. The appendix is normal. There is a  small amount of free fluid which is noted within the abdomen and pelvis,  likely reactive. No acute osseous abnormalities are seen. There is  stable sclerosis of the SI joints bilaterally.     Impression:    1. Thick-walled and edematous loops of small bowel which are noted  within the left side of the abdomen, concerning for active Crohn's  disease.  2. The patient's small hiatal hernia is mildly distended with fluid, as  is the stomach. The patient had similar findings on the exam from July 11, 2024. Some degree of gastritis, and associated ileus is not  excluded.  3. There are some distended loops of small bowel, which don't  appear  particularly thick walled, which are noted proximal to a focally  narrowed segment of small bowel. Stricture is not excluded.        Radiation dose reduction techniques were utilized, including automated  exposure control and exposure modulation based on body size.        This report was finalized on 8/7/2024 10:12 PM by Dr. Sparkle Singh M.D on Workstation: [id number]       I reviewed the patient's daily medications.  Scheduled Medications  atorvastatin, 10 mg, Oral, Daily  cetirizine, 10 mg, Oral, Daily  enoxaparin, 40 mg, Subcutaneous, Q24H  escitalopram, 10 mg, Oral, Daily  fluticasone, 2 spray, Nasal, Daily  lisinopril, 10 mg, Oral, Q24H   And  hydroCHLOROthiazide, 12.5 mg, Oral, Q24H  levothyroxine, 100 mcg, Oral, Q AM  methylPREDNISolone sodium succinate, 20 mg, Intravenous, Q8H  pantoprazole, 40 mg, Intravenous, Q12H  piperacillin-tazobactam, 3.375 g, Intravenous, Q8H  polyethylene glycol, 17 g, Oral, Daily  sodium chloride, 10 mL, Intravenous, Q12H    Infusions     Diet  Diet: Liquid; Full Liquid; Fluid Consistency: Thin (IDDSI 0)         I have personally reviewed:  [x]  Laboratory   []  Microbiology   [x]  Radiology   []  EKG/Telemetry   []  Cardiology/Vascular   []  Pathology   [x]  Records     Assessment/Plan     Active Hospital Problems    Diagnosis  POA    **Exacerbation of Crohn's disease [K50.90]  Yes    Hypokalemia [E87.6]  Unknown    Hypothyroidism [E03.9]  Yes    GERD (gastroesophageal reflux disease) [K21.9]  Yes    Essential hypertension [I10]  Yes      Resolved Hospital Problems   No resolved problems to display.       53 y.o. female admitted with Exacerbation of Crohn's disease.    Crohn's disease exacerbation  Leukocytosis  Constipation  -Day 5 of 5 of Zosyn  -GI following, IV steroids per GI  -Surgery following.  Have added MiraLAX.  Continue full liquid diet.  May need to discharge on a full liquid diet.  -Continue Norco as needed, continue morphine as needed  -IVF have been  discontinued    Vaginal candidiasis, resolved  -Diflucan x 1  -Routinely gets vaginal yeast infections with antibiotics    Hypokalemia, resolved  -Replete as needed    Hypertension-continue home lisinopril/hydrochlorothiazide    Hypothyroidism-continue home levothyroxine    GERD-continue home Protonix    Works for [employer]    Lovenox 40 mg SC daily for DVT prophylaxis.  Full code.  Discussed with patient, nursing staff, and consulting provider.  Anticipate discharge home tomorrow.  If okay with consultants    Expected Discharge Date: 8/11/2024; Expected Discharge Time:       aDvie Carreon MD  [facility]  08/11/24  09:58 EDT

## 2024-08-11 NOTE — PAYOR COMM NOTE
"Kiah Melgoza (53 y.o. Female)          U/D FOR [id number]    CONTACT F# [phone]         Date of Birth   1970    Social Security Number       Address   [address] [place] [address]    Home Phone       MRN   [id number]       [facility]    Marital Status                               Admission Date   24    Admission Type   Emergency    Admitting Provider   Marlon Mills MD    Attending Provider   Davie Carreon MD    Department, Room/Bed   [address], [facility]/       Discharge Date       Discharge Disposition       Discharge Destination                                 Attending Provider: Davie Carreon MD    Allergies: No Known Allergies    Isolation: None   Infection: None   Code Status: CPR    Ht: 162.6 cm (64.02\")   Wt: 101 kg (222 lb 10.6 oz)    Admission Cmt: None   Principal Problem: Exacerbation of Crohn's disease [K50.90]                   Active Insurance as of 2024       Primary Coverage       Payor Plan Insurance Group Employer/Plan Group    [facility] [employer] EMPL[employer]EE [id number]       Payor Plan Address Payor Plan Phone Number Payor Plan Fax Number Effective Dates    [address] [phone]  2016 - None Entered    [place] [address]         Subscriber Name Subscriber Birth Date Member ID       KIAH MELGOZA 1970 [id number]                     Emergency Contacts        (Rel.) Home Phone Work Phone Mobile Phone    Jim Melgoza (Spouse) [phone] -- [phone]    MINNIE MELGOZA (Daughter) -- -- [phone]                 Physician Progress Notes (last 24 hours)        Davie Carreon MD at 24 0958              Name: Kiah Melgoza ADMIT: 2024   : 1970  PCP: Kathie Romeo DO    MRN: [id number] LOS: 2 days   AGE/SEX: 53 y.o. female  ROOM: [facility]     Subjective   Subjective   Abdominal pain continues to improve.  No fevers or chills.  Did have some nausea yesterday " evening after eating soup, but no vomiting.  Has not had breakfast yet.  Had a bowel movement yesterday morning after suppository.      Review of Systems  As above    Objective   Objective   Vital Signs  Temp:  [97.6 °F (36.4 °C)-98.5 °F (36.9 °C)] 97.8 °F (36.6 °C)  Heart Rate:  [53-60] 53  Resp:  [14-18] 18  BP: (129-161)/(72-79) 161/79  SpO2:  [92 %-94 %] 93 %  on   ;   Device (Oxygen Therapy): room air  Body mass index is 38.2 kg/m².  Physical Exam  Constitutional:       General: She is not in acute distress.     Appearance: She is not ill-appearing.   Cardiovascular:      Rate and Rhythm: Normal rate and regular rhythm.   Pulmonary:      Effort: Pulmonary effort is normal. No respiratory distress.   Abdominal:      General: Abdomen is flat. There is distension.      Tenderness: There is abdominal tenderness (Mild in bilateral lower quadrants).   Musculoskeletal:         General: No swelling or deformity. Normal range of motion.   Skin:     General: Skin is warm and dry.   Neurological:      General: No focal deficit present.      Mental Status: She is alert. Mental status is at baseline.         Results Review     I reviewed the patient's new clinical results.  Results from last 7 days   Lab Units 08/11/24  0501 08/10/24  0605 08/09/24  0522 08/08/24  0545   WBC 10*3/mm3 10.08 10.49 13.86* 12.32*   HEMOGLOBIN g/dL 11.4* 11.3* 11.5* 12.9   PLATELETS 10*3/mm3 280 241 244 242     Results from last 7 days   Lab Units 08/11/24  0501 08/10/24  0605 08/09/24  0522 08/08/24  0545   SODIUM mmol/L 142 142 141 140   POTASSIUM mmol/L 3.3* 3.7 3.4* 3.5   CHLORIDE mmol/L 106 107 108* 104   CO2 mmol/L 26.0 25.1 24.0 24.5   BUN mg/dL 8 9 8 7   CREATININE mg/dL 0.82 0.86 0.89 0.78   GLUCOSE mg/dL 122* 128* 122* 163*   Estimated Creatinine Clearance: 91.7 mL/min (by C-G formula based on SCr of 0.82 mg/dL).  Results from last 7 days   Lab Units 08/07/24  2028   ALBUMIN g/dL 3.9   BILIRUBIN mg/dL 0.4   ALK PHOS U/L 100   AST  "(SGOT) U/L 16   ALT (SGPT) U/L 20     Results from last 7 days   Lab Units 08/11/24  0501 08/10/24  0605 08/09/24  0522 08/08/24  0545 08/07/24 2028   CALCIUM mg/dL 8.1* 8.4* 8.3* 8.6 9.4   ALBUMIN g/dL  --   --   --   --  3.9   MAGNESIUM mg/dL  --   --   --   --  2.0     Results from last 7 days   Lab Units 08/07/24  2151   LACTATE mmol/L 1.0     COVID19   Date Value Ref Range Status   03/22/2022 Not Detected Not Detected - Ref. Range Final     SARS-CoV-2 PCR   Date Value Ref Range Status   09/22/2020 Not Detected Not Detected Final     Comment:     Nucleic acid specific to SARS-CoV-2 (COVID-19) virus was not detected inthis sample by the TaqPath (TM) COVID-19 Combo Kit.SARS-CoV-2 (COVID-19) nucleic acid testing performed using [employer] Aptima (R) SARS-CoV-2 Assay or [employer] TaqPath   (TM)COVID-19 Combo Kit as indicated above under Emergency Use Authorization (EUA) from the FDA. Aptima (R) and TaqPath (TM) test performancecharacteristics verified by [facility] in accordance with the FDAs Guidance Document (Policy for Diagnostic   Tests for Coronavirus Disease-2019during the Public Health Emergency) issued on March 16, 2020. The laboratory is regulated under CLIA as qualified to perform high-complexity testingUnless otherwise noted, all testing was performed at [facility],   CLIA No. [id number], [place] State Licensee No. [id number]     No results found for: \"HGBA1C\", \"POCGLU\"    CT Abdomen Pelvis With Contrast  Narrative: CT OF THE ABDOMEN AND PELVIS WITH CONTRAST     HISTORY: Abdominal pain. HISTORY of Crohn's disease.     COMPARISON: July 11, 2024     TECHNIQUE: Axial CT imaging was obtained through the abdomen and pelvis.  IV contrast was administered.     FINDINGS:  Images through the lung bases demonstrate a benign centrally calcified  nodule within the left lower lobe. The gallbladder is absent. There is a  small hiatal hernia. It is distended with fluid, as is the stomach. This  was actually " also present on prior study. The spleen, adrenal glands,  and pancreas are normal. Duodenum appears unremarkable. No  hydronephrosis is seen on either side. The kidneys enhance  symmetrically. There are mild aortoiliac calcifications. No distal  ureteral or bladder stones are seen. Uterus appears normal, as are the  ovaries. There is colonic diverticulosis. The appendix is normal. The  patient has thick walled and edematous segments of small bowel which are  noted within the abdomen, with adjacent mesenteric edema and trace  fluid. Appearance is concerning for active Crohn's disease. Distal to  the inflamed segments, there is also a mildly dilated loop of small  bowel, which is located proximal to an area of narrowing within the  small bowel, which may reflect a stricture. This is best seen on coronal  series 3 image 59. The distal ileum is decompressed. There is fatty  infiltration of the wall of the distal small bowel, in keeping with  chronic inflammatory bowel disease. The appendix is normal. There is a  small amount of free fluid which is noted within the abdomen and pelvis,  likely reactive. No acute osseous abnormalities are seen. There is  stable sclerosis of the SI joints bilaterally.     Impression:    1. Thick-walled and edematous loops of small bowel which are noted  within the left side of the abdomen, concerning for active Crohn's  disease.  2. The patient's small hiatal hernia is mildly distended with fluid, as  is the stomach. The patient had similar findings on the exam from July 11, 2024. Some degree of gastritis, and associated ileus is not  excluded.  3. There are some distended loops of small bowel, which don't appear  particularly thick walled, which are noted proximal to a focally  narrowed segment of small bowel. Stricture is not excluded.        Radiation dose reduction techniques were utilized, including automated  exposure control and exposure modulation based on body size.        This  report was finalized on 8/7/2024 10:12 PM by Dr. Sparkle Singh M.D on Workstation: [id number]       I reviewed the patient's daily medications.  Scheduled Medications  atorvastatin, 10 mg, Oral, Daily  cetirizine, 10 mg, Oral, Daily  enoxaparin, 40 mg, Subcutaneous, Q24H  escitalopram, 10 mg, Oral, Daily  fluticasone, 2 spray, Nasal, Daily  lisinopril, 10 mg, Oral, Q24H   And  hydroCHLOROthiazide, 12.5 mg, Oral, Q24H  levothyroxine, 100 mcg, Oral, Q AM  methylPREDNISolone sodium succinate, 20 mg, Intravenous, Q8H  pantoprazole, 40 mg, Intravenous, Q12H  piperacillin-tazobactam, 3.375 g, Intravenous, Q8H  polyethylene glycol, 17 g, Oral, Daily  sodium chloride, 10 mL, Intravenous, Q12H    Infusions     Diet  Diet: Liquid; Full Liquid; Fluid Consistency: Thin (IDDSI 0)        I have personally reviewed:  [x]  Laboratory   []  Microbiology   [x]  Radiology   []  EKG/Telemetry   []  Cardiology/Vascular   []  Pathology   [x]  Records     Assessment/Plan     Active Hospital Problems    Diagnosis  POA    **Exacerbation of Crohn's disease [K50.90]  Yes    Hypokalemia [E87.6]  Unknown    Hypothyroidism [E03.9]  Yes    GERD (gastroesophageal reflux disease) [K21.9]  Yes    Essential hypertension [I10]  Yes      Resolved Hospital Problems   No resolved problems to display.       53 y.o. female admitted with Exacerbation of Crohn's disease.    Crohn's disease exacerbation  Leukocytosis  Constipation  -Day 5 of 5 of Zosyn  -GI following, IV steroids per GI  -Surgery following.  Have added MiraLAX.  Continue full liquid diet.  May need to discharge on a full liquid diet.  -Continue Norco as needed, continue morphine as needed  -IVF have been discontinued    Vaginal candidiasis, resolved  -Diflucan x 1  -Routinely gets vaginal yeast infections with antibiotics    Hypokalemia, resolved  -Replete as needed    Hypertension-continue home lisinopril/hydrochlorothiazide    Hypothyroidism-continue home  levothyroxine    GERD-continue home Protonix    Works for [employer]    Lovenox 40 mg SC daily for DVT prophylaxis.  Full code.  Discussed with patient, nursing staff, and consulting provider.  Anticipate discharge home tomorrow.  If okay with consultants    Expected Discharge Date: 8/11/2024; Expected Discharge Time:       Davie Carreon MD  [facility]  08/11/24  09:58 EDT            Electronically signed by Davie Carreon MD at 08/11/24 1000       Miles Borden MD at 08/11/24 0941          Colorectal & General Surgery  Progress Note    Patient: Rosa Melgoza  YOB: 1970  MRN: [id number]      Assessment  Rosa Melgoza is a 53 y.o. female with Crohn's disease who presents with acute Crohn's enteritis with intermittent small bowel.  Continues to make progress but she remains somewhat nauseated at times.  I think we should keep her on full liquids, possibly into the outpatient world.  Continue steroids.  Okay for antibiotics to fall off from my standpoint.  I did discontinue her maintenance intravenous fluids that she is now tolerating enough liquids.  I have also ordered nutritional supplements so that she can get some good nutrition while still on a liquid diet.    I do think it is safe to give her a daily dose of MiraLAX at this point, so I have ordered that as well.    Subjective  Feels better today.  Had some nausea last night.  Passing some flatus but no bowel function today.    Objective    Vitals:    08/11/24 0855   BP: 161/79   Pulse: 53   Resp: 18   Temp: 97.8 °F (36.6 °C)   SpO2: 93%       Physical Exam  Constitutional: Well-developed well-nourished, no acute distress  Neck: Supple, trachea midline  Respiratory: No increased work of breathing, Symmetric excursion  Cardiovascular: Well pefursed, no jugular venous distention evident   Abdominal: Soft, non-tender, non-distended  Skin: Warm, dry, no rash on visualized skin surfaces  Psychiatric: Alert  and oriented ×3, normal affect     Laboratory Results  I have personally reviewed CBC with WBC 10, hemoglobin 11, platelets 280.  BMP with creatinine 0.82, potassium 3.3, bicarb 26.    Radiology  No new imaging for review.         Bryan Borden MD  Colorectal & General Surgery  [facility]    [address], [address]  [place], [place], [address]  P: [phone]  F: [phone]       Electronically signed by Miles Borden MD at 08/11/24 0943       Lise Leo APRN at 08/11/24 0810          Gastroenterology   Inpatient Progress Note    Reason for Follow Up: Crohn's exacerbation    Subjective     Interval History:   Patient tolerating full liquids.  She has not any nausea or vomiting today.  Did have 1 BM yesterday following suppository.  No BM today.  Reports that she has already been seen by general surgery this morning.  Plans to continue antibiotic therapy and IV steroids.  Hopefully home middle of next week.  Reports some left lower quadrant tenderness but no significant pain.    Current Facility-Administered Medications:     acetaminophen (TYLENOL) tablet 650 mg, 650 mg, Oral, Q4H PRN **OR** acetaminophen (TYLENOL) 160 MG/5ML oral solution 650 mg, 650 mg, Oral, Q4H PRN **OR** acetaminophen (TYLENOL) suppository 650 mg, 650 mg, Rectal, Q4H PRN, Lise Perdomo APRN    atorvastatin (LIPITOR) tablet 10 mg, 10 mg, Oral, Daily, Lise Perdomo APRN, 10 mg at 08/10/24 0812    bisacodyl (DULCOLAX) suppository 10 mg, 10 mg, Rectal, Daily PRN, Davie Carreon MD, 10 mg at 08/10/24 0830    butalbital-acetaminophen-caffeine (FIORICET, ESGIC) -40 MG per tablet 1 tablet, 1 tablet, Oral, Q6H PRN, Lise Perdomo APRN    calcium carbonate (TUMS) chewable tablet 500 mg (200 mg elemental), 2 tablet, Oral, BID PRN, Lise Perdomo APRN    cetirizine (zyrTEC) tablet 10 mg, 10 mg, Oral, Daily, Lise Perdomo APRN, 10 mg at 08/10/24 0812    Enoxaparin Sodium  (LOVENOX) syringe 40 mg, 40 mg, Subcutaneous, Q24H, Davie Carreon MD, 40 mg at 08/10/24 0813    escitalopram (LEXAPRO) tablet 10 mg, 10 mg, Oral, Daily, Lise Perdomo APRN, 10 mg at 08/10/24 0812    fluticasone (FLONASE) 50 MCG/ACT nasal spray 2 spray, 2 spray, Nasal, Daily, Lise Perdomo APRN    lisinopril (PRINIVIL,ZESTRIL) tablet 10 mg, 10 mg, Oral, Q24H, 10 mg at 08/10/24 0812 **AND** hydroCHLOROthiazide tablet 12.5 mg, 12.5 mg, Oral, Q24H, Lise Perdomo APRN, 12.5 mg at 08/10/24 0812    HYDROcodone-acetaminophen (NORCO) 5-325 MG per tablet 1 tablet, 1 tablet, Oral, Q6H PRN, Davie Carreon MD, 1 tablet at 08/10/24 2329    hydrOXYzine (ATARAX) tablet 25 mg, 25 mg, Oral, Q8H PRN, Lise Perdomo APRN    levothyroxine (SYNTHROID, LEVOTHROID) tablet 100 mcg, 100 mcg, Oral, Q AM, Lise Perdomo APRN, 100 mcg at 08/11/24 0611    methylPREDNISolone sodium succinate (SOLU-Medrol) injection 20 mg, 20 mg, Intravenous, Q8H, Rhianna Shelley PA-C, 20 mg at 08/11/24 0227    morphine injection 2 mg, 2 mg, Intravenous, Q3H PRN, Lise Perdomo APRN, 2 mg at 08/08/24 2228    ondansetron ODT (ZOFRAN-ODT) disintegrating tablet 4 mg, 4 mg, Oral, Q6H PRN, 4 mg at 08/08/24 0545 **OR** ondansetron (ZOFRAN) injection 4 mg, 4 mg, Intravenous, Q6H PRN, Lise Perdomo APRN, 4 mg at 08/10/24 1040    pantoprazole (PROTONIX) injection 40 mg, 40 mg, Intravenous, Q12H, Lise Perdomo APRN, 40 mg at 08/10/24 2136    piperacillin-tazobactam (ZOSYN) 3.375 g IVPB in 100 mL NS MBP (CD), 3.375 g, Intravenous, Q8H, Lise Perdomo APRN, 3.375 g at 08/11/24 0611    promethazine (PHENERGAN) tablet 25 mg, 25 mg, Oral, Q6H PRN, Lise Perdomo APRN    sodium chloride 0.9 % flush 10 mL, 10 mL, Intravenous, Q12H, Lise Perdomo APRN, 10 mL at 08/10/24 2136    sodium chloride 0.9 % flush 10 mL, 10 mL, Intravenous, PRN, Lise Perdomo APRN    sodium chloride 0.9 % infusion  40 mL, 40 mL, Intravenous, PRN, Lise Perdomo APRN    sodium chloride 0.9 % infusion, 100 mL/hr, Intravenous, Continuous, Lise Perdomo APRN, Last Rate: 100 mL/hr at 08/11/24 0227, 100 mL/hr at 08/11/24 0227  Review of Systems:    All systems were reviewed and negative except for:  Gastrointestinal: positive for  pain    Objective     Vital Signs  Temp:  [97.6 °F (36.4 °C)-98.9 °F (37.2 °C)] 97.6 °F (36.4 °C)  Heart Rate:  [59-63] 59  Resp:  [14-16] 14  BP: (129-158)/(72-82) 158/78  Body mass index is 38.2 kg/m².    Intake/Output Summary (Last 24 hours) at 8/11/2024 0810  Last data filed at 8/11/2024 0611  Gross per 24 hour   Intake 1135 ml   Output 5100 ml   Net -3965 ml     No intake/output data recorded.     Physical Exam:   General: patient awake, alert and cooperative   Eyes: no scleral icterus   Skin: warm and dry, not jaundiced   Abdomen: soft, mild tenderness on palpation left lower quadrant-but very mild, nondistended; normal bowel sounds, no masses palpated, no periumbical lymphadenopathy   Psychiatric: Appropriate affect and behavior     Results Review:     I reviewed the patient's new clinical results.  I reviewed the patient's new imaging results and agree with the interpretation.  I reviewed the patient's other test results and agree with the interpretation    Results from last 7 days   Lab Units 08/11/24  0501 08/10/24  0605 08/09/24  0522   WBC 10*3/mm3 10.08 10.49 13.86*   HEMOGLOBIN g/dL 11.4* 11.3* 11.5*   HEMATOCRIT % 34.9 34.0 35.0   PLATELETS 10*3/mm3 280 241 244     Results from last 7 days   Lab Units 08/11/24  0501 08/10/24  0605 08/09/24  0522 08/08/24  0545 08/07/24 2028   SODIUM mmol/L 142 142 141   < > 140   POTASSIUM mmol/L 3.3* 3.7 3.4*   < > 3.0*   CHLORIDE mmol/L 106 107 108*   < > 102   CO2 mmol/L 26.0 25.1 24.0   < > 26.0   BUN mg/dL 8 9 8   < > 7   CREATININE mg/dL 0.82 0.86 0.89   < > 0.86   CALCIUM mg/dL 8.1* 8.4* 8.3*   < > 9.4   BILIRUBIN mg/dL  --   --   --    --  0.4   ALK PHOS U/L  --   --   --   --  100   ALT (SGPT) U/L  --   --   --   --  20   AST (SGOT) U/L  --   --   --   --  16   GLUCOSE mg/dL 122* 128* 122*   < > 142*    < > = values in this interval not displayed.         Lab Results   Lab Value Date/Time    LIPASE 33 08/07/2024 2028    LIPASE 20 03/22/2022 1620    LIPASE 22 03/08/2021 1513    LIPASE 34 07/27/2020 1523    LIPASE 20 06/19/2020 1728       Radiology:  CT Abdomen Pelvis With Contrast   Final Result       1. Thick-walled and edematous loops of small bowel which are noted   within the left side of the abdomen, concerning for active Crohn's   disease.   2. The patient's small hiatal hernia is mildly distended with fluid, as   is the stomach. The patient had similar findings on the exam from July 11, 2024. Some degree of gastritis, and associated ileus is not   excluded.   3. There are some distended loops of small bowel, which don't appear   particularly thick walled, which are noted proximal to a focally   narrowed segment of small bowel. Stricture is not excluded.           Radiation dose reduction techniques were utilized, including automated   exposure control and exposure modulation based on body size.           This report was finalized on 8/7/2024 10:12 PM by Dr. Sparkle Singh M.D on Workstation: [id number]              Assessment & Plan     Active Hospital Problems    Diagnosis     **Exacerbation of Crohn's disease     Hypokalemia     Hypothyroidism     GERD (gastroesophageal reflux disease)     Essential hypertension        Assessment:  Crohn's disease of small bowel-on Stelara every 6 weeks  Abdominal pain  Nausea and vomiting  GERD      Plan:  Patient continue methylprednisolone 20 mg 3 times daily  General Surgery following-no surgical intervention planned at this time for stricturing noted on CT scan  Continue full liquid diet  Continue antibiotic therapy  Continue to monitor lab work  Crohn's disease was diagnosed with  cross-sectional imaging and [facility] IBD panel  Patient will likely require change in biologic therapy.  Current patient of Dr. Garza and Manda Butler PA-C  Hgb stable  Hopefully able to transition over to oral prednisone soon.  Will continue to follow.    I discussed the patients findings and my recommendations with patient.    NADEGE Lui APRN  [facility] [place]  [address] [place] [address]  Office: [phone]      Electronically signed by Lise Leo APRN at 08/11/24 1120       Lise Leo APRN at 08/10/24 1501          Gastroenterology   Inpatient Progress Note    Reason for Follow Up: Crohn's flare    Subjective     Interval History:   Family at bedside.  Patient reports abdominal pain is improved today.  Rates pain as a 2/10 with tenderness being more prominent on the left side.  Reports having 2 loose BMs this morning after suppository administration.  Tolerating full liquids.    Current Facility-Administered Medications:     acetaminophen (TYLENOL) tablet 650 mg, 650 mg, Oral, Q4H PRN **OR** acetaminophen (TYLENOL) 160 MG/5ML oral solution 650 mg, 650 mg, Oral, Q4H PRN **OR** acetaminophen (TYLENOL) suppository 650 mg, 650 mg, Rectal, Q4H PRN, Lise Perdomo APRN    atorvastatin (LIPITOR) tablet 10 mg, 10 mg, Oral, Daily, Lise Perdomo APRN, 10 mg at 08/10/24 0812    bisacodyl (DULCOLAX) suppository 10 mg, 10 mg, Rectal, Daily PRN, Davie Carreon MD, 10 mg at 08/10/24 0830    butalbital-acetaminophen-caffeine (FIORICET, ESGIC) -40 MG per tablet 1 tablet, 1 tablet, Oral, Q6H PRN, Lise Perdomo APRN    calcium carbonate (TUMS) chewable tablet 500 mg (200 mg elemental), 2 tablet, Oral, BID PRN, Lise Perdomo APRN    cetirizine (zyrTEC) tablet 10 mg, 10 mg, Oral, Daily, Lise Perdomo APRN, 10 mg at 08/10/24 0812    Enoxaparin Sodium (LOVENOX) syringe 40 mg, 40  mg, Subcutaneous, Q24H, Davie Carreon MD, 40 mg at 08/10/24 0813    escitalopram (LEXAPRO) tablet 10 mg, 10 mg, Oral, Daily, Lise Perdomo APRN, 10 mg at 08/10/24 0812    fluticasone (FLONASE) 50 MCG/ACT nasal spray 2 spray, 2 spray, Nasal, Daily, Lise Perdomo APRN    lisinopril (PRINIVIL,ZESTRIL) tablet 10 mg, 10 mg, Oral, Q24H, 10 mg at 08/10/24 0812 **AND** hydroCHLOROthiazide tablet 12.5 mg, 12.5 mg, Oral, Q24H, Lise Perdomo APRN, 12.5 mg at 08/10/24 0812    HYDROcodone-acetaminophen (NORCO) 5-325 MG per tablet 1 tablet, 1 tablet, Oral, Q6H PRN, Davie Carreon MD, 1 tablet at 08/10/24 1019    hydrOXYzine (ATARAX) tablet 25 mg, 25 mg, Oral, Q8H PRN, Lise Perdomo APRN    levothyroxine (SYNTHROID, LEVOTHROID) tablet 100 mcg, 100 mcg, Oral, Q AM, Lise Perdomo APRN, 100 mcg at 08/10/24 0611    methylPREDNISolone sodium succinate (SOLU-Medrol) injection 20 mg, 20 mg, Intravenous, Q8H, Rhianna Shelley PA-C, 20 mg at 08/10/24 0820    morphine injection 2 mg, 2 mg, Intravenous, Q3H PRN, Lise Perdomo APRN, 2 mg at 08/08/24 2228    ondansetron ODT (ZOFRAN-ODT) disintegrating tablet 4 mg, 4 mg, Oral, Q6H PRN, 4 mg at 08/08/24 0545 **OR** ondansetron (ZOFRAN) injection 4 mg, 4 mg, Intravenous, Q6H PRN, Lise Perdomo APRN, 4 mg at 08/10/24 1040    pantoprazole (PROTONIX) injection 40 mg, 40 mg, Intravenous, Q12H, Lise Perdomo APRN, 40 mg at 08/10/24 0813    piperacillin-tazobactam (ZOSYN) 3.375 g IVPB in 100 mL NS MBP (CD), 3.375 g, Intravenous, Q8H, Lise Perdomo APRN, 3.375 g at 08/10/24 1436    promethazine (PHENERGAN) tablet 25 mg, 25 mg, Oral, Q6H PRN, Lise Perdomo APRN    sodium chloride 0.9 % flush 10 mL, 10 mL, Intravenous, Q12H, Lise Perdomo APRN, 10 mL at 08/10/24 0821    sodium chloride 0.9 % flush 10 mL, 10 mL, Intravenous, PRN, Lise Perdomo APRN    sodium chloride 0.9 % infusion 40 mL, 40 mL, Intravenous,  PRN, Lise Perdomo APRN    sodium chloride 0.9 % infusion, 100 mL/hr, Intravenous, Continuous, Lise Perdomo APRN, Last Rate: 100 mL/hr at 08/09/24 1216, 100 mL/hr at 08/09/24 1216  Review of Systems:    All systems were reviewed and negative except for:  Gastrointestinal: positive for  diarrhea and pain    Objective     Vital Signs  Temp:  [97 °F (36.1 °C)-98.9 °F (37.2 °C)] 98.9 °F (37.2 °C)  Heart Rate:  [63-71] 63  Resp:  [16] 16  BP: (118-152)/(73-82) 152/82  Body mass index is 38.2 kg/m².    Intake/Output Summary (Last 24 hours) at 8/10/2024 1501  Last data filed at 8/10/2024 1438  Gross per 24 hour   Intake 320 ml   Output 4000 ml   Net -3680 ml     I/O this shift:  In: 100 [IV Piggyback:100]  Out: 2100 [Urine:2100]     Physical Exam:   General: patient awake, alert and cooperative   Eyes: no scleral icterus   Skin: warm and dry, not jaundiced   Abdomen: soft, + left sided abdominal tenderness, nondistended; normal bowel sounds, no masses palpated, no periumbical lymphadenopathy   Psychiatric: Appropriate affect and behavior     Results Review:     I reviewed the patient's new clinical results.  I reviewed the patient's new imaging results and agree with the interpretation.  I reviewed the patient's other test results and agree with the interpretation    Results from last 7 days   Lab Units 08/10/24  0605 08/09/24  0522 08/08/24  0545   WBC 10*3/mm3 10.49 13.86* 12.32*   HEMOGLOBIN g/dL 11.3* 11.5* 12.9   HEMATOCRIT % 34.0 35.0 38.6   PLATELETS 10*3/mm3 241 244 242     Results from last 7 days   Lab Units 08/10/24  0605 08/09/24 0522 08/08/24  0545 08/07/24 2028   SODIUM mmol/L 142 141 140 140   POTASSIUM mmol/L 3.7 3.4* 3.5 3.0*   CHLORIDE mmol/L 107 108* 104 102   CO2 mmol/L 25.1 24.0 24.5 26.0   BUN mg/dL 9 8 7 7   CREATININE mg/dL 0.86 0.89 0.78 0.86   CALCIUM mg/dL 8.4* 8.3* 8.6 9.4   BILIRUBIN mg/dL  --   --   --  0.4   ALK PHOS U/L  --   --   --  100   ALT (SGPT) U/L  --   --   --  20    AST (SGOT) U/L  --   --   --  16   GLUCOSE mg/dL 128* 122* 163* 142*         Lab Results   Lab Value Date/Time    LIPASE 33 08/07/2024 2028    LIPASE 20 03/22/2022 1620    LIPASE 22 03/08/2021 1513    LIPASE 34 07/27/2020 1523    LIPASE 20 06/19/2020 1728       Radiology:  CT Abdomen Pelvis With Contrast   Final Result       1. Thick-walled and edematous loops of small bowel which are noted   within the left side of the abdomen, concerning for active Crohn's   disease.   2. The patient's small hiatal hernia is mildly distended with fluid, as   is the stomach. The patient had similar findings on the exam from July 11, 2024. Some degree of gastritis, and associated ileus is not   excluded.   3. There are some distended loops of small bowel, which don't appear   particularly thick walled, which are noted proximal to a focally   narrowed segment of small bowel. Stricture is not excluded.           Radiation dose reduction techniques were utilized, including automated   exposure control and exposure modulation based on body size.           This report was finalized on 8/7/2024 10:12 PM by Dr. Sparkle Singh M.D on Workstation: [id number]              Assessment & Plan     Active Hospital Problems    Diagnosis     **Exacerbation of Crohn's disease     Hypokalemia     Hypothyroidism     GERD (gastroesophageal reflux disease)     Essential hypertension        Assessment:  Crohn's disease-on Stelara which was recently switched to every 6 week dosing  Abdominal pain  Nausea and vomiting  GERD    These problems are new to me.  Plan:  Continue methylprednisolone 60 mg once daily  General surgery was consulted due to findings of stricturing on CT scan   Continue full liquid diet, would not recommend advancing at this time per general surgery recommendations  Continue antibiotic therapy  Continue to monitor lab work  Crohn's disease was diagnosed via cross-sectional [facility] IBD panel.  Plan of care moving forward  will likely include switching biologic therapy, which can be determined at patient's next office follow-up visit.  Patient is a current patient of Dr. Garza and Manda Butler PA-C.  We will continue to follow    I discussed the patients findings and my recommendations with patient and family.    NADEGE Lui APRN  [facility] [address]  Office: [phone]      Electronically signed by Lise Leo APRN at 08/10/24 1616       Consult Notes (last 24 hours)  Notes from 08/10/24 1249 through 08/11/24 1249   No notes of this type exist for this encounter.

## 2024-08-11 NOTE — PROGRESS NOTES
Gastroenterology   Inpatient Progress Note    Reason for Follow Up: Crohn's exacerbation    Subjective     Interval History:   Patient tolerating full liquids.  She has not any nausea or vomiting today.  Did have 1 BM yesterday following suppository.  No BM today.  Reports that she has already been seen by general surgery this morning.  Plans to continue antibiotic therapy and IV steroids.  Hopefully home middle of next week.  Reports some left lower quadrant tenderness but no significant pain.    Current Facility-Administered Medications:     acetaminophen (TYLENOL) tablet 650 mg, 650 mg, Oral, Q4H PRN **OR** acetaminophen (TYLENOL) 160 MG/5ML oral solution 650 mg, 650 mg, Oral, Q4H PRN **OR** acetaminophen (TYLENOL) suppository 650 mg, 650 mg, Rectal, Q4H PRN, Lise ePrdomo APRN    atorvastatin (LIPITOR) tablet 10 mg, 10 mg, Oral, Daily, Lise Perdomo APRN, 10 mg at 08/10/24 0812    bisacodyl (DULCOLAX) suppository 10 mg, 10 mg, Rectal, Daily PRN, Davie Carreon MD, 10 mg at 08/10/24 0830    butalbital-acetaminophen-caffeine (FIORICET, ESGIC) -40 MG per tablet 1 tablet, 1 tablet, Oral, Q6H PRN, Lise Perdomo APRN    calcium carbonate (TUMS) chewable tablet 500 mg (200 mg elemental), 2 tablet, Oral, BID PRN, Lise Perdomo APRN    cetirizine (zyrTEC) tablet 10 mg, 10 mg, Oral, Daily, Lise Perdomo APRN, 10 mg at 08/10/24 0812    Enoxaparin Sodium (LOVENOX) syringe 40 mg, 40 mg, Subcutaneous, Q24H, Davie Carreon MD, 40 mg at 08/10/24 0813    escitalopram (LEXAPRO) tablet 10 mg, 10 mg, Oral, Daily, Lise Perdomo APRN, 10 mg at 08/10/24 0812    fluticasone (FLONASE) 50 MCG/ACT nasal spray 2 spray, 2 spray, Nasal, Daily, Lise Perdomo APRN    lisinopril (PRINIVIL,ZESTRIL) tablet 10 mg, 10 mg, Oral, Q24H, 10 mg at 08/10/24 0812 **AND** hydroCHLOROthiazide tablet 12.5 mg, 12.5 mg, Oral, Q24H, Lise Perdomo APRN, 12.5 mg at 08/10/24 0812     HYDROcodone-acetaminophen (NORCO) 5-325 MG per tablet 1 tablet, 1 tablet, Oral, Q6H PRN, Davie Carreon MD, 1 tablet at 08/10/24 2329    hydrOXYzine (ATARAX) tablet 25 mg, 25 mg, Oral, Q8H PRN, Lise Perdomo APRN    levothyroxine (SYNTHROID, LEVOTHROID) tablet 100 mcg, 100 mcg, Oral, Q AM, Lise Perdomo APRN, 100 mcg at 08/11/24 0611    methylPREDNISolone sodium succinate (SOLU-Medrol) injection 20 mg, 20 mg, Intravenous, Q8H, Rhianna Shelley PA-C, 20 mg at 08/11/24 0227    morphine injection 2 mg, 2 mg, Intravenous, Q3H PRN, Lise Perdomo APRN, 2 mg at 08/08/24 2228    ondansetron ODT (ZOFRAN-ODT) disintegrating tablet 4 mg, 4 mg, Oral, Q6H PRN, 4 mg at 08/08/24 0545 **OR** ondansetron (ZOFRAN) injection 4 mg, 4 mg, Intravenous, Q6H PRN, Lise Perdomo APRN, 4 mg at 08/10/24 1040    pantoprazole (PROTONIX) injection 40 mg, 40 mg, Intravenous, Q12H, Lise Perdomo APRN, 40 mg at 08/10/24 2136    piperacillin-tazobactam (ZOSYN) 3.375 g IVPB in 100 mL NS MBP (CD), 3.375 g, Intravenous, Q8H, Lise Perdomo APRN, 3.375 g at 08/11/24 0611    promethazine (PHENERGAN) tablet 25 mg, 25 mg, Oral, Q6H PRN, Lise Perdomo APRN    sodium chloride 0.9 % flush 10 mL, 10 mL, Intravenous, Q12H, Lise Perdomo APRN, 10 mL at 08/10/24 2136    sodium chloride 0.9 % flush 10 mL, 10 mL, Intravenous, PRN, Lise Perdomo APRN    sodium chloride 0.9 % infusion 40 mL, 40 mL, Intravenous, PRN, Lise Perdomo APRN    sodium chloride 0.9 % infusion, 100 mL/hr, Intravenous, Continuous, Lise Perdomo APRN, Last Rate: 100 mL/hr at 08/11/24 0227, 100 mL/hr at 08/11/24 0227  Review of Systems:    All systems were reviewed and negative except for:  Gastrointestinal: positive for  pain    Objective     Vital Signs  Temp:  [97.6 °F (36.4 °C)-98.9 °F (37.2 °C)] 97.6 °F (36.4 °C)  Heart Rate:  [59-63] 59  Resp:  [14-16] 14  BP: (129-158)/(72-82) 158/78  Body mass index  is 38.2 kg/m².    Intake/Output Summary (Last 24 hours) at 8/11/2024 0810  Last data filed at 8/11/2024 0611  Gross per 24 hour   Intake 1135 ml   Output 5100 ml   Net -3965 ml     No intake/output data recorded.     Physical Exam:   General: patient awake, alert and cooperative   Eyes: no scleral icterus   Skin: warm and dry, not jaundiced   Abdomen: soft, mild tenderness on palpation left lower quadrant-but very mild, nondistended; normal bowel sounds, no masses palpated, no periumbical lymphadenopathy   Psychiatric: Appropriate affect and behavior     Results Review:     I reviewed the patient's new clinical results.  I reviewed the patient's new imaging results and agree with the interpretation.  I reviewed the patient's other test results and agree with the interpretation    Results from last 7 days   Lab Units 08/11/24  0501 08/10/24  0605 08/09/24  0522   WBC 10*3/mm3 10.08 10.49 13.86*   HEMOGLOBIN g/dL 11.4* 11.3* 11.5*   HEMATOCRIT % 34.9 34.0 35.0   PLATELETS 10*3/mm3 280 241 244     Results from last 7 days   Lab Units 08/11/24  0501 08/10/24  0605 08/09/24  0522 08/08/24  0545 08/07/24 2028   SODIUM mmol/L 142 142 141   < > 140   POTASSIUM mmol/L 3.3* 3.7 3.4*   < > 3.0*   CHLORIDE mmol/L 106 107 108*   < > 102   CO2 mmol/L 26.0 25.1 24.0   < > 26.0   BUN mg/dL 8 9 8   < > 7   CREATININE mg/dL 0.82 0.86 0.89   < > 0.86   CALCIUM mg/dL 8.1* 8.4* 8.3*   < > 9.4   BILIRUBIN mg/dL  --   --   --   --  0.4   ALK PHOS U/L  --   --   --   --  100   ALT (SGPT) U/L  --   --   --   --  20   AST (SGOT) U/L  --   --   --   --  16   GLUCOSE mg/dL 122* 128* 122*   < > 142*    < > = values in this interval not displayed.         Lab Results   Lab Value Date/Time    LIPASE 33 08/07/2024 2028    LIPASE 20 03/22/2022 1620    LIPASE 22 03/08/2021 1513    LIPASE 34 07/27/2020 1523    LIPASE 20 06/19/2020 1728       Radiology:  CT Abdomen Pelvis With Contrast   Final Result       1. Thick-walled and edematous loops of small  bowel which are noted   within the left side of the abdomen, concerning for active Crohn's   disease.   2. The patient's small hiatal hernia is mildly distended with fluid, as   is the stomach. The patient had similar findings on the exam from July 11, 2024. Some degree of gastritis, and associated ileus is not   excluded.   3. There are some distended loops of small bowel, which don't appear   particularly thick walled, which are noted proximal to a focally   narrowed segment of small bowel. Stricture is not excluded.           Radiation dose reduction techniques were utilized, including automated   exposure control and exposure modulation based on body size.           This report was finalized on 8/7/2024 10:12 PM by Dr. Sparkle Singh M.D on Workstation: [id number]              Assessment & Plan     Active Hospital Problems    Diagnosis     **Exacerbation of Crohn's disease     Hypokalemia     Hypothyroidism     GERD (gastroesophageal reflux disease)     Essential hypertension        Assessment:  Crohn's disease of small bowel-on Stelara every 6 weeks  Abdominal pain  Nausea and vomiting  GERD      Plan:  Patient continue methylprednisolone 20 mg 3 times daily  General Surgery following-no surgical intervention planned at this time for stricturing noted on CT scan  Continue full liquid diet  Continue antibiotic therapy  Continue to monitor lab work  Crohn's disease was diagnosed with cross-sectional imaging and [facility] IBD panel  Patient will likely require change in biologic therapy.  Current patient of Dr. Garza and Manda Butler PA-C  Hgb stable  Hopefully able to transition over to oral prednisone soon.  Will continue to follow.    I discussed the patients findings and my recommendations with patient.    NADEGE Lui APRN  [facility] [address] [address]  Office: [phone]

## 2024-08-11 NOTE — PROGRESS NOTES
Colorectal & General Surgery  Progress Note    Patient: Rosa Melgoza  YOB: 1970  MRN: [id number]      Assessment  Rosa Melgoza is a 53 y.o. female with Crohn's disease who presents with acute Crohn's enteritis with intermittent small bowel.  Continues to make progress but she remains somewhat nauseated at times.  I think we should keep her on full liquids, possibly into the outpatient world.  Continue steroids.  Okay for antibiotics to fall off from my standpoint.  I did discontinue her maintenance intravenous fluids that she is now tolerating enough liquids.  I have also ordered nutritional supplements so that she can get some good nutrition while still on a liquid diet.    I do think it is safe to give her a daily dose of MiraLAX at this point, so I have ordered that as well.    Subjective  Feels better today.  Had some nausea last night.  Passing some flatus but no bowel function today.    Objective    Vitals:    08/11/24 0855   BP: 161/79   Pulse: 53   Resp: 18   Temp: 97.8 °F (36.6 °C)   SpO2: 93%       Physical Exam  Constitutional: Well-developed well-nourished, no acute distress  Neck: Supple, trachea midline  Respiratory: No increased work of breathing, Symmetric excursion  Cardiovascular: Well pefursed, no jugular venous distention evident   Abdominal: Soft, non-tender, non-distended  Skin: Warm, dry, no rash on visualized skin surfaces  Psychiatric: Alert and oriented ×3, normal affect     Laboratory Results  I have personally reviewed CBC with WBC 10, hemoglobin 11, platelets 280.  BMP with creatinine 0.82, potassium 3.3, bicarb 26.    Radiology  No new imaging for review.         Bryan Borden MD  Colorectal & General Surgery  [facility]    [address], [address]  [place], [place], [address]  P: [phone]  F: [phone]

## 2024-08-12 ENCOUNTER — READMISSION MANAGEMENT (OUTPATIENT)
Dept: CALL CENTER | Facility: HOSPITAL | Age: 54
End: 2024-08-12
Payer: COMMERCIAL

## 2024-08-12 ENCOUNTER — SPECIALTY PHARMACY (OUTPATIENT)
Dept: GASTROENTEROLOGY | Facility: CLINIC | Age: 54
End: 2024-08-12
Payer: COMMERCIAL

## 2024-08-12 VITALS
RESPIRATION RATE: 14 BRPM | BODY MASS INDEX: 38.01 KG/M2 | OXYGEN SATURATION: 90 % | DIASTOLIC BLOOD PRESSURE: 93 MMHG | HEART RATE: 65 BPM | HEIGHT: 64 IN | SYSTOLIC BLOOD PRESSURE: 158 MMHG | TEMPERATURE: 98 F | WEIGHT: 222.66 LBS

## 2024-08-12 DIAGNOSIS — F43.22 ADJUSTMENT DISORDER WITH ANXIOUS MOOD: ICD-10-CM

## 2024-08-12 DIAGNOSIS — K21.9 GASTROESOPHAGEAL REFLUX DISEASE: ICD-10-CM

## 2024-08-12 DIAGNOSIS — G43.109 MIGRAINE WITH AURA AND WITHOUT STATUS MIGRAINOSUS, NOT INTRACTABLE: ICD-10-CM

## 2024-08-12 DIAGNOSIS — I10 ESSENTIAL HYPERTENSION: ICD-10-CM

## 2024-08-12 DIAGNOSIS — E78.2 MIXED HYPERLIPIDEMIA: ICD-10-CM

## 2024-08-12 LAB
ANION GAP SERPL CALCULATED.3IONS-SCNC: 10.2 MMOL/L (ref 5–15)
BACTERIA SPEC AEROBE CULT: NORMAL
BACTERIA SPEC AEROBE CULT: NORMAL
BASOPHILS # BLD AUTO: 0.02 10*3/MM3 (ref 0–0.2)
BASOPHILS NFR BLD AUTO: 0.2 % (ref 0–1.5)
BUN SERPL-MCNC: 6 MG/DL (ref 6–20)
BUN/CREAT SERPL: 7.9 (ref 7–25)
CALCIUM SPEC-SCNC: 8.8 MG/DL (ref 8.6–10.5)
CHLORIDE SERPL-SCNC: 101 MMOL/L (ref 98–107)
CO2 SERPL-SCNC: 30.8 MMOL/L (ref 22–29)
CREAT SERPL-MCNC: 0.76 MG/DL (ref 0.57–1)
DEPRECATED RDW RBC AUTO: 42.2 FL (ref 37–54)
EGFRCR SERPLBLD CKD-EPI 2021: 93.8 ML/MIN/1.73
EOSINOPHIL # BLD AUTO: 0 10*3/MM3 (ref 0–0.4)
EOSINOPHIL NFR BLD AUTO: 0 % (ref 0.3–6.2)
ERYTHROCYTE [DISTWIDTH] IN BLOOD BY AUTOMATED COUNT: 12.9 % (ref 12.3–15.4)
GLUCOSE SERPL-MCNC: 124 MG/DL (ref 65–99)
HBV CORE IGM SERPL QL IA: NORMAL
HBV SURFACE AB SER RIA-ACNC: REACTIVE
HBV SURFACE AG SERPL QL IA: NORMAL
HCT VFR BLD AUTO: 38.1 % (ref 34–46.6)
HGB BLD-MCNC: 12.5 G/DL (ref 12–15.9)
IMM GRANULOCYTES # BLD AUTO: 0.15 10*3/MM3 (ref 0–0.05)
IMM GRANULOCYTES NFR BLD AUTO: 1.5 % (ref 0–0.5)
LYMPHOCYTES # BLD AUTO: 1.05 10*3/MM3 (ref 0.7–3.1)
LYMPHOCYTES NFR BLD AUTO: 10.6 % (ref 19.6–45.3)
MAGNESIUM SERPL-MCNC: 2 MG/DL (ref 1.6–2.6)
MCH RBC QN AUTO: 29.1 PG (ref 26.6–33)
MCHC RBC AUTO-ENTMCNC: 32.8 G/DL (ref 31.5–35.7)
MCV RBC AUTO: 88.8 FL (ref 79–97)
MONOCYTES # BLD AUTO: 0.41 10*3/MM3 (ref 0.1–0.9)
MONOCYTES NFR BLD AUTO: 4.1 % (ref 5–12)
NEUTROPHILS NFR BLD AUTO: 8.26 10*3/MM3 (ref 1.7–7)
NEUTROPHILS NFR BLD AUTO: 83.6 % (ref 42.7–76)
NRBC BLD AUTO-RTO: 0 /100 WBC (ref 0–0.2)
PLATELET # BLD AUTO: 314 10*3/MM3 (ref 140–450)
PMV BLD AUTO: 11 FL (ref 6–12)
POTASSIUM SERPL-SCNC: 3 MMOL/L (ref 3.5–5.2)
RBC # BLD AUTO: 4.29 10*6/MM3 (ref 3.77–5.28)
SODIUM SERPL-SCNC: 142 MMOL/L (ref 136–145)
WBC NRBC COR # BLD AUTO: 9.89 10*3/MM3 (ref 3.4–10.8)

## 2024-08-12 PROCEDURE — 86480 TB TEST CELL IMMUN MEASURE: CPT | Performed by: PHYSICIAN ASSISTANT

## 2024-08-12 PROCEDURE — 86704 HEP B CORE ANTIBODY TOTAL: CPT | Performed by: PHYSICIAN ASSISTANT

## 2024-08-12 PROCEDURE — 25010000002 ENOXAPARIN PER 10 MG: Performed by: STUDENT IN AN ORGANIZED HEALTH CARE EDUCATION/TRAINING PROGRAM

## 2024-08-12 PROCEDURE — 86706 HEP B SURFACE ANTIBODY: CPT | Performed by: PHYSICIAN ASSISTANT

## 2024-08-12 PROCEDURE — 87340 HEPATITIS B SURFACE AG IA: CPT | Performed by: PHYSICIAN ASSISTANT

## 2024-08-12 PROCEDURE — 25010000002 ONDANSETRON PER 1 MG: Performed by: NURSE PRACTITIONER

## 2024-08-12 PROCEDURE — 99232 SBSQ HOSP IP/OBS MODERATE 35: CPT | Performed by: SURGERY

## 2024-08-12 PROCEDURE — 25010000002 PIPERACILLIN SOD-TAZOBACTAM PER 1 G: Performed by: NURSE PRACTITIONER

## 2024-08-12 PROCEDURE — 99232 SBSQ HOSP IP/OBS MODERATE 35: CPT | Performed by: PHYSICIAN ASSISTANT

## 2024-08-12 PROCEDURE — 83735 ASSAY OF MAGNESIUM: CPT | Performed by: STUDENT IN AN ORGANIZED HEALTH CARE EDUCATION/TRAINING PROGRAM

## 2024-08-12 PROCEDURE — 86705 HEP B CORE ANTIBODY IGM: CPT | Performed by: PHYSICIAN ASSISTANT

## 2024-08-12 PROCEDURE — 85025 COMPLETE CBC W/AUTO DIFF WBC: CPT | Performed by: INTERNAL MEDICINE

## 2024-08-12 PROCEDURE — 25010000002 METHYLPREDNISOLONE PER 40 MG

## 2024-08-12 PROCEDURE — 80048 BASIC METABOLIC PNL TOTAL CA: CPT | Performed by: INTERNAL MEDICINE

## 2024-08-12 RX ORDER — PREDNISONE 20 MG/1
40 TABLET ORAL DAILY
Qty: 60 TABLET | Refills: 0 | Status: SHIPPED | OUTPATIENT
Start: 2024-08-12 | End: 2024-09-11

## 2024-08-12 RX ORDER — ONDANSETRON 4 MG/1
4 TABLET, ORALLY DISINTEGRATING ORAL EVERY 8 HOURS PRN
Qty: 30 TABLET | Refills: 0 | Status: SHIPPED | OUTPATIENT
Start: 2024-08-12 | End: 2024-08-14 | Stop reason: ALTCHOICE

## 2024-08-12 RX ORDER — BISACODYL 10 MG
10 SUPPOSITORY, RECTAL RECTAL DAILY PRN
Qty: 30 SUPPOSITORY | Refills: 0 | Status: SHIPPED | OUTPATIENT
Start: 2024-08-12 | End: 2024-09-11

## 2024-08-12 RX ORDER — POTASSIUM CHLORIDE 750 MG/1
40 TABLET, FILM COATED, EXTENDED RELEASE ORAL EVERY 4 HOURS
Status: DISCONTINUED | OUTPATIENT
Start: 2024-08-12 | End: 2024-08-12 | Stop reason: HOSPADM

## 2024-08-12 RX ORDER — POLYETHYLENE GLYCOL 3350 17 G/17G
17 POWDER, FOR SOLUTION ORAL DAILY
Qty: 510 G | Refills: 0 | Status: SHIPPED | OUTPATIENT
Start: 2024-08-12 | End: 2024-09-11

## 2024-08-12 RX ORDER — HYDROCODONE BITARTRATE AND ACETAMINOPHEN 5; 325 MG/1; MG/1
1 TABLET ORAL EVERY 6 HOURS PRN
Qty: 12 TABLET | Refills: 0 | Status: SHIPPED | OUTPATIENT
Start: 2024-08-12 | End: 2024-08-15

## 2024-08-12 RX ADMIN — METHYLPREDNISOLONE SODIUM SUCCINATE 20 MG: 40 INJECTION, POWDER, FOR SOLUTION INTRAMUSCULAR; INTRAVENOUS at 08:44

## 2024-08-12 RX ADMIN — HYDROCODONE BITARTRATE AND ACETAMINOPHEN 1 TABLET: 5; 325 TABLET ORAL at 09:52

## 2024-08-12 RX ADMIN — PANTOPRAZOLE SODIUM 40 MG: 40 INJECTION, POWDER, FOR SOLUTION INTRAVENOUS at 08:44

## 2024-08-12 RX ADMIN — LEVOTHYROXINE SODIUM 100 MCG: 100 TABLET ORAL at 05:09

## 2024-08-12 RX ADMIN — POTASSIUM CHLORIDE 40 MEQ: 750 TABLET, EXTENDED RELEASE ORAL at 14:49

## 2024-08-12 RX ADMIN — POLYETHYLENE GLYCOL 3350 17 G: 17 POWDER, FOR SOLUTION ORAL at 08:43

## 2024-08-12 RX ADMIN — LISINOPRIL 10 MG: 10 TABLET ORAL at 08:44

## 2024-08-12 RX ADMIN — POTASSIUM CHLORIDE 40 MEQ: 750 TABLET, EXTENDED RELEASE ORAL at 17:09

## 2024-08-12 RX ADMIN — PIPERACILLIN AND TAZOBACTAM 3.38 G: 3; .375 INJECTION, POWDER, FOR SOLUTION INTRAVENOUS at 05:10

## 2024-08-12 RX ADMIN — Medication 10 ML: at 08:44

## 2024-08-12 RX ADMIN — CETIRIZINE HYDROCHLORIDE 10 MG: 10 TABLET ORAL at 08:43

## 2024-08-12 RX ADMIN — FLUTICASONE PROPIONATE 2 SPRAY: 50 SPRAY, METERED NASAL at 08:44

## 2024-08-12 RX ADMIN — ENOXAPARIN SODIUM 40 MG: 100 INJECTION SUBCUTANEOUS at 08:43

## 2024-08-12 RX ADMIN — ESCITALOPRAM OXALATE 10 MG: 10 TABLET, FILM COATED ORAL at 08:43

## 2024-08-12 RX ADMIN — HYDROCHLOROTHIAZIDE 12.5 MG: 12.5 TABLET ORAL at 08:44

## 2024-08-12 RX ADMIN — METHYLPREDNISOLONE SODIUM SUCCINATE 20 MG: 40 INJECTION, POWDER, FOR SOLUTION INTRAMUSCULAR; INTRAVENOUS at 00:20

## 2024-08-12 RX ADMIN — METHYLPREDNISOLONE SODIUM SUCCINATE 20 MG: 40 INJECTION, POWDER, FOR SOLUTION INTRAMUSCULAR; INTRAVENOUS at 17:09

## 2024-08-12 RX ADMIN — ONDANSETRON 4 MG: 2 INJECTION INTRAMUSCULAR; INTRAVENOUS at 00:15

## 2024-08-12 RX ADMIN — ATORVASTATIN CALCIUM 10 MG: 20 TABLET, FILM COATED ORAL at 08:43

## 2024-08-12 RX ADMIN — HYDROCODONE BITARTRATE AND ACETAMINOPHEN 1 TABLET: 5; 325 TABLET ORAL at 00:15

## 2024-08-12 NOTE — PLAN OF CARE
Goal Outcome Evaluation:  Plan of Care Reviewed With: patient        Progress: improving  Outcome Evaluation: IV steroids and IV zosyn given as ordered, Norco given for pain control , Zofran given for nausea, up ad vincenzo, ambulating in cruz frequently, room air, voiding, morning labs, resting between care

## 2024-08-12 NOTE — PROGRESS NOTES
Colorectal & General Surgery  Progress Note    Patient: Rosa Melgoza  YOB: 1970  MRN: [id number]      Assessment  Rosa Melgoza is a 53 y.o. female with Crohn's disease who presents with acute Crohn's enteritis.  She has made significant progress.  From my standpoint, okay to transition to oral prednisone and discontinue antibiotics.  I think she will need to stay on a very liquidy diet for at least a week.  I discussed supplementing her diet with multiple Toroleo nutrition drinks on a daily basis.  I will plan to see her in the office in about a week and a half with a repeat CT scan to ensure that we are moving in the right direction.  Continue to recommend modification to her biologic therapy.    Subjective  Says she feels better.  1 bout of nausea last night when she had too much soup.  Otherwise doing well.    Objective    Vitals:    08/12/24 0506   BP: 158/82   Pulse: 51   Resp: 14   Temp: 97.4 °F (36.3 °C)   SpO2: 91%       Physical Exam  Constitutional: Well-developed well-nourished, no acute distress  Neck: Supple, trachea midline  Respiratory: No increased work of breathing, Symmetric excursion  Cardiovascular: Well pefursed, no jugular venous distention evident   Abdominal: Soft, non-tender, non-distended  Skin: Warm, dry, no rash on visualized skin surfaces  Psychiatric: Alert and oriented ×3, normal affect     Laboratory Results  I have personally reviewed CBC with WBC 9, hemoglobin 12, platelets 314.  BMP with creatinine 0.76, potassium 3.0.    Radiology  None to review         Bryan Borden MD  Colorectal & General Surgery  [facility]    [address] [place] [address], [address]  [place], [address]  P: [phone]  F: [phone]

## 2024-08-12 NOTE — PROGRESS NOTES
Continued Stay Note  [facility]     Patient Name: Rosa Melgoza  MRN: [id number]  Today's Date: 8/12/2024    Admit Date: 8/7/2024    Plan: Home no needs   Discharge Plan       Row Name 08/12/24 1000       Plan    Plan Home no needs    Plan Comments Discharge order noted. Met with patient who confirmed DC plan is to return home. Stated her spouse will assist as needed and will provide transportation at DC. Denies any needs/equipment.                   Discharge Codes    No documentation.                 Expected Discharge Date and Time       Expected Discharge Date Expected Discharge Time    Aug 12, 2024               Sloane Holland RN

## 2024-08-12 NOTE — DISCHARGE SUMMARY
Patient Name: Rosa Melgoza  : 1970  MRN: [id number]    Date of Admission: 2024  Date of Discharge:  2024  Primary Care Physician: Kathie Romeo DO      Chief Complaint:   Abdominal Pain      Discharge Diagnoses     Active Hospital Problems    Diagnosis  POA    **Exacerbation of Crohn's disease [K50.90]  Yes    Hypokalemia [E87.6]  Unknown    Hypothyroidism [E03.9]  Yes    GERD (gastroesophageal reflux disease) [K21.9]  Yes    Essential hypertension [I10]  Yes      Resolved Hospital Problems   No resolved problems to display.        Hospital Course     Ms. Melgoza is a 53 y.o. female with a history of chron's disease hypertension, hypothyroidism, GERD presenting with abdominal pain found to have Crohn's disease exacerbation.  CT abdomen concerning for a stricture.  Surgery was consulted and patient received 5 days of IV antibiotics.  Patient's pain improved with IV steroids.  Was able to tolerate a full liquid diet with minimal nausea and or vomiting.  Plan to discharge on full liquid diet.  Will have her diet advanced as an outpatient by either surgery or GI.  Plan for prednisone 40 mg daily until follow-up with GI.  Discussed holding her home semaglutide and to follow-up with GI.  Will get repeat CT abdomen in 1-2 weeks.    At the time of discharge patient was told to take all medications as prescribed, keep all follow-up appointments, and call their doctor or return to the hospital with any worsening or concerning symptoms.    Day of Discharge     Subjective:  Patient resting comfortably in bed.  Tolerating a full liquid diet.  Did have some nausea after dinner yesterday but no vomiting.  Discussed discharge plan for later today if tolerating breakfast and lunch.        Physical Exam:  Temp:  [97.2 °F (36.2 °C)-99.8 °F (37.7 °C)] 97.2 °F (36.2 °C)  Heart Rate:  [51-61] 53  Resp:  [14-18] 14  BP: (156-172)/(80-87) 172/87  Body mass index is 38.2 kg/m².  Physical Exam  Constitutional:        General: She is not in acute distress.     Appearance: She is not ill-appearing.   Cardiovascular:      Rate and Rhythm: Normal rate and regular rhythm.   Pulmonary:      Effort: Pulmonary effort is normal. No respiratory distress.   Abdominal:      General: Abdomen is flat. There is no distension.      Tenderness: There is abdominal tenderness (minimal).   Musculoskeletal:         General: No swelling or deformity. Normal range of motion.   Skin:     General: Skin is warm and dry.   Neurological:      General: No focal deficit present.      Mental Status: She is alert. Mental status is at baseline.         Consultants     Consult Orders (all) (From admission, onward)       Start     Ordered    08/08/24 1618  Inpatient General Surgery Consult  Once        Specialty:  General Surgery  Provider:  Miles Borden MD    08/08/24 1618    08/08/24 0050  Inpatient Gastroenterology Consult  Once        Specialty:  Gastroenterology  Provider:  Maicol Garza MD    08/08/24 0053    08/07/24 2252  [facility] (on-call MD unless specified) Details  Once        Specialty:  Hospitalist  Provider:  (Not yet assigned)    08/07/24 2251    08/07/24 2226  Gastroenterology (on-call MD unless specified)  Once        Specialty:  Gastroenterology  Provider:  Ricki Saunders MD    08/07/24 2225                  Procedures     Imaging Results (All)       Procedure Component Value Units Date/Time    CT Abdomen Pelvis With Contrast [[id number]] Collected: 08/07/24 2159     Updated: 08/07/24 2215    Narrative:      CT OF THE ABDOMEN AND PELVIS WITH CONTRAST     HISTORY: Abdominal pain. HISTORY of Crohn's disease.     COMPARISON: July 11, 2024     TECHNIQUE: Axial CT imaging was obtained through the abdomen and pelvis.  IV contrast was administered.     FINDINGS:  Images through the lung bases demonstrate a benign centrally calcified  nodule within the left lower lobe. The gallbladder is absent. There is a  small hiatal hernia. It is  distended with fluid, as is the stomach. This  was actually also present on prior study. The spleen, adrenal glands,  and pancreas are normal. Duodenum appears unremarkable. No  hydronephrosis is seen on either side. The kidneys enhance  symmetrically. There are mild aortoiliac calcifications. No distal  ureteral or bladder stones are seen. Uterus appears normal, as are the  ovaries. There is colonic diverticulosis. The appendix is normal. The  patient has thick walled and edematous segments of small bowel which are  noted within the abdomen, with adjacent mesenteric edema and trace  fluid. Appearance is concerning for active Crohn's disease. Distal to  the inflamed segments, there is also a mildly dilated loop of small  bowel, which is located proximal to an area of narrowing within the  small bowel, which may reflect a stricture. This is best seen on coronal  series 3 image 59. The distal ileum is decompressed. There is fatty  infiltration of the wall of the distal small bowel, in keeping with  chronic inflammatory bowel disease. The appendix is normal. There is a  small amount of free fluid which is noted within the abdomen and pelvis,  likely reactive. No acute osseous abnormalities are seen. There is  stable sclerosis of the SI joints bilaterally.       Impression:         1. Thick-walled and edematous loops of small bowel which are noted  within the left side of the abdomen, concerning for active Crohn's  disease.  2. The patient's small hiatal hernia is mildly distended with fluid, as  is the stomach. The patient had similar findings on the exam from July 11, 2024. Some degree of gastritis, and associated ileus is not  excluded.  3. There are some distended loops of small bowel, which don't appear  particularly thick walled, which are noted proximal to a focally  narrowed segment of small bowel. Stricture is not excluded.        Radiation dose reduction techniques were utilized, including automated  exposure  "control and exposure modulation based on body size.        This report was finalized on 8/7/2024 10:12 PM by Dr. Sparkle Singh M.D on Workstation: [id number]               Pertinent Labs     Results from last 7 days   Lab Units 08/12/24  0547 08/11/24  0501 08/10/24  0605 08/09/24  0522   WBC 10*3/mm3 9.89 10.08 10.49 13.86*   HEMOGLOBIN g/dL 12.5 11.4* 11.3* 11.5*   PLATELETS 10*3/mm3 314 280 241 244     Results from last 7 days   Lab Units 08/12/24  0547 08/11/24  0501 08/10/24  0605 08/09/24  0522   SODIUM mmol/L 142 142 142 141   POTASSIUM mmol/L 3.0* 3.3* 3.7 3.4*   CHLORIDE mmol/L 101 106 107 108*   CO2 mmol/L 30.8* 26.0 25.1 24.0   BUN mg/dL 6 8 9 8   CREATININE mg/dL 0.76 0.82 0.86 0.89   GLUCOSE mg/dL 124* 122* 128* 122*   Estimated Creatinine Clearance: 98.9 mL/min (by C-G formula based on SCr of 0.76 mg/dL).  Results from last 7 days   Lab Units 08/07/24 2028   ALBUMIN g/dL 3.9   BILIRUBIN mg/dL 0.4   ALK PHOS U/L 100   AST (SGOT) U/L 16   ALT (SGPT) U/L 20     Results from last 7 days   Lab Units 08/12/24  0547 08/11/24  0501 08/10/24  0605 08/09/24  0522 08/08/24  0545 08/07/24 2028   CALCIUM mg/dL 8.8 8.1* 8.4* 8.3*   < > 9.4   ALBUMIN g/dL  --   --   --   --   --  3.9   MAGNESIUM mg/dL 2.0  --   --   --   --  2.0    < > = values in this interval not displayed.     Results from last 7 days   Lab Units 08/07/24 2028   LIPASE U/L 33             Invalid input(s): \"LDLCALC\"  Results from last 7 days   Lab Units 08/07/24  2221   BLOODCX  No growth at 4 days  No growth at 4 days       Test Results Pending at Discharge     Pending Labs       Order Current Status    Hepatitis B Core Antibody, Total In process    QuantiFERON TB Plus Client Incubated In process    Blood Culture - Blood, Arm, Left Preliminary result    Blood Culture - Blood, Arm, Right Preliminary result            Discharge Details        Discharge Medications        New Medications        Instructions Start Date   bisacodyl 10 MG " suppository  Commonly known as: DULCOLAX   10 mg, Rectal, Daily PRN      HYDROcodone-acetaminophen 5-325 MG per tablet  Commonly known as: NORCO   1 tablet, Oral, Every 6 Hours PRN      ondansetron ODT 4 MG disintegrating tablet  Commonly known as: ZOFRAN-ODT   4 mg, Translingual, Every 8 Hours PRN      polyethylene glycol 17 g packet  Commonly known as: MIRALAX   17 g, Oral, Daily      predniSONE 20 MG tablet  Commonly known as: DELTASONE   40 mg, Oral, Daily             Changes to Medications        Instructions Start Date   Emgality 120 MG/ML auto-injector pen  Generic drug: galcanezumab-gnlm  What changed: additional instructions   120 mg, Subcutaneous, Every 30 Days             Continue These Medications        Instructions Start Date   atorvastatin 10 MG tablet  Commonly known as: LIPITOR   10 mg, Oral, Daily      butalbital-acetaminophen-caffeine -40 MG per tablet  Commonly known as: FIORICET, ESGIC   1 tablet, Oral, Every 6 Hours PRN      escitalopram 10 MG tablet  Commonly known as: LEXAPRO   10 mg, Oral, Daily      fluticasone 50 MCG/ACT nasal spray  Commonly known as: FLONASE   Use 2 sprays into each nostril as directed by provider Daily.      hydrOXYzine 25 MG tablet  Commonly known as: ATARAX   25 mg, Oral, Every 8 Hours PRN      Hyoscyamine Sulfate SL 0.125 MG sublingual tablet  Commonly known as: Levsin/SL   1 tablet, Sublingual, Every 6 Hours PRN      levocetirizine 5 MG tablet  Commonly known as: XYZAL   5 mg, Oral, Every Evening      levothyroxine 100 MCG tablet  Commonly known as: SYNTHROID, LEVOTHROID   100 mcg, Oral, Daily      lisinopril-hydrochlorothiazide 10-12.5 MG per tablet  Commonly known as: PRINZIDE,ZESTORETIC   1 tablet, Oral, Daily      MULTI ADULT GUMMIES PO   No dose, route, or frequency recorded.      pantoprazole 40 MG EC tablet  Commonly known as: PROTONIX   40 mg, Oral, 2 Times Daily      promethazine 25 MG tablet  Commonly known as: PHENERGAN   25 mg, Oral, Every 6 Hours  PRN      Stelara 90 MG/ML solution prefilled syringe Injection  Generic drug: Ustekinumab   90 mg, Subcutaneous, Every 6 Weeks      STRESS B COMPLEX PO   1 tablet, Oral, Daily      Trudhesa 0.725 MG/ACT aerosol solution  Generic drug: Dihydroergotamine Mesylate HFA   0.725 mg, Nasal, As Needed, One spray into each nostril, maybe repeated after 1 hour if needed, NO more than 2 doses in 24-hour period or 3 doses in 7-day period.      Ubiquinol 100 MG capsule   100 mg, Oral, Daily      VITAMIN D3 GUMMIES ADULT PO   5,000 Units, Oral, Daily      Wegovy 1.7 MG/0.75ML solution auto-injector  Generic drug: Semaglutide-Weight Management   1.7 mg, Subcutaneous, Weekly             Stop These Medications      ondansetron 8 MG tablet  Commonly known as: ZOFRAN              No Known Allergies      Discharge Disposition:  Home or Self Care    Discharge Diet:  Diet Order   Procedures    Diet: Liquid; Full Liquid; Fluid Consistency: Thin (IDDSI 0)       Discharge Activity:   As tolerated    CODE STATUS:    Code Status and Medical Interventions: CPR (Attempt to Resuscitate); Full Support   Ordered at: 08/08/24 0053     Code Status (Patient has no pulse and is not breathing):    CPR (Attempt to Resuscitate)     Medical Interventions (Patient has pulse or is breathing):    Full Support       Future Appointments   Date Time Provider Department Center   8/15/2024 10:40 AM Nemesio Vasquez II, MD [facility]   8/19/2024  4:30 PM [facility]  [facility]   8/22/2024  9:30 AM Miles Borden MD [facility]   8/29/2024  3:00 PM Loly Zee APRN [facility]   9/19/2024  3:15 PM Kathie Romeo DO [facility]     Additional Instructions for the Follow-ups that You Need to Schedule       CT Enterography Abdomen Pelvis w Contrast   Aug 26, 2024      Will Oral Contrast be needed for this procedure?: Yes   Release to patient: Routine Release   Exam reason: Crohn's enteritis               Follow-up  Information       Kathie Romeo,  .    Specialty: Family Medicine  Contact information:  [address]  [address]  [facility] [address]  [phone]               Loly Zee, APRN. Go on 8/29/2024.    Specialties: Gastroenterology, Nurse Practitioner  Why: at 3pm  Contact information:  [address]  [address]  [facility] [id number] [phone]                             Additional Instructions for the Follow-ups that You Need to Schedule       CT Enterography Abdomen Pelvis w Contrast   Aug 26, 2024      Will Oral Contrast be needed for this procedure?: Yes   Release to patient: Routine Release   Exam reason: Crohn's enteritis            Time Spent on Discharge:  Greater than 30 minutes      Davie Carreon MD  [facility]  08/12/24  13:58 EDT

## 2024-08-12 NOTE — PLAN OF CARE
Goal Outcome Evaluation:               Went over the plan of car and answered all questions. Vitals stable and pain is well controlled.Patient is tolerating her diet and no other issues this shift.

## 2024-08-12 NOTE — PROGRESS NOTES
[facility]  Inpatient Progress Note    Reason for Follow-up:  Crohn's exacerbation     Subjective     Interval History:   Patient overall doing better.  Plans to discharge today.    Current Facility-Administered Medications:     acetaminophen (TYLENOL) tablet 650 mg, 650 mg, Oral, Q4H PRN **OR** acetaminophen (TYLENOL) 160 MG/5ML oral solution 650 mg, 650 mg, Oral, Q4H PRN **OR** acetaminophen (TYLENOL) suppository 650 mg, 650 mg, Rectal, Q4H PRN, Lise Perdomo APRN    atorvastatin (LIPITOR) tablet 10 mg, 10 mg, Oral, Daily, Lise Perdomo APRN, 10 mg at 08/12/24 0843    bisacodyl (DULCOLAX) suppository 10 mg, 10 mg, Rectal, Daily PRN, Davie Carreon MD, 10 mg at 08/10/24 0830    butalbital-acetaminophen-caffeine (FIORICET, ESGIC) -40 MG per tablet 1 tablet, 1 tablet, Oral, Q6H PRN, Lise Perdomo APRN    calcium carbonate (TUMS) chewable tablet 500 mg (200 mg elemental), 2 tablet, Oral, BID PRN, Lise Perdomo APRN    cetirizine (zyrTEC) tablet 10 mg, 10 mg, Oral, Daily, Lise Perdomo APRN, 10 mg at 08/12/24 0843    Enoxaparin Sodium (LOVENOX) syringe 40 mg, 40 mg, Subcutaneous, Q24H, Davie Carreon MD, 40 mg at 08/12/24 0843    escitalopram (LEXAPRO) tablet 10 mg, 10 mg, Oral, Daily, Lise Perdomo APRN, 10 mg at 08/12/24 0843    fluticasone (FLONASE) 50 MCG/ACT nasal spray 2 spray, 2 spray, Nasal, Daily, Lise Perdomo APRN, 2 spray at 08/12/24 0844    lisinopril (PRINIVIL,ZESTRIL) tablet 10 mg, 10 mg, Oral, Q24H, 10 mg at 08/12/24 0844 **AND** hydroCHLOROthiazide tablet 12.5 mg, 12.5 mg, Oral, Q24H, Lise Perdomo APRN, 12.5 mg at 08/12/24 0844    HYDROcodone-acetaminophen (NORCO) 5-325 MG per tablet 1 tablet, 1 tablet, Oral, Q6H PRN, Davie Carreon MD, 1 tablet at 08/12/24 0952    hydrOXYzine (ATARAX) tablet 25 mg, 25 mg, Oral, Q8H PRN, Lise Perdomo APRN    levothyroxine (SYNTHROID, LEVOTHROID) tablet 100 mcg, 100  mcg, Oral, Q AM, Lise Perdomo APRN, 100 mcg at 08/12/24 0509    Magnesium Standard Dose Replacement - Follow Nurse / BPA Driven Protocol, , Does not apply, Lauri HOWE Cody W, MD    methylPREDNISolone sodium succinate (SOLU-Medrol) injection 20 mg, 20 mg, Intravenous, Q8H, Rhianna Shelley PA-C, 20 mg at 08/12/24 0844    morphine injection 2 mg, 2 mg, Intravenous, Q3H PRN, Lise Perdomo APRN, 2 mg at 08/08/24 2228    ondansetron ODT (ZOFRAN-ODT) disintegrating tablet 4 mg, 4 mg, Oral, Q6H PRN, 4 mg at 08/08/24 0545 **OR** ondansetron (ZOFRAN) injection 4 mg, 4 mg, Intravenous, Q6H PRN, Lise Perdomo APRN, 4 mg at 08/12/24 0015    pantoprazole (PROTONIX) injection 40 mg, 40 mg, Intravenous, Q12H, Lise Perdomo APRN, 40 mg at 08/12/24 0844    piperacillin-tazobactam (ZOSYN) 3.375 g IVPB in 100 mL NS MBP (CD), 3.375 g, Intravenous, Q8H, Lise Perdomo APRN, 3.375 g at 08/12/24 0510    polyethylene glycol (MIRALAX) packet 17 g, 17 g, Oral, Daily, Miles Borden MD, 17 g at 08/12/24 0843    Potassium Replacement - Follow Nurse / BPA Driven Protocol, , Does not apply, Lauri HOWE Cody W, MD    promethazine (PHENERGAN) tablet 25 mg, 25 mg, Oral, Q6H PRN, Lise Perdomo APRN    sodium chloride 0.9 % flush 10 mL, 10 mL, Intravenous, Q12H, Lise Perdomo APRN, 10 mL at 08/12/24 0844    sodium chloride 0.9 % flush 10 mL, 10 mL, Intravenous, PRN, Lise Perdomo APRN    sodium chloride 0.9 % infusion 40 mL, 40 mL, Intravenous, PRN, Lise Perdomo, NADEGE      Objective     Vital Signs  Temp:  [97.2 °F (36.2 °C)-99.8 °F (37.7 °C)] 97.2 °F (36.2 °C)  Heart Rate:  [51-61] 53  Resp:  [14-18] 14  BP: (156-172)/(80-87) 172/87  Body mass index is 38.2 kg/m².    Intake/Output Summary (Last 24 hours) at 8/12/2024 1101  Last data filed at 8/12/2024 0506  Gross per 24 hour   Intake 2433.33 ml   Output 4950 ml   Net -2516.67 ml     No intake/output data  recorded.     Physical Exam:    General: patient awake, alert and cooperative   Eyes: Normal lids and lashes, no scleral icterus   Skin: warm and dry, not jaundiced   Pulm: regular and unlabored   Psychiatric: Normal mood and behavior; memory intact     Results Review:     I reviewed the patient's new clinical results.    Results from last 7 days   Lab Units 08/12/24  0547 08/11/24  0501 08/10/24  0605   WBC 10*3/mm3 9.89 10.08 10.49   HEMOGLOBIN g/dL 12.5 11.4* 11.3*   HEMATOCRIT % 38.1 34.9 34.0   PLATELETS 10*3/mm3 314 280 241     Results from last 7 days   Lab Units 08/12/24  0547 08/11/24  0501 08/10/24  0605 08/08/24  0545 08/07/24 2028   SODIUM mmol/L 142 142 142   < > 140   POTASSIUM mmol/L 3.0* 3.3* 3.7   < > 3.0*   CHLORIDE mmol/L 101 106 107   < > 102   CO2 mmol/L 30.8* 26.0 25.1   < > 26.0   BUN mg/dL 6 8 9   < > 7   CREATININE mg/dL 0.76 0.82 0.86   < > 0.86   CALCIUM mg/dL 8.8 8.1* 8.4*   < > 9.4   BILIRUBIN mg/dL  --   --   --   --  0.4   ALK PHOS U/L  --   --   --   --  100   ALT (SGPT) U/L  --   --   --   --  20   AST (SGOT) U/L  --   --   --   --  16   GLUCOSE mg/dL 124* 122* 128*   < > 142*    < > = values in this interval not displayed.         Lab Results   Lab Value Date/Time    LIPASE 33 08/07/2024 2028    LIPASE 20 03/22/2022 1620    LIPASE 22 03/08/2021 1513    LIPASE 34 07/27/2020 1523    LIPASE 20 06/19/2020 1728       Radiology:  CT Abdomen Pelvis With Contrast   Final Result       1. Thick-walled and edematous loops of small bowel which are noted   within the left side of the abdomen, concerning for active Crohn's   disease.   2. The patient's small hiatal hernia is mildly distended with fluid, as   is the stomach. The patient had similar findings on the exam from July 11, 2024. Some degree of gastritis, and associated ileus is not   excluded.   3. There are some distended loops of small bowel, which don't appear   particularly thick walled, which are noted proximal to a focally    narrowed segment of small bowel. Stricture is not excluded.           Radiation dose reduction techniques were utilized, including automated   exposure control and exposure modulation based on body size.           This report was finalized on 8/7/2024 10:12 PM by Dr. Sparkle Singh M.D on Workstation: [id number]          CT Enterography Abdomen Pelvis w Contrast    (Results Pending)       Assessment & Plan     Active Hospital Problems    Diagnosis     **Exacerbation of Crohn's disease     Hypokalemia     Hypothyroidism     GERD (gastroesophageal reflux disease)     Essential hypertension        Assessment:  Flare of Crohn's disease of small bowel-on Stelara every 6 weeks  Abdominal pain, improving  Nausea and vomiting, improved  GERD      Plan:  Okay for discharge today from GI standpoint.  Complete 1 more dose of methylprednisone 20 mg at lunchtime prior to discharge.  Start oral prednisone 40 mg daily tomorrow morning and continue until office follow-up.  Discussed taper at that time.  Low fiber diet as tolerated.  Will change biologic therapy over to Skyrizi as outpatient.  Patient agreeable.  Check TB gold and hep B today as she is due.  Made follow-up with Loly LIGHT with our office to check on patient and start taper if doing okay.  She is a patient of Dr. Garza.    I discussed the patients findings and my recommendations with patient.    Dragon dictation used throughout this note.            Manda Butler PA-C  [facility]  [address] [place] [address]  Office: [phone]

## 2024-08-13 ENCOUNTER — PATIENT MESSAGE (OUTPATIENT)
Dept: FAMILY MEDICINE CLINIC | Facility: CLINIC | Age: 54
End: 2024-08-13
Payer: COMMERCIAL

## 2024-08-13 ENCOUNTER — SPECIALTY PHARMACY (OUTPATIENT)
Dept: NEUROLOGY | Facility: CLINIC | Age: 54
End: 2024-08-13
Payer: COMMERCIAL

## 2024-08-13 ENCOUNTER — SPECIALTY PHARMACY (OUTPATIENT)
Dept: GASTROENTEROLOGY | Facility: CLINIC | Age: 54
End: 2024-08-13
Payer: COMMERCIAL

## 2024-08-13 ENCOUNTER — TRANSITIONAL CARE MANAGEMENT TELEPHONE ENCOUNTER (OUTPATIENT)
Dept: CALL CENTER | Facility: HOSPITAL | Age: 54
End: 2024-08-13
Payer: COMMERCIAL

## 2024-08-13 DIAGNOSIS — K50.919 CROHN'S DISEASE WITH COMPLICATION, UNSPECIFIED GASTROINTESTINAL TRACT LOCATION: Primary | ICD-10-CM

## 2024-08-13 RX ORDER — DIPHENHYDRAMINE HCL 25 MG
50 CAPSULE ORAL ONCE
OUTPATIENT
Start: 2024-08-13 | End: 2024-08-13

## 2024-08-13 RX ORDER — DIPHENHYDRAMINE HYDROCHLORIDE 50 MG/ML
50 INJECTION INTRAMUSCULAR; INTRAVENOUS ONCE
OUTPATIENT
Start: 2024-08-13 | End: 2024-08-13

## 2024-08-13 RX ORDER — PANTOPRAZOLE SODIUM 40 MG/1
40 TABLET, DELAYED RELEASE ORAL 2 TIMES DAILY
Qty: 180 TABLET | Refills: 3 | Status: SHIPPED | OUTPATIENT
Start: 2024-08-13

## 2024-08-13 RX ORDER — ESCITALOPRAM OXALATE 10 MG/1
10 TABLET ORAL DAILY
Qty: 90 TABLET | Refills: 3 | Status: SHIPPED | OUTPATIENT
Start: 2024-08-13

## 2024-08-13 RX ORDER — ATORVASTATIN CALCIUM 10 MG/1
10 TABLET, FILM COATED ORAL DAILY
Qty: 90 TABLET | Refills: 3 | Status: SHIPPED | OUTPATIENT
Start: 2024-08-13

## 2024-08-13 RX ORDER — BUTALBITAL, ACETAMINOPHEN AND CAFFEINE 50; 325; 40 MG/1; MG/1; MG/1
1 TABLET ORAL EVERY 6 HOURS PRN
Qty: 30 TABLET | Refills: 0 | Status: SHIPPED | OUTPATIENT
Start: 2024-08-13

## 2024-08-13 RX ORDER — LEVOCETIRIZINE DIHYDROCHLORIDE 5 MG/1
5 TABLET, FILM COATED ORAL EVERY EVENING
Qty: 90 TABLET | Refills: 3 | Status: SHIPPED | OUTPATIENT
Start: 2024-08-13

## 2024-08-13 RX ORDER — HYDROXYZINE HYDROCHLORIDE 25 MG/1
25 TABLET, FILM COATED ORAL EVERY 8 HOURS PRN
Qty: 90 TABLET | Refills: 0 | Status: SHIPPED | OUTPATIENT
Start: 2024-08-13

## 2024-08-13 RX ORDER — LISINOPRIL AND HYDROCHLOROTHIAZIDE 12.5; 1 MG/1; MG/1
1 TABLET ORAL DAILY
Qty: 90 TABLET | Refills: 0 | Status: SHIPPED | OUTPATIENT
Start: 2024-08-13

## 2024-08-13 NOTE — PROGRESS NOTES
Case Management Discharge Note      Final Note: Discharged home. Sloane Holland RN         Selected Continued Care - Discharged on 8/12/2024 Admission date: 8/7/2024 - Discharge disposition: Home or Self Care         Transportation Services  Private: Car    Final Discharge Disposition Code: 01 - home or self-care

## 2024-08-13 NOTE — OUTREACH NOTE
Call Center TCM Note      Flowsheet Row Responses   [facility] [place] patient discharged from? [place]   Does the patient have one of the following disease processes/diagnoses(primary or secondary)? Other   TCM attempt successful? No   Unsuccessful attempts Attempt 1  [Attemted to reach patient and spouse, listed on PCP verbal release. No answer.]   Call Status Left message  [Left message on patient voicemail]            Josselin Garcia RN    8/13/2024, 14:42 EDT

## 2024-08-13 NOTE — PROGRESS NOTES
Specialty Pharmacy Refill Coordination Note     Rosa is a 53 y.o. female contacted today regarding refills of her specialty medication(s).    Specialty medication(s) and dose(s) confirmed: galcanezumab 120 mg subcutaneous every 30 days  Changes to medications: no  Changes to insurance: no  Reviewed and verified with patient:  Allergies  Meds  Problems         Refill Questions      Flowsheet Row Most Recent Value   Changes to allergies? No   Changes to medications? No   New conditions or infections since last clinic visit No   If yes, please describe  N/A   Unplanned office visit, urgent care, ED, or hospital admission in the last 4 weeks  No   How does patient/caregiver feel medication is working? Very good   Financial problems or insurance changes  No   Since the previous refill, were any specialty medication doses or scheduled injections missed or delayed?  No   Does this patient require a clinical escalation to a pharmacist? No            Delivery Questions      Flowsheet Row Most Recent Value   Delivery method  at Pharmacy   Delivery address verified with patient/caregiver? Yes  [picking up]   Delivery address Other (Comment)  [picking up]   Number of medications in delivery 1   Medication(s) being filled and delivered Galcanezumab-[facility]   Doses left of specialty medications 0   Copay verified? Yes   Copay amount $0   Copay form of payment No copayment ($0)   Ship Date N/A, picking up   Delivery Date N/A, picking up   Signature Required No                 Follow-up: 3 weeks     Devon Golden RPH  8/13/2024   15:19 EDT

## 2024-08-13 NOTE — PROGRESS NOTES
Specialty Pharmacy Patient Management Program  Neurology Reassessment     Rosa Melgoza is a 53 y.o. female with chronic migraine seen by a Neurology provider and enrolled in the Neurology Patient Management program offered by [facility].  A follow-up outreach was conducted, including assessment of continued therapy appropriateness, medication adherence, and side effect incidence and management for galcanezumab (Emgality).     Changes to Insurance Coverage or Financial Support  No changes.        Relevant Past Medical History and Comorbidities  Relevant medical history and concomitant health conditions were discussed with the patient. The patient's chart has been reviewed for relevant past medical history and comorbid health conditions and updated as necessary.   Past Medical History:   Diagnosis Date    Allergic     Anxiety     Cholelithiasis     Crohn disease     Depression     Disease of thyroid gland     Diverticulitis of colon     Diverticulosis     Elevated blood pressure reading without diagnosis of hypertension     Encounter for long-term (current) use of medications 2020    Fatty liver     GERD (gastroesophageal reflux disease)     Headache, migraine     Hernia     History of medical problems     not sure of date, but very painful joints and muscles.  Poss r/t crohns    History of sore throat     Hyperlipidemia     Hypertension     Hypothyroidism     Migraine     Need for DTaP and Hib vaccine     History of     Obesity     Urinary tract infection     Vaginal yeast infection      Social History     Socioeconomic History    Marital status:    Tobacco Use    Smoking status: Former     Current packs/day: 0.00     Average packs/day: 1 pack/day for 10.0 years (10.0 ttl pk-yrs)     Types: Cigarettes     Start date: 3/1/2001     Quit date: 3/1/2011     Years since quittin.4    Smokeless tobacco: Never    Tobacco comments:     smoked off and on   Vaping Use    Vaping  status: Never Used   Substance and Sexual Activity    Alcohol use: Not Currently     Comment: once a year or so I have a drink w dinner    Drug use: Never    Sexual activity: Yes     Partners: Male     Birth control/protection: Surgical     Comment:  had vasectomy     Problem list reviewed by Devon Golden RPH on 8/13/2024 at  3:15 PM      Hospitalizations and Urgent Care Since Last Assessment  ED Visits, Admissions, or Hospitalizations: none.   Urgent Office Visits: none.       Allergies  Known allergies and reactions were discussed with the patient. The patient's chart has been reviewed for allergy information and updated as necessary.   No Known Allergies  Allergies reviewed by Devon Golden RPH on 8/13/2024 at  3:15 PM      Relevant Laboratory Values    Common labs          8/10/2024    06:05 8/11/2024    05:01 8/12/2024    05:47   Common Labs   Glucose 128  122  124    BUN 9  8  6    Creatinine 0.86  0.82  0.76    Sodium 142  142  142    Potassium 3.7  3.3  3.0    Chloride 107  106  101    Calcium 8.4  8.1  8.8    WBC 10.49  10.08  9.89    Hemoglobin 11.3  11.4  12.5    Hematocrit 34.0  34.9  38.1    Platelets 241  280  314        Lab Assessment  The above labs have been reviewed. No dose adjustments are needed for the specialty medication(s) based on the labs.       Current Medication List  This medication list has been reviewed with the patient and evaluated for any interactions or necessary modifications/recommendations, and updated to include all prescription medications, OTC medications, and supplements the patient is currently taking.  This list reflects what is contained in the patient's profile, which has also been marked as reviewed to communicate to other providers it is the most up to date version of the patient's current medication therapy.     Current Outpatient Medications:     atorvastatin (LIPITOR) 10 MG tablet, Take 1 tablet by mouth Daily., Disp: 90 tablet, Rfl: 3    B  Complex-C-Folic Acid (STRESS B COMPLEX PO), Take 1 tablet by mouth Daily., Disp: , Rfl:     bisacodyl (DULCOLAX) 10 MG suppository, Insert 1 suppository into the rectum Daily As Needed for Constipation for up to 30 days., Disp: 30 suppository, Rfl: 0    butalbital-acetaminophen-caffeine (FIORICET, ESGIC) -40 MG per tablet, Take 1 tablet by mouth Every 6 (Six) Hours As Needed for Headache., Disp: 30 tablet, Rfl: 0    Cholecalciferol (VITAMIN D3 GUMMIES ADULT PO), Take 5,000 Units by mouth Daily., Disp: , Rfl:     Dihydroergotamine Mesylate HFA (Trudhesa) 0.725 MG/ACT aerosol solution, 0.725 mg into the nostril(s) as directed by provider As Needed (Moderate to severe headache). One spray into each nostril, maybe repeated after 1 hour if needed, NO more than 2 doses in 24-hour period or 3 doses in 7-day period., Disp: 8 mL, Rfl: 2    escitalopram (LEXAPRO) 10 MG tablet, Take 1 tablet by mouth Daily., Disp: 90 tablet, Rfl: 3    fluticasone (FLONASE) 50 MCG/ACT nasal spray, Use 2 sprays into each nostril as directed by provider Daily., Disp: 16 g, Rfl: 6    galcanezumab-gnlm (EMGALITY) 120 MG/ML auto-injector pen, Inject 1 mL under the skin into the appropriate area as directed Every 30 (Thirty) Days., Disp: 1 mL, Rfl: 11    HYDROcodone-acetaminophen (NORCO) 5-325 MG per tablet, Take 1 tablet by mouth Every 6 (Six) Hours As Needed for Moderate Pain for up to 3 days., Disp: 12 tablet, Rfl: 0    hydrOXYzine (ATARAX) 25 MG tablet, Take 1 tablet by mouth Every 8 (Eight) Hours As Needed for Anxiety., Disp: 90 tablet, Rfl: 0    Hyoscyamine Sulfate SL (Levsin/SL) 0.125 MG sublingual tablet, Place 1 tablet under the tongue Every 6 (Six) Hours As Needed (abdominal pain/cramping)., Disp: 60 each, Rfl: 3    levocetirizine (XYZAL) 5 MG tablet, Take 1 tablet by mouth Every Evening., Disp: 90 tablet, Rfl: 3    levothyroxine (SYNTHROID, LEVOTHROID) 100 MCG tablet, Take 1 tablet by mouth Daily., Disp: 90 tablet, Rfl: 1     lisinopril-hydrochlorothiazide (PRINZIDE,ZESTORETIC) 10-12.5 MG per tablet, Take 1 tablet by mouth Daily., Disp: 90 tablet, Rfl: 0    Multiple Vitamins-Minerals (MULTI ADULT GUMMIES PO), , Disp: , Rfl:     ondansetron ODT (ZOFRAN-ODT) 4 MG disintegrating tablet, Place 1 tablet on the tongue Every 8 (Eight) Hours As Needed for Nausea or Vomiting for up to 30 days., Disp: 30 tablet, Rfl: 0    pantoprazole (PROTONIX) 40 MG EC tablet, Take 1 tablet by mouth 2 (Two) Times a Day., Disp: 180 tablet, Rfl: 3    polyethylene glycol (MIRALAX) 17 GM/SCOOP powder, Dissolve 17 g (1 capful) in liquid and take by mouth Daily for 30 days., Disp: 510 g, Rfl: 0    predniSONE (DELTASONE) 20 MG tablet, Take 2 tablets by mouth Daily for 30 days., Disp: 60 tablet, Rfl: 0    promethazine (PHENERGAN) 25 MG tablet, Take 1 tablet by mouth Every 6 (Six) Hours As Needed for Nausea or Vomiting., Disp: 60 tablet, Rfl: 1    Semaglutide-Weight Management (Wegovy) 1.7 MG/0.75ML solution auto-injector, Inject 0.75 mL under the skin into the appropriate area as directed 1 (One) Time Per Week. (Patient taking differently: Inject 0.75 mL under the skin into the appropriate area as directed 1 (One) Time Per Week. Every Thursday), Disp: 3 mL, Rfl: 0    Ubiquinol 100 MG capsule, Take 100 mg by mouth Daily., Disp: , Rfl:   No current facility-administered medications for this visit.    Medicines reviewed by Devon Golden RP on 8/13/2024 at  3:15 PM    Drug Interactions  None identified.       Adverse Drug Reactions  Medication tolerability: Tolerating with no to minimal ADRs  Medication plan: Continue therapy with normal follow-up  Plan for ADR Management: N/A, no ADR's      Adherence, Self-Administration, and Current Therapy Problems  Adherence related patient's specialty therapy was discussed with the patient. The Adherence segment of this outreach has been reviewed and updated.   Adherence Questions  Linked Medication(s) Assessed:  Galcanezumab-[facility]  What are the identified reasons for non-adherence or missed doses? : no problems identified  What is the estimated medication adherence level?: %  Based on the patient/caregiver response and refill history, does this patient require an MTP to track adherence improvements?: no    Additional Barriers to Patient Self-Administration: no barriers.  Methods for Supporting Patient Self-Administration: no further support needed.    Recently Close Medication Therapy Problems  No medication therapy recommendations to display  Open Medication Therapy Problems  No medication therapy recommendations to display     Goals of Therapy  Goals related to the patient's specialty therapy was discussed with the patient. The Patient Goals segment of this outreach has been reviewed and updated.    Goals Addressed Today        Specialty Pharmacy General Goal      Reduce frequency and severity of migraines. Prior to Botox/galcanezumab therapy, patient reports experiencing about 15 up to 25 migraine days per month.    8/13/24: Patient reports about 1 migraine day per month and not having needed to use Trudhesa in the past 6 months. Patient uses Fioricet for less severe migraines or headaches, which has been completely alleviating migraine symptoms. No side-effects or concerns.   2/20/24 clinical reassessment: Patient reports migraine frequency decreased to 1-2 true migraine days per month and reduced migraine severity by about 50% with combination of Botox/galcanezumab. No side-effects. Trudhesa works to completely alleviate migraines when needed.                Quality of Life Assessment   Quality of Life related to the patient's enrollment in the patient management program and services provided was discussed with the patient. The QOL segment of this outreach has been reviewed and updated.   Quality of Life Improvement Scale: 9-A good deal better      Reassessment Plan & Follow-Up  Medication Therapy Changes: No  changes, continuing galcanezumab for migraine prevention per patient's neurologist.  Related Plans, Therapy Recommendations, or Issues to Be Addressed: Nothing further to be addressed at this time.   Pharmacist to perform regular reassessments no more than (6) months from the previous assessment.  Care Coordinator to set up future refill outreaches, coordinate prescription delivery, and escalate clinical questions to pharmacist.     Attestation  Therapeutic appropriateness: Appropriate  I attest the patient was actively involved in and has agreed to the above plan of care. If the prescribed therapy is at any point deemed not appropriate based on the current or future assessments, a consultation will be initiated with the patient's specialty care provider to determine the best course of action. The revised plan of therapy will be documented along with any additional patient education provided. Discussed aforementioned material with patient by phone.    Devon Golden, PharmD, [facility]  Clinic Specialty Pharmacist, Neurology  8/13/2024  15:23 EDT

## 2024-08-13 NOTE — OUTREACH NOTE
Call Center TCM Note      Flowsheet Row Responses   [facility] patient discharged from? [place]   Does the patient have one of the following disease processes/diagnoses(primary or secondary)? Other   TCM attempt successful? No   Unsuccessful attempts Attempt 2  [Attempted to reach patient and spouse, listed on PCP verbal release. No answer.]            Josselin Garcia RN    8/13/2024, 15:38 EDT

## 2024-08-13 NOTE — PROGRESS NOTES
Entered therapy plan and ambulatory referral for Skyrizi IV loading doses. Called and LVM for pt.    Tanya Ashley, PharmD, BCACP, BCPS, BCCCP

## 2024-08-13 NOTE — OUTREACH NOTE
Prep Survey      Flowsheet Row Responses   [facility] patient discharged from? [place]   Is LACE score < 7 ? No   Eligibility [facility]   Date of Admission 08/07/24   Date of Discharge 08/12/24   Discharge Disposition Home or Self Care   Discharge diagnosis Exacerbation of Crohn's disease   Does the patient have one of the following disease processes/diagnoses(primary or secondary)? Other   Does the patient have Home health ordered? No   Is there a DME ordered? No   Prep survey completed? Yes            Cammie VELASQUEZ - Registered Nurse

## 2024-08-13 NOTE — PAYOR COMM NOTE
"Kiah Melgoza (53 y.o. Female)          DC SUMMARY FOR [id number]        Date of Birth   1970    Social Security Number       Address   [address] [place], [place] [address]    Home Phone       MRN   [id number]       [facility]    Marital Status                               Admission Date   24    Admission Type   Emergency    Admitting Provider   Marlon Mills MD    Attending Provider       Department, Room/Bed   [address], 86/1       Discharge Date   2024    Discharge Disposition   Home or Self Care    Discharge Destination                                 Attending Provider: (none)   Allergies: No Known Allergies    Isolation: None   Infection: None   Code Status: Prior    Ht: 162.6 cm (64.02\")   Wt: 101 kg (222 lb 10.6 oz)    Admission Cmt: None   Principal Problem: Exacerbation of Crohn's disease [K50.90]                   Active Insurance as of 2024       Primary Coverage       Payor Plan Insurance Group Employer/Plan Group    [employer] [id number]       Payor Plan Address Payor Plan Phone Number Payor Plan Fax Number Effective Dates    [address] [phone]  2016 - None Entered    [place] [address]         Subscriber Name Subscriber Birth Date Member ID       KIAH MELGOZA 1970 [id number]                     Emergency Contacts        (Rel.) Home Phone Work Phone Mobile Phone    Jim Melgoza (Spouse) [phone] -- [phone]    CRISTINOMINNIE SAHU (Daughter) -- -- [phone]                 Discharge Summary        Davie Carreon MD at 24 1357              Patient Name: Kiah Melgoza  : 1970  MRN: [id number]    Date of Admission: 2024  Date of Discharge:  2024  Primary Care Physician: Kathie Romeo DO      Chief Complaint:   Abdominal Pain      Discharge Diagnoses     Active Hospital Problems    Diagnosis  POA    **Exacerbation of Crohn's disease " [K50.90]  Yes    Hypokalemia [E87.6]  Unknown    Hypothyroidism [E03.9]  Yes    GERD (gastroesophageal reflux disease) [K21.9]  Yes    Essential hypertension [I10]  Yes      Resolved Hospital Problems   No resolved problems to display.        Hospital Course     Ms. Melgoza is a 53 y.o. female with a history of chron's disease hypertension, hypothyroidism, GERD presenting with abdominal pain found to have Crohn's disease exacerbation.  CT abdomen concerning for a stricture.  Surgery was consulted and patient received 5 days of IV antibiotics.  Patient's pain improved with IV steroids.  Was able to tolerate a full liquid diet with minimal nausea and or vomiting.  Plan to discharge on full liquid diet.  Will have her diet advanced as an outpatient by either surgery or GI.  Plan for prednisone 40 mg daily until follow-up with GI.  Discussed holding her home semaglutide and to follow-up with GI.  Will get repeat CT abdomen in 1-2 weeks.    At the time of discharge patient was told to take all medications as prescribed, keep all follow-up appointments, and call their doctor or return to the hospital with any worsening or concerning symptoms.    Day of Discharge     Subjective:  Patient resting comfortably in bed.  Tolerating a full liquid diet.  Did have some nausea after dinner yesterday but no vomiting.  Discussed discharge plan for later today if tolerating breakfast and lunch.        Physical Exam:  Temp:  [97.2 °F (36.2 °C)-99.8 °F (37.7 °C)] 97.2 °F (36.2 °C)  Heart Rate:  [51-61] 53  Resp:  [14-18] 14  BP: (156-172)/(80-87) 172/87  Body mass index is 38.2 kg/m².  Physical Exam  Constitutional:       General: She is not in acute distress.     Appearance: She is not ill-appearing.   Cardiovascular:      Rate and Rhythm: Normal rate and regular rhythm.   Pulmonary:      Effort: Pulmonary effort is normal. No respiratory distress.   Abdominal:      General: Abdomen is flat. There is no distension.      Tenderness: There  is abdominal tenderness (minimal).   Musculoskeletal:         General: No swelling or deformity. Normal range of motion.   Skin:     General: Skin is warm and dry.   Neurological:      General: No focal deficit present.      Mental Status: She is alert. Mental status is at baseline.         Consultants     Consult Orders (all) (From admission, onward)       Start     Ordered    08/08/24 1618  Inpatient General Surgery Consult  Once        Specialty:  General Surgery  Provider:  Miles Borden MD    08/08/24 1618    08/08/24 0050  Inpatient Gastroenterology Consult  Once        Specialty:  Gastroenterology  Provider:  Maicol Garza MD    08/08/24 0053    08/07/24 2252  [facility] (on-call MD unless specified) Details  Once        Specialty:  Hospitalist  Provider:  (Not yet assigned)    08/07/24 2251 08/07/24 2226  Gastroenterology (on-call MD unless specified)  Once        Specialty:  Gastroenterology  Provider:  Ricki Saunders MD    08/07/24 2225                  Procedures     Imaging Results (All)       Procedure Component Value Units Date/Time    CT Abdomen Pelvis With Contrast [[id number]] Collected: 08/07/24 2159     Updated: 08/07/24 2215    Narrative:      CT OF THE ABDOMEN AND PELVIS WITH CONTRAST     HISTORY: Abdominal pain. HISTORY of Crohn's disease.     COMPARISON: July 11, 2024     TECHNIQUE: Axial CT imaging was obtained through the abdomen and pelvis.  IV contrast was administered.     FINDINGS:  Images through the lung bases demonstrate a benign centrally calcified  nodule within the left lower lobe. The gallbladder is absent. There is a  small hiatal hernia. It is distended with fluid, as is the stomach. This  was actually also present on prior study. The spleen, adrenal glands,  and pancreas are normal. Duodenum appears unremarkable. No  hydronephrosis is seen on either side. The kidneys enhance  symmetrically. There are mild aortoiliac calcifications. No distal  ureteral or  bladder stones are seen. Uterus appears normal, as are the  ovaries. There is colonic diverticulosis. The appendix is normal. The  patient has thick walled and edematous segments of small bowel which are  noted within the abdomen, with adjacent mesenteric edema and trace  fluid. Appearance is concerning for active Crohn's disease. Distal to  the inflamed segments, there is also a mildly dilated loop of small  bowel, which is located proximal to an area of narrowing within the  small bowel, which may reflect a stricture. This is best seen on coronal  series 3 image 59. The distal ileum is decompressed. There is fatty  infiltration of the wall of the distal small bowel, in keeping with  chronic inflammatory bowel disease. The appendix is normal. There is a  small amount of free fluid which is noted within the abdomen and pelvis,  likely reactive. No acute osseous abnormalities are seen. There is  stable sclerosis of the SI joints bilaterally.       Impression:         1. Thick-walled and edematous loops of small bowel which are noted  within the left side of the abdomen, concerning for active Crohn's  disease.  2. The patient's small hiatal hernia is mildly distended with fluid, as  is the stomach. The patient had similar findings on the exam from July 11, 2024. Some degree of gastritis, and associated ileus is not  excluded.  3. There are some distended loops of small bowel, which don't appear  particularly thick walled, which are noted proximal to a focally  narrowed segment of small bowel. Stricture is not excluded.        Radiation dose reduction techniques were utilized, including automated  exposure control and exposure modulation based on body size.        This report was finalized on 8/7/2024 10:12 PM by Dr. Sparkle Singh M.D on Workstation: [id number]               Pertinent Labs     Results from last 7 days   Lab Units 08/12/24  0547 08/11/24  0501 08/10/24  0605 08/09/24  0522   WBC 10*3/mm3 9.89  "10.08 10.49 13.86*   HEMOGLOBIN g/dL 12.5 11.4* 11.3* 11.5*   PLATELETS 10*3/mm3 314 280 241 244     Results from last 7 days   Lab Units 08/12/24  0547 08/11/24  0501 08/10/24  0605 08/09/24  0522   SODIUM mmol/L 142 142 142 141   POTASSIUM mmol/L 3.0* 3.3* 3.7 3.4*   CHLORIDE mmol/L 101 106 107 108*   CO2 mmol/L 30.8* 26.0 25.1 24.0   BUN mg/dL 6 8 9 8   CREATININE mg/dL 0.76 0.82 0.86 0.89   GLUCOSE mg/dL 124* 122* 128* 122*   Estimated Creatinine Clearance: 98.9 mL/min (by C-G formula based on SCr of 0.76 mg/dL).  Results from last 7 days   Lab Units 08/07/24 2028   ALBUMIN g/dL 3.9   BILIRUBIN mg/dL 0.4   ALK PHOS U/L 100   AST (SGOT) U/L 16   ALT (SGPT) U/L 20     Results from last 7 days   Lab Units 08/12/24  0547 08/11/24  0501 08/10/24  0605 08/09/24  0522 08/08/24  0545 08/07/24 2028   CALCIUM mg/dL 8.8 8.1* 8.4* 8.3*   < > 9.4   ALBUMIN g/dL  --   --   --   --   --  3.9   MAGNESIUM mg/dL 2.0  --   --   --   --  2.0    < > = values in this interval not displayed.     Results from last 7 days   Lab Units 08/07/24 2028   LIPASE U/L 33             Invalid input(s): \"LDLCALC\"  Results from last 7 days   Lab Units 08/07/24  2221   BLOODCX  No growth at 4 days  No growth at 4 days       Test Results Pending at Discharge     Pending Labs       Order Current Status    Hepatitis B Core Antibody, Total In process    QuantiFERON TB Plus Client Incubated In process    Blood Culture - Blood, Arm, Left Preliminary result    Blood Culture - Blood, Arm, Right Preliminary result            Discharge Details        Discharge Medications        New Medications        Instructions Start Date   bisacodyl 10 MG suppository  Commonly known as: DULCOLAX   10 mg, Rectal, Daily PRN      HYDROcodone-acetaminophen 5-325 MG per tablet  Commonly known as: NORCO   1 tablet, Oral, Every 6 Hours PRN      ondansetron ODT 4 MG disintegrating tablet  Commonly known as: ZOFRAN-ODT   4 mg, Translingual, Every 8 Hours PRN      polyethylene " glycol 17 g packet  Commonly known as: MIRALAX   17 g, Oral, Daily      predniSONE 20 MG tablet  Commonly known as: DELTASONE   40 mg, Oral, Daily             Changes to Medications        Instructions Start Date   Emgality 120 MG/ML auto-injector pen  Generic drug: galcanezumab-gnlm  What changed: additional instructions   120 mg, Subcutaneous, Every 30 Days             Continue These Medications        Instructions Start Date   atorvastatin 10 MG tablet  Commonly known as: LIPITOR   10 mg, Oral, Daily      butalbital-acetaminophen-caffeine -40 MG per tablet  Commonly known as: FIORICET, ESGIC   1 tablet, Oral, Every 6 Hours PRN      escitalopram 10 MG tablet  Commonly known as: LEXAPRO   10 mg, Oral, Daily      fluticasone 50 MCG/ACT nasal spray  Commonly known as: FLONASE   Use 2 sprays into each nostril as directed by provider Daily.      hydrOXYzine 25 MG tablet  Commonly known as: ATARAX   25 mg, Oral, Every 8 Hours PRN      Hyoscyamine Sulfate SL 0.125 MG sublingual tablet  Commonly known as: Levsin/SL   1 tablet, Sublingual, Every 6 Hours PRN      levocetirizine 5 MG tablet  Commonly known as: XYZAL   5 mg, Oral, Every Evening      levothyroxine 100 MCG tablet  Commonly known as: SYNTHROID, LEVOTHROID   100 mcg, Oral, Daily      lisinopril-hydrochlorothiazide 10-12.5 MG per tablet  Commonly known as: PRINZIDE,ZESTORETIC   1 tablet, Oral, Daily      MULTI ADULT GUMMIES PO   No dose, route, or frequency recorded.      pantoprazole 40 MG EC tablet  Commonly known as: PROTONIX   40 mg, Oral, 2 Times Daily      promethazine 25 MG tablet  Commonly known as: PHENERGAN   25 mg, Oral, Every 6 Hours PRN      Stelara 90 MG/ML solution prefilled syringe Injection  Generic drug: Ustekinumab   90 mg, Subcutaneous, Every 6 Weeks      STRESS B COMPLEX PO   1 tablet, Oral, Daily      Trudhesa 0.725 MG/ACT aerosol solution  Generic drug: Dihydroergotamine Mesylate HFA   0.725 mg, Nasal, As Needed, One spray into each  nostril, maybe repeated after 1 hour if needed, NO more than 2 doses in 24-hour period or 3 doses in 7-day period.      Ubiquinol 100 MG capsule   100 mg, Oral, Daily      VITAMIN D3 GUMMIES ADULT PO   5,000 Units, Oral, Daily      Wegovy 1.7 MG/0.75ML solution auto-injector  Generic drug: Semaglutide-Weight Management   1.7 mg, Subcutaneous, Weekly             Stop These Medications      ondansetron 8 MG tablet  Commonly known as: ZOFRAN              No Known Allergies      Discharge Disposition:  Home or Self Care    Discharge Diet:  Diet Order   Procedures    Diet: Liquid; Full Liquid; Fluid Consistency: Thin (IDDSI 0)       Discharge Activity:   As tolerated    CODE STATUS:    Code Status and Medical Interventions: CPR (Attempt to Resuscitate); Full Support   Ordered at: 08/08/24 0053     Code Status (Patient has no pulse and is not breathing):    CPR (Attempt to Resuscitate)     Medical Interventions (Patient has pulse or is breathing):    Full Support       Future Appointments   Date Time Provider Department Center   8/15/2024 10:40 AM Nemesio Vasquez II, MD [facility]   8/19/2024  4:30 PM [facility] [address]  [facility]   8/22/2024  9:30 AM Miles Borden MD [facility]   8/29/2024  3:00 PM Loly Zee APRN [facility]   9/19/2024  3:15 PM [facility]     Additional Instructions for the Follow-ups that You Need to Schedule       CT Enterography Abdomen Pelvis w Contrast   Aug 26, 2024      Will Oral Contrast be needed for this procedure?: Yes   Release to patient: Routine Release   Exam reason: Crohn's enteritis               Follow-up Information       Kathie Romeo DO .    Specialty: Family Medicine  Contact information:  [address]  [facility] [address]  [place] [id number] [phone]               Loly Zee APRN. Go on 8/29/2024.    Specialties: Gastroenterology, Nurse Practitioner  Why: at 3pm  Contact information:  [address]  [place] [address]  [phone]                             Additional Instructions for the Follow-ups that You Need to Schedule       CT Enterography Abdomen Pelvis w Contrast   Aug 26, 2024      Will Oral Contrast be needed for this procedure?: Yes   Release to patient: Routine Release   Exam reason: Crohn's enteritis            Time Spent on Discharge:  Greater than 30 minutes      Davie Carreon MD  [facility]  08/12/24  13:58 EDT                Electronically signed by Davie Carreon MD at 08/12/24 1400

## 2024-08-14 ENCOUNTER — TELEPHONE (OUTPATIENT)
Dept: GASTROENTEROLOGY | Facility: CLINIC | Age: 54
End: 2024-08-14
Payer: COMMERCIAL

## 2024-08-14 ENCOUNTER — TRANSITIONAL CARE MANAGEMENT TELEPHONE ENCOUNTER (OUTPATIENT)
Dept: CALL CENTER | Facility: HOSPITAL | Age: 54
End: 2024-08-14
Payer: COMMERCIAL

## 2024-08-14 LAB
GAMMA INTERFERON BACKGROUND BLD IA-ACNC: 0 IU/ML
HBV CORE AB SERPL QL IA: NEGATIVE
M TB IFN-G BLD-IMP: NEGATIVE
M TB IFN-G CD4+ BCKGRND COR BLD-ACNC: 0 IU/ML
M TB IFN-G CD4+CD8+ BCKGRND COR BLD-ACNC: 0 IU/ML
MITOGEN IGNF BCKGRD COR BLD-ACNC: 4.7 IU/ML
SERVICE CMNT-IMP: NORMAL

## 2024-08-14 RX ORDER — ONDANSETRON 4 MG/1
4 TABLET, FILM COATED ORAL EVERY 8 HOURS PRN
Qty: 90 TABLET | Refills: 0 | Status: SHIPPED | OUTPATIENT
Start: 2024-08-14 | End: 2024-08-14 | Stop reason: SDUPTHER

## 2024-08-14 RX ORDER — ONDANSETRON HYDROCHLORIDE 8 MG/1
4 TABLET, FILM COATED ORAL EVERY 8 HOURS PRN
Qty: 90 TABLET | Refills: 0 | Status: SHIPPED | OUTPATIENT
Start: 2024-08-14

## 2024-08-14 NOTE — OUTREACH NOTE
Call Center TCM Note      Flowsheet Row Responses   [facility] patient discharged from? [facility]   Does the patient have one of the following disease processes/diagnoses(primary or secondary)? Other   TCM attempt successful? No   Unsuccessful attempts Attempt 3            Joy Flowers LPN    8/14/2024, 14:02 EDT

## 2024-08-14 NOTE — TELEPHONE ENCOUNTER
From: Rosa Melgoza  To: Kathie Romeo  Sent: 8/13/2024 5:01 PM EDT  Subject: ondansetron    Can you send the refills to [facility] for this rx for my ondansetron. They wrote for the dissolving ones at discharge from the hospital and they taste so terrible. I attached the rx info from last time, but it isnt showing up in my request refill bc of the change. I was dced from [facility] on Monday from a crohns exac with stricture.   Also while in there my BP went nestor high the last few days. It was low while on IV morphine for my pain but once my pain was lower and switched to po norco it started going up as, as high as 179/110 at one point before I left yesterday. I will set up an appt to see you asap also.   thanksRosa .

## 2024-08-14 NOTE — TELEPHONE ENCOUNTER
----- Message from Manda Butler sent at 8/14/2024  9:08 AM EDT -----  Regarding: FW: return to work.   Contact: [phone]  Okay for return to work statement to return to work on Monday 8/19.  Please create note and get it to patient.  ----- Message -----  From: Angella Estes RN  Sent: 8/14/2024   8:28 AM EDT  To: Manda Butler PA-C  Subject: FW: return to work.                                ----- Message -----  From: Rosa Melgoza  Sent: 8/13/2024   5:06 PM EDT  To: [facility]  Subject: return to work.                                  I have submitted for STD/FMLA for my hospital stay but also need a return to work statement for my boss.  I would like yo go back Monday since I am still on liquids and with the work I do, I don't have quick access to a bathroom at all times.  Can you rmail or send me a return tonwork on 8/19 letter if at all possible.    thanks so much, Rosa

## 2024-08-15 ENCOUNTER — OFFICE VISIT (OUTPATIENT)
Dept: FAMILY MEDICINE CLINIC | Facility: CLINIC | Age: 54
End: 2024-08-15
Payer: COMMERCIAL

## 2024-08-15 ENCOUNTER — SPECIALTY PHARMACY (OUTPATIENT)
Dept: GASTROENTEROLOGY | Facility: CLINIC | Age: 54
End: 2024-08-15
Payer: COMMERCIAL

## 2024-08-15 ENCOUNTER — PROCEDURE VISIT (OUTPATIENT)
Dept: NEUROLOGY | Facility: CLINIC | Age: 54
End: 2024-08-15
Payer: COMMERCIAL

## 2024-08-15 VITALS
HEART RATE: 100 BPM | OXYGEN SATURATION: 97 % | HEIGHT: 64 IN | WEIGHT: 213.6 LBS | DIASTOLIC BLOOD PRESSURE: 62 MMHG | BODY MASS INDEX: 36.47 KG/M2 | SYSTOLIC BLOOD PRESSURE: 90 MMHG

## 2024-08-15 DIAGNOSIS — R73.09 ELEVATED GLUCOSE: ICD-10-CM

## 2024-08-15 DIAGNOSIS — Z09 HOSPITAL DISCHARGE FOLLOW-UP: Primary | ICD-10-CM

## 2024-08-15 DIAGNOSIS — G43.719 INTRACTABLE CHRONIC MIGRAINE WITHOUT AURA AND WITHOUT STATUS MIGRAINOSUS: Primary | ICD-10-CM

## 2024-08-15 DIAGNOSIS — K50.919 EXACERBATION OF CROHN'S DISEASE WITH COMPLICATION: ICD-10-CM

## 2024-08-15 NOTE — PROGRESS NOTES
"Transitional Care Follow Up Visit  Subjective     Rosa Melgoza is a 53 y.o. female who presents for a transitional care management visit.    Within 48 business hours after discharge our office contacted her via telephone to coordinate her care and needs.      I reviewed and discussed the details of that call along with the discharge summary, hospital problems, inpatient lab results, inpatient diagnostic studies, and consultation reports with Rosa.     Current outpatient and discharge medications have been reconciled for the patient.  Reviewed by: NADEGE Garcia          8/12/2024     8:03 PM   Date of TCM Phone Call   [facility]   Date of Admission 8/7/2024   Date of Discharge 8/12/2024   Discharge Disposition Home or Self Care     Risk for Readmission (LACE) Score: 7 (8/12/2024  6:00 AM)      History of Present Illness   Course During Hospital Stay:  admitted for crohn's exacerbation.  On full liquids, and thin soft foods.  Tried a cracker and was not a good fit for her.    Having BM since home.  Denies vomiting.    Still having some pain, but manageable.  Only taken 1 pain pill.  Potassium     Follow up with GI on 8/29-first skyrizi will be then  Surgery on 8/22       The following portions of the patient's history were reviewed and updated as appropriate: allergies, current medications, past family history, past medical history, past social history, past surgical history, and problem list.    Review of Systems    Objective   BP 90/62   Pulse 100   Ht 162.6 cm (64.02\")   Wt 96.9 kg (213 lb 9.6 oz)   SpO2 97%   BMI 36.65 kg/m²   Physical Exam  Constitutional:       Appearance: Normal appearance.   Cardiovascular:      Rate and Rhythm: Normal rate and regular rhythm.      Pulses: Normal pulses.   Pulmonary:      Effort: Pulmonary effort is normal.      Breath sounds: Normal breath sounds.   Skin:     General: Skin is warm and dry.   Neurological:      Mental Status: She is " alert.   Psychiatric:         Mood and Affect: Mood normal.         Behavior: Behavior normal.         Thought Content: Thought content normal.         Judgment: Judgment normal.         Assessment & Plan   Problems Addressed this Visit          Gastrointestinal Abdominal     Exacerbation of Crohn's disease    Relevant Orders    CBC & Differential (Completed)    Comprehensive Metabolic Panel (Completed)    Hemoglobin A1c (Completed)     Other Visit Diagnoses       Hospital discharge follow-up    -  Primary    Relevant Orders    CBC & Differential (Completed)    Comprehensive Metabolic Panel (Completed)    Hemoglobin A1c (Completed)    Elevated glucose        Relevant Orders    Hemoglobin A1c (Completed)          Diagnoses         Codes Comments    Hospital discharge follow-up    -  Primary ICD-10-CM: Z09  ICD-9-CM: V67.59     Exacerbation of Crohn's disease with complication     ICD-10-CM: K50.919  ICD-9-CM: 555.9     Elevated glucose     ICD-10-CM: R73.09  ICD-9-CM: 790.29         Recheck labs for stability of white blood cell count, check of hemoglobin A1c in CMP for potassium.  Will call with results of labs and any changes needed plan of care.

## 2024-08-16 ENCOUNTER — TELEPHONE (OUTPATIENT)
Dept: SURGERY | Facility: CLINIC | Age: 54
End: 2024-08-16
Payer: COMMERCIAL

## 2024-08-16 LAB
ALBUMIN SERPL-MCNC: 3.9 G/DL (ref 3.8–4.9)
ALP SERPL-CCNC: 73 IU/L (ref 44–121)
ALT SERPL-CCNC: 31 IU/L (ref 0–32)
AST SERPL-CCNC: 16 IU/L (ref 0–40)
BASOPHILS # BLD AUTO: 0 X10E3/UL (ref 0–0.2)
BASOPHILS NFR BLD AUTO: 0 %
BILIRUB SERPL-MCNC: 0.3 MG/DL (ref 0–1.2)
BUN SERPL-MCNC: 12 MG/DL (ref 6–24)
BUN/CREAT SERPL: 12 (ref 9–23)
CALCIUM SERPL-MCNC: 9.3 MG/DL (ref 8.7–10.2)
CHLORIDE SERPL-SCNC: 97 MMOL/L (ref 96–106)
CO2 SERPL-SCNC: 28 MMOL/L (ref 20–29)
CREAT SERPL-MCNC: 0.99 MG/DL (ref 0.57–1)
EGFRCR SERPLBLD CKD-EPI 2021: 68 ML/MIN/1.73
EOSINOPHIL # BLD AUTO: 0.1 X10E3/UL (ref 0–0.4)
EOSINOPHIL NFR BLD AUTO: 0 %
ERYTHROCYTE [DISTWIDTH] IN BLOOD BY AUTOMATED COUNT: 12.8 % (ref 11.7–15.4)
GLOBULIN SER CALC-MCNC: 2.5 G/DL (ref 1.5–4.5)
GLUCOSE SERPL-MCNC: 106 MG/DL (ref 70–99)
HBA1C MFR BLD: 5.4 % (ref 4.8–5.6)
HCT VFR BLD AUTO: 44.9 % (ref 34–46.6)
HGB BLD-MCNC: 14.5 G/DL (ref 11.1–15.9)
IMM GRANULOCYTES # BLD AUTO: 0.1 X10E3/UL (ref 0–0.1)
IMM GRANULOCYTES NFR BLD AUTO: 1 %
LYMPHOCYTES # BLD AUTO: 2.8 X10E3/UL (ref 0.7–3.1)
LYMPHOCYTES NFR BLD AUTO: 15 %
MCH RBC QN AUTO: 29.6 PG (ref 26.6–33)
MCHC RBC AUTO-ENTMCNC: 32.3 G/DL (ref 31.5–35.7)
MCV RBC AUTO: 92 FL (ref 79–97)
MONOCYTES # BLD AUTO: 1 X10E3/UL (ref 0.1–0.9)
MONOCYTES NFR BLD AUTO: 5 %
NEUTROPHILS # BLD AUTO: 14.9 X10E3/UL (ref 1.4–7)
NEUTROPHILS NFR BLD AUTO: 79 %
PLATELET # BLD AUTO: 412 X10E3/UL (ref 150–450)
POTASSIUM SERPL-SCNC: 3.1 MMOL/L (ref 3.5–5.2)
PROT SERPL-MCNC: 6.4 G/DL (ref 6–8.5)
RBC # BLD AUTO: 4.9 X10E6/UL (ref 3.77–5.28)
SODIUM SERPL-SCNC: 142 MMOL/L (ref 134–144)
WBC # BLD AUTO: 18.9 X10E3/UL (ref 3.4–10.8)

## 2024-08-16 RX ORDER — POTASSIUM CHLORIDE 20 MEQ/1
20 TABLET, EXTENDED RELEASE ORAL DAILY
Qty: 30 TABLET | Refills: 2 | Status: SHIPPED | OUTPATIENT
Start: 2024-08-16

## 2024-08-16 NOTE — TELEPHONE ENCOUNTER
Patient called saying nikhil when she went to her PCP, her white blood cell count had elevated again. Patient follow up with you is on 8/22/2024 and she wanted you to be aware of this and wants to know if anything else should be done in the mean time.Please Advise. Thank You

## 2024-08-16 NOTE — PROGRESS NOTES
Please call patient with results of labs.  Potassium still remains low.  I am going to send in supplement and want her to discuss for recheck at her follow up next week.  Her white blood cell count is elevated but this is likely related to the steroids that they started upon discharge when I look at the other parts of wbc.

## 2024-08-19 ENCOUNTER — HOSPITAL ENCOUNTER (OUTPATIENT)
Dept: CT IMAGING | Facility: HOSPITAL | Age: 54
Discharge: HOME | End: 2024-08-19
Admitting: SURGERY
Payer: COMMERCIAL

## 2024-08-19 DIAGNOSIS — K50.919 EXACERBATION OF CROHN'S DISEASE WITH COMPLICATION: ICD-10-CM

## 2024-08-19 LAB — CREAT BLDA-MCNC: 0.9 MG/DL (ref 0.6–1.3)

## 2024-08-19 PROCEDURE — 25510000001 IOPAMIDOL 61 % SOLUTION: Performed by: SURGERY

## 2024-08-19 PROCEDURE — 74177 CT ABD & PELVIS W/CONTRAST: CPT

## 2024-08-19 PROCEDURE — 82565 ASSAY OF CREATININE: CPT

## 2024-08-19 RX ADMIN — IOPAMIDOL 95 ML: 612 INJECTION, SOLUTION INTRAVENOUS at 16:48

## 2024-08-22 ENCOUNTER — OFFICE VISIT (OUTPATIENT)
Dept: SURGERY | Facility: CLINIC | Age: 54
End: 2024-08-22
Payer: COMMERCIAL

## 2024-08-22 VITALS
BODY MASS INDEX: 36.09 KG/M2 | SYSTOLIC BLOOD PRESSURE: 106 MMHG | HEIGHT: 65 IN | DIASTOLIC BLOOD PRESSURE: 70 MMHG | WEIGHT: 216.6 LBS

## 2024-08-22 DIAGNOSIS — K50.019 CROHN'S DISEASE OF SMALL INTESTINE WITH COMPLICATION: Primary | ICD-10-CM

## 2024-08-22 NOTE — PROGRESS NOTES
Colorectal & General Surgery  Follow - Up    Patient: Rosa Melgoza  YOB: 1970  MRN: [id number]      Assessment  Rosa Melgoza is a 53 y.o. female with Crohn's disease who was recently admitted to the hospital with significant enteritis.  She has responded well to high-dose steroids.  She is being started on a new biologic, Skyrizi.  I reviewed the CT enterography that we performed, which demonstrates significant improvement in her small bowel enteritis.  There is certainly some inflammation that remains, but hopefully with continued steroids and new biologic, this will be well-controlled and we will be able to avoid surgery.    From my standpoint, she is welcome to follow-up on an as-needed basis.  I am happy to see her anytime in am happy to care for her given that we now have our relationship.    Chief Complaint: Hospital follow-up    History of Present Illness   Rosa Melogza is a 53 y.o. female who presents in follow-up today.  Overall, she feels much better.  She does complain of some mild nausea at times still and feeling somewhat bloated.  Otherwise, she is having no issues with tolerating her diet.  She continues to have constipation, which is relatively baseline for her.    Past Medical History   Past Medical History:   Diagnosis Date    Allergic     Anxiety     Cholelithiasis     Crohn disease     Depression     Disease of thyroid gland     Diverticulitis of colon     Diverticulosis     Elevated blood pressure reading without diagnosis of hypertension     Encounter for long-term (current) use of medications 01/13/2020    Fatty liver     GERD (gastroesophageal reflux disease)     Headache, migraine     Hernia     History of medical problems 01/23    not sure of date, but very painful joints and muscles.  Poss r/t crohns    History of sore throat     Hyperlipidemia     Hypertension     Hypothyroidism     Migraine     Need for DTaP and Hib vaccine     History of     Obesity     Urinary  tract infection     Vaginal yeast infection         Past Surgical History   Past Surgical History:   Procedure Laterality Date    BREAST BIOPSY       SECTION      CHOLECYSTECTOMY      COLONOSCOPY  approx     Tavon LIU  benign polyps per patient    COLONOSCOPY N/A 2020    Procedure: COLONOSCOPY  into cecum and TI with biopsies;  Surgeon: Maicol Garza MD;  Location:  [facility];  Service: Gastroenterology;  Laterality: N/A;  Pre: abn CT scan  post: diverticulosis    ENDOSCOPY N/A 2020    Procedure: ESOPHAGOGASTRODUODENOSCOPY WITH BIOPSY;  Surgeon: Maicol Garza MD;  Location:  [facility];  Service: Gastroenterology;  Laterality: N/A;  Pre: abn CT scan  post: hiatal hernia, esophagitis    FRACTURE SURGERY  2013    broken hand, 3 screws in place    TONSILLECTOMY      UPPER GASTROINTESTINAL ENDOSCOPY  approx     Tavon HOBBS.JOSSELIN  normal per patient       Social History  Social History     Socioeconomic History    Marital status:    Tobacco Use    Smoking status: Former     Current packs/day: 0.00     Average packs/day: 1 pack/day for 10.0 years (10.0 ttl pk-yrs)     Types: Cigarettes     Start date: 3/1/2001     Quit date: 3/1/2011     Years since quittin.4    Smokeless tobacco: Never    Tobacco comments:     smoked off and on   Vaping Use    Vaping status: Never Used   Substance and Sexual Activity    Alcohol use: Not Currently     Comment: once a year or so I have a drink w dinner    Drug use: Never    Sexual activity: Yes     Partners: Male     Birth control/protection: Surgical     Comment:  had vasectomy       Family History  Family History   Problem Relation Age of Onset    Breast cancer Mother     Arthritis Mother     Cancer Mother         breast ca    Miscarriages / Stillbirths Mother     Hypertension Father     Heart disease Father     Alcohol abuse Father     Hyperlipidemia Father     Breast cancer Maternal Grandmother     Anxiety disorder  Maternal Grandmother     Arthritis Maternal Grandmother     Cancer Maternal Grandmother         breast and brain    Depression Maternal Grandmother     Diabetes Maternal Grandmother     Heart disease Maternal Grandmother     Kidney disease Maternal Grandmother     Thyroid disease Maternal Grandmother     Mental illness Maternal Grandmother     Alcohol abuse Other     Depression Other     Anxiety disorder Other     Hypertension Other     Other Other         Pure hypercholesterolemia and esophageal reflux    Lung cancer Other     Inflammatory bowel disease Maternal Grandfather     Irritable bowel syndrome Maternal Grandfather         Honestly not sure which he had    Arthritis Maternal Grandfather     Cancer Maternal Grandfather         lung    Stroke Maternal Grandfather        Colorectal cancer family history: None    Review of Systems  Negative except as documented in the HPI.     Allergies  No Known Allergies    Medications    Current Outpatient Medications:     atorvastatin (LIPITOR) 10 MG tablet, Take 1 tablet by mouth Daily., Disp: 90 tablet, Rfl: 3    B Complex-C-Folic Acid (STRESS B COMPLEX PO), Take 1 tablet by mouth Daily., Disp: , Rfl:     bisacodyl (DULCOLAX) 10 MG suppository, Insert 1 suppository into the rectum Daily As Needed for Constipation for up to 30 days., Disp: 30 suppository, Rfl: 0    butalbital-acetaminophen-caffeine (FIORICET, ESGIC) -40 MG per tablet, Take 1 tablet by mouth Every 6 (Six) Hours As Needed for Headache., Disp: 30 tablet, Rfl: 0    Cholecalciferol (VITAMIN D3 GUMMIES ADULT PO), Take 5,000 Units by mouth Daily., Disp: , Rfl:     Dihydroergotamine Mesylate HFA (Trudhesa) 0.725 MG/ACT aerosol solution, 0.725 mg into the nostril(s) as directed by provider As Needed (Moderate to severe headache). One spray into each nostril, maybe repeated after 1 hour if needed, NO more than 2 doses in 24-hour period or 3 doses in 7-day period., Disp: 8 mL, Rfl: 2    escitalopram (LEXAPRO)  10 MG tablet, Take 1 tablet by mouth Daily., Disp: 90 tablet, Rfl: 3    fluticasone (FLONASE) 50 MCG/ACT nasal spray, Use 2 sprays into each nostril as directed by provider Daily., Disp: 16 g, Rfl: 6    galcanezumab-gnlm (EMGALITY) 120 MG/ML auto-injector pen, Inject 1 mL under the skin into the appropriate area as directed Every 30 (Thirty) Days., Disp: 1 mL, Rfl: 11    hydrOXYzine (ATARAX) 25 MG tablet, Take 1 tablet by mouth Every 8 (Eight) Hours As Needed for Anxiety., Disp: 90 tablet, Rfl: 0    Hyoscyamine Sulfate SL (Levsin/SL) 0.125 MG sublingual tablet, Place 1 tablet under the tongue Every 6 (Six) Hours As Needed (abdominal pain/cramping)., Disp: 60 each, Rfl: 3    levocetirizine (XYZAL) 5 MG tablet, Take 1 tablet by mouth Every Evening., Disp: 90 tablet, Rfl: 3    levothyroxine (SYNTHROID, LEVOTHROID) 100 MCG tablet, Take 1 tablet by mouth Daily., Disp: 90 tablet, Rfl: 1    lisinopril-hydrochlorothiazide (PRINZIDE,ZESTORETIC) 10-12.5 MG per tablet, Take 1 tablet by mouth Daily., Disp: 90 tablet, Rfl: 0    Multiple Vitamins-Minerals (MULTI ADULT GUMMIES PO), , Disp: , Rfl:     ondansetron (ZOFRAN) 8 MG tablet, Take 0.5 tablets by mouth Every 8 (Eight) Hours As Needed for Nausea or Vomiting., Disp: 90 tablet, Rfl: 0    pantoprazole (PROTONIX) 40 MG EC tablet, Take 1 tablet by mouth 2 (Two) Times a Day., Disp: 180 tablet, Rfl: 3    polyethylene glycol (MIRALAX) 17 GM/SCOOP powder, Dissolve 17 g (1 capful) in liquid and take by mouth Daily for 30 days., Disp: 510 g, Rfl: 0    potassium chloride (KLOR-CON M20) 20 MEQ CR tablet, Take 1 tablet by mouth Daily., Disp: 30 tablet, Rfl: 2    predniSONE (DELTASONE) 20 MG tablet, Take 2 tablets by mouth Daily for 30 days., Disp: 60 tablet, Rfl: 0    promethazine (PHENERGAN) 25 MG tablet, Take 1 tablet by mouth Every 6 (Six) Hours As Needed for Nausea or Vomiting., Disp: 60 tablet, Rfl: 1    Semaglutide-Weight Management (Wegovy) 1.7 MG/0.75ML solution auto-injector,  Inject 0.75 mL under the skin into the appropriate area as directed 1 (One) Time Per Week., Disp: 3 mL, Rfl: 0    Ubiquinol 100 MG capsule, Take 100 mg by mouth Daily., Disp: , Rfl:     Vital Signs  Vitals:    08/22/24 0953   BP: 106/70        Physical Exam  Constitutional: Resting comfortably, no acute distress  Neck: Supple, trachea midline  Respiratory: No increased work of breathing, Symmetric excursion  Cardiovascular: Well pefursed, no jugular venous distention evident   Abdominal:  Soft, non-tender, non-distended  Lymphatics: No cervical or suprascapular adenopathy  Skin: Warm, dry, no rash on visualized skin surfaces  Musculoskeletal: Symmetric strength, no obvious gross abnormalities  Psychiatric: Alert and oriented ×3, normal affect     Radiology  I have personally reviewed CT enterography demonstrates improving small bowel enteritis.  No evidence of obstruction.         Bryan Borden MD  Colorectal & General Surgery  [facility]    [address], [address]  [place], [place], [address]  P: [phone]  F: [phone]

## 2024-08-29 ENCOUNTER — INFUSION (OUTPATIENT)
Dept: ONCOLOGY | Facility: HOSPITAL | Age: 54
End: 2024-08-29
Payer: COMMERCIAL

## 2024-08-29 ENCOUNTER — SPECIALTY PHARMACY (OUTPATIENT)
Dept: GASTROENTEROLOGY | Facility: CLINIC | Age: 54
End: 2024-08-29
Payer: COMMERCIAL

## 2024-08-29 ENCOUNTER — OFFICE VISIT (OUTPATIENT)
Dept: GASTROENTEROLOGY | Facility: CLINIC | Age: 54
End: 2024-08-29
Payer: COMMERCIAL

## 2024-08-29 VITALS
RESPIRATION RATE: 15 BRPM | OXYGEN SATURATION: 97 % | DIASTOLIC BLOOD PRESSURE: 87 MMHG | SYSTOLIC BLOOD PRESSURE: 130 MMHG | HEIGHT: 65 IN | TEMPERATURE: 97.2 F | BODY MASS INDEX: 35.85 KG/M2 | WEIGHT: 215.2 LBS | HEART RATE: 81 BPM

## 2024-08-29 VITALS
DIASTOLIC BLOOD PRESSURE: 81 MMHG | BODY MASS INDEX: 36.35 KG/M2 | HEIGHT: 65 IN | WEIGHT: 218.2 LBS | TEMPERATURE: 97.8 F | HEART RATE: 105 BPM | SYSTOLIC BLOOD PRESSURE: 119 MMHG

## 2024-08-29 DIAGNOSIS — K50.919 CROHN'S DISEASE WITH COMPLICATION, UNSPECIFIED GASTROINTESTINAL TRACT LOCATION: Primary | ICD-10-CM

## 2024-08-29 DIAGNOSIS — K50.019 CROHN'S DISEASE OF SMALL INTESTINE WITH COMPLICATION: Primary | ICD-10-CM

## 2024-08-29 LAB
ALBUMIN SERPL-MCNC: 3.7 G/DL (ref 3.5–5.2)
ALBUMIN/GLOB SERPL: 1.5 G/DL
ALP SERPL-CCNC: 74 U/L (ref 39–117)
ALT SERPL W P-5'-P-CCNC: 26 U/L (ref 1–33)
ANION GAP SERPL CALCULATED.3IONS-SCNC: 7.7 MMOL/L (ref 5–15)
AST SERPL-CCNC: 9 U/L (ref 1–32)
BILIRUB SERPL-MCNC: 0.3 MG/DL (ref 0–1.2)
BUN SERPL-MCNC: 13 MG/DL (ref 6–20)
BUN/CREAT SERPL: 15.5 (ref 7–25)
CALCIUM SPEC-SCNC: 9.2 MG/DL (ref 8.6–10.5)
CHLORIDE SERPL-SCNC: 101 MMOL/L (ref 98–107)
CO2 SERPL-SCNC: 32.3 MMOL/L (ref 22–29)
CREAT SERPL-MCNC: 0.84 MG/DL (ref 0.57–1)
EGFRCR SERPLBLD CKD-EPI 2021: 83.2 ML/MIN/1.73
GLOBULIN UR ELPH-MCNC: 2.5 GM/DL
GLUCOSE SERPL-MCNC: 121 MG/DL (ref 65–99)
POTASSIUM SERPL-SCNC: 3.3 MMOL/L (ref 3.5–5.2)
PROT SERPL-MCNC: 6.2 G/DL (ref 6–8.5)
SODIUM SERPL-SCNC: 141 MMOL/L (ref 136–145)

## 2024-08-29 PROCEDURE — 80053 COMPREHEN METABOLIC PANEL: CPT | Performed by: PHYSICIAN ASSISTANT

## 2024-08-29 PROCEDURE — 25810000003 SODIUM CHLORIDE 0.9 % SOLUTION 250 ML FLEX CONT: Performed by: PHYSICIAN ASSISTANT

## 2024-08-29 PROCEDURE — 25010000002 RISANKIZUMAB-RZAA 600 MG/10ML SOLUTION 10 ML VIAL: Performed by: PHYSICIAN ASSISTANT

## 2024-08-29 PROCEDURE — 99214 OFFICE O/P EST MOD 30 MIN: CPT | Performed by: NURSE PRACTITIONER

## 2024-08-29 PROCEDURE — 96365 THER/PROPH/DIAG IV INF INIT: CPT

## 2024-08-29 RX ORDER — DIPHENHYDRAMINE HCL 25 MG
50 CAPSULE ORAL ONCE
OUTPATIENT
Start: 2024-09-26 | End: 2024-09-26

## 2024-08-29 RX ORDER — RISANKIZUMAB-RZAA 360 MG/2.4
360 WEARABLE INJECTOR SUBCUTANEOUS
Qty: 2.4 ML | Refills: 6 | Status: SHIPPED | OUTPATIENT
Start: 2024-08-29

## 2024-08-29 RX ORDER — DIPHENHYDRAMINE HYDROCHLORIDE 50 MG/ML
50 INJECTION INTRAMUSCULAR; INTRAVENOUS ONCE
OUTPATIENT
Start: 2024-09-26 | End: 2024-09-26

## 2024-08-29 RX ORDER — PREDNISONE 5 MG/1
35 TABLET ORAL DAILY
Qty: 120 TABLET | Refills: 3 | Status: SHIPPED | OUTPATIENT
Start: 2024-08-29

## 2024-08-29 RX ADMIN — SODIUM CHLORIDE 600 MG: 900 INJECTION, SOLUTION INTRAVENOUS at 11:57

## 2024-08-29 NOTE — PROGRESS NOTES
Chief Complaint   Patient presents with    Crohn's Disease       HPI    Rosa Melgoza is a  53 y.o. female here for a follow up visit for Crohn's disease hospital follow-up.    Past medical history reviewed as below.    This patient was admitted to [facility] earlier this month for exacerbation of Crohn's disease.  Discharge summary reviewed.  Patient presented with complaints of abdominal pain found to have Crohn's disease exacerbation. CT abdomen concerning for a stricture. Surgery was consulted and patient received 5 days of IV antibiotics. Patient's pain improved with IV steroids. Was able to tolerate a full liquid diet with minimal nausea and or vomiting. Plan to discharge on full liquid diet. Plan for prednisone 40 mg daily until follow-up.  Semaglutide was also put on hold.  Plan to transition her to Skyrizi as an outpatient.    Recent follow-up with surgical services reviewed.  Patient demonstrated improvement on CT enterography asked that surgical intervention not needed.  She is to follow-up with their services as needed.    On visit today she reports 2 episodes on Friday where she had epigastric discomfort that was short duration which have not returned.  She is passing 2 formed stools on average a day.  Denies rectal pain, rectal bleeding or weight loss.  Appetite is good.  She is currently on 40 mg of prednisone and needs to begin taper.  She met with our pharmacist and receive her first Skyrizi infusion while she was here in the office.  She needs follow-up lab work today to reassess white blood cell count.    Past Medical History:   Diagnosis Date    Allergic     Anxiety     Cholelithiasis     Crohn disease     Depression     Disease of thyroid gland     Diverticulitis of colon     Diverticulosis     Elevated blood pressure reading without diagnosis of hypertension     Encounter for long-term (current) use of medications 01/13/2020    Fatty liver     GERD (gastroesophageal reflux disease)     Headache,  migraine     Hernia     History of medical problems     not sure of date, but very painful joints and muscles.  Poss r/t crohns    History of sore throat     Hyperlipidemia     Hypertension     Hypothyroidism     Irritable bowel syndrome     Migraine     Need for DTaP and Hib vaccine     History of     Obesity     Urinary tract infection     Vaginal yeast infection        Past Surgical History:   Procedure Laterality Date    BREAST BIOPSY       SECTION      CHOLECYSTECTOMY      COLONOSCOPY  approx     Tavon HOBBS.JOSSELIN  benign polyps per patient    COLONOSCOPY N/A 2020    Procedure: COLONOSCOPY  into cecum and TI with biopsies;  Surgeon: Maicol Garza MD;  Location: [facility];  Service: Gastroenterology;  Laterality: N/A;  Pre: abn CT scan  post: diverticulosis    ENDOSCOPY N/A 2020    Procedure: ESOPHAGOGASTRODUODENOSCOPY WITH BIOPSY;  Surgeon: Maicol Garza MD;  Location: [facility];  Service: Gastroenterology;  Laterality: N/A;  Pre: abn CT scan  post: hiatal hernia, esophagitis    FRACTURE SURGERY      broken hand, 3 screws in place    TONSILLECTOMY      UPPER GASTROINTESTINAL ENDOSCOPY  approx     Tavon HOBBS.JOSSELIN  normal per patient       Scheduled Meds:     Continuous Infusions: No current facility-administered medications for this visit.      PRN Meds:     No Known Allergies    Social History     Socioeconomic History    Marital status:    Tobacco Use    Smoking status: Former     Current packs/day: 0.00     Average packs/day: 1 pack/day for 10.0 years (10.0 ttl pk-yrs)     Types: Cigarettes     Start date: 3/1/2001     Quit date: 3/1/2011     Years since quittin.5    Smokeless tobacco: Never    Tobacco comments:     smoked off and on   Vaping Use    Vaping status: Never Used   Substance and Sexual Activity    Alcohol use: Not Currently     Comment: once a year or so I have a drink w dinner    Drug use: Never    Sexual activity: Yes      Partners: Male     Birth control/protection: Surgical     Comment:  had vasectomy       Family History   Problem Relation Age of Onset    Breast cancer Mother     Arthritis Mother     Cancer Mother         breast ca    Miscarriages / Stillbirths Mother     Hypertension Father     Heart disease Father     Alcohol abuse Father     Hyperlipidemia Father     Cirrhosis Father     Breast cancer Maternal Grandmother     Anxiety disorder Maternal Grandmother     Arthritis Maternal Grandmother     Cancer Maternal Grandmother         breast and brain    Depression Maternal Grandmother     Diabetes Maternal Grandmother     Heart disease Maternal Grandmother     Kidney disease Maternal Grandmother     Thyroid disease Maternal Grandmother     Mental illness Maternal Grandmother     Alcohol abuse Other     Depression Other     Anxiety disorder Other     Hypertension Other     Lung cancer Other     Inflammatory bowel disease Maternal Grandfather     Irritable bowel syndrome Maternal Grandfather         Honestly not sure which he had    Arthritis Maternal Grandfather     Cancer Maternal Grandfather         lung    Stroke Maternal Grandfather        Review of Systems   HENT:  Negative for trouble swallowing.    Gastrointestinal: Negative.        Vitals:    08/29/24 1520   BP: 119/81   Pulse: 105   Temp: 97.8 °F (36.6 °C)       Physical Exam  Constitutional:       Appearance: She is well-developed.   Abdominal:      General: Bowel sounds are normal. There is no distension.      Palpations: Abdomen is soft. There is no mass.      Tenderness: There is no abdominal tenderness. There is no guarding.      Hernia: No hernia is present.   Skin:     General: Skin is warm and dry.      Capillary Refill: Capillary refill takes less than 2 seconds.   Neurological:      Mental Status: She is alert and oriented to person, place, and time.   Psychiatric:         Behavior: Behavior normal.     Assessment    Diagnoses and all orders for this  visit:    1. Crohn's disease with complication, unspecified gastrointestinal tract location (Primary)  -     CBC (No Diff)    Other orders  -     predniSONE (DELTASONE) 5 MG tablet; Take 7 tablets by mouth Daily. Take 35 mg of prednisone daily x 7 days then decrease by 5 mg a week until off  Dispense: 120 tablet; Refill: 3    Plan    Begin prednisone taper as above  She received her first dose of Skyrizi in office today and did well  CBC today  Follow-up 4 weeks call with questions or concerns in the interim         NADEGE Sandra  [facility]  [address] [place] [address] [address] [place] [address]  Office: [phone]    I spent 30 minutes caring for Rosa on this date of service. This time includes time spent by me in the following activities: preparing for the visit, reviewing tests, obtaining and/or reviewing a separately obtained history, performing a medically appropriate examination and/or evaluation , counseling and educating the patient/family/caregiver, ordering medications, tests, or procedures, documenting information in the medical record, independently interpreting results and communicating that information with the patient/family/caregiver and care coordination.

## 2024-08-29 NOTE — PROGRESS NOTES
Specialty Pharmacy Patient Management Program  Gastroenterology Initial Assessment     Rosa Melgoza is a 53 y.o. female seen by a Gastroenterology provider for Crohn's Disease and enrolled in the Patient Management program offered by [facility]. An initial outreach was conducted, including assessment of therapy appropriateness and specialty medication education for Skyrizi (risankizumab). The patient was introduced to services offered by [facility], including: regular assessments, refill coordination, curbside pick-up or mail order delivery options, prior authorization maintenance, and financial assistance programs as applicable. The patient was also provided with contact information for the pharmacy team.     Insurance Coverage & Financial Support  IV doses scheduled - first dose this AM. Auth in chart.      Relevant Past Medical History and Comorbidities  Relevant medical history and concomitant health conditions were discussed with the patient. The patient's chart has been reviewed for relevant past medical history and comorbid health conditions and updated as necessary.   Past Medical History:   Diagnosis Date    Allergic     Anxiety     Cholelithiasis     Crohn disease     Depression     Disease of thyroid gland     Diverticulitis of colon     Diverticulosis     Elevated blood pressure reading without diagnosis of hypertension     Encounter for long-term (current) use of medications 01/13/2020    Fatty liver     GERD (gastroesophageal reflux disease)     Headache, migraine     Hernia     History of medical problems 01/23    not sure of date, but very painful joints and muscles.  Poss r/t crohns    History of sore throat     Hyperlipidemia     Hypertension     Hypothyroidism     Irritable bowel syndrome     Migraine     Need for DTaP and Hib vaccine     History of     Obesity     Urinary tract infection     Vaginal yeast infection      Social History      Socioeconomic History    Marital status:    Tobacco Use    Smoking status: Former     Current packs/day: 0.00     Average packs/day: 1 pack/day for 10.0 years (10.0 ttl pk-yrs)     Types: Cigarettes     Start date: 3/1/2001     Quit date: 3/1/2011     Years since quittin.5    Smokeless tobacco: Never    Tobacco comments:     smoked off and on   Vaping Use    Vaping status: Never Used   Substance and Sexual Activity    Alcohol use: Not Currently     Comment: once a year or so I have a drink w dinner    Drug use: Never    Sexual activity: Yes     Partners: Male     Birth control/protection: Surgical     Comment:  had vasectomy     Problem list reviewed by Tanya Ashley PharmD on 2024 at  3:49 PM    Allergies  Known allergies and reactions were discussed with the patient. The patient's chart has been reviewed for allergy information and updated as necessary.   Patient has no known allergies.  Allergies reviewed by Tanya Ashley PharmD on 2024 at  3:49 PM    Current Medication List  This medication list has been reviewed with the patient and evaluated for any interactions or necessary modifications/recommendations, and updated to include all prescription medications, OTC medications, and supplements the patient is currently taking. This list reflects what is contained in the patient's profile, which has also been marked as reviewed to communicate to other providers it is the most up to date version of the patient's current medication therapy.     Current Outpatient Medications:     atorvastatin (LIPITOR) 10 MG tablet, Take 1 tablet by mouth Daily., Disp: 90 tablet, Rfl: 3    B Complex-C-Folic Acid (STRESS B COMPLEX PO), Take 1 tablet by mouth Daily., Disp: , Rfl:     bisacodyl (DULCOLAX) 10 MG suppository, Insert 1 suppository into the rectum Daily As Needed for Constipation for up to 30 days., Disp: 30 suppository, Rfl: 0    butalbital-acetaminophen-caffeine (FIORICET, ESGIC)  -40 MG per tablet, Take 1 tablet by mouth Every 6 (Six) Hours As Needed for Headache., Disp: 30 tablet, Rfl: 0    Cholecalciferol (VITAMIN D3 GUMMIES ADULT PO), Take 5,000 Units by mouth Daily., Disp: , Rfl:     Dihydroergotamine Mesylate HFA (Trudhesa) 0.725 MG/ACT aerosol solution, 0.725 mg into the nostril(s) as directed by provider As Needed (Moderate to severe headache). One spray into each nostril, maybe repeated after 1 hour if needed, NO more than 2 doses in 24-hour period or 3 doses in 7-day period., Disp: 8 mL, Rfl: 2    escitalopram (LEXAPRO) 10 MG tablet, Take 1 tablet by mouth Daily., Disp: 90 tablet, Rfl: 3    fluticasone (FLONASE) 50 MCG/ACT nasal spray, Use 2 sprays into each nostril as directed by provider Daily., Disp: 16 g, Rfl: 6    galcanezumab-gnlm (EMGALITY) 120 MG/ML auto-injector pen, Inject 1 mL under the skin into the appropriate area as directed Every 30 (Thirty) Days., Disp: 1 mL, Rfl: 11    hydrOXYzine (ATARAX) 25 MG tablet, Take 1 tablet by mouth Every 8 (Eight) Hours As Needed for Anxiety., Disp: 90 tablet, Rfl: 0    Hyoscyamine Sulfate SL (Levsin/SL) 0.125 MG sublingual tablet, Place 1 tablet under the tongue Every 6 (Six) Hours As Needed (abdominal pain/cramping)., Disp: 60 each, Rfl: 3    levocetirizine (XYZAL) 5 MG tablet, Take 1 tablet by mouth Every Evening., Disp: 90 tablet, Rfl: 3    levothyroxine (SYNTHROID, LEVOTHROID) 100 MCG tablet, Take 1 tablet by mouth Daily., Disp: 90 tablet, Rfl: 1    lisinopril-hydrochlorothiazide (PRINZIDE,ZESTORETIC) 10-12.5 MG per tablet, Take 1 tablet by mouth Daily., Disp: 90 tablet, Rfl: 0    Multiple Vitamins-Minerals (MULTI ADULT GUMMIES PO), , Disp: , Rfl:     ondansetron (ZOFRAN) 8 MG tablet, Take 0.5 tablets by mouth Every 8 (Eight) Hours As Needed for Nausea or Vomiting., Disp: 90 tablet, Rfl: 0    pantoprazole (PROTONIX) 40 MG EC tablet, Take 1 tablet by mouth 2 (Two) Times a Day., Disp: 180 tablet, Rfl: 3    polyethylene glycol  (MIRALAX) 17 GM/SCOOP powder, Dissolve 17 g (1 capful) in liquid and take by mouth Daily for 30 days., Disp: 510 g, Rfl: 0    potassium chloride (KLOR-CON M20) 20 MEQ CR tablet, Take 1 tablet by mouth Daily., Disp: 30 tablet, Rfl: 2    predniSONE (DELTASONE) 20 MG tablet, Take 2 tablets by mouth Daily for 30 days., Disp: 60 tablet, Rfl: 0    promethazine (PHENERGAN) 25 MG tablet, Take 1 tablet by mouth Every 6 (Six) Hours As Needed for Nausea or Vomiting., Disp: 60 tablet, Rfl: 1    Risankizumab-rzaa (Skyrizi) 360 MG/2.4ML solution cartridge, Inject 360 mg under the skin into the appropriate area as directed Every 8 (Eight) Weeks., Disp: 2.4 mL, Rfl: 6    Semaglutide-Weight Management (Wegovy) 1.7 MG/0.75ML solution auto-injector, Inject 0.75 mL under the skin into the appropriate area as directed 1 (One) Time Per Week., Disp: 3 mL, Rfl: 0    Ubiquinol 100 MG capsule, Take 100 mg by mouth Daily., Disp: , Rfl:   No current facility-administered medications for this visit.  Medicines reviewed by Tanya Ashley, PharmD on 8/29/2024 at  3:49 PM    Drug Interactions  none     Relevant Laboratory Values  Lab Results   Component Value Date    GLUCOSE 121 (H) 08/29/2024    BUN 13 08/29/2024    CREATININE 0.84 08/29/2024    BCR 15.5 08/29/2024     08/29/2024    K 3.3 (L) 08/29/2024     08/29/2024    CO2 32.3 (H) 08/29/2024    CALCIUM 9.2 08/29/2024    PROTEINTOT 6.2 08/29/2024    ALBUMIN 3.7 08/29/2024    ALT 26 08/29/2024    AST 9 08/29/2024    ALKPHOS 74 08/29/2024    BILITOT 0.3 08/29/2024    ANIONGAP 7.7 08/29/2024    MG 2.0 08/12/2024    EGFRRESULT 68 08/15/2024     Lab Results   Component Value Date    WBC 18.9 (H) 08/15/2024    HGB 14.5 08/15/2024    HCT 44.9 08/15/2024     08/15/2024     Lab Results   Component Value Date    HEPAIGM Negative 06/22/2020    HEPBCAB Negative 08/12/2024    HEPBIGMCORE Non-Reactive 08/12/2024    HEPBSAB Reactive 08/12/2024    HEPBSAG Non-Reactive 08/12/2024     HEPCVIRUSABY 0.2 06/22/2020     Lab Results   Component Value Date    QUANTITBGLDP Negative 08/12/2024    QUANTITBGLDP Negative 08/30/2023    QUANTITBGLDP Negative 04/14/2023    QUANTITBGLDP Negative 08/17/2022    QUANTITBGLDP Negative 04/13/2022     Lab Value Review  The above lab values have been reviewed; the following specialty medication(s) dose adjustment(s) are recommended: none.    Initial Education Provided for Specialty Medication  The patient has been provided with the following education and any applicable administration techniques (i.e. self-injection) have been demonstrated for the therapies indicated. All questions and concerns have been addressed prior to the patient receiving the medication, and the patient has verbalized understanding of the education and any materials provided. Additional patient education shall be provided and documented upon request by the patient, provider, or payer.         Nathan (Risankizumab-rzaa)         Medication Expectations   Why am I taking this medication? You are taking this medication for Crohn's disease.   What should I expect while on this medication? You may experience symptom relief in as little as 4 weeks, including less abdominal pain and bowel movements. You should expect a decrease in frequency and severity of symptoms within 12 weeks. Endoscopic response may be seen in as little as 12 weeks.   How does the medication work? Risankizumab-rzaa inhibits the release of pro-inflammatory cytokines and chemokines. Risankizumab-rzaa is a humanized immunoglobulin G1 (IgG1) monoclonal antibody that selectively binds human interleukin 23 (IL-23) cytokine and inhibits its interaction with the IL-23 receptor. IL-23 is a naturally occurring cytokine that is involved in inflammatory and immune responses.   How long will I be on this medication for? The amount of time you will be on this medication will be determined by your doctor and your response to the medication.     How do I take this medication? Take as directed on your prescription label. There is an initial IV loading dose at weeks 0, 4, and 8. Subcutaneous injections with the on-body injector start at week 12 and every 8 weeks therafter. It may be administered in the stomach or upper thigh. Allow to sit out of the refrigerator for 45 to 90 minutes before injecting.   What are some possible side effects? Injection site reactions and hypersensitivity reactions, headache, joint pain, back or abdominal pain, fever, upper respiratory or urinary tract infection.   What happens if I miss a dose? If you miss a dose, take it as soon as you remember.            Medication Safety   What are things I should warn my doctor immediately about? Allergic reaction such has hives or trouble breathing, or signs of liver problems such as dark urine, yellowing skin or eyes, nausea, vomiting, or loss of appetite. If you develop symptoms of infection, such as a cough or fever, that do not go away, weight loss, changes in how often you urinate, changes in sweating, muscle pain or weakness, or severe dizziness.    What are things that I should be cautious of? Injection site reaction, headache, and fatigue. You may have more chance of getting an infection. Wash your hands often and stay away from people with infections, colds, or flu.   What are some medications that can interact with this one? Immunosuppressants and vaccines.            Medication Storage/Handling   How should I handle this medication? Keep this medicine out of reach of pets and children. Keep the product in the refrigerator in the original carton to protect from light until time of use. Do not shake or freeze. Do not inject where the skin is tender, bruised, red, hard, or affected by any lesions. Rotate injection sites.   How does this medication need to be stored? Store in refrigerator and keep dry. If needed, you may store at room temperature for up to 24 hours but do not  return to the refrigerator if unused.    How should I dispose of this medication? You can dispose of the on-body injector in a sharps container, and if this is not available you may use an empty hard plastic container such as a milk jug or laundry detergent container. Discard any unused portion or if stored outside of refrigerator > 24 hours.            Resources/Support   How can I remind myself to take this medication? You can download a reminder rachana on your phone or use a calandar to help remember your next injection.   Is financial support available?  Yes, [employer] provides co-pay cards if you have commercial insurance or patient assistance if you have Medicare or no insurance.    Which vaccines are recommended for me? Talk to your doctor about these vaccines: Flu, Coronavirus (COVID-19), Pneumococcal (pneumonia), Tdap, Hepatitis B, Zoster (shingles)               Adherence and Self-Administration  Barriers to Patient Adherence and/or Self-Administration: none   Methods for Supporting Patient Adherence and/or Self-Administration:  showed pt Skyrizi OBI demo today - she feels comfortable with it and will call if she has questions/issues       Goals of Therapy   Goals Addressed Today        Specialty Pharmacy General Goal      At least 50% symptom reduction and mucosal healing               Reassessment Plan & Follow-Up  Medication Therapy Changes: IV Skyrizi first dose today. Will call pt ~1 week after 3rd IV dose, currently scheduled for 10/24.  Additional Plans, Therapy Recommendations, or Therapy Problems to Be Addressed:  pt still on 40mg prednisone - saw MICHELINE Zee today for F/U and she will give pt taper instructions. Monitor for thrush (pt previously had thrush with Stelara IV dose + prednisone).    Pharmacist to perform regular reassessments no more than (6) months from the previous assessment.  Welcome information and patient satisfaction survey to be sent by retail team with patient's initial fill.  Care  Coordinator to set up future refill outreaches, coordinate prescription delivery, and escalate clinical questions to pharmacist.     Attestation      I attest the patient was actively involved in and has agreed to the above plan of care. I attest that the initiated specialty medication(s) are appropriate for the patient based on my assessment. If the prescribed therapy is at any point deemed not appropriate based on the current or future assessments, a consultation will be initiated with the patient's specialty care provider to determine the best course of action. The revised plan of therapy will be documented along with any required assessments and/or additional patient education provided.     Tanya Ashley, PharmD, BCACP, BCPS, BCCCP  Clinical Specialty Pharmacist, Gastroenterology  08/29/24 15:59 EDT

## 2024-08-30 LAB
ERYTHROCYTE [DISTWIDTH] IN BLOOD BY AUTOMATED COUNT: 13.1 % (ref 12.3–15.4)
HCT VFR BLD AUTO: 38 % (ref 34–46.6)
HGB BLD-MCNC: 12.3 G/DL (ref 12–15.9)
MCH RBC QN AUTO: 29.4 PG (ref 26.6–33)
MCHC RBC AUTO-ENTMCNC: 32.4 G/DL (ref 31.5–35.7)
MCV RBC AUTO: 90.7 FL (ref 79–97)
PLATELET # BLD AUTO: 315 10*3/MM3 (ref 140–450)
RBC # BLD AUTO: 4.19 10*6/MM3 (ref 3.77–5.28)
WBC # BLD AUTO: 18.55 10*3/MM3 (ref 3.4–10.8)

## 2024-08-30 NOTE — PROGRESS NOTES
Please inform the patient her white blood cell count remains elevated likely secondary to steroids which we are tapering.  Repeat CBC 2 weeks.

## 2024-09-03 ENCOUNTER — TELEPHONE (OUTPATIENT)
Dept: GASTROENTEROLOGY | Facility: CLINIC | Age: 54
End: 2024-09-03
Payer: COMMERCIAL

## 2024-09-03 DIAGNOSIS — K50.019 CROHN'S DISEASE OF SMALL INTESTINE WITH COMPLICATION: Primary | ICD-10-CM

## 2024-09-03 DIAGNOSIS — K50.919 CROHN'S DISEASE WITH COMPLICATION, UNSPECIFIED GASTROINTESTINAL TRACT LOCATION: ICD-10-CM

## 2024-09-03 RX ORDER — HYDROCORTISONE ACETATE 25 MG/1
25 SUPPOSITORY RECTAL NIGHTLY
Qty: 7 SUPPOSITORY | Refills: 0 | Status: SHIPPED | OUTPATIENT
Start: 2024-09-03 | End: 2024-09-10

## 2024-09-03 NOTE — TELEPHONE ENCOUNTER
Pt reviewed results via [employer].     Sent pt [employer] msg advising of results and recommendations. Advised to call if any questions.     Order placed for CBC, sent to [facility] to [facility]iano.

## 2024-09-03 NOTE — TELEPHONE ENCOUNTER
----- Message from Loly Zee sent at 8/30/2024 12:06 PM EDT -----  Please inform the patient her white blood cell count remains elevated likely secondary to steroids which we are tapering.  Repeat CBC 2 weeks.

## 2024-09-04 ENCOUNTER — PATIENT MESSAGE (OUTPATIENT)
Dept: GASTROENTEROLOGY | Facility: CLINIC | Age: 54
End: 2024-09-04
Payer: COMMERCIAL

## 2024-09-04 DIAGNOSIS — E87.6 HYPOKALEMIA: Primary | ICD-10-CM

## 2024-09-05 RX ORDER — POTASSIUM CHLORIDE 1500 MG/1
20 TABLET, EXTENDED RELEASE ORAL 2 TIMES DAILY
Qty: 60 TABLET | Refills: 1 | Status: SHIPPED | OUTPATIENT
Start: 2024-09-05

## 2024-09-05 NOTE — TELEPHONE ENCOUNTER
From: Rosa Melgoza  To: Manda Butler  Sent: 9/4/2024 5:31 PM EDT  Subject: potassium    I am taking potassium now from it being low prior to my PCP seeing me for my hospital dc follow up. I am on 20 meq daily. Should I increase it to 40? I have 16 tablets left.   thanks  Rosa

## 2024-09-09 ENCOUNTER — OFFICE VISIT (OUTPATIENT)
Dept: FAMILY MEDICINE CLINIC | Facility: CLINIC | Age: 54
End: 2024-09-09
Payer: COMMERCIAL

## 2024-09-09 VITALS
HEART RATE: 107 BPM | SYSTOLIC BLOOD PRESSURE: 128 MMHG | BODY MASS INDEX: 36.69 KG/M2 | DIASTOLIC BLOOD PRESSURE: 92 MMHG | HEIGHT: 65 IN | WEIGHT: 220.2 LBS | OXYGEN SATURATION: 96 %

## 2024-09-09 DIAGNOSIS — E03.8 HYPOTHYROIDISM DUE TO HASHIMOTO'S THYROIDITIS: ICD-10-CM

## 2024-09-09 DIAGNOSIS — J30.1 SEASONAL ALLERGIC RHINITIS DUE TO POLLEN: ICD-10-CM

## 2024-09-09 DIAGNOSIS — R53.83 FATIGUE, UNSPECIFIED TYPE: ICD-10-CM

## 2024-09-09 DIAGNOSIS — F43.22 ADJUSTMENT DISORDER WITH ANXIOUS MOOD: ICD-10-CM

## 2024-09-09 DIAGNOSIS — Z00.01 ENCOUNTER FOR GENERAL ADULT MEDICAL EXAMINATION WITH ABNORMAL FINDINGS: Primary | ICD-10-CM

## 2024-09-09 DIAGNOSIS — E06.3 HYPOTHYROIDISM DUE TO HASHIMOTO'S THYROIDITIS: ICD-10-CM

## 2024-09-09 DIAGNOSIS — E78.2 MIXED HYPERLIPIDEMIA: ICD-10-CM

## 2024-09-09 DIAGNOSIS — K21.9 GASTROESOPHAGEAL REFLUX DISEASE WITHOUT ESOPHAGITIS: ICD-10-CM

## 2024-09-09 DIAGNOSIS — I10 ESSENTIAL HYPERTENSION: ICD-10-CM

## 2024-09-09 DIAGNOSIS — E66.01 CLASS 2 SEVERE OBESITY DUE TO EXCESS CALORIES WITH SERIOUS COMORBIDITY AND BODY MASS INDEX (BMI) OF 36.0 TO 36.9 IN ADULT: ICD-10-CM

## 2024-09-09 PROCEDURE — 99396 PREV VISIT EST AGE 40-64: CPT | Performed by: NURSE PRACTITIONER

## 2024-09-09 NOTE — PROGRESS NOTES
Subjective   Rosa Melgoza is a 54 y.o. female who presents for annual female wellness exam.  Chief Complaint   Patient presents with    Annual Exam     Patient presents today for complete physical.  Is feeling better. Is tapering off prednisone.  Has had first dose of skyrizi.  Crohn's doing well so far and denies issues.    Denies increase in symptoms since titrating down.    Hyperlipidemia: lipitor 10mg daily. Denies issues    Potassium was still low so increased dose-did this last Wednesday    Mood/anxiety-lexapro 10mg.      Allergies: floanse-, xyzal    Thyroid: abnormal in March    Hypertension: lisinopril-hctz    GERD-protonix 40mg  has been eating bland since last flair.            Menstrual History: doesn't still have  Pregnancy History: 2  Sexual History: 1 male partner  Contraception: post menopausal  Hormone Replacement Therapy: n/a  Diet: eating bland lately  Exercise: not currently  Do you feel safe? Reports she does  Have you ever been abused? Denies  Dentist: appt tomorrow   Eye doctor: yearly, due     Mammogram: 12/2023  Pap Smear: 6/2022  Bone Density: n/a  Colon Cancer Screening: sept 2020    Immunization History   Administered Date(s) Administered    COVID-19 (PFIZER) BIVALENT 12+YRS 12/08/2022    COVID-19 (PFIZER) Purple Cap Monovalent 12/31/2020, 01/21/2021, 11/19/2021    COVID-19 F23 (PFIZER) 12YRS+ (COMIRNATY) 09/22/2023    Covid-19 (Pfizer) Gray Cap Monovalent 05/25/2022    Flu Vaccine Split Quad 11/14/2021    Fluzone (or Fluarix & Flulaval for VFC) >6mos 11/16/2020    Hepatitis A 04/15/2022    Hepatitis B 04/15/2022    Pneumococcal Conjugate 13-Valent (PCV13) 04/15/2022    Shingrix 04/29/2022, 06/30/2022    Tdap 04/15/2022       The following portions of the patient's history were reviewed and updated as appropriate: allergies, current medications, past family history, past medical history, past social history, past surgical history and problem list.    Past Medical History:   Diagnosis  Date    Allergic     Anxiety     Cholelithiasis     Crohn disease     Depression     Disease of thyroid gland     Diverticulitis of colon     Diverticulosis     Elevated blood pressure reading without diagnosis of hypertension     Encounter for long-term (current) use of medications 2020    Fatty liver     GERD (gastroesophageal reflux disease)     Headache, migraine     Hernia     History of medical problems     not sure of date, but very painful joints and muscles.  Poss r/t crohns    History of sore throat     Hyperlipidemia     Hypertension     Hypothyroidism     Irritable bowel syndrome     Migraine     Need for DTaP and Hib vaccine     History of     Obesity     Urinary tract infection     Vaginal yeast infection        Past Surgical History:   Procedure Laterality Date    BREAST BIOPSY       SECTION      CHOLECYSTECTOMY      COLONOSCOPY  approx     Tavon M.D.  benign polyps per patient    COLONOSCOPY N/A 2020    Procedure: COLONOSCOPY  into cecum and TI with biopsies;  Surgeon: Maicol Garza MD;  Location: [facility] ENDOSCOPY;  Service: Gastroenterology;  Laterality: N/A;  Pre: abn CT scan  post: diverticulosis    ENDOSCOPY N/A 2020    Procedure: ESOPHAGOGASTRODUODENOSCOPY WITH BIOPSY;  Surgeon: Maicol Garza MD;  Location: [facility] ENDOSCOPY;  Service: Gastroenterology;  Laterality: N/A;  Pre: abn CT scan  post: hiatal hernia, esophagitis    FRACTURE SURGERY      broken hand, 3 screws in place    TONSILLECTOMY      UPPER GASTROINTESTINAL ENDOSCOPY  approx     Tavon M.DAna  normal per patient       Family History   Problem Relation Age of Onset    Breast cancer Mother     Arthritis Mother     Cancer Mother         breast ca    Miscarriages / Stillbirths Mother     Hypertension Father     Heart disease Father     Alcohol abuse Father     Hyperlipidemia Father     Cirrhosis Father     Breast cancer Maternal Grandmother     Anxiety disorder Maternal  Grandmother     Arthritis Maternal Grandmother     Cancer Maternal Grandmother         breast and brain    Depression Maternal Grandmother     Diabetes Maternal Grandmother     Heart disease Maternal Grandmother     Kidney disease Maternal Grandmother     Thyroid disease Maternal Grandmother     Mental illness Maternal Grandmother     Alcohol abuse Other     Depression Other     Anxiety disorder Other     Hypertension Other     Lung cancer Other     Inflammatory bowel disease Maternal Grandfather     Irritable bowel syndrome Maternal Grandfather         Honestly not sure which he had    Arthritis Maternal Grandfather     Cancer Maternal Grandfather         lung    Stroke Maternal Grandfather        Social History     Socioeconomic History    Marital status:    Tobacco Use    Smoking status: Former     Current packs/day: 0.00     Average packs/day: 1 pack/day for 10.0 years (10.0 ttl pk-yrs)     Types: Cigarettes     Start date: 3/1/2001     Quit date: 3/1/2011     Years since quittin.5    Smokeless tobacco: Never    Tobacco comments:     smoked off and on   Vaping Use    Vaping status: Never Used   Substance and Sexual Activity    Alcohol use: Not Currently     Comment: once a year or so I have a drink w dinner    Drug use: Never    Sexual activity: Yes     Partners: Male     Birth control/protection: Surgical     Comment:  had vasectomy       Review of Systems    Objective   Vitals:    24 1341   BP: 128/92   Pulse: 107   SpO2: 96%     Body mass index is 36.64 kg/m².  Physical Exam  Constitutional:       Appearance: Normal appearance.   Cardiovascular:      Rate and Rhythm: Normal rate and regular rhythm.      Pulses: Normal pulses.   Pulmonary:      Effort: Pulmonary effort is normal.      Breath sounds: Normal breath sounds.   Skin:     General: Skin is warm and dry.   Neurological:      Mental Status: She is alert.   Psychiatric:         Mood and Affect: Mood normal.         Behavior:  Behavior normal.         Thought Content: Thought content normal.         Judgment: Judgment normal.           Assessment & Plan   Diagnoses and all orders for this visit:    1. Encounter for general adult medical examination with abnormal findings (Primary)    2. Essential hypertension  -     CBC & Differential  -     Comprehensive Metabolic Panel  -     Lipid Panel  -     TSH  -     Vitamin B12    3. Gastroesophageal reflux disease without esophagitis  -     CBC & Differential  -     Comprehensive Metabolic Panel  -     Lipid Panel  -     TSH    4. Mixed hyperlipidemia  -     Lipid Panel    5. Hypothyroidism due to Hashimoto's thyroiditis  -     TSH    6. Adjustment disorder with anxious mood    7. Seasonal allergic rhinitis due to pollen    8. Class 2 severe obesity due to excess calories with serious comorbidity and body mass index (BMI) of 36.0 to 36.9 in adult  -     CBC & Differential  -     Comprehensive Metabolic Panel  -     Lipid Panel  -     TSH  -     Vitamin B12    9. Fatigue, unspecified type  -     Vitamin B12      Physical complete for patient.    Encouraged patient to get flu shot. Offered with her work    Hyperlipidemia: lipitor 10mg daily. Denies issues-rechecking labs today    Potassium was still low so increased dose-did this last Wednesday-rechecking after increase dose    Mood/anxiety-lexapro 10mg.  continue    Allergies: floanse-, xyzal-continue    Thyroid: abnormal in March-rechecking today    Hypertension: lisinopril-hctz-patient encouraged to monitor BP and notify if systolic >140 or diastolic >90.   She verbalizes understanding and is agreeable.     GERD-protonix 40mg  has been eating bland since last flair.     Follow up with PCP in 6 months, sooner if needed.          Discussed the importance of maintaining a healthy weight and getting regular exercise.  Educated patient on the benefits of healthy diet.  Advise follow-up annually for wellness exams.

## 2024-09-10 LAB
ALBUMIN SERPL-MCNC: 3.8 G/DL (ref 3.8–4.9)
ALP SERPL-CCNC: 69 IU/L (ref 44–121)
ALT SERPL-CCNC: 27 IU/L (ref 0–32)
AST SERPL-CCNC: 12 IU/L (ref 0–40)
BASOPHILS # BLD AUTO: 0 X10E3/UL (ref 0–0.2)
BASOPHILS NFR BLD AUTO: 0 %
BILIRUB SERPL-MCNC: 0.4 MG/DL (ref 0–1.2)
BUN SERPL-MCNC: 14 MG/DL (ref 6–24)
BUN/CREAT SERPL: 17 (ref 9–23)
CALCIUM SERPL-MCNC: 9.4 MG/DL (ref 8.7–10.2)
CHLORIDE SERPL-SCNC: 100 MMOL/L (ref 96–106)
CHOLEST SERPL-MCNC: 213 MG/DL (ref 100–199)
CO2 SERPL-SCNC: 27 MMOL/L (ref 20–29)
CREAT SERPL-MCNC: 0.84 MG/DL (ref 0.57–1)
EGFRCR SERPLBLD CKD-EPI 2021: 83 ML/MIN/1.73
EOSINOPHIL # BLD AUTO: 0 X10E3/UL (ref 0–0.4)
EOSINOPHIL NFR BLD AUTO: 0 %
ERYTHROCYTE [DISTWIDTH] IN BLOOD BY AUTOMATED COUNT: 13.1 % (ref 11.7–15.4)
GLOBULIN SER CALC-MCNC: 2.1 G/DL (ref 1.5–4.5)
GLUCOSE SERPL-MCNC: 94 MG/DL (ref 70–99)
HCT VFR BLD AUTO: 39.9 % (ref 34–46.6)
HDLC SERPL-MCNC: 72 MG/DL
HGB BLD-MCNC: 13 G/DL (ref 11.1–15.9)
IMM GRANULOCYTES # BLD AUTO: 0.1 X10E3/UL (ref 0–0.1)
IMM GRANULOCYTES NFR BLD AUTO: 1 %
LDLC SERPL CALC-MCNC: 126 MG/DL (ref 0–99)
LYMPHOCYTES # BLD AUTO: 1.2 X10E3/UL (ref 0.7–3.1)
LYMPHOCYTES NFR BLD AUTO: 8 %
MCH RBC QN AUTO: 29.7 PG (ref 26.6–33)
MCHC RBC AUTO-ENTMCNC: 32.6 G/DL (ref 31.5–35.7)
MCV RBC AUTO: 91 FL (ref 79–97)
MONOCYTES # BLD AUTO: 0.6 X10E3/UL (ref 0.1–0.9)
MONOCYTES NFR BLD AUTO: 5 %
NEUTROPHILS # BLD AUTO: 11.9 X10E3/UL (ref 1.4–7)
NEUTROPHILS NFR BLD AUTO: 86 %
PLATELET # BLD AUTO: 349 X10E3/UL (ref 150–450)
POTASSIUM SERPL-SCNC: 3.9 MMOL/L (ref 3.5–5.2)
PROT SERPL-MCNC: 5.9 G/DL (ref 6–8.5)
RBC # BLD AUTO: 4.37 X10E6/UL (ref 3.77–5.28)
SODIUM SERPL-SCNC: 140 MMOL/L (ref 134–144)
TRIGL SERPL-MCNC: 84 MG/DL (ref 0–149)
TSH SERPL DL<=0.005 MIU/L-ACNC: 0.13 UIU/ML (ref 0.45–4.5)
VIT B12 SERPL-MCNC: 597 PG/ML (ref 232–1245)
VLDLC SERPL CALC-MCNC: 15 MG/DL (ref 5–40)
WBC # BLD AUTO: 13.8 X10E3/UL (ref 3.4–10.8)

## 2024-09-11 ENCOUNTER — SPECIALTY PHARMACY (OUTPATIENT)
Dept: NEUROLOGY | Facility: CLINIC | Age: 54
End: 2024-09-11
Payer: COMMERCIAL

## 2024-09-11 DIAGNOSIS — G43.109 MIGRAINE WITH AURA AND WITHOUT STATUS MIGRAINOSUS, NOT INTRACTABLE: ICD-10-CM

## 2024-09-11 DIAGNOSIS — E03.8 HYPOTHYROIDISM DUE TO HASHIMOTO'S THYROIDITIS: ICD-10-CM

## 2024-09-11 DIAGNOSIS — E06.3 HYPOTHYROIDISM DUE TO HASHIMOTO'S THYROIDITIS: ICD-10-CM

## 2024-09-11 RX ORDER — LEVOTHYROXINE SODIUM 88 UG/1
88 TABLET ORAL DAILY
Qty: 30 TABLET | Refills: 2 | Status: SHIPPED | OUTPATIENT
Start: 2024-09-11

## 2024-09-11 NOTE — PROGRESS NOTES
Forwarding to Dr Romeo for her review only. Please call patient with results of labs.  WBC trending down.  Potassium is up to 3.9. Continue same dosing. Cholesterol is slightly up, but likely from not tolerating po for a while.  Would recommend rechecking levels in 6 months.  Thyroid Is above range. I am going to decrease levothyroxine dosing.  Would like her to recheck TSH in 6 weeks.  Will place lab orders.

## 2024-09-11 NOTE — PROGRESS NOTES
Specialty Pharmacy Refill Coordination Note     Rosa is a 54 y.o. female contacted today regarding refills of Emgality specialty medication(s).    Reviewed and verified with patient:       Specialty medication(s) and dose(s) confirmed: yes    Refill Questions      Flowsheet Row Most Recent Value   Changes to allergies? No   Changes to medications? No   New conditions or infections since last clinic visit No   If yes, please describe  N/A   Unplanned office visit, urgent care, ED, or hospital admission in the last 4 weeks  No   How does patient/caregiver feel medication is working? Very good   Financial problems or insurance changes  No   Since the previous refill, were any specialty medication doses or scheduled injections missed or delayed?  No   Does this patient require a clinical escalation to a pharmacist? No            Delivery Questions      Flowsheet Row Most Recent Value   Delivery method UPS   Delivery address Home   Number of medications in delivery 1   Medication(s) being filled and delivered Galcanezumab-Gnlm   Doses left of specialty medications 0   Copay verified? Yes   Copay amount $0   Copay form of payment No copayment ($0)   Ship Date 9/11   Delivery Date 9/12   Signature Required No                   Follow-up: 21 day(s)     Shelbie Foster, Pharmacy Technician  Specialty Pharmacy Technician

## 2024-09-11 NOTE — TELEPHONE ENCOUNTER
Rx Refill Note  Requested Prescriptions     Pending Prescriptions Disp Refills    butalbital-acetaminophen-caffeine (FIORICET, ESGIC) -40 MG per tablet 30 tablet 0     Sig: Take 1 tablet by mouth Every 6 (Six) Hours As Needed for Headache.      Last office visit 09/09/2024  Please advise this refill

## 2024-09-12 RX ORDER — BUTALBITAL, ACETAMINOPHEN AND CAFFEINE 50; 325; 40 MG/1; MG/1; MG/1
1 TABLET ORAL EVERY 6 HOURS PRN
Qty: 30 TABLET | Refills: 0 | Status: SHIPPED | OUTPATIENT
Start: 2024-09-12

## 2024-09-16 DIAGNOSIS — G43.109 MIGRAINE WITH AURA AND WITHOUT STATUS MIGRAINOSUS, NOT INTRACTABLE: ICD-10-CM

## 2024-09-16 RX ORDER — BUTALBITAL, ACETAMINOPHEN AND CAFFEINE 50; 325; 40 MG/1; MG/1; MG/1
1 TABLET ORAL EVERY 6 HOURS PRN
Qty: 30 TABLET | Refills: 0 | OUTPATIENT
Start: 2024-09-16

## 2024-09-17 ENCOUNTER — PATIENT MESSAGE (OUTPATIENT)
Dept: FAMILY MEDICINE CLINIC | Facility: CLINIC | Age: 54
End: 2024-09-17
Payer: COMMERCIAL

## 2024-09-17 DIAGNOSIS — G43.109 MIGRAINE WITH AURA AND WITHOUT STATUS MIGRAINOSUS, NOT INTRACTABLE: ICD-10-CM

## 2024-09-18 RX ORDER — BUTALBITAL, ACETAMINOPHEN AND CAFFEINE 50; 325; 40 MG/1; MG/1; MG/1
1 TABLET ORAL EVERY 6 HOURS PRN
Qty: 30 TABLET | Refills: 0 | Status: SHIPPED | OUTPATIENT
Start: 2024-09-18

## 2024-09-26 ENCOUNTER — INFUSION (OUTPATIENT)
Dept: ONCOLOGY | Facility: HOSPITAL | Age: 54
End: 2024-09-26
Payer: COMMERCIAL

## 2024-09-26 ENCOUNTER — OFFICE VISIT (OUTPATIENT)
Dept: GASTROENTEROLOGY | Facility: CLINIC | Age: 54
End: 2024-09-26
Payer: COMMERCIAL

## 2024-09-26 VITALS
OXYGEN SATURATION: 97 % | WEIGHT: 225 LBS | SYSTOLIC BLOOD PRESSURE: 144 MMHG | RESPIRATION RATE: 15 BRPM | DIASTOLIC BLOOD PRESSURE: 94 MMHG | HEART RATE: 94 BPM | BODY MASS INDEX: 37.49 KG/M2 | TEMPERATURE: 95.6 F | HEIGHT: 65 IN

## 2024-09-26 VITALS
SYSTOLIC BLOOD PRESSURE: 114 MMHG | BODY MASS INDEX: 37.72 KG/M2 | HEIGHT: 65 IN | DIASTOLIC BLOOD PRESSURE: 79 MMHG | WEIGHT: 226.4 LBS | HEART RATE: 111 BPM

## 2024-09-26 DIAGNOSIS — K50.919 CROHN'S DISEASE WITH COMPLICATION, UNSPECIFIED GASTROINTESTINAL TRACT LOCATION: Primary | ICD-10-CM

## 2024-09-26 DIAGNOSIS — K50.00 CROHN'S DISEASE OF SMALL INTESTINE WITHOUT COMPLICATION: Primary | ICD-10-CM

## 2024-09-26 LAB
ALBUMIN SERPL-MCNC: 3.4 G/DL (ref 3.5–5.2)
ALBUMIN/GLOB SERPL: 1.2 G/DL
ALP SERPL-CCNC: 77 U/L (ref 39–117)
ALT SERPL W P-5'-P-CCNC: 21 U/L (ref 1–33)
ANION GAP SERPL CALCULATED.3IONS-SCNC: 9.1 MMOL/L (ref 5–15)
AST SERPL-CCNC: 11 U/L (ref 1–32)
BILIRUB SERPL-MCNC: 0.3 MG/DL (ref 0–1.2)
BUN SERPL-MCNC: 12 MG/DL (ref 6–20)
BUN/CREAT SERPL: 15.4 (ref 7–25)
CALCIUM SPEC-SCNC: 8.9 MG/DL (ref 8.6–10.5)
CHLORIDE SERPL-SCNC: 103 MMOL/L (ref 98–107)
CO2 SERPL-SCNC: 27.9 MMOL/L (ref 22–29)
CREAT SERPL-MCNC: 0.78 MG/DL (ref 0.57–1)
EGFRCR SERPLBLD CKD-EPI 2021: 90.4 ML/MIN/1.73
GLOBULIN UR ELPH-MCNC: 2.8 GM/DL
GLUCOSE SERPL-MCNC: 159 MG/DL (ref 65–99)
POTASSIUM SERPL-SCNC: 4 MMOL/L (ref 3.5–5.2)
PROT SERPL-MCNC: 6.2 G/DL (ref 6–8.5)
SODIUM SERPL-SCNC: 140 MMOL/L (ref 136–145)

## 2024-09-26 PROCEDURE — 80053 COMPREHEN METABOLIC PANEL: CPT | Performed by: PHYSICIAN ASSISTANT

## 2024-09-26 PROCEDURE — 96365 THER/PROPH/DIAG IV INF INIT: CPT

## 2024-09-26 PROCEDURE — 99213 OFFICE O/P EST LOW 20 MIN: CPT | Performed by: NURSE PRACTITIONER

## 2024-09-26 PROCEDURE — 25810000003 SODIUM CHLORIDE 0.9 % SOLUTION 250 ML FLEX CONT: Performed by: PHYSICIAN ASSISTANT

## 2024-09-26 PROCEDURE — 25010000002 RISANKIZUMAB-RZAA 600 MG/10ML SOLUTION 10 ML VIAL: Performed by: PHYSICIAN ASSISTANT

## 2024-09-26 RX ORDER — DIPHENHYDRAMINE HYDROCHLORIDE 50 MG/ML
50 INJECTION INTRAMUSCULAR; INTRAVENOUS ONCE
OUTPATIENT
Start: 2024-10-24 | End: 2024-10-24

## 2024-09-26 RX ORDER — DIPHENHYDRAMINE HCL 25 MG
50 CAPSULE ORAL ONCE
OUTPATIENT
Start: 2024-10-24 | End: 2024-10-24

## 2024-09-26 RX ADMIN — SODIUM CHLORIDE 600 MG: 900 INJECTION, SOLUTION INTRAVENOUS at 10:23

## 2024-09-27 ENCOUNTER — SPECIALTY PHARMACY (OUTPATIENT)
Dept: GASTROENTEROLOGY | Facility: CLINIC | Age: 54
End: 2024-09-27
Payer: COMMERCIAL

## 2024-10-02 ENCOUNTER — TELEPHONE (OUTPATIENT)
Dept: GASTROENTEROLOGY | Facility: CLINIC | Age: 54
End: 2024-10-02
Payer: COMMERCIAL

## 2024-10-02 LAB — CALPROTECTIN STL-MCNT: 121 UG/G (ref 0–120)

## 2024-10-02 RX ORDER — NYSTATIN 100000 [USP'U]/ML
500000 SUSPENSION ORAL 4 TIMES DAILY
Qty: 140 ML | Refills: 0 | Status: SHIPPED | OUTPATIENT
Start: 2024-10-02 | End: 2024-10-12

## 2024-10-02 RX ORDER — FLUCONAZOLE 150 MG/1
150 TABLET ORAL ONCE
Qty: 1 TABLET | Refills: 0 | Status: SHIPPED | OUTPATIENT
Start: 2024-10-02 | End: 2024-10-06

## 2024-10-02 NOTE — TELEPHONE ENCOUNTER
----- Message from [facility] sent at 10/1/2024  3:55 PM EDT -----  Regarding: questions jorge to Judy  Contact: [phone]  Did Judy ask you about my other questions yet?

## 2024-10-07 ENCOUNTER — TELEPHONE (OUTPATIENT)
Dept: GASTROENTEROLOGY | Facility: CLINIC | Age: 54
End: 2024-10-07
Payer: COMMERCIAL

## 2024-10-07 NOTE — TELEPHONE ENCOUNTER
----- Message from [facility] sent at 10/7/2024  8:04 AM EDT -----  Regarding: Appointment Request  Contact: [phone]  Appointment Request From: Rosa Melgoza    With Provider: Manda Butler [[facility]]    Preferred Date Range: 10/24/2024 – 10/24/2024    Preferred Times: Any Time    Reason for visit: Follow-up    Comments:  follow up for crohns after infusion

## 2024-10-11 ENCOUNTER — SPECIALTY PHARMACY (OUTPATIENT)
Dept: NEUROLOGY | Facility: CLINIC | Age: 54
End: 2024-10-11
Payer: COMMERCIAL

## 2024-10-11 NOTE — PROGRESS NOTES
Specialty Pharmacy Refill Coordination Note     Rosa is a 54 y.o. female contacted today regarding refills of Emgality specialty medication(s).    Reviewed and verified with patient:       Specialty medication(s) and dose(s) confirmed: yes    Refill Questions      Flowsheet Row Most Recent Value   Changes to allergies? No   Changes to medications? No   New conditions or infections since last clinic visit No   If yes, please describe  N/A   Unplanned office visit, urgent care, ED, or hospital admission in the last 4 weeks  No   How does patient/caregiver feel medication is working? Very good   Financial problems or insurance changes  No   Since the previous refill, were any specialty medication doses or scheduled injections missed or delayed?  No   Does this patient require a clinical escalation to a pharmacist? No            Delivery Questions      Flowsheet Row Most Recent Value   Delivery method UPS   Delivery address verified with patient/caregiver? Yes   Delivery address Home   Number of medications in delivery 1   Medication(s) being filled and delivered Galcanezumab-Gnlm   Doses left of specialty medications 0   Copay verified? Yes   Copay amount $0   Copay form of payment No copayment ($0)   Ship Date 10/14   Delivery Date 10/15   Signature Required No                   Follow-up: 21 day(s)     Shelbie Foster, Pharmacy Technician  Specialty Pharmacy Technician

## 2024-10-22 ENCOUNTER — PATIENT ROUNDING (BHMG ONLY) (OUTPATIENT)
Dept: URGENT CARE | Facility: CLINIC | Age: 54
End: 2024-10-22
Payer: COMMERCIAL

## 2024-10-22 NOTE — ED NOTES
Thank you for letting us care for you in your recent visit to our urgent care center. We would love to hear about your experience with us. Was this the first time you have visited our location?    We’re always looking for ways to make our patients’ experiences even better. Do you have any recommendations on ways we may improve?     I appreciate you taking the time to respond. Please be on the lookout for a survey about your recent visit from [employer] via text or email. We would greatly appreciate if you could fill that out and turn it back in. We want your voice to be heard and we value your feedback.   Thank you for choosing [facility] for your healthcare needs.

## 2024-10-23 ENCOUNTER — TELEPHONE (OUTPATIENT)
Dept: GASTROENTEROLOGY | Facility: CLINIC | Age: 54
End: 2024-10-23
Payer: COMMERCIAL

## 2024-10-23 NOTE — TELEPHONE ENCOUNTER
Called and lvm to called to schedule lab appointment. Also pt can go to [facility] closest to her or outpatient lab at the hospital

## 2024-10-24 ENCOUNTER — INFUSION (OUTPATIENT)
Dept: ONCOLOGY | Facility: HOSPITAL | Age: 54
End: 2024-10-24
Payer: COMMERCIAL

## 2024-10-24 VITALS
OXYGEN SATURATION: 96 % | DIASTOLIC BLOOD PRESSURE: 85 MMHG | WEIGHT: 229.6 LBS | TEMPERATURE: 96.3 F | HEIGHT: 65 IN | BODY MASS INDEX: 38.25 KG/M2 | HEART RATE: 96 BPM | SYSTOLIC BLOOD PRESSURE: 122 MMHG | RESPIRATION RATE: 15 BRPM

## 2024-10-24 DIAGNOSIS — K50.919 CROHN'S DISEASE WITH COMPLICATION, UNSPECIFIED GASTROINTESTINAL TRACT LOCATION: Primary | ICD-10-CM

## 2024-10-24 LAB
ALBUMIN SERPL-MCNC: 3.8 G/DL (ref 3.5–5.2)
ALBUMIN/GLOB SERPL: 1.4 G/DL
ALP SERPL-CCNC: 77 U/L (ref 39–117)
ALT SERPL W P-5'-P-CCNC: 14 U/L (ref 1–33)
ANION GAP SERPL CALCULATED.3IONS-SCNC: 11.4 MMOL/L (ref 5–15)
AST SERPL-CCNC: 14 U/L (ref 1–32)
BILIRUB SERPL-MCNC: 0.3 MG/DL (ref 0–1.2)
BUN SERPL-MCNC: 14 MG/DL (ref 6–20)
BUN/CREAT SERPL: 16.3 (ref 7–25)
CALCIUM SPEC-SCNC: 9 MG/DL (ref 8.6–10.5)
CHLORIDE SERPL-SCNC: 103 MMOL/L (ref 98–107)
CO2 SERPL-SCNC: 27.6 MMOL/L (ref 22–29)
CREAT SERPL-MCNC: 0.86 MG/DL (ref 0.57–1)
EGFRCR SERPLBLD CKD-EPI 2021: 80.4 ML/MIN/1.73
GLOBULIN UR ELPH-MCNC: 2.7 GM/DL
GLUCOSE SERPL-MCNC: 106 MG/DL (ref 65–99)
POTASSIUM SERPL-SCNC: 3.4 MMOL/L (ref 3.5–5.2)
PROT SERPL-MCNC: 6.5 G/DL (ref 6–8.5)
SODIUM SERPL-SCNC: 142 MMOL/L (ref 136–145)

## 2024-10-24 PROCEDURE — 96365 THER/PROPH/DIAG IV INF INIT: CPT

## 2024-10-24 PROCEDURE — 25810000003 SODIUM CHLORIDE 0.9 % SOLUTION 250 ML FLEX CONT: Performed by: PHYSICIAN ASSISTANT

## 2024-10-24 PROCEDURE — 25010000002 RISANKIZUMAB-RZAA 600 MG/10ML SOLUTION 10 ML VIAL: Performed by: PHYSICIAN ASSISTANT

## 2024-10-24 PROCEDURE — 84443 ASSAY THYROID STIM HORMONE: CPT | Performed by: FAMILY MEDICINE

## 2024-10-24 PROCEDURE — 80053 COMPREHEN METABOLIC PANEL: CPT | Performed by: PHYSICIAN ASSISTANT

## 2024-10-24 RX ORDER — DIPHENHYDRAMINE HYDROCHLORIDE 50 MG/ML
50 INJECTION INTRAMUSCULAR; INTRAVENOUS ONCE
OUTPATIENT
Start: 2024-10-24 | End: 2024-10-24

## 2024-10-24 RX ORDER — DIPHENHYDRAMINE HCL 25 MG
50 CAPSULE ORAL ONCE
OUTPATIENT
Start: 2024-10-24 | End: 2024-10-24

## 2024-10-24 RX ADMIN — SODIUM CHLORIDE 600 MG: 900 INJECTION, SOLUTION INTRAVENOUS at 11:01

## 2024-10-24 NOTE — PROGRESS NOTES
Labs look okay other than your potassium is 0.1 mmol/L low.  This may be from excessive diarrhea if you are having that problem.  Make sure you are taking your potassium as prescribed.  Loly can recheck this at your follow-up on 11/5/2024.

## 2024-11-05 ENCOUNTER — OFFICE VISIT (OUTPATIENT)
Dept: GASTROENTEROLOGY | Facility: CLINIC | Age: 54
End: 2024-11-05
Payer: COMMERCIAL

## 2024-11-05 VITALS
OXYGEN SATURATION: 95 % | WEIGHT: 234.8 LBS | BODY MASS INDEX: 39.12 KG/M2 | HEIGHT: 65 IN | DIASTOLIC BLOOD PRESSURE: 78 MMHG | HEART RATE: 95 BPM | TEMPERATURE: 97.8 F | SYSTOLIC BLOOD PRESSURE: 119 MMHG

## 2024-11-05 DIAGNOSIS — K50.00 CROHN'S DISEASE OF SMALL INTESTINE WITHOUT COMPLICATION: Primary | ICD-10-CM

## 2024-11-05 DIAGNOSIS — K21.00 GASTROESOPHAGEAL REFLUX DISEASE WITH ESOPHAGITIS WITHOUT HEMORRHAGE: ICD-10-CM

## 2024-11-05 LAB
25(OH)D3+25(OH)D2 SERPL-MCNC: 33.4 NG/ML (ref 30–100)
ALBUMIN SERPL-MCNC: 3.6 G/DL (ref 3.5–5.2)
ALBUMIN/GLOB SERPL: 1.5 G/DL
ALP SERPL-CCNC: 76 U/L (ref 39–117)
ALT SERPL-CCNC: 15 U/L (ref 1–33)
AST SERPL-CCNC: 18 U/L (ref 1–32)
BILIRUB SERPL-MCNC: <0.2 MG/DL (ref 0–1.2)
BUN SERPL-MCNC: 11 MG/DL (ref 6–20)
BUN/CREAT SERPL: 11 (ref 7–25)
CALCIUM SERPL-MCNC: 8.6 MG/DL (ref 8.6–10.5)
CHLORIDE SERPL-SCNC: 105 MMOL/L (ref 98–107)
CO2 SERPL-SCNC: 29.7 MMOL/L (ref 22–29)
CREAT SERPL-MCNC: 1 MG/DL (ref 0.57–1)
CRP SERPL-MCNC: 0.95 MG/DL (ref 0–0.5)
EGFRCR SERPLBLD CKD-EPI 2021: 67.1 ML/MIN/1.73
ERYTHROCYTE [DISTWIDTH] IN BLOOD BY AUTOMATED COUNT: 12.8 % (ref 12.3–15.4)
ERYTHROCYTE [SEDIMENTATION RATE] IN BLOOD BY WESTERGREN METHOD: 21 MM/HR (ref 0–30)
FOLATE SERPL-MCNC: 6.03 NG/ML (ref 4.78–24.2)
GLOBULIN SER CALC-MCNC: 2.4 GM/DL
GLUCOSE SERPL-MCNC: 103 MG/DL (ref 65–99)
HCT VFR BLD AUTO: 34.8 % (ref 34–46.6)
HGB BLD-MCNC: 11.8 G/DL (ref 12–15.9)
IRON SATN MFR SERPL: 12 % (ref 20–50)
IRON SERPL-MCNC: 42 MCG/DL (ref 37–145)
MAGNESIUM SERPL-MCNC: 2 MG/DL (ref 1.6–2.6)
MCH RBC QN AUTO: 30.6 PG (ref 26.6–33)
MCHC RBC AUTO-ENTMCNC: 33.9 G/DL (ref 31.5–35.7)
MCV RBC AUTO: 90.2 FL (ref 79–97)
PLATELET # BLD AUTO: 241 10*3/MM3 (ref 140–450)
POTASSIUM SERPL-SCNC: 3.9 MMOL/L (ref 3.5–5.2)
PROT SERPL-MCNC: 6 G/DL (ref 6–8.5)
RBC # BLD AUTO: 3.86 10*6/MM3 (ref 3.77–5.28)
SODIUM SERPL-SCNC: 144 MMOL/L (ref 136–145)
TIBC SERPL-MCNC: 341 MCG/DL
UIBC SERPL-MCNC: 299 MCG/DL (ref 112–346)
VIT B12 SERPL-MCNC: 733 PG/ML (ref 211–946)
WBC # BLD AUTO: 8.69 10*3/MM3 (ref 3.4–10.8)

## 2024-11-05 PROCEDURE — 99214 OFFICE O/P EST MOD 30 MIN: CPT | Performed by: NURSE PRACTITIONER

## 2024-11-05 NOTE — PROGRESS NOTES
Chief Complaint   Patient presents with    Crohn's Disease       HPI    Rosa Melgoza is a  54 y.o. female here for a follow up visit for Crohn's disease of the small intestine on Skyrizi    Past medical history reviewed as below.    On visit today Rosa reports feeling much improved from a GI standpoint.  She has completed induction phase of Skyrizi now on every 8 week injections.  She tapered off prednisone late last month.  She is having 1-2 bowel movements a day with semiformed stool.  Denies abdominal pain, rectal pain or rectal bleeding.  She still takes Levsin as needed for abdominal cramps.  Rare nausea relieved with Zofran.  No vomiting. She planes of hip and knee pain which has been ongoing for quite some time even when she was on Entyvio.  She needs follow-up lab work today as ordered below.    Past Medical History:   Diagnosis Date    Allergic     Anxiety     Cholelithiasis     Crohn disease     Depression     Disease of thyroid gland     Diverticulitis of colon     Diverticulosis     Elevated blood pressure reading without diagnosis of hypertension     Encounter for long-term (current) use of medications 2020    Fatty liver     GERD (gastroesophageal reflux disease)     Headache, migraine     Hernia     History of medical problems     not sure of date, but very painful joints and muscles.  Poss r/t crohns    History of sore throat     Hyperlipidemia     Hypertension     Hypothyroidism     Irritable bowel syndrome     Migraine     Need for DTaP and Hib vaccine     History of     Obesity     Urinary tract infection     Vaginal yeast infection        Past Surgical History:   Procedure Laterality Date    BREAST BIOPSY       SECTION      CHOLECYSTECTOMY      COLONOSCOPY  approx     Tavon LIU  benign polyps per patient    COLONOSCOPY N/A 2020    Procedure: COLONOSCOPY  into cecum and TI with biopsies;  Surgeon: Maicol Garza MD;  Location: [facility] ENDOSCOPY;  Service:  Gastroenterology;  Laterality: N/A;  Pre: abn CT scan  post: diverticulosis    ENDOSCOPY N/A 2020    Procedure: ESOPHAGOGASTRODUODENOSCOPY WITH BIOPSY;  Surgeon: Maicol Garza MD;  Location: [facility] ENDOSCOPY;  Service: Gastroenterology;  Laterality: N/A;  Pre: abn CT scan  post: hiatal hernia, esophagitis    FRACTURE SURGERY  2013    broken hand, 3 screws in place    TONSILLECTOMY      UPPER GASTROINTESTINAL ENDOSCOPY  approx     Tavon M.D.  normal per patient       Scheduled Meds:     Continuous Infusions: No current facility-administered medications for this visit.      PRN Meds:     No Known Allergies    Social History     Socioeconomic History    Marital status:    Tobacco Use    Smoking status: Former     Current packs/day: 0.00     Average packs/day: 1 pack/day for 10.0 years (10.0 ttl pk-yrs)     Types: Cigarettes     Start date: 3/1/2001     Quit date: 3/1/2011     Years since quittin.6    Smokeless tobacco: Never    Tobacco comments:     smoked off and on   Vaping Use    Vaping status: Never Used   Substance and Sexual Activity    Alcohol use: Not Currently     Comment: once a year or so I have a drink w dinner    Drug use: Never    Sexual activity: Yes     Partners: Male     Birth control/protection: Vasectomy, Surgical     Comment:  had vasectomy       Family History   Problem Relation Age of Onset    Breast cancer Mother     Arthritis Mother     Cancer Mother         breast ca    Miscarriages / Stillbirths Mother     Hypertension Father     Heart disease Father     Alcohol abuse Father     Hyperlipidemia Father     Cirrhosis Father     Breast cancer Maternal Grandmother     Anxiety disorder Maternal Grandmother     Arthritis Maternal Grandmother     Cancer Maternal Grandmother         breast and brain    Depression Maternal Grandmother     Diabetes Maternal Grandmother     Heart disease Maternal Grandmother     Kidney disease Maternal Grandmother     Thyroid disease  Maternal Grandmother     Mental illness Maternal Grandmother     Alcohol abuse Other     Depression Other     Anxiety disorder Other     Hypertension Other     Lung cancer Other     Inflammatory bowel disease Maternal Grandfather     Irritable bowel syndrome Maternal Grandfather         Honestly not sure which he had    Arthritis Maternal Grandfather     Cancer Maternal Grandfather         lung    Stroke Maternal Grandfather        Review of Systems   HENT:  Negative for trouble swallowing.    Gastrointestinal: Negative.        Vitals:    11/05/24 0936   BP: 119/78   Pulse: 95   Temp: 97.8 °F (36.6 °C)   SpO2: 95%       Physical Exam  Constitutional:       Appearance: She is well-developed.   Abdominal:      General: Bowel sounds are normal. There is no distension.      Palpations: Abdomen is soft. There is no mass.      Tenderness: There is no abdominal tenderness. There is no guarding.      Hernia: No hernia is present.   Skin:     General: Skin is warm and dry.      Capillary Refill: Capillary refill takes less than 2 seconds.   Neurological:      Mental Status: She is alert and oriented to person, place, and time.   Psychiatric:         Behavior: Behavior normal.     Assessment    Diagnoses and all orders for this visit:    1. Crohn's disease of small intestine without complication (Primary)  -     CBC (No Diff)  -     Comprehensive Metabolic Panel  -     C-reactive Protein  -     Sedimentation Rate  -     Iron and TIBC  -     Vitamin B12 and Folate  -     Magnesium  -     Vitamin D 25 Hydroxy    2. Gastroesophageal reflux disease with esophagitis without hemorrhage    Plan    Continue Skyrizi as directed   She would like to try turmeric for joint pain which is reasonable  Labs today as above  Follow-up 6 weeks     NADEGE Sandra  [facility]  [address] [place] [address]  Office: [phone]    I spent 30 minutes caring for Rosa on this date of service.  This time includes time spent by me in the following activities: preparing for the visit, reviewing tests, obtaining and/or reviewing a separately obtained history, performing a medically appropriate examination and/or evaluation , counseling and educating the patient/family/caregiver, ordering medications, tests, or procedures, documenting information in the medical record, independently interpreting results and communicating that information with the patient/family/caregiver and care coordination.

## 2024-11-06 ENCOUNTER — SPECIALTY PHARMACY (OUTPATIENT)
Dept: GASTROENTEROLOGY | Facility: CLINIC | Age: 54
End: 2024-11-06
Payer: COMMERCIAL

## 2024-11-06 NOTE — PROGRESS NOTES
Specialty Pharmacy Patient Management Program  Refill Outreach      Rosa is a 54 y.o. female contacted today regarding refills of her medication(s).    Specialty medication(s) and dose(s) confirmed: Skyrizi OBI  Other medications being refilled: N/A        Delivery Questions      Flowsheet Row Most Recent Value   Delivery method UPS   Delivery address verified with patient/caregiver? Yes   Delivery address Home   Number of medications in delivery 1   Medication(s) being filled and delivered Risankizumab-rzaa (Skyrizi)   Doses left of specialty medications 0   Copay verified? Yes   Copay amount $0   Copay form of payment No copayment ($0)   Ship Date 11/7   Delivery Date 11/8   Signature Required No                 Follow-up: 51 day(s)    Electronically signed by Tanya Ashley, PharmD, 11/06/24, 3:56 PM EST.

## 2024-11-08 ENCOUNTER — SPECIALTY PHARMACY (OUTPATIENT)
Dept: NEUROLOGY | Facility: CLINIC | Age: 54
End: 2024-11-08
Payer: COMMERCIAL

## 2024-11-08 NOTE — PROGRESS NOTES
Specialty Pharmacy Refill Coordination Note     Rosa is a 54 y.o. female contacted today regarding refills of Emgality specialty medication(s).    Reviewed and verified with patient:       Specialty medication(s) and dose(s) confirmed: yes    Refill Questions      Flowsheet Row Most Recent Value   Changes to allergies? No   Changes to medications? No   New conditions or infections since last clinic visit No   If yes, please describe  N/A   Unplanned office visit, urgent care, ED, or hospital admission in the last 4 weeks  No   How does patient/caregiver feel medication is working? Very good   Financial problems or insurance changes  No   Since the previous refill, were any specialty medication doses or scheduled injections missed or delayed?  No   Does this patient require a clinical escalation to a pharmacist? No            Delivery Questions      Flowsheet Row Most Recent Value   Delivery method UPS   Delivery address verified with patient/caregiver? Yes   Delivery address Home   Number of medications in delivery 1   Medication(s) being filled and delivered Galcanezumab-gnlm (EMGALITY)   Doses left of specialty medications 0   Copay verified? Yes   Copay amount $0   Copay form of payment No copayment ($0)   Ship Date 11/11   Delivery Date 11/12   Signature Required No                   Follow-up: 21 day(s)     Shelbie Foster, Pharmacy Technician  Specialty Pharmacy Technician

## 2024-11-08 NOTE — PROGRESS NOTES
Please inform Rosa that lab work shows normal vitamin D, normal magnesium, normal B12 and folate, improved inflammatory markers.  Hemoglobin just slightly below normal but still considered within normal range.  She does have a slight decrease in iron saturation but her iron is normal.  See if she would be willing to take a over-the-counter multivitamin with iron.

## 2024-11-13 DIAGNOSIS — I10 ESSENTIAL HYPERTENSION: ICD-10-CM

## 2024-11-13 DIAGNOSIS — F43.22 ADJUSTMENT DISORDER WITH ANXIOUS MOOD: ICD-10-CM

## 2024-11-13 DIAGNOSIS — J30.1 SEASONAL ALLERGIC RHINITIS DUE TO POLLEN: ICD-10-CM

## 2024-11-13 DIAGNOSIS — G43.109 MIGRAINE WITH AURA AND WITHOUT STATUS MIGRAINOSUS, NOT INTRACTABLE: ICD-10-CM

## 2024-11-13 RX ORDER — FLUTICASONE PROPIONATE 50 MCG
2 SPRAY, SUSPENSION (ML) NASAL DAILY
Qty: 16 G | Refills: 6 | Status: SHIPPED | OUTPATIENT
Start: 2024-11-13

## 2024-11-13 RX ORDER — PROMETHAZINE HYDROCHLORIDE 25 MG/1
25 TABLET ORAL EVERY 6 HOURS PRN
Qty: 60 TABLET | Refills: 1 | Status: SHIPPED | OUTPATIENT
Start: 2024-11-13

## 2024-11-13 RX ORDER — HYDROXYZINE HYDROCHLORIDE 25 MG/1
25 TABLET, FILM COATED ORAL EVERY 8 HOURS PRN
Qty: 90 TABLET | Refills: 0 | Status: SHIPPED | OUTPATIENT
Start: 2024-11-13

## 2024-11-13 RX ORDER — BUTALBITAL, ACETAMINOPHEN AND CAFFEINE 50; 325; 40 MG/1; MG/1; MG/1
1 TABLET ORAL EVERY 6 HOURS PRN
Qty: 30 TABLET | Refills: 0 | Status: SHIPPED | OUTPATIENT
Start: 2024-11-13

## 2024-11-13 RX ORDER — LISINOPRIL AND HYDROCHLOROTHIAZIDE 10; 12.5 MG/1; MG/1
1 TABLET ORAL DAILY
Qty: 90 TABLET | Refills: 0 | Status: SHIPPED | OUTPATIENT
Start: 2024-11-13

## 2024-11-13 NOTE — TELEPHONE ENCOUNTER
Rx Refill Note  Requested Prescriptions     Pending Prescriptions Disp Refills    butalbital-acetaminophen-caffeine (FIORICET, ESGIC) -40 MG per tablet 30 tablet 0     Sig: Take 1 tablet by mouth Every 6 (Six) Hours As Needed for Headache.    promethazine (PHENERGAN) 25 MG tablet 60 tablet 1     Sig: Take 1 tablet by mouth Every 6 (Six) Hours As Needed for Nausea or Vomiting.    fluticasone (FLONASE) 50 MCG/ACT nasal spray 16 g 6     Sig: Use 2 sprays into each nostril as directed by provider Daily.    lisinopril-hydrochlorothiazide (PRINZIDE,ZESTORETIC) 10-12.5 MG per tablet 90 tablet 0     Sig: Take 1 tablet by mouth Daily.    hydrOXYzine (ATARAX) 25 MG tablet 90 tablet 0     Sig: Take 1 tablet by mouth Every 8 (Eight) Hours As Needed for Anxiety.      Last office visit with prescribing clinician: 3/18/2024   Last telemedicine visit with prescribing clinician: Visit date not found   Next office visit with prescribing clinician: Visit date not found                         Would you like a call back once the refill request has been completed: [] Yes [] No    If the office needs to give you a call back, can they leave a voicemail: [] Yes [] No    Stacey Rivera MA  11/13/24, 17:20 EST

## 2024-11-15 RX ORDER — POTASSIUM CHLORIDE 1500 MG/1
20 TABLET, EXTENDED RELEASE ORAL 2 TIMES DAILY
Qty: 60 TABLET | Refills: 1 | Status: SHIPPED | OUTPATIENT
Start: 2024-11-15

## 2024-11-19 ENCOUNTER — PATIENT MESSAGE (OUTPATIENT)
Dept: FAMILY MEDICINE CLINIC | Facility: CLINIC | Age: 54
End: 2024-11-19
Payer: COMMERCIAL

## 2024-11-19 RX ORDER — ONDANSETRON 8 MG/1
TABLET, FILM COATED ORAL
Qty: 90 TABLET | Refills: 0 | Status: SHIPPED | OUTPATIENT
Start: 2024-11-19

## 2024-11-19 NOTE — TELEPHONE ENCOUNTER
Rx Refill Note  Requested Prescriptions     Pending Prescriptions Disp Refills    ondansetron (ZOFRAN) 8 MG tablet [Pharmacy Med Name: ONDANSETRON HYDROCHLORIDE 8MG TABLET] 90 tablet 0     Sig: TAKE 1/2 A TABLET BY MOUTH EVERY 8 HOURS AS NEEDED FOR NAUSEA AND VOMITING      Last office visit with prescribing clinician: 3/18/2024   Last telemedicine visit with prescribing clinician: Visit date not found   Next office visit with prescribing clinician: Visit date not found                         Would you like a call back once the refill request has been completed: [] Yes [] No    If the office needs to give you a call back, can they leave a voicemail: [] Yes [] No    Stacey Reid MA  11/19/24, 10:12 EST

## 2024-11-21 ENCOUNTER — PROCEDURE VISIT (OUTPATIENT)
Dept: NEUROLOGY | Facility: CLINIC | Age: 54
End: 2024-11-21
Payer: COMMERCIAL

## 2024-11-21 DIAGNOSIS — G43.719 INTRACTABLE CHRONIC MIGRAINE WITHOUT AURA AND WITHOUT STATUS MIGRAINOSUS: Primary | ICD-10-CM

## 2024-12-02 ENCOUNTER — PATIENT MESSAGE (OUTPATIENT)
Dept: FAMILY MEDICINE CLINIC | Facility: CLINIC | Age: 54
End: 2024-12-02

## 2024-12-10 ENCOUNTER — SPECIALTY PHARMACY (OUTPATIENT)
Dept: NEUROLOGY | Facility: CLINIC | Age: 54
End: 2024-12-10
Payer: COMMERCIAL

## 2024-12-10 NOTE — PROGRESS NOTES
Specialty Pharmacy Refill Coordination Note     Rosa is a 54 y.o. female contacted today regarding refills of Emgality specialty medication(s).    Reviewed and verified with patient:       Specialty medication(s) and dose(s) confirmed: yes    Refill Questions      Flowsheet Row Most Recent Value   Changes to allergies? No   Changes to medications? No   New conditions or infections since last clinic visit No   If yes, please describe  N/A   Unplanned office visit, urgent care, ED, or hospital admission in the last 4 weeks  No   How does patient/caregiver feel medication is working? Very good   Financial problems or insurance changes  No   Since the previous refill, were any specialty medication doses or scheduled injections missed or delayed?  No   Does this patient require a clinical escalation to a pharmacist? No            Delivery Questions      Flowsheet Row Most Recent Value   Delivery method UPS   Delivery address verified with patient/caregiver? Yes   Delivery address Home   Number of medications in delivery 1   Medication(s) being filled and delivered Galcanezumab-gnlm (EMGALITY)   Doses left of specialty medications 0   Copay verified? Yes   Copay amount $0   Copay form of payment No copayment ($0)   Ship Date 12/10   Delivery Date 12/11   Signature Required No                   Follow-up: 21 day(s)     Shelbie Foster, Pharmacy Technician  Specialty Pharmacy Technician

## 2024-12-26 ENCOUNTER — SPECIALTY PHARMACY (OUTPATIENT)
Dept: GASTROENTEROLOGY | Facility: CLINIC | Age: 54
End: 2024-12-26
Payer: COMMERCIAL

## 2024-12-26 NOTE — PROGRESS NOTES
Specialty Pharmacy Patient Management Program  Gastroenterology Refill Outreach      Rosa is a 54 y.o. female contacted today regarding refills of her medication(s).    Specialty medication(s) and dose(s) confirmed: skyrizi    Refill Questions      Flowsheet Row Most Recent Value   Changes to allergies? No   Changes to medications? No   New conditions or infections since last clinic visit No   If yes, please describe  n/a   Unplanned office visit, urgent care, ED, or hospital admission in the last 4 weeks  No   How does patient/caregiver feel medication is working? Very good   Financial problems or insurance changes  No   Since the previous refill, were any specialty medication doses or scheduled injections missed or delayed?  No   Does this patient require a clinical escalation to a pharmacist? No          Delivery Questions      Flowsheet Row Most Recent Value   Delivery method UPS   Delivery address verified with patient/caregiver? Yes   Delivery address Home   Number of medications in delivery 1   Medication(s) being filled and delivered Risankizumab-rzaa (Skyrizi)   Doses left of specialty medications 1 week   Copay verified? Yes   Copay amount 0   Copay form of payment No copayment ($0)   Ship Date 12/30   Delivery Date 12/31   Signature Required No            Follow-Up: 45 days    Mari Huntley  12/26/2024  14:28 EST

## 2025-01-03 ENCOUNTER — SPECIALTY PHARMACY (OUTPATIENT)
Dept: NEUROLOGY | Facility: CLINIC | Age: 55
End: 2025-01-03
Payer: COMMERCIAL

## 2025-01-03 NOTE — PROGRESS NOTES
Specialty Pharmacy Refill Coordination Note     Rosa is a 54 y.o. female contacted today regarding refills of Emgality specialty medication(s).    Reviewed and verified with patient:       Specialty medication(s) and dose(s) confirmed: yes    Refill Questions      Flowsheet Row Most Recent Value   Changes to allergies? No   Changes to medications? No   New conditions or infections since last clinic visit No   If yes, please describe  N/A   Unplanned office visit, urgent care, ED, or hospital admission in the last 4 weeks  No   How does patient/caregiver feel medication is working? Very good   Financial problems or insurance changes  No   Since the previous refill, were any specialty medication doses or scheduled injections missed or delayed?  No   Does this patient require a clinical escalation to a pharmacist? No            Delivery Questions      Flowsheet Row Most Recent Value   Delivery method UPS   Delivery address verified with patient/caregiver? Yes   Delivery address Home   Number of medications in delivery 1   Medication(s) being filled and delivered Galcanezumab-gnlm (EMGALITY)   Doses left of specialty medications 0   Copay verified? Yes   Copay amount $0   Copay form of payment No copayment ($0)   Ship Date 1/7   Delivery Date 1/8   Signature Required No                   Follow-up: 21 day(s)     Shelbie Foster, Pharmacy Technician  Specialty Pharmacy Technician

## 2025-01-05 ENCOUNTER — PATIENT MESSAGE (OUTPATIENT)
Dept: GASTROENTEROLOGY | Facility: CLINIC | Age: 55
End: 2025-01-05
Payer: COMMERCIAL

## 2025-01-05 DIAGNOSIS — G43.109 MIGRAINE WITH AURA AND WITHOUT STATUS MIGRAINOSUS, NOT INTRACTABLE: ICD-10-CM

## 2025-01-05 DIAGNOSIS — E06.3 HYPOTHYROIDISM DUE TO HASHIMOTO'S THYROIDITIS: ICD-10-CM

## 2025-01-06 RX ORDER — LEVOTHYROXINE SODIUM 88 UG/1
88 TABLET ORAL DAILY
Qty: 30 TABLET | Refills: 0 | Status: SHIPPED | OUTPATIENT
Start: 2025-01-06

## 2025-01-06 RX ORDER — BUTALBITAL, ACETAMINOPHEN AND CAFFEINE 50; 325; 40 MG/1; MG/1; MG/1
1 TABLET ORAL EVERY 6 HOURS PRN
Qty: 30 TABLET | Refills: 0 | Status: SHIPPED | OUTPATIENT
Start: 2025-01-06

## 2025-01-08 DIAGNOSIS — R10.13 EPIGASTRIC PAIN: ICD-10-CM

## 2025-01-09 RX ORDER — BUDESONIDE 3 MG/1
CAPSULE, COATED PELLETS ORAL
Qty: 132 CAPSULE | Refills: 0 | Status: SHIPPED | OUTPATIENT
Start: 2025-01-09 | End: 2025-03-09

## 2025-01-16 ENCOUNTER — SPECIALTY PHARMACY (OUTPATIENT)
Dept: GASTROENTEROLOGY | Facility: CLINIC | Age: 55
End: 2025-01-16
Payer: COMMERCIAL

## 2025-01-16 ENCOUNTER — OFFICE VISIT (OUTPATIENT)
Dept: GASTROENTEROLOGY | Facility: CLINIC | Age: 55
End: 2025-01-16
Payer: COMMERCIAL

## 2025-01-16 VITALS
BODY MASS INDEX: 38.75 KG/M2 | WEIGHT: 232.6 LBS | HEIGHT: 65 IN | OXYGEN SATURATION: 97 % | HEART RATE: 90 BPM | SYSTOLIC BLOOD PRESSURE: 116 MMHG | DIASTOLIC BLOOD PRESSURE: 81 MMHG | TEMPERATURE: 96.3 F

## 2025-01-16 DIAGNOSIS — G89.29 CHRONIC JOINT PAIN: ICD-10-CM

## 2025-01-16 DIAGNOSIS — K50.00 CROHN'S DISEASE OF SMALL INTESTINE WITHOUT COMPLICATION: Primary | ICD-10-CM

## 2025-01-16 DIAGNOSIS — K58.0 IRRITABLE BOWEL SYNDROME WITH DIARRHEA: ICD-10-CM

## 2025-01-16 DIAGNOSIS — M25.50 CHRONIC JOINT PAIN: ICD-10-CM

## 2025-01-16 DIAGNOSIS — F43.22 ADJUSTMENT DISORDER WITH ANXIOUS MOOD: ICD-10-CM

## 2025-01-16 DIAGNOSIS — K21.00 GASTROESOPHAGEAL REFLUX DISEASE WITH ESOPHAGITIS WITHOUT HEMORRHAGE: ICD-10-CM

## 2025-01-16 LAB
ALBUMIN SERPL-MCNC: 4.1 G/DL (ref 3.5–5.2)
ALBUMIN/GLOB SERPL: 1.4 G/DL
ALP SERPL-CCNC: 86 U/L (ref 39–117)
ALT SERPL-CCNC: 15 U/L (ref 1–33)
AST SERPL-CCNC: 14 U/L (ref 1–32)
BASOPHILS # BLD AUTO: 0.04 10*3/MM3 (ref 0–0.2)
BASOPHILS NFR BLD AUTO: 0.5 % (ref 0–1.5)
BILIRUB SERPL-MCNC: 0.2 MG/DL (ref 0–1.2)
BUN SERPL-MCNC: 8 MG/DL (ref 6–20)
BUN/CREAT SERPL: 8.2 (ref 7–25)
CALCIUM SERPL-MCNC: 9.2 MG/DL (ref 8.6–10.5)
CHLORIDE SERPL-SCNC: 102 MMOL/L (ref 98–107)
CO2 SERPL-SCNC: 30 MMOL/L (ref 22–29)
CREAT SERPL-MCNC: 0.97 MG/DL (ref 0.57–1)
CRP SERPL-MCNC: 1.01 MG/DL (ref 0–0.5)
EGFRCR SERPLBLD CKD-EPI 2021: 69.6 ML/MIN/1.73
EOSINOPHIL # BLD AUTO: 0.11 10*3/MM3 (ref 0–0.4)
EOSINOPHIL NFR BLD AUTO: 1.4 % (ref 0.3–6.2)
ERYTHROCYTE [DISTWIDTH] IN BLOOD BY AUTOMATED COUNT: 11.7 % (ref 12.3–15.4)
ERYTHROCYTE [SEDIMENTATION RATE] IN BLOOD BY WESTERGREN METHOD: 6 MM/HR (ref 0–30)
GLOBULIN SER CALC-MCNC: 3 GM/DL
GLUCOSE SERPL-MCNC: 101 MG/DL (ref 65–99)
HCT VFR BLD AUTO: 38.8 % (ref 34–46.6)
HGB BLD-MCNC: 12.8 G/DL (ref 12–15.9)
IMM GRANULOCYTES # BLD AUTO: 0.03 10*3/MM3 (ref 0–0.05)
IMM GRANULOCYTES NFR BLD AUTO: 0.4 % (ref 0–0.5)
LYMPHOCYTES # BLD AUTO: 1.93 10*3/MM3 (ref 0.7–3.1)
LYMPHOCYTES NFR BLD AUTO: 25.2 % (ref 19.6–45.3)
MCH RBC QN AUTO: 28.1 PG (ref 26.6–33)
MCHC RBC AUTO-ENTMCNC: 33 G/DL (ref 31.5–35.7)
MCV RBC AUTO: 85.3 FL (ref 79–97)
MONOCYTES # BLD AUTO: 0.48 10*3/MM3 (ref 0.1–0.9)
MONOCYTES NFR BLD AUTO: 6.3 % (ref 5–12)
NEUTROPHILS # BLD AUTO: 5.07 10*3/MM3 (ref 1.7–7)
NEUTROPHILS NFR BLD AUTO: 66.2 % (ref 42.7–76)
NRBC BLD AUTO-RTO: 0 /100 WBC (ref 0–0.2)
PLATELET # BLD AUTO: 235 10*3/MM3 (ref 140–450)
POTASSIUM SERPL-SCNC: 3.8 MMOL/L (ref 3.5–5.2)
PROT SERPL-MCNC: 7.1 G/DL (ref 6–8.5)
RBC # BLD AUTO: 4.55 10*6/MM3 (ref 3.77–5.28)
SODIUM SERPL-SCNC: 143 MMOL/L (ref 136–145)
WBC # BLD AUTO: 7.66 10*3/MM3 (ref 3.4–10.8)

## 2025-01-16 RX ORDER — HYDROXYZINE HYDROCHLORIDE 25 MG/1
25 TABLET, FILM COATED ORAL EVERY 8 HOURS PRN
Qty: 90 TABLET | Refills: 0 | Status: SHIPPED | OUTPATIENT
Start: 2025-01-16

## 2025-01-16 NOTE — TELEPHONE ENCOUNTER
Rx Refill Note  Requested Prescriptions     Pending Prescriptions Disp Refills    hydrOXYzine (ATARAX) 25 MG tablet 90 tablet 0     Sig: Take 1 tablet by mouth Every 8 (Eight) Hours As Needed for Anxiety.      Last office visit with prescribing clinician: 3/18/2024   Last telemedicine visit with prescribing clinician: Visit date not found   Next office visit with prescribing clinician: Visit date not found                         Would you like a call back once the refill request has been completed: [] Yes [] No    If the office needs to give you a call back, can they leave a voicemail: [] Yes [] No    Stacey Reid MA  01/16/25, 07:57 EST

## 2025-01-16 NOTE — PROGRESS NOTES
Chief Complaint  Crohn's Disease, Nausea, and Abdominal Pain    Subjective          History of Present Illness    Rosa Melgoza is a  54 y.o. female presents for follow-up on Crohn's disease of small intestine diagnosed [facility] cross-sectional imaging [facility]. She is a patient of Dr. Garza last seen by Loly LIGHT on    She is on Skyrizi every 8 weeks, started 9/2024.  9/26/2024 fecal calprotectin elevated at 121. Came off prednisone in November. Started Budesonide 3 days ago due to concerns for flare--no change in symptoms yet. Today she reports epigastric pain, LLQ pain, some nausea, and 2-10 Bms/day (smaller amounts). On modified diet, broth, bland, crackers. Avoiding red meat. Waking up at night with diarrhea some times. Reports persistent joint pains (hips,knees, hands), hips are the worse, bilaterally. Notices improvement in form and frequency of stools for a week after taking skyrizi.     She has IBS-D overlap and takes Levsin as needed for abdominal pain. Doesn't seem like it is helping much currently.     She has failed Stelara (hospitalized for active crohn's disease) and Entyvio (persistent joint pains).      Denies abnormal weight loss, melena, or hematochezia.     GERD seems well-controlled on twice daily dosing Protonix.  She takes Zofran as needed for nausea. Takes zegerid as needed for flares.     11/5/2024 normal vitamin D, normal magnesium, normal B12 and folate, improved inflammatory markers. Hemoglobin just slightly below normal but still considered within normal range. She does have a slight decrease in iron saturation but her iron is normal.     8/19/2024 CT enterography showed substantially improved Crohn's enteritis.  She was on prednisone at this time.    8/7/2024 contrasted CT scan for abdominal pain showed thick-walled and edematous loops of small bowel in the left abdomen concerning for active Crohn's disease.  Distended loops of small bowel, not thick wall, with  "focally narrowed segment-stricture not excluded.    From 3/28/2022 note:  Prometheus monitor testing showed a level of 43 which suggested increased likelihood of endoscopically active disease.    5/20/21 IBD serology consistent with Crohn's disease.     9/24/20 EGD: Medium-sized hiatal hernia, LA Grade A esophagitis, otherwise normal. Path with gastritis, negative H. Pylori.  9/24/20 Colonoscopy: Diverticulosis otherwise normal. Colon biopsies were normal, recall 10 years    Objective   Vital Signs:   /81 (Patient Position: Sitting, Cuff Size: Adult)   Pulse 90   Temp 96.3 °F (35.7 °C)   Ht 165.1 cm (65\")   Wt 106 kg (232 lb 9.6 oz)   SpO2 97%   BMI 38.71 kg/m²       Physical Exam  Vitals reviewed.   Constitutional:       General: She is awake. She is not in acute distress.     Appearance: Normal appearance. She is well-developed and well-groomed.   HENT:      Head: Normocephalic.   Pulmonary:      Effort: Pulmonary effort is normal. No respiratory distress.   Abdominal:      General: Bowel sounds are normal. There is no distension.      Palpations: Abdomen is soft. There is no hepatomegaly or mass.      Tenderness: There is generalized abdominal tenderness. There is no guarding or rebound.   Skin:     Coloration: Skin is not pale.   Neurological:      Mental Status: She is alert and oriented to person, place, and time.      Gait: Gait is intact.   Psychiatric:         Mood and Affect: Mood and affect normal.         Speech: Speech normal.         Behavior: Behavior is cooperative.         Judgment: Judgment normal.          Result Review :             Assessment and Plan    Diagnoses and all orders for this visit:    1. Crohn's disease of small intestine without complication (Primary)  -     CBC & Differential  -     Comprehensive Metabolic Panel  -     C-reactive Protein  -     Sedimentation Rate  -     Calprotectin, Fecal - Stool, Per Rectum  -     Prometheus Monitr Crohn's Disease    2. Chronic joint " pain  -     CBC & Differential  -     Comprehensive Metabolic Panel  -     C-reactive Protein  -     Sedimentation Rate  -     Calprotectin, Fecal - Stool, Per Rectum  -     Prometheus Monitr Crohn's Disease    3. Gastroesophageal reflux disease with esophagitis without hemorrhage    4. Irritable bowel syndrome with diarrhea    Check labs above including promethius monitr and fecal calprotectin to assess for active inflammation.     Continue budesonide and symptomatic measures including modified diet    Continue Skyrizi at current intervals/dosing.     Follow Up   Return in about 3 months (around 4/16/2025) for Next scheduled follow up with Dr. Garza.    Dragon dictation used throughout this note.     Manda Butler PA-C

## 2025-01-20 ENCOUNTER — OFFICE VISIT (OUTPATIENT)
Dept: FAMILY MEDICINE CLINIC | Facility: CLINIC | Age: 55
End: 2025-01-20
Payer: COMMERCIAL

## 2025-01-20 VITALS
DIASTOLIC BLOOD PRESSURE: 92 MMHG | SYSTOLIC BLOOD PRESSURE: 138 MMHG | HEIGHT: 65 IN | OXYGEN SATURATION: 98 % | WEIGHT: 231.4 LBS | HEART RATE: 96 BPM | BODY MASS INDEX: 38.55 KG/M2

## 2025-01-20 DIAGNOSIS — K21.9 GASTROESOPHAGEAL REFLUX DISEASE: ICD-10-CM

## 2025-01-20 DIAGNOSIS — E78.2 MIXED HYPERLIPIDEMIA: ICD-10-CM

## 2025-01-20 DIAGNOSIS — R73.09 ELEVATED GLUCOSE: ICD-10-CM

## 2025-01-20 DIAGNOSIS — J01.00 ACUTE MAXILLARY SINUSITIS, RECURRENCE NOT SPECIFIED: ICD-10-CM

## 2025-01-20 DIAGNOSIS — F43.22 ADJUSTMENT DISORDER WITH ANXIOUS MOOD: Primary | ICD-10-CM

## 2025-01-20 DIAGNOSIS — E66.01 CLASS 2 SEVERE OBESITY DUE TO EXCESS CALORIES WITH SERIOUS COMORBIDITY AND BODY MASS INDEX (BMI) OF 36.0 TO 36.9 IN ADULT: ICD-10-CM

## 2025-01-20 DIAGNOSIS — E06.3 HYPOTHYROIDISM DUE TO HASHIMOTO'S THYROIDITIS: ICD-10-CM

## 2025-01-20 DIAGNOSIS — E66.812 CLASS 2 SEVERE OBESITY DUE TO EXCESS CALORIES WITH SERIOUS COMORBIDITY AND BODY MASS INDEX (BMI) OF 36.0 TO 36.9 IN ADULT: ICD-10-CM

## 2025-01-20 DIAGNOSIS — J30.1 SEASONAL ALLERGIC RHINITIS DUE TO POLLEN: ICD-10-CM

## 2025-01-20 DIAGNOSIS — I10 ESSENTIAL HYPERTENSION: ICD-10-CM

## 2025-01-20 DIAGNOSIS — K21.9 GASTROESOPHAGEAL REFLUX DISEASE WITHOUT ESOPHAGITIS: ICD-10-CM

## 2025-01-20 DIAGNOSIS — G43.109 MIGRAINE WITH AURA AND WITHOUT STATUS MIGRAINOSUS, NOT INTRACTABLE: ICD-10-CM

## 2025-01-20 PROCEDURE — 99214 OFFICE O/P EST MOD 30 MIN: CPT | Performed by: NURSE PRACTITIONER

## 2025-01-20 NOTE — PROGRESS NOTES
"Chief Complaint  Hypothyroidism (Needs labs done and refills, pt needs 90 day refills. She is getting new insurance in march. 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  Past couple days has felt like she also has a sinus infection.   Allergies have been bothering.  In last couple days having ear pain/pressure, teeth pain.     Has follow up with GI as well  Declines flu testing  Denies fever or body aches  Hyperlipidemia: atorvastatin 10mg. Denies issues will check with labs    Migraine: fioricet.   Takes more often with increase stressors with change of job.  . Twice daily with migraine and has had one once weekly.   Almost time for botox as well    Anxiety/depression: lexapro, hydroxyzine prn.  Feels like getting stressed more often even before the job changes.     Allergies: flonase, xyzal- working well    Hypothyroid: levothyroxine 88mcg-check with labs    Hypertension: lisinopril/hctz- doing well overall.      GERD: sees gi.   Dr SARGENT prescribes protonix working well      Objective   Vital Signs:  /92   Pulse 96   Ht 165.1 cm (65\")   Wt 105 kg (231 lb 6.4 oz)   SpO2 98%   BMI 38.51 kg/m²   Estimated body mass index is 38.51 kg/m² as calculated from the following:    Height as of this encounter: 165.1 cm (65\").    Weight as of this encounter: 105 kg (231 lb 6.4 oz).          Physical Exam  Constitutional:       Appearance: Normal appearance.   HENT:      Head: Normocephalic and atraumatic.      Right Ear: Tympanic membrane has decreased mobility.      Left Ear: Tympanic membrane has decreased mobility.      Nose: Rhinorrhea present.      Right Sinus: Maxillary sinus tenderness and frontal sinus tenderness present.      Left Sinus: Maxillary sinus tenderness and frontal sinus tenderness present.      Mouth/Throat:    "   Pharynx: Postnasal drip present.   Cardiovascular:      Rate and Rhythm: Normal rate and regular rhythm.      Pulses: Normal pulses.   Pulmonary:      Effort: Pulmonary effort is normal.      Breath sounds: Normal breath sounds.   Skin:     General: Skin is warm and dry.   Neurological:      Mental Status: She is alert.   Psychiatric:         Mood and Affect: Mood normal.         Behavior: Behavior normal.         Thought Content: Thought content normal.         Judgment: Judgment normal.        Result Review :                Assessment and Plan   Diagnoses and all orders for this visit:    1. Adjustment disorder with anxious mood (Primary)  -     hydrOXYzine (ATARAX) 25 MG tablet; Take 1 tablet by mouth Every 8 (Eight) Hours As Needed for Anxiety.  Dispense: 270 tablet; Refill: 1  -     escitalopram (LEXAPRO) 10 MG tablet; Take 1 tablet by mouth Daily.  Dispense: 90 tablet; Refill: 1    2. Hypothyroidism due to Hashimoto's thyroiditis  -     TSH Rfx On Abnormal To Free T4; Future    3. Migraine with aura and without status migrainosus, not intractable    4. Seasonal allergic rhinitis due to pollen  -     fluticasone (FLONASE) 50 MCG/ACT nasal spray; Use 2 sprays into each nostril as directed by provider Daily.  Dispense: 48 g; Refill: 1    5. Essential hypertension  -     lisinopril-hydrochlorothiazide (PRINZIDE,ZESTORETIC) 10-12.5 MG per tablet; Take 1 tablet by mouth Daily.  Dispense: 90 tablet; Refill: 1    6. Gastroesophageal reflux disease without esophagitis    7. Mixed hyperlipidemia  -     Lipid Panel; Future  -     atorvastatin (LIPITOR) 10 MG tablet; Take 1 tablet by mouth Daily.  Dispense: 90 tablet; Refill: 1    8. Class 2 severe obesity due to excess calories with serious comorbidity and body mass index (BMI) of 36.0 to 36.9 in adult    9. Elevated glucose  -     Hemoglobin A1c; Future    10. Acute maxillary sinusitis, recurrence not specified    11. Gastroesophageal reflux disease  -      pantoprazole (PROTONIX) 40 MG EC tablet; Take 1 tablet by mouth 2 (Two) Times a Day.  Dispense: 180 tablet; Refill: 1    Other orders  -     amoxicillin-clavulanate (AUGMENTIN) 875-125 MG per tablet; Take 1 tablet by mouth 2 (Two) Times a Day.  Dispense: 14 tablet; Refill: 0  -     fluconazole (DIFLUCAN) 150 MG tablet; Take 1 tablet by mouth 1 (One) Time for 1 dose. May repeat 1 time in 72 hours if necessary.  Dispense: 2 tablet; Refill: 0  -     levocetirizine (XYZAL) 5 MG tablet; Take 1 tablet by mouth Every Evening.  Dispense: 90 tablet; Refill: 1    Treating for sinus infection.  Antibiotic sent in.    Patient to obtain labs fasting.  She prefers to have at [facility].    Hyperlipidemia: atorvastatin 10mg. Denies issues will check with labs  Checking with labs    Migraine: fioricet.   Takes more often with increase stressors with change of job.  . Twice daily with migraine and has had one once weekly.   Almost time for botox as well    Anxiety/depression: lexapro, hydroxyzine prn.  Feels like getting stressed more often even before the job changes.   Continue.  Refills given    Allergies: flonase, xyzal- working well    Continue.  Refills given  Hypothyroid: levothyroxine 88mcg-check with labs    hypertension: lisinopril/hctz- doing well overall.    Continue.  Refills given     GERD: sees gi.   Dr SARGENT prescribes protonix working well  Continue.  Refills given     Class 2 Severe Obesity (BMI >=35 and <=39.9). Obesity-related health conditions include the following: hypertension, impaired fasting glucose, dyslipidemias, and GERD. Obesity is unchanged. BMI is is above average; BMI management plan is completed. We discussed portion control and increasing exercise.      Patient to return in 3 months, sooner if needed.       Follow Up   No follow-ups on file.  Patient was given instructions and counseling regarding her condition or for health maintenance advice. Please see specific information pulled into  the AVS if appropriate.

## 2025-01-20 NOTE — PROGRESS NOTES
Overall your labs look good, some inflammation noted on your CRP.  If you do not hear from us about the PromPATHEOSs monitor lab test by the end of January, please let me know so we can follow-up on these results.

## 2025-01-22 RX ORDER — PANTOPRAZOLE SODIUM 40 MG/1
40 TABLET, DELAYED RELEASE ORAL 2 TIMES DAILY
Qty: 180 TABLET | Refills: 1 | Status: SHIPPED | OUTPATIENT
Start: 2025-01-22

## 2025-01-22 RX ORDER — HYDROXYZINE HYDROCHLORIDE 25 MG/1
25 TABLET, FILM COATED ORAL EVERY 8 HOURS PRN
Qty: 270 TABLET | Refills: 1 | Status: SHIPPED | OUTPATIENT
Start: 2025-01-22

## 2025-01-22 RX ORDER — FLUTICASONE PROPIONATE 50 MCG
2 SPRAY, SUSPENSION (ML) NASAL DAILY
Qty: 48 G | Refills: 1 | Status: SHIPPED | OUTPATIENT
Start: 2025-01-22

## 2025-01-22 RX ORDER — ESCITALOPRAM OXALATE 10 MG/1
10 TABLET ORAL DAILY
Qty: 90 TABLET | Refills: 1 | Status: SHIPPED | OUTPATIENT
Start: 2025-01-22 | End: 2025-01-23

## 2025-01-22 RX ORDER — LISINOPRIL AND HYDROCHLOROTHIAZIDE 10; 12.5 MG/1; MG/1
1 TABLET ORAL DAILY
Qty: 90 TABLET | Refills: 1 | Status: SHIPPED | OUTPATIENT
Start: 2025-01-22

## 2025-01-22 RX ORDER — ATORVASTATIN CALCIUM 10 MG/1
10 TABLET, FILM COATED ORAL DAILY
Qty: 90 TABLET | Refills: 1 | Status: SHIPPED | OUTPATIENT
Start: 2025-01-22

## 2025-01-22 RX ORDER — LEVOCETIRIZINE DIHYDROCHLORIDE 5 MG/1
5 TABLET, FILM COATED ORAL EVERY EVENING
Qty: 90 TABLET | Refills: 1 | Status: SHIPPED | OUTPATIENT
Start: 2025-01-22

## 2025-01-22 RX ORDER — FLUCONAZOLE 150 MG/1
150 TABLET ORAL ONCE
Qty: 2 TABLET | Refills: 0 | Status: SHIPPED | OUTPATIENT
Start: 2025-01-22 | End: 2025-01-23

## 2025-01-23 DIAGNOSIS — F43.22 ADJUSTMENT DISORDER WITH ANXIOUS MOOD: ICD-10-CM

## 2025-01-23 RX ORDER — ESCITALOPRAM OXALATE 20 MG/1
20 TABLET ORAL DAILY
Qty: 90 TABLET | Refills: 1 | Status: SHIPPED | OUTPATIENT
Start: 2025-01-23

## 2025-01-27 ENCOUNTER — LAB (OUTPATIENT)
Dept: LAB | Facility: HOSPITAL | Age: 55
End: 2025-01-27
Payer: COMMERCIAL

## 2025-01-27 ENCOUNTER — TRANSCRIBE ORDERS (OUTPATIENT)
Dept: ADMINISTRATIVE | Facility: HOSPITAL | Age: 55
End: 2025-01-27
Payer: COMMERCIAL

## 2025-01-27 DIAGNOSIS — R73.09 ELEVATED GLUCOSE: ICD-10-CM

## 2025-01-27 DIAGNOSIS — E06.3 HYPOTHYROIDISM DUE TO HASHIMOTO'S THYROIDITIS: ICD-10-CM

## 2025-01-27 DIAGNOSIS — Z12.31 SCREENING MAMMOGRAM, ENCOUNTER FOR: Primary | ICD-10-CM

## 2025-01-27 DIAGNOSIS — E78.2 MIXED HYPERLIPIDEMIA: ICD-10-CM

## 2025-01-27 LAB
CHOLEST SERPL-MCNC: 159 MG/DL (ref 0–200)
HBA1C MFR BLD: 5.6 % (ref 4.8–5.6)
HDLC SERPL-MCNC: 49 MG/DL (ref 40–60)
LDLC SERPL CALC-MCNC: 88 MG/DL (ref 0–100)
LDLC/HDLC SERPL: 1.75 {RATIO}
TRIGL SERPL-MCNC: 122 MG/DL (ref 0–150)
TSH SERPL DL<=0.05 MIU/L-ACNC: 2.1 UIU/ML (ref 0.27–4.2)
VLDLC SERPL-MCNC: 22 MG/DL (ref 5–40)

## 2025-01-27 PROCEDURE — 36415 COLL VENOUS BLD VENIPUNCTURE: CPT

## 2025-01-27 PROCEDURE — 80061 LIPID PANEL: CPT

## 2025-01-27 PROCEDURE — 84443 ASSAY THYROID STIM HORMONE: CPT

## 2025-01-27 PROCEDURE — 83036 HEMOGLOBIN GLYCOSYLATED A1C: CPT

## 2025-01-29 DIAGNOSIS — E06.3 HYPOTHYROIDISM DUE TO HASHIMOTO'S THYROIDITIS: ICD-10-CM

## 2025-01-29 RX ORDER — LEVOTHYROXINE SODIUM 88 UG/1
88 TABLET ORAL DAILY
Qty: 90 TABLET | Refills: 1 | Status: SHIPPED | OUTPATIENT
Start: 2025-01-29

## 2025-01-29 NOTE — PROGRESS NOTES
Please let patient know that thyroid and lipid panel are all within normal limits.  Continue same doses of medication.  Keep follow-up with Dr. Romeo in 6 months, sooner if needed.

## 2025-01-30 ENCOUNTER — LAB REQUISITION (OUTPATIENT)
Dept: LAB | Facility: HOSPITAL | Age: 55
End: 2025-01-30
Payer: COMMERCIAL

## 2025-01-30 DIAGNOSIS — K50.00 CROHN'S DISEASE OF SMALL INTESTINE WITHOUT COMPLICATIONS: ICD-10-CM

## 2025-01-30 DIAGNOSIS — G89.29 OTHER CHRONIC PAIN: ICD-10-CM

## 2025-01-30 PROCEDURE — 83993 ASSAY FOR CALPROTECTIN FECAL: CPT | Performed by: PHYSICIAN ASSISTANT

## 2025-02-03 ENCOUNTER — PATIENT MESSAGE (OUTPATIENT)
Dept: GASTROENTEROLOGY | Facility: CLINIC | Age: 55
End: 2025-02-03
Payer: COMMERCIAL

## 2025-02-03 DIAGNOSIS — G43.109 MIGRAINE WITH AURA AND WITHOUT STATUS MIGRAINOSUS, NOT INTRACTABLE: ICD-10-CM

## 2025-02-03 DIAGNOSIS — G43.719 INTRACTABLE CHRONIC MIGRAINE WITHOUT AURA AND WITHOUT STATUS MIGRAINOSUS: ICD-10-CM

## 2025-02-04 RX ORDER — PROMETHAZINE HYDROCHLORIDE 25 MG/1
25 TABLET ORAL EVERY 6 HOURS PRN
Qty: 60 TABLET | Refills: 1 | Status: SHIPPED | OUTPATIENT
Start: 2025-02-04

## 2025-02-04 RX ORDER — GALCANEZUMAB 120 MG/ML
120 INJECTION, SOLUTION SUBCUTANEOUS
Qty: 1 ML | Refills: 1 | Status: SHIPPED | OUTPATIENT
Start: 2025-02-04

## 2025-02-04 RX ORDER — BUTALBITAL, ACETAMINOPHEN AND CAFFEINE 50; 325; 40 MG/1; MG/1; MG/1
1 TABLET ORAL EVERY 6 HOURS PRN
Qty: 30 TABLET | Refills: 0 | Status: SHIPPED | OUTPATIENT
Start: 2025-02-04

## 2025-02-04 NOTE — TELEPHONE ENCOUNTER
Patient has not been seen in office for a follow up since 3/2023. Procedure visits do not qualify as office visits. She is scheduled on 2/13 for BOTOX but needs to schedule an office visit before getting more refills.

## 2025-02-05 ENCOUNTER — SPECIALTY PHARMACY (OUTPATIENT)
Dept: NEUROLOGY | Facility: CLINIC | Age: 55
End: 2025-02-05
Payer: COMMERCIAL

## 2025-02-05 ENCOUNTER — TELEPHONE (OUTPATIENT)
Dept: GASTROENTEROLOGY | Facility: CLINIC | Age: 55
End: 2025-02-05
Payer: COMMERCIAL

## 2025-02-05 LAB — CALPROTECTIN STL-MCNT: 82 UG/G (ref 0–120)

## 2025-02-05 RX ORDER — POTASSIUM CHLORIDE 1500 MG/1
20 TABLET, EXTENDED RELEASE ORAL 2 TIMES DAILY
Qty: 180 TABLET | Refills: 1 | Status: SHIPPED | OUTPATIENT
Start: 2025-02-05

## 2025-02-05 NOTE — PROGRESS NOTES
Specialty Pharmacy Patient Management Program  Neurology Reassessment     Rosa Melgoza is a 54 y.o. female with migraines seen by a Neurology provider and enrolled in the Neurology Patient Management program offered by [facility].  A follow-up outreach was conducted, including assessment of continued therapy appropriateness, medication adherence, and side effect incidence and management for galcanezumab (Emgality).     Changes to Insurance Coverage or Financial Support  No changes        Relevant Past Medical History and Comorbidities  Relevant medical history and concomitant health conditions were discussed with the patient. The patient's chart has been reviewed for relevant past medical history and comorbid health conditions and updated as necessary.   Past Medical History:   Diagnosis Date    Allergic     Anxiety     Cholelithiasis     Crohn disease     Depression     Disease of thyroid gland     Diverticulitis of colon     Diverticulosis     Elevated blood pressure reading without diagnosis of hypertension     Encounter for long-term (current) use of medications 2020    Fatty liver     GERD (gastroesophageal reflux disease)     Headache, migraine     Hernia     History of medical problems     not sure of date, but very painful joints and muscles.  Poss r/t crohns    History of sore throat     Hyperlipidemia     Hypertension     Hypothyroidism     Irritable bowel syndrome     Migraine     Need for DTaP and Hib vaccine     History of     Obesity     Urinary tract infection     Vaginal yeast infection      Social History     Socioeconomic History    Marital status:    Tobacco Use    Smoking status: Former     Current packs/day: 0.00     Average packs/day: 1 pack/day for 10.0 years (10.0 ttl pk-yrs)     Types: Cigarettes     Start date: 3/1/2001     Quit date: 3/1/2011     Years since quittin.9    Smokeless tobacco: Never    Tobacco comments:     smoked off and on    Vaping Use    Vaping status: Never Used   Substance and Sexual Activity    Alcohol use: Not Currently     Comment: once a year or so I have a drink w dinner    Drug use: Never    Sexual activity: Yes     Partners: Male     Birth control/protection: Post-menopausal, Vasectomy, Surgical     Comment:  had vasectomy     Problem list reviewed by Kristan Perdomo RPH on 2/5/2025 at 11:04 AM      Hospitalizations and Urgent Care Since Last Assessment  ED Visits, Admissions, or Hospitalizations: None   Urgent Office Visits: None       Allergies  Known allergies and reactions were discussed with the patient. The patient's chart has been reviewed for allergy information and updated as necessary.   No Known Allergies  Allergies reviewed by Kristan Perdomo RPH on 2/5/2025 at 11:04 AM      Relevant Laboratory Values  Common labs          11/5/2024    10:08 1/16/2025    10:28 1/27/2025    13:46   Common Labs   Glucose 103  101     BUN 11  8     Creatinine 1.00  0.97     Sodium 144  143     Potassium 3.9  3.8     Chloride 105  102     Calcium 8.6  9.2     Total Protein 6.0  7.1     Albumin 3.6  4.1     Total Bilirubin <0.2  0.2     Alkaline Phosphatase 76  86     AST (SGOT) 18  14     ALT (SGPT) 15  15     WBC 8.69  7.66     Hemoglobin 11.8  12.8     Hematocrit 34.8  38.8     Platelets 241  235     Total Cholesterol   159    Triglycerides   122    HDL Cholesterol   49    LDL Cholesterol    88    Hemoglobin A1C   5.60        Lab Assessment  The above labs have been reviewed. No dose adjustments are needed for the specialty medication(s) based on the labs.       Current Medication List  This medication list has been reviewed with the patient and evaluated for any interactions or necessary modifications/recommendations, and updated to include all prescription medications, OTC medications, and supplements the patient is currently taking.  This list reflects what is contained in the patient's profile, which has also been marked  as reviewed to communicate to other providers it is the most up to date version of the patient's current medication therapy.     Current Outpatient Medications:     Albuterol-Budesonide 90-80 MCG/ACT aerosol, Inhale 1 dose Every 4 (Four) to 6 (Six) Hours As Needed (cough, wheeze, chest tightness, or shortness of breath)., Disp: 32.1 g, Rfl: 0    amoxicillin-clavulanate (AUGMENTIN) 875-125 MG per tablet, Take 1 tablet by mouth 2 (Two) Times a Day., Disp: 14 tablet, Rfl: 0    atorvastatin (LIPITOR) 10 MG tablet, Take 1 tablet by mouth Daily., Disp: 90 tablet, Rfl: 1    B Complex-C-Folic Acid (STRESS B COMPLEX PO), Take 1 tablet by mouth Daily., Disp: , Rfl:     Budesonide (ENTOCORT EC) 3 MG 24 hr capsule, Take 3 capsules by mouth Every Morning for 30 days, THEN 2 capsules Every Morning for 14 days, THEN 1 capsule Every Morning for 14 days., Disp: 132 capsule, Rfl: 0    butalbital-acetaminophen-caffeine (FIORICET, ESGIC) -40 MG per tablet, Take 1 tablet by mouth Every 6 (Six) Hours As Needed for Headache., Disp: 30 tablet, Rfl: 0    Cholecalciferol (VITAMIN D3 GUMMIES ADULT PO), Take 5,000 Units by mouth Daily., Disp: , Rfl:     Dihydroergotamine Mesylate HFA (Trudhesa) 0.725 MG/ACT aerosol solution, 0.725 mg into the nostril(s) as directed by provider As Needed (Moderate to severe headache). One spray into each nostril, maybe repeated after 1 hour if needed, NO more than 2 doses in 24-hour period or 3 doses in 7-day period., Disp: 8 mL, Rfl: 2    escitalopram (LEXAPRO) 20 MG tablet, Take 1 tablet by mouth Daily., Disp: 90 tablet, Rfl: 1    fluticasone (FLONASE) 50 MCG/ACT nasal spray, Use 2 sprays into each nostril as directed by provider Daily., Disp: 48 g, Rfl: 1    galcanezumab-gnlm (Emgality) 120 MG/ML auto-injector pen, Inject 1 mL under the skin into the appropriate area as directed Every 30 (Thirty) Days., Disp: 1 mL, Rfl: 1    guaiFENesin-codeine (ROMILAR-AC) 100-10 MG/5ML solution/syrup, Take 5 mL by  mouth 3 (Three) Times a Day As Needed for cough., Disp: 180 mL, Rfl: 0    hydrOXYzine (ATARAX) 25 MG tablet, Take 1 tablet by mouth Every 8 (Eight) Hours As Needed for Anxiety., Disp: 270 tablet, Rfl: 1    hyoscyamine (LEVSIN) 0.125 MG SL tablet, Place 1 tablet under the tongue Every 6 (Six) Hours As Needed (abdominal pain/cramping)., Disp: 180 tablet, Rfl: 3    levocetirizine (XYZAL) 5 MG tablet, Take 1 tablet by mouth Every Evening., Disp: 90 tablet, Rfl: 1    levothyroxine (SYNTHROID, LEVOTHROID) 88 MCG tablet, Take 1 tablet by mouth Daily., Disp: 90 tablet, Rfl: 1    lisinopril-hydrochlorothiazide (PRINZIDE,ZESTORETIC) 10-12.5 MG per tablet, Take 1 tablet by mouth Daily., Disp: 90 tablet, Rfl: 1    Multiple Vitamins-Minerals (MULTI ADULT GUMMIES PO), , Disp: , Rfl:     ondansetron (ZOFRAN) 8 MG tablet, TAKE 1/2 A TABLET BY MOUTH EVERY 8 HOURS AS NEEDED FOR NAUSEA AND VOMITING, Disp: 90 tablet, Rfl: 0    pantoprazole (PROTONIX) 40 MG EC tablet, Take 1 tablet by mouth 2 (Two) Times a Day., Disp: 180 tablet, Rfl: 1    potassium chloride (Klor-Con M20) 20 MEQ CR tablet, Take 1 tablet by mouth 2 (Two) Times a Day., Disp: 180 tablet, Rfl: 1    promethazine (PHENERGAN) 25 MG tablet, Take 1 tablet by mouth Every 6 (Six) Hours As Needed for Nausea or Vomiting., Disp: 60 tablet, Rfl: 1    Risankizumab-rzaa (Skyrizi) 360 MG/2.4ML solution cartridge, Inject 360 mg under the skin into the appropriate area as directed Every 8 (Eight) Weeks., Disp: 2.4 mL, Rfl: 6    Ubiquinol 100 MG capsule, Take 100 mg by mouth Daily., Disp: , Rfl:     Medicines reviewed by Kristan Perdomo RPH on 2/5/2025 at 11:04 AM  Medicines reviewed by Kristan Perdomo RPH on 2/5/2025 at 11:09 AM    Drug Interactions  None at this time.        Adverse Drug Reactions  Medication tolerability: Tolerating with no to minimal ADRs  Medication plan: Continue therapy with normal follow-up  Plan for ADR Management: No ADRs reported during patient  encounter.      Adherence, Self-Administration, and Current Therapy Problems  Adherence related patient's specialty therapy was discussed with the patient. The Adherence segment of this outreach has been reviewed and updated.   Adherence Questions  Linked Medication(s) Assessed: Galcanezumab-gnlm (Emgality)  On average, how many doses/injections does the patient miss per month?: 0  What are the identified reasons for non-adherence or missed doses? : no problems identified  What is the estimated medication adherence level?: %  Based on the patient/caregiver response and refill history, does this patient require an MTP to track adherence improvements?: no    Additional Barriers to Patient Self-Administration: None  Methods for Supporting Patient Self-Administration: None    Recently Close Medication Therapy Problems  No medication therapy recommendations to display  Open Medication Therapy Problems  No medication therapy recommendations to display     Goals of Therapy  Goals related to the patient's specialty therapy was discussed with the patient. The Patient Goals segment of this outreach has been reviewed and updated.    Goals Addressed Today        Specialty Pharmacy General Goal      Reduce frequency and severity of migraines. Prior to Botox/galcanezumab therapy, patient reports experiencing about 15 up to 25 migraine days per month.    8/13/24: Patient reports about 1 migraine day per month and not having needed to use Trudhesa in the past 6 months. Patient uses Fioricet for less severe migraines or headaches, which has been completely alleviating migraine symptoms. No side-effects or concerns.     2/5/25: Patient currently prescribed galcanezumab 120 mg monthly injections and Botox. Typically patient reports having 1 migraine monthly. She reports she currently is having increased stress levels related to work which increases frequency of migraines. When experiencing a migraine she reports she uses  acetaminophen, and Fioricet (only if symptoms not relieved with acetaminophen) to completely resolve migraines. Reports no new side effects or concerns at this time.     2/20/24 clinical reassessment: Patient reports migraine frequency decreased to 1-2 true migraine days per month and reduced migraine severity by about 50% with combination of Botox/galcanezumab. No side-effects. Trudhesa works to completely alleviate migraines when needed.                Quality of Life Assessment   Quality of Life related to the patient's enrollment in the patient management program and services provided was discussed with the patient. The QOL segment of this outreach has been reviewed and updated.   Quality of Life Improvement Scale: 9-A good deal better      Reassessment Plan & Follow-Up  Medication Therapy Changes: Continue galcanezumab 120 mg subcutaneous injection once monthly and botox for migraine management.   Related Plans, Therapy Recommendations, or Issues to Be Addressed: Nothing further to be addressed at this time.  Pharmacist to perform regular reassessments no more than (6) months from the previous assessment.  Care Coordinator to set up future refill outreaches, coordinate prescription delivery, and escalate clinical questions to pharmacist.     Attestation  Therapeutic appropriateness: Appropriate  I attest the patient was actively involved in and has agreed to the above plan of care. If the prescribed therapy is at any point deemed not appropriate based on the current or future assessments, a consultation will be initiated with the patient's specialty care provider to determine the best course of action. The revised plan of therapy will be documented along with any additional patient education provided. Discussed aforementioned material with patient by phone.    Kristan Perdomo RPH  2/5/2025  11:09 EST

## 2025-02-05 NOTE — TELEPHONE ENCOUNTER
Called pt and left vm to see where/if she had Prometheus lab drawn.  It looks like it still says it needs to be collected. Prometheus labs have to be drawn in the hospital lab.

## 2025-02-05 NOTE — TELEPHONE ENCOUNTER
Hub staff attempted to follow warm transfer process and was unsuccessful     Caller: Rosa Melgoza    Relationship to patient: Self    Best call back number: [phone]    Patient is needing: PT IS RETURNING MISSED CALL FROM RADHA VELASQUEZ. CALL DROPPED/CALLED BACK NO ANSWER. PLEASE CONTACT PT TO ASSIST.

## 2025-02-05 NOTE — TELEPHONE ENCOUNTER
Manda Butler PA-C        I ordered a goBalto monitor test.  Can you please try to find this from the company?

## 2025-02-06 ENCOUNTER — SPECIALTY PHARMACY (OUTPATIENT)
Dept: GASTROENTEROLOGY | Facility: CLINIC | Age: 55
End: 2025-02-06
Payer: COMMERCIAL

## 2025-02-06 ENCOUNTER — TELEPHONE (OUTPATIENT)
Dept: GASTROENTEROLOGY | Facility: CLINIC | Age: 55
End: 2025-02-06
Payer: COMMERCIAL

## 2025-02-06 ENCOUNTER — LAB (OUTPATIENT)
Dept: LAB | Facility: HOSPITAL | Age: 55
End: 2025-02-06
Payer: COMMERCIAL

## 2025-02-06 NOTE — PROGRESS NOTES
Specialty Pharmacy Patient Management Program  Gastroenterology Refill Outreach      Rosa is a 54 y.o. female contacted today regarding refills of her medication(s).    Specialty medication(s) and dose(s) confirmed: skyrizi    Refill Questions      Flowsheet Row Most Recent Value   Changes to allergies? No   Changes to medications? No   New conditions or infections since last clinic visit No   If yes, please describe  n/a   Unplanned office visit, urgent care, ED, or hospital admission in the last 4 weeks  No   How does patient/caregiver feel medication is working? Very good   Financial problems or insurance changes  No  [insurance change 4/1]   Since the previous refill, were any specialty medication doses or scheduled injections missed or delayed?  No   Does this patient require a clinical escalation to a pharmacist? No          Delivery Questions      Flowsheet Row Most Recent Value   Delivery method UPS   Delivery address verified with patient/caregiver? Yes   Delivery address Home   Number of medications in delivery 1   Medication(s) being filled and delivered Risankizumab-rzaa (Skyrizi)   Doses left of specialty medications 2 weeks   Copay verified? Yes   Copay amount 0   Copay form of payment No copayment ($0)   Ship Date 2/6   Delivery Date Selection 02/07/25   Signature Required No            Follow-Up: 45 days    Mari Huntley  2/6/2025  13:58 EST

## 2025-02-06 NOTE — TELEPHONE ENCOUNTER
Message from Angella Estes RN via my chart:     Called pt and left vm to see where/if she had [facility] lab drawn.  It looks like it still says it needs to be collected. [facility] labs have to be drawn in the hospital lab.           Message from patient via my chart:     Manda,       you asked me to let you know if we hadn't heard any results from.the [facility] test by the end of January.  They also haven't posted any results of the calprotectin either.       Thanks,  Rosa Melgoza

## 2025-02-06 NOTE — TELEPHONE ENCOUNTER
Called, pt she states she had labs drawn in our office.  Advised our office cannot draw [facility] labs.  Pt state she will go to a [facility] facility to have labs drawn.  Update sent to VERONICA Holman.

## 2025-02-12 ENCOUNTER — LAB (OUTPATIENT)
Dept: LAB | Facility: HOSPITAL | Age: 55
End: 2025-02-12
Payer: COMMERCIAL

## 2025-02-12 PROCEDURE — 83520 IMMUNOASSAY QUANT NOS NONAB: CPT | Performed by: PHYSICIAN ASSISTANT

## 2025-02-12 PROCEDURE — 86141 C-REACTIVE PROTEIN HS: CPT | Performed by: PHYSICIAN ASSISTANT

## 2025-02-12 PROCEDURE — 82397 CHEMILUMINESCENT ASSAY: CPT | Performed by: PHYSICIAN ASSISTANT

## 2025-02-13 ENCOUNTER — SPECIALTY PHARMACY (OUTPATIENT)
Dept: NEUROLOGY | Facility: CLINIC | Age: 55
End: 2025-02-13
Payer: COMMERCIAL

## 2025-02-13 ENCOUNTER — PROCEDURE VISIT (OUTPATIENT)
Dept: NEUROLOGY | Facility: CLINIC | Age: 55
End: 2025-02-13
Payer: COMMERCIAL

## 2025-02-13 DIAGNOSIS — G43.719 INTRACTABLE CHRONIC MIGRAINE WITHOUT AURA AND WITHOUT STATUS MIGRAINOSUS: Primary | ICD-10-CM

## 2025-02-13 NOTE — PROGRESS NOTES
Specialty Pharmacy Patient Management Program  Refill Outreach     Rosa was contacted today regarding refills of their medication(s).    Refill Questions      Flowsheet Row Most Recent Value   Changes to allergies? No   Changes to medications? No   New conditions or infections since last clinic visit No   If yes, please describe  N/A   Unplanned office visit, urgent care, ED, or hospital admission in the last 4 weeks  No   How does patient/caregiver feel medication is working? Very good   Financial problems or insurance changes  No   Since the previous refill, were any specialty medication doses or scheduled injections missed or delayed?  No   Does this patient require a clinical escalation to a pharmacist? No            Delivery Questions      Flowsheet Row Most Recent Value   Delivery method UPS   Delivery address verified with patient/caregiver? Yes   Delivery address Home   Number of medications in delivery 1   Medication(s) being filled and delivered Galcanezumab-gnlm (EMGALITY)   Doses left of specialty medications 0   Copay verified? Yes   Copay amount $35   Copay form of payment Credit/debit on file   Ship Date 2/13   Delivery Date Selection 02/14/25   Signature Required No                 Follow-up: 21 day(s)     Shelbie Foster, Pharmacy Technician  2/13/2025  10:45 EST

## 2025-02-15 ENCOUNTER — PATIENT MESSAGE (OUTPATIENT)
Dept: NEUROLOGY | Facility: CLINIC | Age: 55
End: 2025-02-15
Payer: COMMERCIAL

## 2025-02-22 LAB — REF LAB TEST METHOD: NORMAL

## 2025-02-27 RX ORDER — ONDANSETRON 8 MG/1
8 TABLET, FILM COATED ORAL EVERY 8 HOURS PRN
Qty: 90 TABLET | Refills: 0 | Status: SHIPPED | OUTPATIENT
Start: 2025-02-27

## 2025-02-27 NOTE — TELEPHONE ENCOUNTER
Rx Refill Note  Requested Prescriptions     Pending Prescriptions Disp Refills    ondansetron (ZOFRAN) 8 MG tablet 90 tablet 0      Last office visit with prescribing clinician: 3/18/2024   Last telemedicine visit with prescribing clinician: Visit date not found   Next office visit with prescribing clinician: Visit date not found                         Would you like a call back once the refill request has been completed: [] Yes [] No    If the office needs to give you a call back, can they leave a voicemail: [] Yes [] No    Stacey Reid MA  02/27/25, 08:06 EST

## 2025-02-28 ENCOUNTER — TELEPHONE (OUTPATIENT)
Dept: GASTROENTEROLOGY | Facility: CLINIC | Age: 55
End: 2025-02-28
Payer: COMMERCIAL

## 2025-02-28 DIAGNOSIS — K50.019 CROHN'S DISEASE OF SMALL INTESTINE WITH COMPLICATION: Primary | ICD-10-CM

## 2025-02-28 NOTE — TELEPHONE ENCOUNTER
LENA Minor for COLONOSCOPY on 3/19/25  arrive at 8:00  . Sent prep instructions to pt my chart....miralax

## 2025-03-03 DIAGNOSIS — K50.019 CROHN'S DISEASE OF SMALL INTESTINE WITH COMPLICATION: Primary | ICD-10-CM

## 2025-03-11 DIAGNOSIS — G43.109 MIGRAINE WITH AURA AND WITHOUT STATUS MIGRAINOSUS, NOT INTRACTABLE: ICD-10-CM

## 2025-03-11 RX ORDER — BUTALBITAL, ACETAMINOPHEN AND CAFFEINE 50; 325; 40 MG/1; MG/1; MG/1
1 TABLET ORAL EVERY 6 HOURS PRN
Qty: 30 TABLET | Refills: 0 | Status: SHIPPED | OUTPATIENT
Start: 2025-03-11

## 2025-03-11 NOTE — TELEPHONE ENCOUNTER
Rx Refill Note  Requested Prescriptions     Pending Prescriptions Disp Refills    butalbital-acetaminophen-caffeine (FIORICET, ESGIC) -40 MG per tablet 30 tablet 0     Sig: Take 1 tablet by mouth Every 6 (Six) Hours As Needed for Headache.      Last office visit with prescribing clinician: 3/18/2024   Last telemedicine visit with prescribing clinician: Visit date not found   Next office visit with prescribing clinician: Visit date not found                         Would you like a call back once the refill request has been completed: [] Yes [] No    If the office needs to give you a call back, can they leave a voicemail: [] Yes [] No    Stacey Reid MA  03/11/25, 08:35 EDT

## 2025-03-12 ENCOUNTER — SPECIALTY PHARMACY (OUTPATIENT)
Dept: NEUROLOGY | Facility: CLINIC | Age: 55
End: 2025-03-12
Payer: COMMERCIAL

## 2025-03-12 ENCOUNTER — TELEPHONE (OUTPATIENT)
Dept: GASTROENTEROLOGY | Facility: CLINIC | Age: 55
End: 2025-03-12

## 2025-03-12 NOTE — TELEPHONE ENCOUNTER
Caller: Rosa Melgoza    Relationship to patient: Self    Best call back number: [phone]      Patient is needing: PT CALLING TO CONFIRM PROCEDURE WITH DR. BALDERAS 3-19-25

## 2025-03-12 NOTE — PROGRESS NOTES
Specialty Pharmacy Patient Management Program  Refill Outreach     Rosa was contacted today regarding refills of their medication(s).    Refill Questions      Flowsheet Row Most Recent Value   Changes to allergies? No   Changes to medications? No   New conditions or infections since last clinic visit No   If yes, please describe  N/A   Unplanned office visit, urgent care, ED, or hospital admission in the last 4 weeks  No   How does patient/caregiver feel medication is working? Good   Financial problems or insurance changes  No   Since the previous refill, were any specialty medication doses or scheduled injections missed or delayed?  No   Does this patient require a clinical escalation to a pharmacist? No            Delivery Questions      Flowsheet Row Most Recent Value   Delivery method UPS   Delivery address verified with patient/caregiver? Yes   Delivery address Home   Number of medications in delivery 1   Medication(s) being filled and delivered Galcanezumab-gnlm (Emgality)   Doses left of specialty medications 0   Copay verified? Yes   Copay amount $35   Copay form of payment HSA/FSA on file   Delivery Date Selection 03/13/25   Signature Required No                 Follow-up: 21 day(s)     Shelbie Foster, Pharmacy Technician  3/12/2025  10:08 EDT    
No

## 2025-03-18 ENCOUNTER — HOSPITAL ENCOUNTER (OUTPATIENT)
Dept: MAMMOGRAPHY | Facility: HOSPITAL | Age: 55
Discharge: HOME | End: 2025-03-18
Admitting: FAMILY MEDICINE
Payer: COMMERCIAL

## 2025-03-18 DIAGNOSIS — Z12.31 SCREENING MAMMOGRAM, ENCOUNTER FOR: ICD-10-CM

## 2025-03-18 PROCEDURE — 77063 BREAST TOMOSYNTHESIS BI: CPT

## 2025-03-18 PROCEDURE — 77067 SCR MAMMO BI INCL CAD: CPT

## 2025-03-19 ENCOUNTER — ANESTHESIA EVENT (OUTPATIENT)
Dept: GASTROENTEROLOGY | Facility: HOSPITAL | Age: 55
End: 2025-03-19
Payer: COMMERCIAL

## 2025-03-19 ENCOUNTER — HOSPITAL ENCOUNTER (OUTPATIENT)
Facility: HOSPITAL | Age: 55
Setting detail: HOSPITAL OUTPATIENT SURGERY
Discharge: HOME | End: 2025-03-19
Attending: INTERNAL MEDICINE | Admitting: INTERNAL MEDICINE
Payer: COMMERCIAL

## 2025-03-19 ENCOUNTER — ANESTHESIA (OUTPATIENT)
Dept: GASTROENTEROLOGY | Facility: HOSPITAL | Age: 55
End: 2025-03-19
Payer: COMMERCIAL

## 2025-03-19 VITALS
HEART RATE: 83 BPM | BODY MASS INDEX: 39.95 KG/M2 | WEIGHT: 239.8 LBS | DIASTOLIC BLOOD PRESSURE: 74 MMHG | OXYGEN SATURATION: 95 % | HEIGHT: 65 IN | SYSTOLIC BLOOD PRESSURE: 122 MMHG | RESPIRATION RATE: 16 BRPM

## 2025-03-19 DIAGNOSIS — K50.019 CROHN'S DISEASE OF SMALL INTESTINE WITH COMPLICATION: ICD-10-CM

## 2025-03-19 PROCEDURE — 45385 COLONOSCOPY W/LESION REMOVAL: CPT | Performed by: INTERNAL MEDICINE

## 2025-03-19 PROCEDURE — 88305 TISSUE EXAM BY PATHOLOGIST: CPT | Performed by: INTERNAL MEDICINE

## 2025-03-19 PROCEDURE — 25010000002 GLYCOPYRROLATE 0.2 MG/ML SOLUTION

## 2025-03-19 PROCEDURE — 25010000002 LIDOCAINE 2% SOLUTION

## 2025-03-19 PROCEDURE — 43239 EGD BIOPSY SINGLE/MULTIPLE: CPT | Performed by: INTERNAL MEDICINE

## 2025-03-19 PROCEDURE — 25810000003 LACTATED RINGERS PER 1000 ML: Performed by: INTERNAL MEDICINE

## 2025-03-19 PROCEDURE — 45380 COLONOSCOPY AND BIOPSY: CPT | Performed by: INTERNAL MEDICINE

## 2025-03-19 PROCEDURE — 25010000002 PROPOFOL 200 MG/20ML EMULSION

## 2025-03-19 PROCEDURE — 25010000002 PROPOFOL 1000 MG/100ML EMULSION

## 2025-03-19 RX ORDER — LIDOCAINE HYDROCHLORIDE 20 MG/ML
INJECTION, SOLUTION INFILTRATION; PERINEURAL AS NEEDED
Status: DISCONTINUED | OUTPATIENT
Start: 2025-03-19 | End: 2025-03-19 | Stop reason: SURG

## 2025-03-19 RX ORDER — GLYCOPYRROLATE 0.2 MG/ML
INJECTION INTRAMUSCULAR; INTRAVENOUS AS NEEDED
Status: DISCONTINUED | OUTPATIENT
Start: 2025-03-19 | End: 2025-03-19 | Stop reason: SURG

## 2025-03-19 RX ORDER — SODIUM CHLORIDE 0.9 % (FLUSH) 0.9 %
10 SYRINGE (ML) INJECTION AS NEEDED
Status: DISCONTINUED | OUTPATIENT
Start: 2025-03-19 | End: 2025-03-19 | Stop reason: HOSPADM

## 2025-03-19 RX ORDER — SODIUM CHLORIDE, SODIUM LACTATE, POTASSIUM CHLORIDE, CALCIUM CHLORIDE 600; 310; 30; 20 MG/100ML; MG/100ML; MG/100ML; MG/100ML
1000 INJECTION, SOLUTION INTRAVENOUS CONTINUOUS
Status: DISCONTINUED | OUTPATIENT
Start: 2025-03-19 | End: 2025-03-19 | Stop reason: HOSPADM

## 2025-03-19 RX ORDER — PROPOFOL 10 MG/ML
INJECTION, EMULSION INTRAVENOUS AS NEEDED
Status: DISCONTINUED | OUTPATIENT
Start: 2025-03-19 | End: 2025-03-19 | Stop reason: SURG

## 2025-03-19 RX ORDER — PROPOFOL 10 MG/ML
INJECTION, EMULSION INTRAVENOUS CONTINUOUS PRN
Status: DISCONTINUED | OUTPATIENT
Start: 2025-03-19 | End: 2025-03-19 | Stop reason: SURG

## 2025-03-19 RX ADMIN — PROPOFOL INJECTABLE EMULSION 100 MG: 10 INJECTION, EMULSION INTRAVENOUS at 09:06

## 2025-03-19 RX ADMIN — LIDOCAINE HYDROCHLORIDE 60 MG: 20 INJECTION, SOLUTION INFILTRATION; PERINEURAL at 09:07

## 2025-03-19 RX ADMIN — PROPOFOL INJECTABLE EMULSION 50 MG: 10 INJECTION, EMULSION INTRAVENOUS at 09:07

## 2025-03-19 RX ADMIN — GLYCOPYRROLATE 0.2 MG: 0.2 INJECTION INTRAMUSCULAR; INTRAVENOUS at 09:05

## 2025-03-19 RX ADMIN — SODIUM CHLORIDE, SODIUM LACTATE, POTASSIUM CHLORIDE, CALCIUM CHLORIDE 1000 ML: 20; 30; 600; 310 INJECTION, SOLUTION INTRAVENOUS at 08:40

## 2025-03-19 RX ADMIN — PROPOFOL 200 MCG/KG/MIN: 10 INJECTION, EMULSION INTRAVENOUS at 09:07

## 2025-03-19 NOTE — ANESTHESIA PREPROCEDURE EVALUATION
Anesthesia Evaluation     Patient summary reviewed and Nursing notes reviewed                Airway   Mallampati: III  Dental      Pulmonary - negative pulmonary ROS   Cardiovascular     ECG reviewed  Rhythm: regular  Rate: normal    (+) hypertension, hyperlipidemia      Neuro/Psych  (+) headaches, psychiatric history Anxiety  GI/Hepatic/Renal/Endo    (+) morbid obesity, GERD, liver disease fatty liver disease, thyroid problem hypothyroidism    Musculoskeletal (-) negative ROS    Abdominal    Substance History - negative use     OB/GYN negative ob/gyn ROS         Other                    Anesthesia Plan    ASA 3     MAC     intravenous induction     Anesthetic plan, risks, benefits, and alternatives have been provided, discussed and informed consent has been obtained with: patient.    CODE STATUS:

## 2025-03-19 NOTE — ANESTHESIA POSTPROCEDURE EVALUATION
Patient: Rosa Melgoza    Procedure Summary       Date: 03/19/25 [facility]    Anesthesia Start: 0902 Anesthesia Stop: 0934    Procedures:       COLONOSCOPY to cecum with cold forcep polypectomies and cold forcep biopsies      ESOPHAGOGASTRODUODENOSCOPY with cold forcep biopsies (Esophagus) Diagnosis:       Crohn's disease of small intestine with complication      (Crohn's disease of small intestine with complication [K50.019])    Surgeons: Maicol Garza MD Provider: Kory Travis MD    Anesthesia Type: MAC ASA Status: 3            Anesthesia Type: MAC    Vitals  Vitals Value Taken Time   /74 03/19/25 09:52   Temp     Pulse 83 03/19/25 09:52   Resp 16 03/19/25 09:52   SpO2 95 % 03/19/25 09:52           Post Anesthesia Care and Evaluation    Patient location during evaluation: PACU  Patient participation: complete - patient participated  Level of consciousness: awake and alert  Pain management: adequate    Airway patency: patent  Anesthetic complications: No anesthetic complications    Cardiovascular status: acceptable  Respiratory status: acceptable  Hydration status: acceptable    Comments: --------------------            03/19/25               0952     --------------------   BP:       122/74     Pulse:      83       Resp:       16       SpO2:      95%      --------------------

## 2025-03-19 NOTE — DISCHARGE INSTRUCTIONS
For the next 24 hours patient needs to be with a responsible adult.    For 24 hours DO NOT drive, operate machinery, appliances, drink alcohol, make important decisions or sign legal documents.    Start with a light or bland diet if you are feeling sick to your stomach otherwise advance to regular diet as tolerated.    Follow recommendations on procedure report if provided by your doctor.    Call Dr Garza for problems [phone]    Problems may include but not limited to: large amounts of bleeding, trouble breathing, repeated vomiting, severe unrelieved pain, fever or chills.

## 2025-03-20 ENCOUNTER — PATIENT MESSAGE (OUTPATIENT)
Dept: FAMILY MEDICINE CLINIC | Facility: CLINIC | Age: 55
End: 2025-03-20
Payer: COMMERCIAL

## 2025-03-20 ENCOUNTER — SPECIALTY PHARMACY (OUTPATIENT)
Dept: GASTROENTEROLOGY | Facility: CLINIC | Age: 55
End: 2025-03-20
Payer: COMMERCIAL

## 2025-03-20 NOTE — PROGRESS NOTES
Specialty Pharmacy Patient Management Program  Gastroenterology Refill Outreach      Rosa is a 54 y.o. female contacted today regarding refills of her medication(s).    Specialty medication(s) and dose(s) confirmed: skyrizi    Refill Questions      Flowsheet Row Most Recent Value   Changes to allergies? No   Changes to medications? No   New conditions or infections since last clinic visit No   If yes, please describe  n/a   Unplanned office visit, urgent care, ED, or hospital admission in the last 4 weeks  No   How does patient/caregiver feel medication is working? Very good   Financial problems or insurance changes  Yes   If yes, describe changes in insurance or financial issues. insurance change on 4/1   Since the previous refill, were any specialty medication doses or scheduled injections missed or delayed?  No   Does this patient require a clinical escalation to a pharmacist? No          Delivery Questions      Flowsheet Row Most Recent Value   Delivery method UPS   Delivery address verified with patient/caregiver? Yes   Delivery address Home   Number of medications in delivery 1   Medication(s) being filled and delivered Risankizumab-rzaa (Skyrizi)   Doses left of specialty medications 1 month   Copay verified? Yes   Copay amount 0   Copay form of payment No copayment ($0)   Delivery Date Selection 03/21/25   Signature Required No            Follow-Up: 45 days    Mari Huntley  3/20/2025  12:05 EDT

## 2025-03-20 NOTE — PROGRESS NOTES
Specialty Pharmacy Patient Management Program  Gastroenterology Reassessment     Rosa Melgoza is a 54 y.o. female seen by a Gastroenterology provider for Crohn's Disease and enrolled in the Patient Management program offered by [facility]. A follow-up outreach was conducted, including assessment of continued therapy appropriateness, medication adherence, and side effect incidence and management for Skyrizi (risankizumab).     Changes to Insurance Coverage or Financial Support  None; will have new insurance 25    Relevant Past Medical History and Comorbidities  Relevant medical history and concomitant health conditions were discussed with the patient. The patient's chart has been reviewed for relevant past medical history and comorbid health conditions and updated as necessary.   Past Medical History:   Diagnosis Date    Allergic     Anxiety     Cholelithiasis     Crohn disease     Depression     Disease of thyroid gland     Diverticulitis of colon     Diverticulosis     Elevated blood pressure reading without diagnosis of hypertension     Encounter for long-term (current) use of medications 2020    Fatty liver     GERD (gastroesophageal reflux disease)     Headache, migraine     Hernia     History of medical problems     not sure of date, but very painful joints and muscles.  Poss r/t crohns    History of sore throat     Hyperlipidemia     Hypertension     Hypothyroidism     Irritable bowel syndrome     Migraine     Need for DTaP and Hib vaccine     History of     Obesity     Urinary tract infection     Vaginal yeast infection      Social History     Socioeconomic History    Marital status:    Tobacco Use    Smoking status: Former     Current packs/day: 0.00     Average packs/day: 1 pack/day for 10.0 years (10.0 ttl pk-yrs)     Types: Cigarettes     Start date: 3/1/2001     Quit date: 3/1/2011     Years since quittin.0    Smokeless tobacco: Never    Tobacco  comments:     smoked off and on   Vaping Use    Vaping status: Never Used   Substance and Sexual Activity    Alcohol use: Not Currently     Comment: once a year or so I have a drink w dinner    Drug use: Never    Sexual activity: Yes     Partners: Male     Birth control/protection: Post-menopausal, Vasectomy, Surgical     Comment:  had vasectomy     Problem list reviewed by Tanya Ashley, PharmD on 3/20/2025 at  4:12 PM    Allergies  Known allergies and reactions were discussed with the patient. The patient's chart has been reviewed for allergy information and updated as necessary.   Patient has no known allergies.  Allergies reviewed by Tanya Ashley, PharmD on 3/20/2025 at  4:11 PM    Relevant Laboratory Values  Lab Results   Component Value Date    GLUCOSE 101 (H) 01/16/2025    BUN 8 01/16/2025    CREATININE 0.97 01/16/2025    BCR 8.2 01/16/2025     01/16/2025    K 3.8 01/16/2025     01/16/2025    CO2 30.0 (H) 01/16/2025    CALCIUM 9.2 01/16/2025    PROTEINTOT 7.1 01/16/2025    ALBUMIN 4.1 01/16/2025    ALT 15 01/16/2025    AST 14 01/16/2025    ALKPHOS 86 01/16/2025    BILITOT 0.2 01/16/2025    ANIONGAP 11.4 10/24/2024    MG 2.0 11/05/2024     Lab Results   Component Value Date    WBC 7.66 01/16/2025    HGB 12.8 01/16/2025    HCT 38.8 01/16/2025     01/16/2025     Lab Results   Component Value Date    HEPAIGM Negative 06/22/2020    HEPBCAB Negative 08/12/2024    HEPBIGMCORE Non-Reactive 08/12/2024    HEPBSAB Reactive 08/12/2024    HEPBSAG Non-Reactive 08/12/2024    HEPCVIRUSABY 0.2 06/22/2020     Lab Results   Component Value Date    QUANTITBGLDP Negative 08/12/2024    QUANTITBGLDP Negative 08/30/2023    QUANTITBGLDP Negative 04/14/2023    QUANTITBGLDP Negative 08/17/2022    QUANTITBGLDP Negative 04/13/2022     Lab Value Review  The above lab values have been reviewed; the following specialty medication(s) dose adjustment(s) are recommended: none.    Current Medication List  This  medication list has been reviewed with the patient and evaluated for any interactions or necessary modifications/recommendations, and updated to include all prescription medications, OTC medications, and supplements the patient is currently taking. This list reflects what is contained in the patient's profile, which has also been marked as reviewed to communicate to other providers it is the most up to date version of the patient's current medication therapy.     Current Outpatient Medications:     Albuterol-Budesonide 90-80 MCG/ACT aerosol, Inhale 1 dose Every 4 (Four) to 6 (Six) Hours As Needed (cough, wheeze, chest tightness, or shortness of breath)., Disp: 32.1 g, Rfl: 0    atorvastatin (LIPITOR) 10 MG tablet, Take 1 tablet by mouth Daily., Disp: 90 tablet, Rfl: 1    B Complex-C-Folic Acid (STRESS B COMPLEX PO), Take 1 tablet by mouth Daily., Disp: , Rfl:     butalbital-acetaminophen-caffeine (FIORICET, ESGIC) -40 MG per tablet, Take 1 tablet by mouth Every 6 (Six) Hours As Needed for Headache., Disp: 30 tablet, Rfl: 0    Cholecalciferol (VITAMIN D3 GUMMIES ADULT PO), Take 5,000 Units by mouth Daily., Disp: , Rfl:     Dihydroergotamine Mesylate HFA (Trudhesa) 0.725 MG/ACT aerosol solution, 0.725 mg into the nostril(s) as directed by provider As Needed (Moderate to severe headache). One spray into each nostril, maybe repeated after 1 hour if needed, NO more than 2 doses in 24-hour period or 3 doses in 7-day period., Disp: 8 mL, Rfl: 2    escitalopram (LEXAPRO) 20 MG tablet, Take 1 tablet by mouth Daily., Disp: 90 tablet, Rfl: 1    fluticasone (FLONASE) 50 MCG/ACT nasal spray, Use 2 sprays into each nostril as directed by provider Daily., Disp: 48 g, Rfl: 1    galcanezumab-gnlm (Emgality) 120 MG/ML auto-injector pen, Inject 1 mL under the skin into the appropriate area as directed Every 30 (Thirty) Days., Disp: 1 mL, Rfl: 1    guaiFENesin-codeine (ROMILAR-AC) 100-10 MG/5ML solution/syrup, Take 5 mL by mouth  3 (Three) Times a Day As Needed for cough., Disp: 180 mL, Rfl: 0    hydrOXYzine (ATARAX) 25 MG tablet, Take 1 tablet by mouth Every 8 (Eight) Hours As Needed for Anxiety., Disp: 270 tablet, Rfl: 1    hyoscyamine (LEVSIN) 0.125 MG SL tablet, Place 1 tablet under the tongue Every 6 (Six) Hours As Needed (abdominal pain/cramping)., Disp: 180 tablet, Rfl: 3    levocetirizine (XYZAL) 5 MG tablet, Take 1 tablet by mouth Every Evening., Disp: 90 tablet, Rfl: 1    levothyroxine (SYNTHROID, LEVOTHROID) 88 MCG tablet, Take 1 tablet by mouth Daily., Disp: 90 tablet, Rfl: 1    lisinopril-hydrochlorothiazide (PRINZIDE,ZESTORETIC) 10-12.5 MG per tablet, Take 1 tablet by mouth Daily., Disp: 90 tablet, Rfl: 1    Multiple Vitamins-Minerals (MULTI ADULT GUMMIES PO), , Disp: , Rfl:     ondansetron (ZOFRAN) 8 MG tablet, Take 1 tablet by mouth Every 8 (Eight) Hours As Needed for Nausea or Vomiting., Disp: 90 tablet, Rfl: 0    pantoprazole (PROTONIX) 40 MG EC tablet, Take 1 tablet by mouth 2 (Two) Times a Day., Disp: 180 tablet, Rfl: 1    potassium chloride (Klor-Con M20) 20 MEQ CR tablet, Take 1 tablet by mouth 2 (Two) Times a Day., Disp: 180 tablet, Rfl: 1    promethazine (PHENERGAN) 25 MG tablet, Take 1 tablet by mouth Every 6 (Six) Hours As Needed for Nausea or Vomiting., Disp: 60 tablet, Rfl: 1    Risankizumab-rzaa (Skyrizi) 360 MG/2.4ML solution cartridge, Inject 360 mg under the skin into the appropriate area as directed Every 8 (Eight) Weeks., Disp: 2.4 mL, Rfl: 6    Ubiquinol 100 MG capsule, Take 100 mg by mouth Daily., Disp: , Rfl:   No current facility-administered medications for this visit.  Medicines reviewed by Tanya Ashley, PharmD on 3/20/2025 at  4:11 PM    Drug Interactions  none     Adverse Drug Reactions  Adverse Reactions Experienced: none  Plan for ADR Management: N/A     Hospitalizations and Urgent Care Since Last Assessment  Hospitalizations or Admissions: none  ED Visits: none  Urgent Office Visits: none      Adherence and Self-Administration  Approximate Number of Doses Missed Since Last Assessment: none  Ongoing or New Barriers to Patient Adherence and/or Self-Administration: none   Methods for Supporting Patient Adherence and/or Self-Administration: N/A     Recently Close Medication Therapy Problems  No medication therapy recommendations to display    Goals of Therapy  Goals related to the patient's specialty therapy were discussed with the patient. The Patient Goals segment of this outreach has been reviewed and updated.   Goals Addressed Today        Specialty Pharmacy General Goal      At least 50% symptom reduction and mucosal healing     3/20/25: patient had EGD/colonoscopy yesterday - mucosa looked normal externally, biopsies pending. She is still having symptoms however she also still has a high level of stress so it is difficult to determine what amount of her symptoms can be attributed to that              Quality of Life Assessment   Quality of Life related to the patient's specialty therapy was discussed with the patient. The QOL segment of this outreach has been reviewed and updated.   Quality of Life Assessment  Quality of Life Improvement Scale: 9-A good deal better    Reassessment Plan & Follow-Up  Medication Therapy Changes: none  Additional Plans, Therapy Recommendations, or Therapy Problems to Be Addressed:  biopsies from colonoscopy pending final report    Pharmacist to perform regular reassessments no more than (6) months from the previous assessment.  Care Coordinator to set up future refill outreaches, coordinate prescription delivery, and escalate clinical questions to pharmacist.     Attestation  Therapeutic appropriateness: Appropriate   I attest the patient was actively involved in and has agreed to the above plan of care. I attest that the specialty medication(s) addressed above are appropriate for the patient based on my reassessment. If the prescribed therapy is at any point deemed not  appropriate based on the current or future assessments, a consultation will be initiated with the patient's specialty care provider to determine the best course of action. The revised plan of therapy will be documented along with any required assessments and/or additional patient education provided.     Tanya Ashley, PharmD, BCACP, BCPS, BCCCP  Clinical Specialty Pharmacist, Gastroenterology  03/20/25 16:14 EDT

## 2025-03-26 NOTE — H&P
[facility]  Pre Procedure History & Physical    Chief Complaint:   Crohns    Subjective     HPI:   54 y.o. female here for colonoscopy for hx of Crohns    Past Medical History:   Past Medical History:   Diagnosis Date    Allergic     Anxiety     Cholelithiasis     Crohn disease     Depression     Disease of thyroid gland     Diverticulitis of colon     Diverticulosis     Elevated blood pressure reading without diagnosis of hypertension     Encounter for long-term (current) use of medications 2020    Fatty liver     GERD (gastroesophageal reflux disease)     Headache, migraine     Hernia     History of medical problems     not sure of date, but very painful joints and muscles.  Poss r/t crohns    History of sore throat     Hyperlipidemia     Hypertension     Hypothyroidism     Irritable bowel syndrome     Migraine     Need for DTaP and Hib vaccine     History of     Obesity     Urinary tract infection     Vaginal yeast infection        Past Surgical History:  Past Surgical History:   Procedure Laterality Date    BREAST BIOPSY       SECTION      CHOLECYSTECTOMY      COLONOSCOPY  approx     Tavon M.D.  benign polyps per patient    COLONOSCOPY N/A 2020    Procedure: COLONOSCOPY  into cecum and TI with biopsies;  Surgeon: Maicol Garza MD;  Location: [facility] ENDOSCOPY;  Service: Gastroenterology;  Laterality: N/A;  Pre: abn CT scan  post: diverticulosis    COLONOSCOPY N/A 3/19/2025    Procedure: COLONOSCOPY to cecum with cold forcep polypectomies and cold forcep biopsies;  Surgeon: Maicol Garza MD;  Location: [facility] ENDOSCOPY;  Service: Gastroenterology;  Laterality: N/A;  pre:crohn's  post: polyps, diverticulosis    ENDOSCOPY N/A 2020    Procedure: ESOPHAGOGASTRODUODENOSCOPY WITH BIOPSY;  Surgeon: Maicol Garza MD;  Location: [facility] ENDOSCOPY;  Service: Gastroenterology;  Laterality: N/A;  Pre: abn CT scan  post: hiatal hernia,  esophagitis    ENDOSCOPY N/A 3/19/2025    Procedure: ESOPHAGOGASTRODUODENOSCOPY with cold forcep biopsies;  Surgeon: Maicol Garza MD;  Location: [facility] ENDOSCOPY;  Service: Gastroenterology;  Laterality: N/A;  pre:crohn's  post:hiatal hernia, fundic gland polyps    FRACTURE SURGERY  2013    broken hand, 3 screws in place    TONSILLECTOMY      UPPER GASTROINTESTINAL ENDOSCOPY  approx 2010    Montes De Oca M.D.  normal per patient       Family History:  Family History   Problem Relation Age of Onset    Breast cancer Mother     Arthritis Mother     Cancer Mother         breast ca    Miscarriages / Stillbirths Mother     Hypertension Father     Heart disease Father     Alcohol abuse Father     Hyperlipidemia Father     Cirrhosis Father     Breast cancer Maternal Grandmother     Anxiety disorder Maternal Grandmother     Arthritis Maternal Grandmother     Cancer Maternal Grandmother         breast and brain    Depression Maternal Grandmother     Diabetes Maternal Grandmother     Heart disease Maternal Grandmother     Kidney disease Maternal Grandmother     Thyroid disease Maternal Grandmother     Mental illness Maternal Grandmother     Alcohol abuse Other     Depression Other     Anxiety disorder Other     Hypertension Other     Lung cancer Other     Inflammatory bowel disease Maternal Grandfather     Irritable bowel syndrome Maternal Grandfather         Honestly not sure which he had    Arthritis Maternal Grandfather     Cancer Maternal Grandfather         lung    Stroke Maternal Grandfather        Social History:   reports that she quit smoking about 14 years ago. Her smoking use included cigarettes. She started smoking about 24 years ago. She has a 10 pack-year smoking history. She has never used smokeless tobacco. She reports that she does not currently use alcohol. She reports that she does not use drugs.    Medications:   No medications prior to admission.       Allergies:  Patient has no known allergies.    ROS:  "   Pertinent items are noted in HPI     Objective     Blood pressure 122/74, pulse 83, resp. rate 16, height 163.8 cm (64.5\"), weight 109 kg (239 lb 12.8 oz), SpO2 95%, not currently breastfeeding.    Physical Exam   Constitutional: Pt is oriented to person, place, and time and well-developed, well-nourished, and in no distress.   Mouth/Throat: Oropharynx is clear and moist.   Neck: Normal range of motion.   Cardiovascular: Normal rate, regular rhythm and normal heart sounds.    Pulmonary/Chest: Effort normal and breath sounds normal.   Abdominal: Soft. Nontender  Skin: Skin is warm and dry.   Psychiatric: Mood, memory, affect and judgment normal.     Assessment & Plan     Diagnosis:  Crohns    Anticipated Surgical Procedure:  Colonoscopy    The risks, benefits, and alternatives of this procedure have been discussed with the patient or the responsible party- the patient understands and agrees to proceed.                                                            "

## 2025-04-02 RX ORDER — ONDANSETRON 8 MG/1
8 TABLET, FILM COATED ORAL EVERY 8 HOURS PRN
Qty: 90 TABLET | Refills: 0 | Status: SHIPPED | OUTPATIENT
Start: 2025-04-02

## 2025-04-04 ENCOUNTER — SPECIALTY PHARMACY (OUTPATIENT)
Dept: NEUROLOGY | Facility: CLINIC | Age: 55
End: 2025-04-04
Payer: COMMERCIAL

## 2025-04-04 NOTE — PROGRESS NOTES
Specialty Pharmacy Patient Management Program  Refill Outreach     Emgality PA submitted under [employer] insurance 4/4/25 key [id number] García  4/4/2025  10:40 EDT

## 2025-04-07 ENCOUNTER — SPECIALTY PHARMACY (OUTPATIENT)
Dept: NEUROLOGY | Facility: CLINIC | Age: 55
End: 2025-04-07
Payer: COMMERCIAL

## 2025-04-07 ENCOUNTER — TELEPHONE (OUTPATIENT)
Dept: GASTROENTEROLOGY | Facility: CLINIC | Age: 55
End: 2025-04-07
Payer: COMMERCIAL

## 2025-04-07 DIAGNOSIS — G43.719 INTRACTABLE CHRONIC MIGRAINE WITHOUT AURA AND WITHOUT STATUS MIGRAINOSUS: ICD-10-CM

## 2025-04-07 DIAGNOSIS — J30.1 SEASONAL ALLERGIC RHINITIS DUE TO POLLEN: ICD-10-CM

## 2025-04-07 NOTE — TELEPHONE ENCOUNTER
LEFT VMAIL TO RESCHEDULE ANDREY IS OUT OFFICE OK FOR HUB TO RELAY AND RESCHEDULE  CAN BE RESCHEDULED W/KIKO OR LIUDMILA

## 2025-04-07 NOTE — PROGRESS NOTES
Specialty Pharmacy Patient Management Program  Refill Outreach     Emgality is approved until 4/4/2026     Rhonda García  4/7/2025  15:29 EDT

## 2025-04-09 RX ORDER — GALCANEZUMAB 120 MG/ML
120 INJECTION, SOLUTION SUBCUTANEOUS
Qty: 1 ML | Refills: 1 | Status: SHIPPED | OUTPATIENT
Start: 2025-04-09

## 2025-04-14 ENCOUNTER — SPECIALTY PHARMACY (OUTPATIENT)
Dept: NEUROLOGY | Facility: CLINIC | Age: 55
End: 2025-04-14
Payer: COMMERCIAL

## 2025-04-14 NOTE — PROGRESS NOTES
Specialty Pharmacy Refill Coordination Note     Rosa is a 54 y.o. female contacted today regarding refills of her specialty medication(s).    Specialty medication(s) and dose(s) confirmed: galcanezumab 120 mg subcutaneous every 30 days  Changes to medications: no  Changes to insurance: no    Refill Questions      Flowsheet Row Most Recent Value   Changes to allergies? No   Changes to medications? No   New conditions or infections since last clinic visit No   If yes, please describe  n/a   Unplanned office visit, urgent care, ED, or hospital admission in the last 4 weeks  No   How does patient/caregiver feel medication is working? Very good   Financial problems or insurance changes  No   If yes, describe changes in insurance or financial issues. N/A   Since the previous refill, were any specialty medication doses or scheduled injections missed or delayed?  No   Does this patient require a clinical escalation to a pharmacist? No            Delivery Questions      Flowsheet Row Most Recent Value   Delivery method UPS   Delivery address verified with patient/caregiver? Yes   Delivery address Home   Other address preferred No   Number of medications in delivery 1   Medication(s) being filled and delivered Galcanezumab-gnlm (Emgality)   Doses left of specialty medications 0   Copay verified? Yes   Copay amount $35   Copay form of payment Credit/debit on file   Delivery Date Selection 04/15/25   Signature Required No   Do you consent to receive electronic handouts?  Yes                 Follow-up: 3 weeks     Devon Golden RPH  4/14/2025   09:56 EDT

## 2025-04-24 ENCOUNTER — SPECIALTY PHARMACY (OUTPATIENT)
Dept: NEUROLOGY | Facility: CLINIC | Age: 55
End: 2025-04-24
Payer: COMMERCIAL

## 2025-04-24 NOTE — PROGRESS NOTES
Specialty Pharmacy Patient Management Program  Neurology Medication Addition Assessment     Rosa Melgoza is a 54 y.o. female with chronic migraine seen by a Neurology provider and enrolled in the Neurology Patient Management program offered by [facility].  This assessment was conducted as a result of a specialty medication addition or substitution. The patient was previously introduced to services offered by [facility], including: regular assessments, refill coordination, curbside pick-up or mail order delivery options, prior authorization maintenance, and financial assistance programs as applicable. The patient was also provided with contact information for the pharmacy team.     An initial outreach was conducted, including assessment of therapy appropriateness and specialty medication education for onabotulinumtoxinA (Botox).    A follow-up outreach was conducted, including assessment of continued therapy appropriateness, medication adherence, and side effect incidence and management for galcanezumab (Emgality).      Insurance Coverage & Financial Support  [employer] and copay card      Relevant Past Medical History and Comorbidities  Relevant medical history and concomitant health conditions were discussed with the patient. The patient's chart has been reviewed for relevant past medical history and comorbid health conditions and updated as necessary.   Past Medical History:   Diagnosis Date    Allergic     Anxiety     Cholelithiasis     Crohn disease     Depression     Disease of thyroid gland     Diverticulitis of colon     Diverticulosis     Elevated blood pressure reading without diagnosis of hypertension     Encounter for long-term (current) use of medications 01/13/2020    Fatty liver     GERD (gastroesophageal reflux disease)     Headache, migraine     Hernia     History of medical problems 01/23    not sure of date, but very painful joints and  muscles.  Poss r/t crohns    History of sore throat     Hyperlipidemia     Hypertension     Hypothyroidism     Irritable bowel syndrome     Migraine     Need for DTaP and Hib vaccine     History of     Obesity     Urinary tract infection     Vaginal yeast infection      Social History     Socioeconomic History    Marital status:    Tobacco Use    Smoking status: Former     Current packs/day: 0.00     Average packs/day: 1 pack/day for 10.0 years (10.0 ttl pk-yrs)     Types: Cigarettes     Start date: 3/1/2001     Quit date: 3/1/2011     Years since quittin.1    Smokeless tobacco: Never    Tobacco comments:     smoked off and on   Vaping Use    Vaping status: Never Used   Substance and Sexual Activity    Alcohol use: Not Currently     Comment: once a year or so I have a drink w dinner    Drug use: Never    Sexual activity: Yes     Partners: Male     Birth control/protection: Post-menopausal, Vasectomy, Surgical     Comment:  had vasectomy     Problem list reviewed by Devon Golden RPH on 2025 at  4:16 PM    Hospitalizations and Urgent Care Since Last Assessment  ED Visits, Admissions, or Hospitalizations: none.   Urgent Office Visits: none.       Allergies  Known allergies and reactions were discussed with the patient. The patient's chart has been reviewed for  allergy information and updated as necessary.   No Known Allergies  Allergies reviewed by Devon Golden RPH on 2025 at  4:16 PM      Relevant Laboratory Values  Common labs          2024    10:08 2025    10:28 2025    13:46   Common Labs   Glucose 103  101     BUN 11  8     Creatinine 1.00  0.97     Sodium 144  143     Potassium 3.9  3.8     Chloride 105  102     Calcium 8.6  9.2     Albumin 3.6  4.1     Total Bilirubin <0.2  0.2     Alkaline Phosphatase 76  86     AST (SGOT) 18  14     ALT (SGPT) 15  15     WBC 8.69  7.66     Hemoglobin 11.8  12.8     Hematocrit 34.8  38.8     Platelets 241  235     Total  Cholesterol   159    Triglycerides   122    HDL Cholesterol   49    LDL Cholesterol    88    Hemoglobin A1C   5.60        Lab Assessment  The above labs have been reviewed. No dose adjustments are needed for the specialty medication(s) based on the labs.       Current Medication List  This medication list has been reviewed with the patient and evaluated for any interactions or necessary modifications/recommendations, and updated to include all prescription medications, OTC medications, and supplements the patient is currently taking.  This list reflects what is contained in the patient's profile, which has also been marked as reviewed to communicate to other providers it is the most up to date version of the patient's current medication therapy.     Current Outpatient Medications:     Albuterol-Budesonide 90-80 MCG/ACT aerosol, Inhale 1 dose Every 4 (Four) to 6 (Six) Hours As Needed (cough, wheeze, chest tightness, or shortness of breath)., Disp: 32.1 g, Rfl: 0    atorvastatin (LIPITOR) 10 MG tablet, Take 1 tablet by mouth Daily., Disp: 90 tablet, Rfl: 1    B Complex-C-Folic Acid (STRESS B COMPLEX PO), Take 1 tablet by mouth Daily., Disp: , Rfl:     butalbital-acetaminophen-caffeine (FIORICET, ESGIC) -40 MG per tablet, Take 1 tablet by mouth Every 6 (Six) Hours As Needed for Headache., Disp: 30 tablet, Rfl: 0    Cholecalciferol (VITAMIN D3 GUMMIES ADULT PO), Take 5,000 Units by mouth Daily., Disp: , Rfl:     Dihydroergotamine Mesylate HFA (Trudhesa) 0.725 MG/ACT aerosol solution, 0.725 mg into the nostril(s) as directed by provider As Needed (Moderate to severe headache). One spray into each nostril, maybe repeated after 1 hour if needed, NO more than 2 doses in 24-hour period or 3 doses in 7-day period., Disp: 8 mL, Rfl: 2    escitalopram (LEXAPRO) 20 MG tablet, Take 1 tablet by mouth Daily., Disp: 90 tablet, Rfl: 1    fluticasone (FLONASE) 50 MCG/ACT nasal spray, Use 2 sprays into each nostril as directed by  provider Daily., Disp: 48 g, Rfl: 1    galcanezumab-gnlm (Emgality) 120 MG/ML auto-injector pen, Inject 1 mL under the skin into the appropriate area as directed Every 30 (Thirty) Days., Disp: 1 mL, Rfl: 1    guaiFENesin-codeine (ROMILAR-AC) 100-10 MG/5ML solution/syrup, Take 5 mL by mouth 3 (Three) Times a Day As Needed for cough., Disp: 180 mL, Rfl: 0    hydrOXYzine (ATARAX) 25 MG tablet, Take 1 tablet by mouth Every 8 (Eight) Hours As Needed for Anxiety., Disp: 270 tablet, Rfl: 1    hyoscyamine (LEVSIN) 0.125 MG SL tablet, Place 1 tablet under the tongue Every 6 (Six) Hours As Needed (abdominal pain/cramping)., Disp: 180 tablet, Rfl: 3    levocetirizine (XYZAL) 5 MG tablet, Take 1 tablet by mouth Every Evening., Disp: 90 tablet, Rfl: 1    levothyroxine (SYNTHROID, LEVOTHROID) 88 MCG tablet, Take 1 tablet by mouth Daily., Disp: 90 tablet, Rfl: 1    lisinopril-hydrochlorothiazide (PRINZIDE,ZESTORETIC) 10-12.5 MG per tablet, Take 1 tablet by mouth Daily., Disp: 90 tablet, Rfl: 1    Multiple Vitamins-Minerals (MULTI ADULT GUMMIES PO), , Disp: , Rfl:     OnabotulinumtoxinA 200 units reconstituted solution, Inject 155 Units into the appropriate muscle as directed by prescriber Every 3 (Three) Months. For  Only., Disp: 1 each, Rfl: 3    ondansetron (ZOFRAN) 8 MG tablet, TAKE 1 TABLET BY MOUTH EVERY 8 (EIGHT) HOURS AS NEEDED FOR NAUSEA OR VOMITING., Disp: 90 tablet, Rfl: 0    pantoprazole (PROTONIX) 40 MG EC tablet, Take 1 tablet by mouth 2 (Two) Times a Day., Disp: 180 tablet, Rfl: 1    potassium chloride (Klor-Con M20) 20 MEQ CR tablet, Take 1 tablet by mouth 2 (Two) Times a Day., Disp: 180 tablet, Rfl: 1    promethazine (PHENERGAN) 25 MG tablet, Take 1 tablet by mouth Every 6 (Six) Hours As Needed for Nausea or Vomiting., Disp: 60 tablet, Rfl: 1    Risankizumab-rzaa (Skyrizi) 360 MG/2.4ML solution cartridge, Inject 360 mg under the skin into the appropriate area as directed Every 8 (Eight) Weeks.,  Disp: 2.4 mL, Rfl: 6    Ubiquinol 100 MG capsule, Take 100 mg by mouth Daily., Disp: , Rfl:     Medicines reviewed by Devon Golden RPH on 4/24/2025 at  4:16 PM    Drug Interactions  None identified.        Initial Education Provided for Specialty Medication  The patient has been provided with the following education and any applicable administration techniques (i.e. self-injection) have been demonstrated for the therapies indicated. All questions and concerns have been addressed prior to the patient receiving the medication, and the patient has verbalized comprehension of the education and any materials provided.  Additional patient education shall be provided and documented upon request by the patient, provider or payer.            Botox (onabotulinumtoxinA) For  Only. Provider to inject 155 units IntraMuscularly via 31 equally divided doses into head, neck, and shoulders every 12 weeks.        Medication Expectations   Why am I taking this medication? For prevention and relief of migraines.   What should I expect while on this medication? You should expect to see a decrease in the severity and frequency of migraines..     How does the medication work? Botox enters the nerve endings around where it is injected and blocks the release of chemicals involved in pain transmission.  This prevents activation of pain networks in the brain.   How long will I be on this medication for? The amount of time you will be on this medication will be determined by your doctor based your response.    How do I take this medication? This medication will be given in the office.  It will be given IM into each of 31 sites divided across 7 specific head/neck muscle areas.     What are some possible side effects? Potential side effects including, but not limited to: neck pain and stiffness, neck weakness,  temporary drooping of the eye, rare flu-like symptoms (such as muscle aches and fever), dry eyes, injections  site pain, bleeding, infection, failure of the procedure to help.  More serious side effect such as allergic reaction, dysphagia, respiratory distress, .  Discussed that it may take 10-14 days after first treatment to see improvement of headaches, and that the in-office treatments are done every 12 weeks.  I gave the patient my name and contact information for any additional questions or concerns.       What happens if I miss a dose? N/A          Medication Safety   What are things I should warn my doctor immediately about? Let the provider know immediately if you have difficulty breathing or swallowing, weakness of neck muscles.   What are things that I should be cautious of?  Injections near the eye: This medicine may reduce blinking, which can raise the risk of eye problems, including corneal exposure and ulcers. Tell your doctor right away if you notice that you are blinking less than usual or your eyes feel dry   What are some medications that can interact with this one? N/A          Medication Storage/Handling   How should I handle this medication? N/A   How does this medication need to be stored? N/A   How should I dispose of this medication? N/A          Resources/Support   How can I remind myself to take this medication? Patient will be scheduled every 3 months in clinic.   Is financial support available?  [employer] Botox savings program.   Which vaccines are recommended for me? Talk to your doctor about these vaccines: Flu, Coronavirus (COVID-19), Pneumococcal (pneumonia), Tdap, Hepatitis B, Zoster (shingles)              Adherence, Self-Administration, and Current Therapy Problems  Adherence related to the patient's specialty therapy was discussed with the patient. The Adherence segment of this outreach has been reviewed and updated.     Is there a concern with patient's ability to self administer the medication correctly and without issue?: No  Were any potential barriers to adherence identified during  the initial assessment or patient education?: No  Are there any concerns regarding the patient's understanding of the importance of medication adherence?: No    Adherence Questions  Linked Medication(s) Assessed: Galcanezumab-gnlm (Emgality)  On average, how many doses/injections does the patient miss per month?: 0  What are the identified reasons for non-adherence or missed doses? : no problems identified  What is the estimated medication adherence level?: %  Based on the patient/caregiver response and refill history, does this patient require an MTP to track adherence improvements?: no    Additional Barriers to Patient Self-Administration: no barriers.  Methods for Supporting Patient Self-Administration: no further support needed at this time.      Recently Close Medication Therapy Problems  No medication therapy recommendations to display  Open Medication Therapy Problems  No medication therapy recommendations to display     Adverse Drug Reactions  Medication tolerability: Tolerating with no to minimal ADRs  Medication plan: Continue therapy with normal follow-up  Plan for ADR Management: N/A, no ADR's     Goals of Therapy  Goals related to the patient's specialty therapy were discussed with the patient. The Patient Goals segment of this outreach has been reviewed and updated.   Goals Addressed Today        Specialty Pharmacy General Goal      Reduce frequency and severity of migraines. Prior to Botox/galcanezumab therapy, patient reports experiencing about 15 up to 25 migraine days per month.    4/24/25: Continuing Botox, due to insurance change, Botox approved via pharmacy insurance and sending via [facility] pharmacy to clinic. No changes since last clinical assessment on 2/5/25.     2/5/25: Patient currently prescribed galcanezumab 120 mg monthly injections and Botox. Typically patient reports having 1 migraine monthly. She reports she currently is having increased stress levels related to work  which increases frequency of migraines. When experiencing a migraine she reports she uses acetaminophen, and Fioricet (only if symptoms not relieved with acetaminophen) to completely resolve migraines. Reports no new side effects or concerns at this time.     8/13/24: Patient reports about 1 migraine day per month and not having needed to use Trudhesa in the past 6 months. Patient uses Fioricet for less severe migraines or headaches, which has been completely alleviating migraine symptoms. No side-effects or concerns.     2/20/24 clinical reassessment: Patient reports migraine frequency decreased to 1-2 true migraine days per month and reduced migraine severity by about 50% with combination of Botox/galcanezumab. No side-effects. Trudhesa works to completely alleviate migraines when needed.                Quality of Life Assessment   Quality of Life related to the patient's enrollment in the patient management program and services provided was discussed with the patient. The QOL segment of this outreach has been reviewed and updated.   Quality of Life Improvement Scale: 9-A good deal better      Reassessment Plan & Follow-Up  Medication Therapy Changes: No changes, continuing Botox and galcanezumab for migraine prevention at this time.  Related Plans, Therapy Recommendations, or Issues to Be Addressed: Nothing further to be addressed at this time.    Pharmacist to perform regular reassessments no more than (6) months from the previous assessment.  Care Coordinator to set up future refill outreaches, coordinate prescription delivery, and escalate clinical questions to pharmacist.     Attestation  Therapeutic appropriateness: Appropriate  I attest the patient was actively involved in and has agreed to the above plan of care. If the prescribed therapy is at any point deemed not appropriate based on the current or future assessments, a consultation will be initiated with the patient's specialty care provider to  determine the best course of action. The revised plan of therapy will be documented along with any additional patient education provided. Discussed aforementioned material with patient by phone.    Devon Golden RPH  4/24/2025  16:18 EDT

## 2025-04-24 NOTE — PROGRESS NOTES
Specialty Pharmacy Patient Management Program  Refill Outreach     BOTOX APPROVED THROUGH PHARMACY UNTIL 4/25/2026      Rhonda García  4/24/2025  09:47 EDT

## 2025-04-30 ENCOUNTER — OFFICE VISIT (OUTPATIENT)
Dept: NEUROLOGY | Facility: CLINIC | Age: 55
End: 2025-04-30
Payer: COMMERCIAL

## 2025-04-30 VITALS
SYSTOLIC BLOOD PRESSURE: 126 MMHG | HEIGHT: 64 IN | OXYGEN SATURATION: 99 % | HEART RATE: 74 BPM | WEIGHT: 225 LBS | BODY MASS INDEX: 38.41 KG/M2 | DIASTOLIC BLOOD PRESSURE: 74 MMHG

## 2025-04-30 DIAGNOSIS — G43.719 INTRACTABLE CHRONIC MIGRAINE WITHOUT AURA AND WITHOUT STATUS MIGRAINOSUS: Primary | ICD-10-CM

## 2025-04-30 PROCEDURE — 99214 OFFICE O/P EST MOD 30 MIN: CPT | Performed by: PSYCHIATRY & NEUROLOGY

## 2025-04-30 NOTE — PROGRESS NOTES
Chief Complaint   Patient presents with    Intractable chronic migraine without aura and without statu       Patient ID: Rosa Melgoza is a 54 y.o. female.    HPI:  I had the pleasure of seeing your patient again today.  As you may know she is a 54-year-old female who for the management of migraine headaches.  She has had good results with Botox therapy.  She is also receiving Emgality injections q. monthly.  She does use Nurtec abortively.  She takes Zofran for nausea.  She reports 10 or less days of headaches within the last 30 days.  The second Botox injection series seemed to have been quite helpful for her.  She denies any new onset focal weakness or numbness of her arms or legs.  No double vision or loss of vision.  No slurred speech or other new neuro symptoms.  The following portions of the patient's history were reviewed and updated as appropriate: allergies, current medications, past family history, past medical history, past social history, past surgical history and problem list.    The following portions of the patient's history were reviewed and updated as appropriate: allergies, current medications, past family history, past medical history, past social history, past surgical history and problem list.    Review of Systems   Neurological:  Positive for headaches.      I have reviewed the review of systems above performed by my medical assistant.      Vitals:    04/30/25 1556   BP: 126/74   Pulse: 74   SpO2: 99%       Neurological Exam  Mental Status  Awake, alert and oriented to person, place and time. Recent and remote memory are intact. Speech is normal. Language is fluent with no aphasia. Attention and concentration are normal. Fund of knowledge is appropriate for level of education.    Cranial Nerves  CN I: Sense of smell is normal.  CN II: Visual acuity is normal.  CN III, IV, VI: Extraocular movements intact bilaterally. Pupils equal round and reactive to light bilaterally.  CN V: Facial  sensation is normal.  CN VII: Full and symmetric facial movement.  CN XI: Shoulder shrug strength is normal.  CN XII: Tongue midline without atrophy or fasciculations.    Motor  Normal muscle bulk throughout. No fasciculations present. Normal muscle tone. No abnormal involuntary movements. No pronator drift.                                             Right                     Left  Rhomboids                            5                          5  Infraspinatus                          5                          5  Supraspinatus                       5                          5  Deltoid                                   5                          5   Biceps                                   5                          5  Brachioradialis                      5                          5   Triceps                                  5                          5   Pronator                                5                          5   Supinator                              5                           5   Wrist flexor                            5                          5   Wrist extensor                       5                          5   Finger flexor                          5                          5   Finger extensor                     5                          5   Interossei                              5                          5   Abductor pollicis brevis         5                          5   Flexor pollicis brevis             5                          5   Opponens pollicis                 5                          5  Extensor digitorum               5                          5  Abductor digiti minimi           5                          5   Abdominal                            5                          5  Glutei                                    5                          5  Hip abductor                         5                          5  Hip adductor                         5                           5   Iliopsoas                               5                          5   Quadriceps                           5                          5   Hamstring                             5                          5   Gastrocnemius                     5                           5   Anterior tibialis                      5                          5   Posterior tibialis                    5                          5   Peroneal                               5                          5  Ankle dorsiflexor                   5                          5  Ankle plantar flexor              5                           5  Extensor hallucis longus      5                           5    Sensory  Sensation is intact to light touch, pinprick, vibration and proprioception in all four extremities.    Reflexes  Deep tendon reflexes are 2+ and symmetric in all four extremities.    Right pathological reflexes: Keysha's absent.  Left pathological reflexes: Keysha's absent.    Coordination    Finger-to-nose, rapid alternating movements and heel-to-shin normal bilaterally without dysmetria.    Gait  Normal casual, toe, heel and tandem gait.       Physical Exam  Vitals reviewed.   Constitutional:       Appearance: She is well-developed.   HENT:      Head: Normocephalic and atraumatic.   Eyes:      Extraocular Movements: Extraocular movements intact.      Pupils: Pupils are equal, round, and reactive to light.   Cardiovascular:      Rate and Rhythm: Normal rate and regular rhythm.   Pulmonary:      Breath sounds: Normal breath sounds.   Musculoskeletal:         General: Normal range of motion.   Skin:     General: Skin is warm.   Neurological:      Coordination: Coordination is intact.      Deep Tendon Reflexes: Reflexes are normal and symmetric.   Psychiatric:         Speech: Speech normal.         Procedures    Assessment/Plan: We are going to continue the current medication regimen process.  She will continue with the Botox therapy.   Will see her back for that soon. A total of 30 minutes was spent face-to-face with the patient today.  Of that greater than 50% of this time was spent discussing signs and symptoms of migraine headache, patient education, plan of care and prognosis.         Diagnoses and all orders for this visit:    1. Intractable chronic migraine without aura and without status migrainosus (Primary)  -     rimegepant sulfate ODT (Nurtec) 75 MG disintegrating tablet; Place 1 tablet under the tongue Every Other Day for 10 doses.  Dispense: 10 tablet; Refill: 0           Nemesio Vasquez II, MD

## 2025-05-13 ENCOUNTER — SPECIALTY PHARMACY (OUTPATIENT)
Dept: GASTROENTEROLOGY | Facility: CLINIC | Age: 55
End: 2025-05-13
Payer: COMMERCIAL

## 2025-05-13 DIAGNOSIS — K50.019 CROHN'S DISEASE OF SMALL INTESTINE WITH COMPLICATION: ICD-10-CM

## 2025-05-13 RX ORDER — RISANKIZUMAB-RZAA 360 MG/2.4
360 WEARABLE INJECTOR SUBCUTANEOUS
Qty: 2.4 ML | Refills: 6 | Status: SHIPPED | OUTPATIENT
Start: 2025-05-13

## 2025-05-13 NOTE — PROGRESS NOTES
Nathan MARTIN approved  EOC: [id number]  5/12/25 to 5/12/26     Mari Huntley  5/13/2025  08:11 EDT

## 2025-05-13 NOTE — PROGRESS NOTES
Specialty Pharmacy Patient Management Program  Gastroenterology Clinical Outreach     Rosa Melgoza is a 54 y.o. female seen by a Gastroenterology provider for Crohn's Disease and enrolled in the Gastroenterology Patient Management program offered by [facility].     A one-time clinical outreach was conducted to let patient know her Skyrizi prior authorization was approved on her new insurance, however they do require that she fill through [facility]. LVM for patient that new RX was sent to [facility] for her.     Plan & Follow-Up  Reach out to pharmacy team if you have any issues filling Skyrizi with [facility].    Tanya Ashley, PharmD, BCACP, BCPS, BCCCP  Clinical Specialty Pharmacist, Gastroenterology  05/13/25 15:44 EDT

## 2025-05-14 ENCOUNTER — OFFICE VISIT (OUTPATIENT)
Dept: FAMILY MEDICINE CLINIC | Facility: CLINIC | Age: 55
End: 2025-05-14
Payer: COMMERCIAL

## 2025-05-14 VITALS — WEIGHT: 247.2 LBS | HEART RATE: 78 BPM | HEIGHT: 64 IN | BODY MASS INDEX: 42.2 KG/M2 | OXYGEN SATURATION: 98 %

## 2025-05-14 DIAGNOSIS — K14.8 TONGUE LESION: Primary | ICD-10-CM

## 2025-05-14 PROCEDURE — 99213 OFFICE O/P EST LOW 20 MIN: CPT | Performed by: FAMILY MEDICINE

## 2025-05-14 NOTE — PROGRESS NOTES
"Chief Complaint  weird bump on tongue (Been there for about a month sometimes it hurts)    Subjective        Rosa Melgoza presents to [facility]  History of Present Illness  Patient is here today with a new concern.  About a month ago she first noticed a mass on her tongue.  It has really not changed in size since she first noticed it but it is sore.  She has not noticed any bleeding.  She denies any recent fevers or upper respiratory infections.  She states that it gets in the way of her speech and constantly annoys her while talking    Symptoms are: new.   Onset was 1 to 6 months.   Symptoms occur: constantly.  Symptoms include: joint pain, change in stool, nasal congestion, fatigue, nausea and neck pain.   Pertinent negative symptoms include no abdominal pain, no anorexia, no chest pain, no chills, no cough, no diaphoresis, no fever, no headaches, no joint swelling, no myalgias, no numbness, no rash, no sore throat, no swollen glands, no dysuria, no vertigo, no visual change, no vomiting and no weakness.   Other symptom:  Large bump on tongue  Treatment and/or Medications comments include: none       Objective   Vital Signs:  Pulse 78   Ht 163.8 cm (64.49\")   Wt 112 kg (247 lb 3.2 oz)   SpO2 98%   BMI 41.79 kg/m²   Estimated body mass index is 41.79 kg/m² as calculated from the following:    Height as of this encounter: 163.8 cm (64.49\").    Weight as of this encounter: 112 kg (247 lb 3.2 oz).            Physical Exam  Vitals and nursing note reviewed.   Constitutional:       Appearance: She is well-developed.   HENT:      Head: Normocephalic and atraumatic.      Right Ear: External ear normal.      Left Ear: External ear normal.      Nose: Nose normal.      Mouth/Throat:      Comments: In the center of the patient's tongue there is a flesh-colored 5 mm cauliflower lesion.  No local edema, erythema, or induration.  Eyes:      General: No scleral icterus.     " Conjunctiva/sclera: Conjunctivae normal.   Cardiovascular:      Rate and Rhythm: Normal rate.   Pulmonary:      Effort: Pulmonary effort is normal.   Musculoskeletal:      Cervical back: Normal range of motion and neck supple.   Lymphadenopathy:      Cervical: No cervical adenopathy.   Skin:     General: Skin is warm and dry.      Findings: No rash.   Neurological:      Mental Status: She is alert and oriented to person, place, and time.   Psychiatric:         Behavior: Behavior normal.         Thought Content: Thought content normal.         Judgment: Judgment normal.        Result Review :                Assessment and Plan   Diagnoses and all orders for this visit:    1. Tongue lesion (Primary)  -     Ambulatory Referral to Oral Maxillofacial Surgery    Patient is here today with a new concern of a bump on her tongue that is tender and bothersome during speech.  Referral entered to oral surgery for possible biopsy.           Follow Up   Return if symptoms worsen or fail to improve.  Patient was given instructions and counseling regarding her condition or for health maintenance advice. Please see specific information pulled into the AVS if appropriate.

## 2025-05-15 ENCOUNTER — PROCEDURE VISIT (OUTPATIENT)
Dept: NEUROLOGY | Facility: CLINIC | Age: 55
End: 2025-05-15
Payer: COMMERCIAL

## 2025-05-15 ENCOUNTER — PATIENT MESSAGE (OUTPATIENT)
Dept: FAMILY MEDICINE CLINIC | Facility: CLINIC | Age: 55
End: 2025-05-15
Payer: COMMERCIAL

## 2025-05-15 DIAGNOSIS — G43.719 INTRACTABLE CHRONIC MIGRAINE WITHOUT AURA AND WITHOUT STATUS MIGRAINOSUS: Primary | ICD-10-CM

## 2025-05-15 NOTE — PROGRESS NOTES
Botox injection: Botox injection for neuromuscular block.  Indication: Chronic migraines.  Risks, benefits and alternatives were discussed with the patient.  EMG guidance was not used in identifying the injection site.  Procerus: 5 units was injected.  : 5 units was injected on the right and 5 units injected on the left.  Frontalis: 10 units injected on the right and 10 units injected on the left.  Temporalis: 20 units injected on the right and 20 units injected on the left.  Occipitalis: 15 units injected on the right and 15 units injected on the left.  Cervical paraspinal: 10 units injected on the right and 10 units injected on the left.  Trapezius: 15 units injected on the right and 15 units injected on the left.  200 unit vial, 1 vials, 45 wasted and 155 used.  The patient tolerated the procedure well.  There were no complications.  Patient instructions: The patient was instructed that they may experience localized discomfort over the next 12 to 24 hours and may take over the counter pain medication if needed.  Follow-up in the office in 3 months for next Botox injection.    [facility]

## 2025-05-16 ENCOUNTER — SPECIALTY PHARMACY (OUTPATIENT)
Dept: NEUROLOGY | Facility: CLINIC | Age: 55
End: 2025-05-16
Payer: COMMERCIAL

## 2025-05-16 NOTE — PROGRESS NOTES
Specialty Pharmacy Patient Management Program  Refill Coordination Outreach      Rosa is a 54 y.o. female contacted today regarding refills of her medication(s).    Specialty medication(s) and dose(s) confirmed: Emgality 120 mg injection  Other medications being refilled: N/A    Refill Questions      Flowsheet Row Most Recent Value   Changes to allergies? No   Changes to medications? No   New conditions or infections since last clinic visit No   Unplanned office visit, urgent care, ED, or hospital admission in the last 4 weeks  No   How does patient/caregiver feel medication is working? Very good   Financial problems or insurance changes  No   Since the previous refill, were any specialty medication doses or scheduled injections missed or delayed?  No   Does this patient require a clinical escalation to a pharmacist? No            Delivery Questions      Flowsheet Row Most Recent Value   Delivery method [employer]   Delivery address verified with patient/caregiver? Yes   Delivery address Home   Number of medications in delivery 1   Medication(s) being filled and delivered Galcanezumab-gnlm (Emgality)   Doses left of specialty medications 1 - 2 weeks - Emgality   Copay verified? Yes   Copay amount $35   Copay form of payment No copayment ($0)   Delivery Date Selection 05/20/25   Signature Required No   Do you consent to receive electronic handouts?  --  [This was not discussed]                   Follow-up: 25 days     Lise Lopez, PharmD, BCPS  Clinical Specialty Pharmacist  5/16/2025  11:17 EDT

## 2025-05-22 ENCOUNTER — OFFICE VISIT (OUTPATIENT)
Dept: GASTROENTEROLOGY | Facility: CLINIC | Age: 55
End: 2025-05-22
Payer: COMMERCIAL

## 2025-05-22 VITALS
DIASTOLIC BLOOD PRESSURE: 73 MMHG | WEIGHT: 248 LBS | BODY MASS INDEX: 42.34 KG/M2 | SYSTOLIC BLOOD PRESSURE: 114 MMHG | HEIGHT: 64 IN | TEMPERATURE: 96.8 F | HEART RATE: 88 BPM

## 2025-05-22 DIAGNOSIS — K21.9 GASTROESOPHAGEAL REFLUX DISEASE, UNSPECIFIED WHETHER ESOPHAGITIS PRESENT: ICD-10-CM

## 2025-05-22 DIAGNOSIS — K58.0 IRRITABLE BOWEL SYNDROME WITH DIARRHEA: ICD-10-CM

## 2025-05-22 DIAGNOSIS — K50.00 CROHN'S DISEASE OF SMALL INTESTINE WITHOUT COMPLICATION: Primary | ICD-10-CM

## 2025-05-22 RX ORDER — VONOPRAZAN FUMARATE 13.36 MG/1
10 TABLET ORAL DAILY
Qty: 15 TABLET | Refills: 0 | COMMUNITY
Start: 2025-05-22 | End: 2025-06-06

## 2025-05-22 NOTE — PROGRESS NOTES
Chief Complaint   Patient presents with    Crohn's Disease       HPI    Rosa Melgoza is a  54 y.o. female here for a follow up visit for Crohn's disease.    This patient follows with Dr. Garza, known to me.    She has failed Stelara (hospitalized for active crohn's disease) and Entyvio (persistent joint pains).  She was started on Skyrizi every 8 weeks in the fall 2024.    She underwent bidirectional endoscopic evaluation 3/19/2025 reviewed as follows:    Upper GI Endoscopy (03/19/2025 08:02) - 3 cm hiatal hernia.  Erythematous the antrum.  Normal duodenum.  A few fundic gland polyps.  Pathology was benign.    Colonoscopy (03/19/2025 08:01) - Normal ileum.  TA polyps.  Diverticulosis.  Recall 5 years.  Random colon biopsy showed no signs of active Crohn's disease.    On visit today she reports feeling improved.  She is having roughly 3 bowel movements a day.  Stool consistency varies.  Occasional abdominal cramps but this is better.  No rectal bleeding or rectal pain.  She takes antispasmodics once or twice a week.  She is scheduled for MR enterography in 2 days.    She has had more reflux related symptoms as of late.  No nausea, vomiting, dysphagia or odynophagia. She takes Protonix 40 mg p.o. twice daily and has been on this particular PPI for quite some time.  Weight has been stable.    Past Medical History:   Diagnosis Date    Allergic     Anxiety     Cholelithiasis     removed    Crohn disease     Depression     Disease of thyroid gland     Diverticulitis of colon     Diverticulosis     Elevated blood pressure reading without diagnosis of hypertension     Encounter for long-term (current) use of medications 01/13/2020    Fatty liver     GERD (gastroesophageal reflux disease)     Headache, migraine     Hernia     History of medical problems 01/23    not sure of date, but very painful joints and muscles.  Poss r/t crohns    History of sore throat     Hyperlipidemia     Hypertension     Hypothyroidism      Irritable bowel syndrome     Migraine     Need for DTaP and Hib vaccine     History of     Obesity     Urinary tract infection     Vaginal yeast infection        Past Surgical History:   Procedure Laterality Date    BREAST BIOPSY       SECTION      CHOLECYSTECTOMY      COLONOSCOPY  approx     Tavon LIU  benign polyps per patient    COLONOSCOPY N/A 2020    Procedure: COLONOSCOPY  into cecum and TI with biopsies;  Surgeon: Maicol Garza MD;  Location:  [facility];  Service: Gastroenterology;  Laterality: N/A;  Pre: abn CT scan  post: diverticulosis    COLONOSCOPY N/A 3/19/2025    Procedure: COLONOSCOPY to cecum with cold forcep polypectomies and cold forcep biopsies;  Surgeon: Maicol Garza MD;  Location:  [facility];  Service: Gastroenterology;  Laterality: N/A;  pre:crohn's  post: polyps, diverticulosis    ENDOSCOPY N/A 2020    Procedure: ESOPHAGOGASTRODUODENOSCOPY WITH BIOPSY;  Surgeon: Maicol Garza MD;  [facility];  Service: Gastroenterology;  Laterality: N/A;  Pre: abn CT scan  post: hiatal hernia, esophagitis    ENDOSCOPY N/A 3/19/2025    Procedure: ESOPHAGOGASTRODUODENOSCOPY with cold forcep biopsies;  Surgeon: Maicol Garza MD;  Location[facility];  Service: Gastroenterology;  Laterality: N/A;  pre:crohn's  post:hiatal hernia, fundic gland polyps    FRACTURE SURGERY      broken hand, 3 screws in place    TONSILLECTOMY      UPPER GASTROINTESTINAL ENDOSCOPY  approx     Tavon LIU  normal per patient       Scheduled Meds:     Continuous Infusions: No current facility-administered medications for this visit.      PRN Meds:     No Known Allergies    Social History     Socioeconomic History    Marital status:    Tobacco Use    Smoking status: Former     Current packs/day: 0.00     Average packs/day: 1 pack/day for 10.0 years (10.0 ttl pk-yrs)     Types: Cigarettes     Start date: 3/1/2001     Quit date:  3/1/2011     Years since quittin.2    Smokeless tobacco: Never    Tobacco comments:     smoked off and on   Vaping Use    Vaping status: Never Used   Substance and Sexual Activity    Alcohol use: Not Currently     Comment: once a year or so I have a drink w dinner    Drug use: Never    Sexual activity: Yes     Partners: Male     Birth control/protection: Post-menopausal, Vasectomy, Surgical     Comment:  had vasectomy       Family History   Problem Relation Age of Onset    Breast cancer Mother     Arthritis Mother     Cancer Mother         breast ca    Miscarriages / Stillbirths Mother     Stroke Mother     Migraines Mother     Hypertension Father     Heart disease Father     Alcohol abuse Father     Hyperlipidemia Father     Cirrhosis Father     Breast cancer Maternal Grandmother     Anxiety disorder Maternal Grandmother     Arthritis Maternal Grandmother     Cancer Maternal Grandmother         breast and brain    Depression Maternal Grandmother     Diabetes Maternal Grandmother     Heart disease Maternal Grandmother     Kidney disease Maternal Grandmother     Thyroid disease Maternal Grandmother     Mental illness Maternal Grandmother     Dementia Maternal Grandmother     Stroke Maternal Grandmother     Alcohol abuse Other     Depression Other     Anxiety disorder Other     Hypertension Other     Lung cancer Other     Inflammatory bowel disease Maternal Grandfather     Irritable bowel syndrome Maternal Grandfather         Honestly not sure which he had    Arthritis Maternal Grandfather     Cancer Maternal Grandfather         lung    Stroke Maternal Grandfather     Migraines Sister     Miscarriages / Stillbirths Sister     Diabetes Maternal Aunt     Heart disease Maternal Aunt     Hyperlipidemia Maternal Aunt     Thyroid disease Maternal Aunt     Hyperlipidemia Maternal Uncle      Vitals:    25 1023   BP: 114/73   Pulse: 88   Temp: 96.8 °F (36 °C)       Physical Exam  Constitutional:        Appearance: She is well-developed.   Abdominal:      General: Bowel sounds are normal. There is no distension.      Palpations: Abdomen is soft. There is no mass.      Tenderness: There is no abdominal tenderness. There is no guarding.      Hernia: No hernia is present.   Skin:     General: Skin is warm and dry.      Capillary Refill: Capillary refill takes less than 2 seconds.   Neurological:      Mental Status: She is alert and oriented to person, place, and time.   Psychiatric:         Behavior: Behavior normal.     Assessment    Diagnoses and all orders for this visit:    1. Crohn's disease of small intestine without complication (Primary)    2. Gastroesophageal reflux disease, unspecified whether esophagitis present    3. Irritable bowel syndrome with diarrhea    Other orders  -     Vonoprazan Fumarate (Voquezna) 10 MG tablet; Take 1 tablet by mouth Daily for 15 days.  Dispense: 15 tablet; Refill: 0    Plan    Reviewed procedural findings as well as pathology with the patient all questions were answered  Continue Skyrizi every 8 weeks  Continue antispasmodics  Trial Voquezna 10 mg tablet once daily to improve GERD related symptoms  Hold Protonix while trialing alternative PPI  Further recommendations and follow-up pending MRI results  Consider SIBO breath testing in the future due to symptoms of gas and bloating         NADEGE Sandra  [facility]  [address] [place] [address]  Office: [phone]    I spent 30 minutes caring for Rosa on this date of service. This time includes time spent by me in the following activities: preparing for the visit, reviewing tests, obtaining and/or reviewing a separately obtained history, performing a medically appropriate examination and/or evaluation , counseling and educating the patient/family/caregiver, ordering medications, tests, or procedures, documenting information in the medical record, independently interpreting  results and communicating that information with the patient/family/caregiver and care coordination.

## 2025-05-27 DIAGNOSIS — R10.13 EPIGASTRIC PAIN: ICD-10-CM

## 2025-05-28 ENCOUNTER — PATIENT MESSAGE (OUTPATIENT)
Dept: FAMILY MEDICINE CLINIC | Facility: CLINIC | Age: 55
End: 2025-05-28
Payer: COMMERCIAL

## 2025-05-28 ENCOUNTER — SPECIALTY PHARMACY (OUTPATIENT)
Dept: NEUROLOGY | Facility: CLINIC | Age: 55
End: 2025-05-28
Payer: COMMERCIAL

## 2025-05-28 DIAGNOSIS — R10.13 EPIGASTRIC PAIN: ICD-10-CM

## 2025-05-28 DIAGNOSIS — I10 ESSENTIAL HYPERTENSION: ICD-10-CM

## 2025-05-28 DIAGNOSIS — G43.719 INTRACTABLE CHRONIC MIGRAINE WITHOUT AURA AND WITHOUT STATUS MIGRAINOSUS: ICD-10-CM

## 2025-05-28 DIAGNOSIS — E78.2 MIXED HYPERLIPIDEMIA: ICD-10-CM

## 2025-05-28 DIAGNOSIS — E06.3 HYPOTHYROIDISM DUE TO HASHIMOTO'S THYROIDITIS: ICD-10-CM

## 2025-05-28 DIAGNOSIS — F43.22 ADJUSTMENT DISORDER WITH ANXIOUS MOOD: ICD-10-CM

## 2025-05-28 RX ORDER — HYOSCYAMINE SULFATE 0.12 MG/1
0.12 TABLET SUBLINGUAL EVERY 6 HOURS PRN
Qty: 120 TABLET | Refills: 3 | Status: SHIPPED | OUTPATIENT
Start: 2025-05-28

## 2025-05-28 RX ORDER — VONOPRAZAN FUMARATE 13.36 MG/1
10 TABLET ORAL DAILY
Qty: 90 TABLET | Refills: 3 | COMMUNITY
Start: 2025-05-28

## 2025-05-28 RX ORDER — GALCANEZUMAB 120 MG/ML
120 INJECTION, SOLUTION SUBCUTANEOUS
Qty: 3 ML | Refills: 1 | Status: SHIPPED | OUTPATIENT
Start: 2025-05-28

## 2025-05-28 RX ORDER — ONDANSETRON 8 MG/1
8 TABLET, FILM COATED ORAL EVERY 8 HOURS PRN
Qty: 25 TABLET | Refills: 3 | Status: SHIPPED | OUTPATIENT
Start: 2025-05-28

## 2025-05-29 DIAGNOSIS — G43.109 MIGRAINE WITH AURA AND WITHOUT STATUS MIGRAINOSUS, NOT INTRACTABLE: ICD-10-CM

## 2025-05-29 RX ORDER — LEVOCETIRIZINE DIHYDROCHLORIDE 5 MG/1
5 TABLET, FILM COATED ORAL EVERY EVENING
Qty: 90 TABLET | Refills: 1 | Status: SHIPPED | OUTPATIENT
Start: 2025-05-29

## 2025-05-29 RX ORDER — PROMETHAZINE HYDROCHLORIDE 25 MG/1
25 TABLET ORAL EVERY 6 HOURS PRN
Qty: 60 TABLET | Refills: 1 | Status: SHIPPED | OUTPATIENT
Start: 2025-05-29

## 2025-05-29 RX ORDER — LEVOTHYROXINE SODIUM 88 UG/1
88 TABLET ORAL DAILY
Qty: 90 TABLET | Refills: 1 | Status: SHIPPED | OUTPATIENT
Start: 2025-05-29

## 2025-05-29 RX ORDER — LISINOPRIL AND HYDROCHLOROTHIAZIDE 10; 12.5 MG/1; MG/1
1 TABLET ORAL DAILY
Qty: 90 TABLET | Refills: 1 | Status: SHIPPED | OUTPATIENT
Start: 2025-05-29

## 2025-05-29 RX ORDER — BUTALBITAL, ACETAMINOPHEN AND CAFFEINE 50; 325; 40 MG/1; MG/1; MG/1
1 TABLET ORAL EVERY 6 HOURS PRN
Qty: 30 TABLET | Refills: 0 | Status: SHIPPED | OUTPATIENT
Start: 2025-05-29

## 2025-05-29 RX ORDER — HYDROXYZINE HYDROCHLORIDE 25 MG/1
25 TABLET, FILM COATED ORAL EVERY 8 HOURS PRN
Qty: 270 TABLET | Refills: 1 | Status: SHIPPED | OUTPATIENT
Start: 2025-05-29

## 2025-05-29 RX ORDER — ESCITALOPRAM OXALATE 20 MG/1
20 TABLET ORAL DAILY
Qty: 90 TABLET | Refills: 1 | Status: SHIPPED | OUTPATIENT
Start: 2025-05-29

## 2025-05-29 RX ORDER — ATORVASTATIN CALCIUM 10 MG/1
10 TABLET, FILM COATED ORAL DAILY
Qty: 90 TABLET | Refills: 1 | Status: SHIPPED | OUTPATIENT
Start: 2025-05-29

## 2025-05-29 NOTE — TELEPHONE ENCOUNTER
Rx Refill Note  Requested Prescriptions     Pending Prescriptions Disp Refills    promethazine (PHENERGAN) 25 MG tablet 60 tablet 1     Sig: Take 1 tablet by mouth Every 6 (Six) Hours As Needed for Nausea or Vomiting.      Last office visit with prescribing clinician: 5/14/2025   Last telemedicine visit with prescribing clinician: Visit date not found   Next office visit with prescribing clinician: Visit date not found                         Would you like a call back once the refill request has been completed: [] Yes [] No    If the office needs to give you a call back, can they leave a voicemail: [] Yes [] No    Stacey Reid MA  05/29/25, 15:04 EDT

## 2025-05-29 NOTE — TELEPHONE ENCOUNTER
Rx Refill Note  Requested Prescriptions     Pending Prescriptions Disp Refills    butalbital-acetaminophen-caffeine (FIORICET, ESGIC) -40 MG per tablet 30 tablet 0     Sig: Take 1 tablet by mouth Every 6 (Six) Hours As Needed for Headache.      Last office visit with prescribing clinician: 5/14/2025   Last telemedicine visit with prescribing clinician: Visit date not found   Next office visit with prescribing clinician: Visit date not found                         Would you like a call back once the refill request has been completed: [] Yes [] No    If the office needs to give you a call back, can they leave a voicemail: [] Yes [] No    Stacey Reid MA  05/29/25, 14:36 EDT

## 2025-05-29 NOTE — TELEPHONE ENCOUNTER
Rx Refill Note  Requested Prescriptions     Pending Prescriptions Disp Refills    levocetirizine (XYZAL) 5 MG tablet 90 tablet 1     Sig: Take 1 tablet by mouth Every Evening.      Last office visit with prescribing clinician: 1/20/2025   Last telemedicine visit with prescribing clinician: Visit date not found   Next office visit with prescribing clinician: Visit date not found                         Would you like a call back once the refill request has been completed: [] Yes [] No    If the office needs to give you a call back, can they leave a voicemail: [] Yes [] No    Stacey Reid MA  05/29/25, 15:05 EDT

## 2025-06-02 RX ORDER — POTASSIUM CHLORIDE 1500 MG/1
20 TABLET, EXTENDED RELEASE ORAL 2 TIMES DAILY
Qty: 180 TABLET | Refills: 1 | Status: SHIPPED | OUTPATIENT
Start: 2025-06-02

## 2025-06-03 RX ORDER — VONOPRAZAN FUMARATE 13.36 MG/1
10 TABLET ORAL DAILY
Qty: 90 TABLET | Refills: 3 | Status: SHIPPED | OUTPATIENT
Start: 2025-06-03

## 2025-06-10 ENCOUNTER — TELEPHONE (OUTPATIENT)
Dept: NEUROLOGY | Facility: CLINIC | Age: 55
End: 2025-06-10
Payer: COMMERCIAL

## 2025-06-10 NOTE — TELEPHONE ENCOUNTER
Caller: [facility]ONICA Fajardo - [facility] - [phone]  - [phone] FX    Relationship: Pharmacy    Best call back number: [phone]    What was the call regarding: CADEN CALLED TO GET CLARIFICATION IF THE PT HAS RECEIVED THE STARTER DOSAGE FOR THE EMGALITY.     SHE STATED PPW WAS FAXED TO THE OFFICE     PLEASE REVIEW  THANK YOU

## 2025-06-11 NOTE — TELEPHONE ENCOUNTER
Spoke with pharmacist (Desmond, I believe) and let them know that the patient has been on this medication since 2022 and that she has completed the started dose. They are a new pharmacy for her so they don't have former records of fills. So they were clarifying that the script/dose sent over is accurate. They will notate the account.

## 2025-06-18 NOTE — TELEPHONE ENCOUNTER
Called and left voicemail to let her know that I had received a message to schedule her a follow up and that I scheduled her because we had a cancellation and if it doesn't work out she can call back and reschedule to different time and date      Ok for hub to read and reschedule    99

## 2025-07-09 DIAGNOSIS — G43.109 MIGRAINE WITH AURA AND WITHOUT STATUS MIGRAINOSUS, NOT INTRACTABLE: ICD-10-CM

## 2025-07-09 RX ORDER — BUTALBITAL, ACETAMINOPHEN AND CAFFEINE 50; 325; 40 MG/1; MG/1; MG/1
TABLET ORAL
Qty: 30 TABLET | Refills: 1 | Status: SHIPPED | OUTPATIENT
Start: 2025-07-09

## 2025-07-09 NOTE — TELEPHONE ENCOUNTER
Rx Refill Note  Requested Prescriptions     Pending Prescriptions Disp Refills    butalbital-acetaminophen-caffeine (FIORICET, ESGIC) -40 MG per tablet [Pharmacy Med Name: BUT/APAP/CAF TAB 325MG] 30 tablet 0     Sig: TAKE 1 TABLET EVERY 6 HOURSAS NEEDED FOR HEADACHE      Last office visit with prescribing clinician: 5/14/2025   Last telemedicine visit with prescribing clinician: Visit date not found   Next office visit with prescribing clinician: Visit date not found                         Would you like a call back once the refill request has been completed: [] Yes [] No    If the office needs to give you a call back, can they leave a voicemail: [] Yes [] No    Stacey Reid MA  07/09/25, 09:46 EDT

## 2025-07-11 ENCOUNTER — TELEPHONE (OUTPATIENT)
Dept: FAMILY MEDICINE CLINIC | Facility: CLINIC | Age: 55
End: 2025-07-11

## 2025-07-30 ENCOUNTER — SPECIALTY PHARMACY (OUTPATIENT)
Dept: NEUROLOGY | Facility: CLINIC | Age: 55
End: 2025-07-30
Payer: COMMERCIAL

## 2025-07-30 NOTE — PROGRESS NOTES
Specialty Pharmacy Patient Management Program  Refill Outreach     Rosa was contacted today regarding refills of their medication(s).    Refill Questions      Flowsheet Row Most Recent Value   Changes to allergies? No   Changes to medications? No   New conditions or infections since last clinic visit No   If yes, please describe  n/a   Unplanned office visit, urgent care, ED, or hospital admission in the last 4 weeks  No   How does patient/caregiver feel medication is working? Very good   Financial problems or insurance changes  No   If yes, describe changes in insurance or financial issues. N/A   Since the previous refill, were any specialty medication doses or scheduled injections missed or delayed?  No   Does this patient require a clinical escalation to a pharmacist? No            Delivery Questions      Flowsheet Row Most Recent Value   Delivery method [facility]   Delivery address verified with patient/caregiver? No   Delivery address [facility]   Other address preferred [facility] shipping to clinic   Number of medications in delivery 1   Medication(s) being filled and delivered OnabotulinumtoxinA   Doses left of specialty medications 0   Copay verified? No   Copay amount $0   Copay form of payment No copayment ($0)   Delivery Date Selection 08/01/25   Signature Required No                 Follow-up: 85 day(s)     Rhonda García  7/30/2025  16:03 EDT

## 2025-08-07 ENCOUNTER — PROCEDURE VISIT (OUTPATIENT)
Dept: NEUROLOGY | Facility: CLINIC | Age: 55
End: 2025-08-07
Payer: COMMERCIAL

## 2025-08-07 DIAGNOSIS — G43.719 INTRACTABLE CHRONIC MIGRAINE WITHOUT AURA AND WITHOUT STATUS MIGRAINOSUS: Primary | ICD-10-CM

## 2025-08-07 RX ORDER — VONOPRAZAN FUMARATE 13.36 MG/1
10 TABLET ORAL DAILY
Qty: 90 TABLET | Refills: 3 | Status: SHIPPED | OUTPATIENT
Start: 2025-08-07

## 2025-08-08 ENCOUNTER — TELEPHONE (OUTPATIENT)
Dept: GASTROENTEROLOGY | Facility: CLINIC | Age: 55
End: 2025-08-08
Payer: COMMERCIAL

## 2025-08-08 DIAGNOSIS — K58.0 IRRITABLE BOWEL SYNDROME WITH DIARRHEA: ICD-10-CM

## 2025-08-08 DIAGNOSIS — K50.019 CROHN'S DISEASE OF SMALL INTESTINE WITH COMPLICATION: ICD-10-CM

## 2025-08-08 DIAGNOSIS — K50.00 CROHN'S ILEITIS, WITHOUT COMPLICATIONS: ICD-10-CM

## 2025-08-08 DIAGNOSIS — K50.919 CROHN'S DISEASE WITH COMPLICATION, UNSPECIFIED GASTROINTESTINAL TRACT LOCATION: ICD-10-CM

## 2025-08-08 DIAGNOSIS — K50.00 CROHN'S DISEASE OF SMALL INTESTINE WITHOUT COMPLICATION: Primary | ICD-10-CM

## 2025-08-21 ENCOUNTER — LAB (OUTPATIENT)
Dept: LAB | Facility: HOSPITAL | Age: 55
End: 2025-08-21
Payer: COMMERCIAL

## (undated) DEVICE — THE SINGLE USE ETRAP – POLYP TRAP IS USED FOR SUCTION RETRIEVAL OF ENDOSCOPICALLY REMOVED POLYPS.: Brand: ETRAP

## (undated) DEVICE — SENSR O2 OXIMAX FNGR A/ 18IN NONSTR

## (undated) DEVICE — TUBING, SUCTION, 1/4" X 10', STRAIGHT: Brand: MEDLINE

## (undated) DEVICE — ADAPT CLN BIOGUARD AIR/H2O DISP

## (undated) DEVICE — FRCP GRASP CAESER 5.5F 240CM DISP STRL

## (undated) DEVICE — BLCK/BITE BLOX W/DENTL/RIM W/STRAP 54F

## (undated) DEVICE — MSK PROC CURAPLEX O2 2/ADAPT 7FT

## (undated) DEVICE — BITEBLOCK OMNI BLOC

## (undated) DEVICE — KT ORCA ORCAPOD DISP STRL

## (undated) DEVICE — CANN O2 ETCO2 FITS ALL CONN CO2 SMPL A/ 7IN DISP LF

## (undated) DEVICE — THE TORRENT IRRIGATION SCOPE CONNECTOR IS USED WITH THE TORRENT IRRIGATION TUBING TO PROVIDE IRRIGATION FLUIDS SUCH AS STERILE WATER DURING GASTROINTESTINAL ENDOSCOPIC PROCEDURES WHEN USED IN CONJUNCTION WITH AN IRRIGATION PUMP (OR ELECTROSURGICAL UNIT).: Brand: TORRENT

## (undated) DEVICE — LN SMPL CO2 SHTRM SD STREAM W/M LUER

## (undated) DEVICE — SINGLE-USE BIOPSY FORCEPS: Brand: RADIAL JAW 4

## (undated) DEVICE — LASSO POLYPECTOMY SNARE: Brand: LASSO